# Patient Record
Sex: FEMALE | Race: WHITE | NOT HISPANIC OR LATINO | Employment: OTHER | URBAN - METROPOLITAN AREA
[De-identification: names, ages, dates, MRNs, and addresses within clinical notes are randomized per-mention and may not be internally consistent; named-entity substitution may affect disease eponyms.]

---

## 2017-02-01 ENCOUNTER — COMMUNICATION - HEALTHEAST (OUTPATIENT)
Dept: INTERNAL MEDICINE | Facility: CLINIC | Age: 82
End: 2017-02-01

## 2017-02-01 DIAGNOSIS — E78.5 DYSLIPIDEMIA: ICD-10-CM

## 2017-02-08 ENCOUNTER — COMMUNICATION - HEALTHEAST (OUTPATIENT)
Dept: INTERNAL MEDICINE | Facility: CLINIC | Age: 82
End: 2017-02-08

## 2017-02-08 DIAGNOSIS — N39.0 RECURRENT UTI: ICD-10-CM

## 2017-03-09 ENCOUNTER — COMMUNICATION - HEALTHEAST (OUTPATIENT)
Dept: INTERNAL MEDICINE | Facility: CLINIC | Age: 82
End: 2017-03-09

## 2017-03-09 DIAGNOSIS — I25.10 CAD (CORONARY ARTERY DISEASE): ICD-10-CM

## 2017-04-10 ENCOUNTER — COMMUNICATION - HEALTHEAST (OUTPATIENT)
Dept: INTERNAL MEDICINE | Facility: CLINIC | Age: 82
End: 2017-04-10

## 2017-04-10 DIAGNOSIS — G47.00 INSOMNIA: ICD-10-CM

## 2017-04-13 ENCOUNTER — RECORDS - HEALTHEAST (OUTPATIENT)
Dept: ADMINISTRATIVE | Facility: OTHER | Age: 82
End: 2017-04-13

## 2017-04-20 ENCOUNTER — RECORDS - HEALTHEAST (OUTPATIENT)
Dept: ADMINISTRATIVE | Facility: OTHER | Age: 82
End: 2017-04-20

## 2017-04-26 ENCOUNTER — RECORDS - HEALTHEAST (OUTPATIENT)
Dept: ADMINISTRATIVE | Facility: OTHER | Age: 82
End: 2017-04-26

## 2017-05-05 ENCOUNTER — OFFICE VISIT - HEALTHEAST (OUTPATIENT)
Dept: PULMONOLOGY | Facility: OTHER | Age: 82
End: 2017-05-05

## 2017-05-05 DIAGNOSIS — J45.909 ASTHMA: ICD-10-CM

## 2017-05-17 ENCOUNTER — RECORDS - HEALTHEAST (OUTPATIENT)
Dept: ADMINISTRATIVE | Facility: OTHER | Age: 82
End: 2017-05-17

## 2017-06-14 ENCOUNTER — RECORDS - HEALTHEAST (OUTPATIENT)
Dept: ADMINISTRATIVE | Facility: OTHER | Age: 82
End: 2017-06-14

## 2017-06-16 ENCOUNTER — OFFICE VISIT - HEALTHEAST (OUTPATIENT)
Dept: INTERNAL MEDICINE | Facility: CLINIC | Age: 82
End: 2017-06-16

## 2017-06-16 DIAGNOSIS — E78.00 PURE HYPERCHOLESTEROLEMIA: ICD-10-CM

## 2017-06-16 DIAGNOSIS — M81.0 SENILE OSTEOPOROSIS: ICD-10-CM

## 2017-06-16 DIAGNOSIS — S62.109A WRIST FRACTURE: ICD-10-CM

## 2017-06-16 DIAGNOSIS — I10 ESSENTIAL HYPERTENSION WITH GOAL BLOOD PRESSURE LESS THAN 140/90: ICD-10-CM

## 2017-06-16 DIAGNOSIS — J45.40 MODERATE PERSISTENT ASTHMA WITHOUT COMPLICATION: ICD-10-CM

## 2017-06-16 DIAGNOSIS — I25.10 CAD (CORONARY ARTERY DISEASE): ICD-10-CM

## 2017-06-16 LAB
CHOLEST SERPL-MCNC: 205 MG/DL
FASTING STATUS PATIENT QL REPORTED: YES
HDLC SERPL-MCNC: 73 MG/DL
LDLC SERPL CALC-MCNC: 109 MG/DL
TRIGL SERPL-MCNC: 114 MG/DL

## 2017-06-17 ENCOUNTER — COMMUNICATION - HEALTHEAST (OUTPATIENT)
Dept: INTERNAL MEDICINE | Facility: CLINIC | Age: 82
End: 2017-06-17

## 2017-06-17 DIAGNOSIS — I10 ESSENTIAL HYPERTENSION WITH GOAL BLOOD PRESSURE LESS THAN 140/90: ICD-10-CM

## 2017-06-26 ENCOUNTER — OFFICE VISIT - HEALTHEAST (OUTPATIENT)
Dept: INTERNAL MEDICINE | Facility: CLINIC | Age: 82
End: 2017-06-26

## 2017-06-26 DIAGNOSIS — E78.5 DYSLIPIDEMIA: ICD-10-CM

## 2017-06-26 DIAGNOSIS — N63.0 BREAST LUMP: ICD-10-CM

## 2017-06-26 DIAGNOSIS — Z48.02 VISIT FOR SUTURE REMOVAL: ICD-10-CM

## 2017-06-27 ENCOUNTER — COMMUNICATION - HEALTHEAST (OUTPATIENT)
Dept: INTERNAL MEDICINE | Facility: CLINIC | Age: 82
End: 2017-06-27

## 2017-06-28 ENCOUNTER — RECORDS - HEALTHEAST (OUTPATIENT)
Dept: ADMINISTRATIVE | Facility: OTHER | Age: 82
End: 2017-06-28

## 2017-07-11 ENCOUNTER — HOSPITAL ENCOUNTER (OUTPATIENT)
Dept: MAMMOGRAPHY | Facility: HOSPITAL | Age: 82
Discharge: HOME | End: 2017-07-11
Attending: INTERNAL MEDICINE

## 2017-07-11 ENCOUNTER — COMMUNICATION - HEALTHEAST (OUTPATIENT)
Dept: INTERNAL MEDICINE | Facility: CLINIC | Age: 82
End: 2017-07-11

## 2017-07-11 DIAGNOSIS — N63.0 BREAST LUMP: ICD-10-CM

## 2017-07-13 ENCOUNTER — HOSPITAL ENCOUNTER (OUTPATIENT)
Dept: MAMMOGRAPHY | Facility: HOSPITAL | Age: 82
Discharge: HOME | End: 2017-07-13
Attending: INTERNAL MEDICINE

## 2017-07-13 DIAGNOSIS — N63.0 BREAST LUMP: ICD-10-CM

## 2017-07-14 ENCOUNTER — COMMUNICATION - HEALTHEAST (OUTPATIENT)
Dept: MAMMOGRAPHY | Facility: HOSPITAL | Age: 82
End: 2017-07-14

## 2017-07-14 LAB
LAB AP CHARGES (HE HISTORICAL CONVERSION): ABNORMAL
PATH REPORT.COMMENTS IMP SPEC: ABNORMAL
PATH REPORT.COMMENTS IMP SPEC: ABNORMAL
PATH REPORT.FINAL DX SPEC: ABNORMAL
PATH REPORT.GROSS SPEC: ABNORMAL
PATH REPORT.MICROSCOPIC SPEC OTHER STN: ABNORMAL
PATH REPORT.MICROSCOPIC SPEC OTHER STN: ABNORMAL
PATH REPORT.RELEVANT HX SPEC: ABNORMAL
RESULT FLAG (HE HISTORICAL CONVERSION): ABNORMAL

## 2017-07-25 ENCOUNTER — COMMUNICATION - HEALTHEAST (OUTPATIENT)
Dept: INTERNAL MEDICINE | Facility: CLINIC | Age: 82
End: 2017-07-25

## 2017-07-25 ENCOUNTER — OFFICE VISIT - HEALTHEAST (OUTPATIENT)
Dept: SURGERY | Facility: CLINIC | Age: 82
End: 2017-07-25

## 2017-07-25 DIAGNOSIS — C50.512 MALIGNANT NEOPLASM OF LOWER-OUTER QUADRANT OF LEFT FEMALE BREAST (H): ICD-10-CM

## 2017-07-26 ENCOUNTER — RECORDS - HEALTHEAST (OUTPATIENT)
Dept: ADMINISTRATIVE | Facility: OTHER | Age: 82
End: 2017-07-26

## 2017-07-27 ENCOUNTER — OFFICE VISIT - HEALTHEAST (OUTPATIENT)
Dept: FAMILY MEDICINE | Facility: CLINIC | Age: 82
End: 2017-07-27

## 2017-07-27 ENCOUNTER — AMBULATORY - HEALTHEAST (OUTPATIENT)
Dept: SURGERY | Facility: CLINIC | Age: 82
End: 2017-07-27

## 2017-07-27 DIAGNOSIS — C50.912 BREAST CANCER, LEFT (H): ICD-10-CM

## 2017-07-27 DIAGNOSIS — Z01.818 PRE-OP EXAM: ICD-10-CM

## 2017-07-27 DIAGNOSIS — R94.31 EKG ABNORMALITY: ICD-10-CM

## 2017-07-28 ENCOUNTER — RECORDS - HEALTHEAST (OUTPATIENT)
Dept: ADMINISTRATIVE | Facility: OTHER | Age: 82
End: 2017-07-28

## 2017-07-28 ENCOUNTER — HOSPITAL ENCOUNTER (OUTPATIENT)
Dept: CARDIOLOGY | Facility: CLINIC | Age: 82
Discharge: HOME | End: 2017-07-28

## 2017-07-28 DIAGNOSIS — R94.31 EKG ABNORMALITY: ICD-10-CM

## 2017-07-28 LAB
AORTIC ROOT: 4.1 CM
AORTIC VALVE MEAN VELOCITY: 78.2 CM/S
AR DECEL SLOPE: 1800 MM/S2
AR PEAK VELOCITY: 342 CM/S
ASCENDING AORTA: 4.1 CM
ATRIAL RATE - MUSE: 61 BPM
AV DIMENSIONLESS INDEX VTI: 0.7
AV MEAN GRADIENT: 3 MMHG
AV PEAK GRADIENT: 5.8 MMHG
AV REGURGITANT PEAK GRADIENT: 46.8 MMHG
AV REGURGITATION PRESSURE HALF TIME: 568 MS
AV VALVE AREA: 2.2 CM2
AV VELOCITY RATIO: 0.7
BSA FOR ECHO PROCEDURE: 1.76 M2
DIASTOLIC BLOOD PRESSURE - MUSE: NORMAL MMHG
DOP CALC AO PEAK VEL: 120 CM/S
DOP CALC AO VTI: 30.7 CM
DOP CALC LVOT AREA: 3.14 CM2
DOP CALC LVOT DIAMETER: 2 CM
DOP CALC LVOT PEAK VEL: 78.2 CM/S
DOP CALC LVOT STROKE VOLUME: 66.3 CM3
DOP CALCLVOT PEAK VEL VTI: 21.1 CM
EJECTION FRACTION: 53 % (ref 55–75)
FRACTIONAL SHORTENING: 32 % (ref 28–44)
INTERPRETATION ECG - MUSE: NORMAL
INTERVENTRICULAR SEPTUM IN END DIASTOLE: 1.2 CM (ref 0.6–0.9)
IVS/PW RATIO: 1
LA AREA 1: 18.4 CM2
LA AREA 2: 18.3 CM2
LEFT ATRIUM LENGTH: 5.25 CM
LEFT ATRIUM SIZE: 3.4 CM
LEFT ATRIUM VOLUME INDEX: 31 ML/M2
LEFT ATRIUM VOLUME: 54.5 CM3
LEFT VENTRICLE DIASTOLIC VOLUME INDEX: 37.5 CM3/M2 (ref 34–74)
LEFT VENTRICLE DIASTOLIC VOLUME: 66 CM3 (ref 46–106)
LEFT VENTRICLE MASS INDEX: 132.8 G/M2
LEFT VENTRICLE SYSTOLIC VOLUME INDEX: 17.6 CM3/M2 (ref 11–31)
LEFT VENTRICLE SYSTOLIC VOLUME: 31 CM3 (ref 14–42)
LEFT VENTRICULAR INTERNAL DIMENSION IN DIASTOLE: 5 CM (ref 3.8–5.2)
LEFT VENTRICULAR INTERNAL DIMENSION IN SYSTOLE: 3.4 CM (ref 2.2–3.5)
LEFT VENTRICULAR MASS: 233.7 G
LEFT VENTRICULAR OUTFLOW TRACT MEAN GRADIENT: 1 MMHG
LEFT VENTRICULAR OUTFLOW TRACT MEAN VELOCITY: 54.6 CM/S
LEFT VENTRICULAR OUTFLOW TRACT PEAK GRADIENT: 2 MMHG
LEFT VENTRICULAR POSTERIOR WALL IN END DIASTOLE: 1.2 CM (ref 0.6–0.9)
LV STROKE VOLUME INDEX: 37.6 ML/M2
MITRAL VALVE E/A RATIO: 0.5
MV AVERAGE E/E' RATIO: 12.3 CM/S
MV DECELERATION TIME: 398 MS
MV E'TISSUE VEL-LAT: 4.48 CM/S
MV E'TISSUE VEL-MED: 4.58 CM/S
MV LATERAL E/E' RATIO: 12.5
MV MEDIAL E/E' RATIO: 12.2
MV PEAK A VELOCITY: 102 CM/S
MV PEAK E VELOCITY: 55.8 CM/S
P AXIS - MUSE: NORMAL DEGREES
PR INTERVAL - MUSE: NORMAL MS
QRS DURATION - MUSE: 84 MS
QT - MUSE: 460 MS
QTC - MUSE: 463 MS
R AXIS - MUSE: -9 DEGREES
SYSTOLIC BLOOD PRESSURE - MUSE: NORMAL MMHG
T AXIS - MUSE: 21 DEGREES
TRICUSPID REGURGITATION PEAK PRESSURE GRADIENT: 33.2 MMHG
TRICUSPID VALVE ANULAR PLANE SYSTOLIC EXCURSION: 2.1 CM
TRICUSPID VALVE PEAK REGURGITANT VELOCITY: 288 CM/S
VENTRICULAR RATE- MUSE: 61 BPM

## 2017-07-31 ENCOUNTER — COMMUNICATION - HEALTHEAST (OUTPATIENT)
Dept: SCHEDULING | Facility: CLINIC | Age: 82
End: 2017-07-31

## 2017-08-01 ENCOUNTER — COMMUNICATION - HEALTHEAST (OUTPATIENT)
Dept: INTERNAL MEDICINE | Facility: CLINIC | Age: 82
End: 2017-08-01

## 2017-08-01 DIAGNOSIS — J45.40 MODERATE PERSISTENT ASTHMA, UNCOMPLICATED: ICD-10-CM

## 2017-08-02 ENCOUNTER — HOSPITAL ENCOUNTER (OUTPATIENT)
Dept: NUCLEAR MEDICINE | Facility: HOSPITAL | Age: 82
Discharge: HOME | End: 2017-08-02
Attending: SPECIALIST

## 2017-08-02 ENCOUNTER — RECORDS - HEALTHEAST (OUTPATIENT)
Dept: ADMINISTRATIVE | Facility: OTHER | Age: 82
End: 2017-08-02

## 2017-08-02 ENCOUNTER — HOSPITAL ENCOUNTER (OUTPATIENT)
Dept: MAMMOGRAPHY | Facility: HOSPITAL | Age: 82
Discharge: HOME | End: 2017-08-02
Attending: SPECIALIST

## 2017-08-02 DIAGNOSIS — C50.512 MALIGNANT NEOPLASM OF LOWER-OUTER QUADRANT OF LEFT FEMALE BREAST (H): ICD-10-CM

## 2017-08-10 ENCOUNTER — OFFICE VISIT - HEALTHEAST (OUTPATIENT)
Dept: SURGERY | Facility: CLINIC | Age: 82
End: 2017-08-10

## 2017-08-10 DIAGNOSIS — C50.512 MALIGNANT NEOPLASM OF LOWER-OUTER QUADRANT OF LEFT FEMALE BREAST (H): ICD-10-CM

## 2017-08-11 ENCOUNTER — COMMUNICATION - HEALTHEAST (OUTPATIENT)
Dept: ONCOLOGY | Facility: HOSPITAL | Age: 82
End: 2017-08-11

## 2017-08-18 ENCOUNTER — OFFICE VISIT - HEALTHEAST (OUTPATIENT)
Dept: ONCOLOGY | Facility: HOSPITAL | Age: 82
End: 2017-08-18

## 2017-08-18 DIAGNOSIS — C50.412 MALIGNANT NEOPLASM OF UPPER-OUTER QUADRANT OF LEFT FEMALE BREAST (H): ICD-10-CM

## 2017-08-18 ASSESSMENT — MIFFLIN-ST. JEOR: SCORE: 1119.19

## 2017-08-22 ENCOUNTER — COMMUNICATION - HEALTHEAST (OUTPATIENT)
Dept: ONCOLOGY | Facility: HOSPITAL | Age: 82
End: 2017-08-22

## 2017-08-24 ENCOUNTER — OFFICE VISIT - HEALTHEAST (OUTPATIENT)
Dept: PULMONOLOGY | Facility: OTHER | Age: 82
End: 2017-08-24

## 2017-08-24 DIAGNOSIS — J45.909 ASTHMA: ICD-10-CM

## 2017-08-29 ENCOUNTER — OFFICE VISIT - HEALTHEAST (OUTPATIENT)
Dept: CARDIOLOGY | Facility: CLINIC | Age: 82
End: 2017-08-29

## 2017-08-29 DIAGNOSIS — I10 ESSENTIAL HYPERTENSION: ICD-10-CM

## 2017-08-29 DIAGNOSIS — I25.10 CORONARY ARTERY DISEASE INVOLVING NATIVE CORONARY ARTERY OF NATIVE HEART WITHOUT ANGINA PECTORIS: ICD-10-CM

## 2017-08-29 DIAGNOSIS — E78.5 DYSLIPIDEMIA: ICD-10-CM

## 2017-08-29 LAB
ATRIAL RATE - MUSE: 63 BPM
DIASTOLIC BLOOD PRESSURE - MUSE: NORMAL MMHG
INTERPRETATION ECG - MUSE: NORMAL
P AXIS - MUSE: 63 DEGREES
PR INTERVAL - MUSE: 188 MS
QRS DURATION - MUSE: 86 MS
QT - MUSE: 432 MS
QTC - MUSE: 442 MS
R AXIS - MUSE: -20 DEGREES
SYSTOLIC BLOOD PRESSURE - MUSE: NORMAL MMHG
T AXIS - MUSE: -2 DEGREES
VENTRICULAR RATE- MUSE: 63 BPM

## 2017-08-29 ASSESSMENT — MIFFLIN-ST. JEOR: SCORE: 1116.02

## 2017-08-31 ENCOUNTER — OFFICE VISIT - HEALTHEAST (OUTPATIENT)
Dept: RADIATION ONCOLOGY | Facility: CLINIC | Age: 82
End: 2017-08-31

## 2017-08-31 DIAGNOSIS — C50.412 MALIGNANT NEOPLASM OF UPPER-OUTER QUADRANT OF LEFT FEMALE BREAST (H): ICD-10-CM

## 2017-08-31 ASSESSMENT — MIFFLIN-ST. JEOR: SCORE: 1117.83

## 2017-09-06 ENCOUNTER — COMMUNICATION - HEALTHEAST (OUTPATIENT)
Dept: PULMONOLOGY | Facility: OTHER | Age: 82
End: 2017-09-06

## 2017-09-06 DIAGNOSIS — J45.909 ASTHMA: ICD-10-CM

## 2017-09-07 ENCOUNTER — AMBULATORY - HEALTHEAST (OUTPATIENT)
Dept: RADIATION ONCOLOGY | Facility: HOSPITAL | Age: 82
End: 2017-09-07

## 2017-09-11 ENCOUNTER — COMMUNICATION - HEALTHEAST (OUTPATIENT)
Dept: INTERNAL MEDICINE | Facility: CLINIC | Age: 82
End: 2017-09-11

## 2017-09-13 ENCOUNTER — OFFICE VISIT - HEALTHEAST (OUTPATIENT)
Dept: RADIATION ONCOLOGY | Facility: CLINIC | Age: 82
End: 2017-09-13

## 2017-09-13 DIAGNOSIS — C50.412 MALIGNANT NEOPLASM OF UPPER-OUTER QUADRANT OF LEFT FEMALE BREAST (H): ICD-10-CM

## 2017-09-15 ENCOUNTER — AMBULATORY - HEALTHEAST (OUTPATIENT)
Dept: PULMONOLOGY | Facility: OTHER | Age: 82
End: 2017-09-15

## 2017-09-15 DIAGNOSIS — J45.909 ASTHMA: ICD-10-CM

## 2017-09-20 ENCOUNTER — COMMUNICATION - HEALTHEAST (OUTPATIENT)
Dept: ONCOLOGY | Facility: HOSPITAL | Age: 82
End: 2017-09-20

## 2017-09-20 ENCOUNTER — OFFICE VISIT - HEALTHEAST (OUTPATIENT)
Dept: RADIATION ONCOLOGY | Facility: CLINIC | Age: 82
End: 2017-09-20

## 2017-09-20 DIAGNOSIS — C50.412 MALIGNANT NEOPLASM OF UPPER-OUTER QUADRANT OF LEFT FEMALE BREAST (H): ICD-10-CM

## 2017-09-21 ENCOUNTER — RECORDS - HEALTHEAST (OUTPATIENT)
Dept: ADMINISTRATIVE | Facility: OTHER | Age: 82
End: 2017-09-21

## 2017-09-27 ENCOUNTER — COMMUNICATION - HEALTHEAST (OUTPATIENT)
Dept: INTERNAL MEDICINE | Facility: CLINIC | Age: 82
End: 2017-09-27

## 2017-09-27 ENCOUNTER — OFFICE VISIT - HEALTHEAST (OUTPATIENT)
Dept: RADIATION ONCOLOGY | Facility: CLINIC | Age: 82
End: 2017-09-27

## 2017-09-27 ENCOUNTER — RECORDS - HEALTHEAST (OUTPATIENT)
Dept: ADMINISTRATIVE | Facility: OTHER | Age: 82
End: 2017-09-27

## 2017-09-27 ENCOUNTER — RECORDS - HEALTHEAST (OUTPATIENT)
Dept: BONE DENSITY | Facility: CLINIC | Age: 82
End: 2017-09-27

## 2017-09-27 DIAGNOSIS — G47.00 INSOMNIA: ICD-10-CM

## 2017-09-27 DIAGNOSIS — M81.0 AGE-RELATED OSTEOPOROSIS WITHOUT CURRENT PATHOLOGICAL FRACTURE: ICD-10-CM

## 2017-09-27 DIAGNOSIS — C50.412 MALIGNANT NEOPLASM OF UPPER-OUTER QUADRANT OF LEFT FEMALE BREAST (H): ICD-10-CM

## 2017-10-04 ENCOUNTER — OFFICE VISIT - HEALTHEAST (OUTPATIENT)
Dept: RADIATION ONCOLOGY | Facility: CLINIC | Age: 82
End: 2017-10-04

## 2017-10-04 ENCOUNTER — AMBULATORY - HEALTHEAST (OUTPATIENT)
Dept: RADIATION ONCOLOGY | Facility: CLINIC | Age: 82
End: 2017-10-04

## 2017-10-04 DIAGNOSIS — C50.412 MALIGNANT NEOPLASM OF UPPER-OUTER QUADRANT OF LEFT FEMALE BREAST (H): ICD-10-CM

## 2017-10-06 ENCOUNTER — AMBULATORY - HEALTHEAST (OUTPATIENT)
Dept: NURSING | Facility: CLINIC | Age: 82
End: 2017-10-06

## 2017-10-11 ENCOUNTER — COMMUNICATION - HEALTHEAST (OUTPATIENT)
Dept: RADIATION ONCOLOGY | Facility: CLINIC | Age: 82
End: 2017-10-11

## 2017-10-19 ENCOUNTER — OFFICE VISIT - HEALTHEAST (OUTPATIENT)
Dept: ONCOLOGY | Facility: HOSPITAL | Age: 82
End: 2017-10-19

## 2017-10-19 DIAGNOSIS — C50.412 MALIGNANT NEOPLASM OF UPPER-OUTER QUADRANT OF LEFT FEMALE BREAST (H): ICD-10-CM

## 2017-10-30 ENCOUNTER — COMMUNICATION - HEALTHEAST (OUTPATIENT)
Dept: ONCOLOGY | Facility: HOSPITAL | Age: 82
End: 2017-10-30

## 2017-11-03 ENCOUNTER — OFFICE VISIT - HEALTHEAST (OUTPATIENT)
Dept: RADIATION ONCOLOGY | Facility: HOSPITAL | Age: 82
End: 2017-11-03

## 2017-11-03 DIAGNOSIS — C50.412 MALIGNANT NEOPLASM OF UPPER-OUTER QUADRANT OF LEFT FEMALE BREAST (H): ICD-10-CM

## 2017-11-10 ENCOUNTER — OFFICE VISIT - HEALTHEAST (OUTPATIENT)
Dept: PULMONOLOGY | Facility: OTHER | Age: 82
End: 2017-11-10

## 2017-11-10 DIAGNOSIS — J45.909 ASTHMA: ICD-10-CM

## 2017-11-14 ENCOUNTER — COMMUNICATION - HEALTHEAST (OUTPATIENT)
Dept: INTERNAL MEDICINE | Facility: CLINIC | Age: 82
End: 2017-11-14

## 2017-11-20 ENCOUNTER — COMMUNICATION - HEALTHEAST (OUTPATIENT)
Dept: ONCOLOGY | Facility: CLINIC | Age: 82
End: 2017-11-20

## 2017-12-20 ENCOUNTER — OFFICE VISIT - HEALTHEAST (OUTPATIENT)
Dept: INTERNAL MEDICINE | Facility: CLINIC | Age: 82
End: 2017-12-20

## 2017-12-20 DIAGNOSIS — I10 ESSENTIAL HYPERTENSION: ICD-10-CM

## 2017-12-20 DIAGNOSIS — Z79.811 AROMATASE INHIBITOR USE: ICD-10-CM

## 2017-12-20 DIAGNOSIS — M81.0 SENILE OSTEOPOROSIS: ICD-10-CM

## 2017-12-20 DIAGNOSIS — I25.10 CAD (CORONARY ARTERY DISEASE): ICD-10-CM

## 2017-12-20 DIAGNOSIS — R29.6 FREQUENT FALLS: ICD-10-CM

## 2017-12-20 DIAGNOSIS — E78.5 DYSLIPIDEMIA: ICD-10-CM

## 2017-12-20 DIAGNOSIS — C50.412 MALIGNANT NEOPLASM OF UPPER-OUTER QUADRANT OF LEFT FEMALE BREAST (H): ICD-10-CM

## 2017-12-20 DIAGNOSIS — J45.40 MODERATE PERSISTENT ASTHMA WITHOUT COMPLICATION: ICD-10-CM

## 2017-12-20 LAB
CHOLEST SERPL-MCNC: 202 MG/DL
FASTING STATUS PATIENT QL REPORTED: YES
HDLC SERPL-MCNC: 71 MG/DL
LDLC SERPL CALC-MCNC: 102 MG/DL
TRIGL SERPL-MCNC: 146 MG/DL

## 2017-12-22 ENCOUNTER — COMMUNICATION - HEALTHEAST (OUTPATIENT)
Dept: INTERNAL MEDICINE | Facility: CLINIC | Age: 82
End: 2017-12-22

## 2017-12-27 ENCOUNTER — RECORDS - HEALTHEAST (OUTPATIENT)
Dept: ADMINISTRATIVE | Facility: OTHER | Age: 82
End: 2017-12-27

## 2017-12-30 ENCOUNTER — COMMUNICATION - HEALTHEAST (OUTPATIENT)
Dept: INTERNAL MEDICINE | Facility: CLINIC | Age: 82
End: 2017-12-30

## 2017-12-30 DIAGNOSIS — G47.00 INSOMNIA: ICD-10-CM

## 2018-01-11 ENCOUNTER — OFFICE VISIT - HEALTHEAST (OUTPATIENT)
Dept: PHYSICAL THERAPY | Facility: REHABILITATION | Age: 83
End: 2018-01-11

## 2018-01-11 DIAGNOSIS — R29.6 FREQUENT FALLS: ICD-10-CM

## 2018-01-11 DIAGNOSIS — Z74.09 IMPAIRED FUNCTIONAL MOBILITY, BALANCE, GAIT, AND ENDURANCE: ICD-10-CM

## 2018-01-17 ENCOUNTER — OFFICE VISIT - HEALTHEAST (OUTPATIENT)
Dept: PHYSICAL THERAPY | Facility: REHABILITATION | Age: 83
End: 2018-01-17

## 2018-01-17 DIAGNOSIS — Z74.09 IMPAIRED FUNCTIONAL MOBILITY, BALANCE, GAIT, AND ENDURANCE: ICD-10-CM

## 2018-01-17 DIAGNOSIS — R29.6 FREQUENT FALLS: ICD-10-CM

## 2018-01-18 ENCOUNTER — OFFICE VISIT - HEALTHEAST (OUTPATIENT)
Dept: ONCOLOGY | Facility: HOSPITAL | Age: 83
End: 2018-01-18

## 2018-01-18 DIAGNOSIS — C50.412 MALIGNANT NEOPLASM OF UPPER-OUTER QUADRANT OF LEFT FEMALE BREAST (H): ICD-10-CM

## 2018-01-19 ENCOUNTER — OFFICE VISIT - HEALTHEAST (OUTPATIENT)
Dept: PHYSICAL THERAPY | Facility: REHABILITATION | Age: 83
End: 2018-01-19

## 2018-01-19 DIAGNOSIS — Z74.09 IMPAIRED FUNCTIONAL MOBILITY, BALANCE, GAIT, AND ENDURANCE: ICD-10-CM

## 2018-01-19 DIAGNOSIS — R29.6 FREQUENT FALLS: ICD-10-CM

## 2018-01-24 ENCOUNTER — OFFICE VISIT - HEALTHEAST (OUTPATIENT)
Dept: PHYSICAL THERAPY | Facility: REHABILITATION | Age: 83
End: 2018-01-24

## 2018-01-24 DIAGNOSIS — Z74.09 IMPAIRED FUNCTIONAL MOBILITY, BALANCE, GAIT, AND ENDURANCE: ICD-10-CM

## 2018-01-24 DIAGNOSIS — R29.6 FREQUENT FALLS: ICD-10-CM

## 2018-01-26 ENCOUNTER — OFFICE VISIT - HEALTHEAST (OUTPATIENT)
Dept: PHYSICAL THERAPY | Facility: REHABILITATION | Age: 83
End: 2018-01-26

## 2018-01-26 DIAGNOSIS — R29.6 FREQUENT FALLS: ICD-10-CM

## 2018-01-26 DIAGNOSIS — Z74.09 IMPAIRED FUNCTIONAL MOBILITY, BALANCE, GAIT, AND ENDURANCE: ICD-10-CM

## 2018-01-31 ENCOUNTER — OFFICE VISIT - HEALTHEAST (OUTPATIENT)
Dept: PHYSICAL THERAPY | Facility: REHABILITATION | Age: 83
End: 2018-01-31

## 2018-01-31 DIAGNOSIS — Z74.09 IMPAIRED FUNCTIONAL MOBILITY, BALANCE, GAIT, AND ENDURANCE: ICD-10-CM

## 2018-01-31 DIAGNOSIS — R29.6 FREQUENT FALLS: ICD-10-CM

## 2018-02-14 ENCOUNTER — OFFICE VISIT - HEALTHEAST (OUTPATIENT)
Dept: PHYSICAL THERAPY | Facility: REHABILITATION | Age: 83
End: 2018-02-14

## 2018-02-14 DIAGNOSIS — Z74.09 IMPAIRED FUNCTIONAL MOBILITY, BALANCE, GAIT, AND ENDURANCE: ICD-10-CM

## 2018-02-14 DIAGNOSIS — R29.6 FREQUENT FALLS: ICD-10-CM

## 2018-03-16 ENCOUNTER — OFFICE VISIT - HEALTHEAST (OUTPATIENT)
Dept: PULMONOLOGY | Facility: OTHER | Age: 83
End: 2018-03-16

## 2018-03-16 DIAGNOSIS — J45.909 ASTHMA: ICD-10-CM

## 2018-04-19 ENCOUNTER — OFFICE VISIT - HEALTHEAST (OUTPATIENT)
Dept: ONCOLOGY | Facility: HOSPITAL | Age: 83
End: 2018-04-19

## 2018-04-19 DIAGNOSIS — Z17.0 MALIGNANT NEOPLASM OF UPPER-OUTER QUADRANT OF LEFT BREAST IN FEMALE, ESTROGEN RECEPTOR POSITIVE (H): ICD-10-CM

## 2018-04-19 DIAGNOSIS — C50.412 MALIGNANT NEOPLASM OF UPPER-OUTER QUADRANT OF LEFT BREAST IN FEMALE, ESTROGEN RECEPTOR POSITIVE (H): ICD-10-CM

## 2018-05-09 ENCOUNTER — COMMUNICATION - HEALTHEAST (OUTPATIENT)
Dept: ONCOLOGY | Facility: HOSPITAL | Age: 83
End: 2018-05-09

## 2018-05-25 ENCOUNTER — COMMUNICATION - HEALTHEAST (OUTPATIENT)
Dept: INTERNAL MEDICINE | Facility: CLINIC | Age: 83
End: 2018-05-25

## 2018-05-25 DIAGNOSIS — N39.0 RECURRENT UTI: ICD-10-CM

## 2018-05-27 ENCOUNTER — OFFICE VISIT - HEALTHEAST (OUTPATIENT)
Dept: FAMILY MEDICINE | Facility: CLINIC | Age: 83
End: 2018-05-27

## 2018-05-27 DIAGNOSIS — J22 LOWER RESPIRATORY INFECTION: ICD-10-CM

## 2018-05-27 DIAGNOSIS — J45.901 ASTHMA EXACERBATION: ICD-10-CM

## 2018-05-29 ENCOUNTER — COMMUNICATION - HEALTHEAST (OUTPATIENT)
Dept: INTERNAL MEDICINE | Facility: CLINIC | Age: 83
End: 2018-05-29

## 2018-05-29 DIAGNOSIS — N39.0 RECURRENT UTI: ICD-10-CM

## 2018-06-14 ENCOUNTER — OFFICE VISIT - HEALTHEAST (OUTPATIENT)
Dept: INTERNAL MEDICINE | Facility: CLINIC | Age: 83
End: 2018-06-14

## 2018-06-14 DIAGNOSIS — R06.09 OTHER FORMS OF DYSPNEA: ICD-10-CM

## 2018-06-14 DIAGNOSIS — I10 ESSENTIAL HYPERTENSION: ICD-10-CM

## 2018-06-14 DIAGNOSIS — E78.00 PURE HYPERCHOLESTEROLEMIA: ICD-10-CM

## 2018-06-14 DIAGNOSIS — I10 ESSENTIAL HYPERTENSION WITH GOAL BLOOD PRESSURE LESS THAN 140/90: ICD-10-CM

## 2018-06-14 DIAGNOSIS — R06.09 DYSPNEA ON EXERTION: ICD-10-CM

## 2018-06-14 DIAGNOSIS — C50.412 MALIGNANT NEOPLASM OF UPPER-OUTER QUADRANT OF LEFT BREAST IN FEMALE, ESTROGEN RECEPTOR POSITIVE (H): ICD-10-CM

## 2018-06-14 DIAGNOSIS — M81.0 SENILE OSTEOPOROSIS: ICD-10-CM

## 2018-06-14 DIAGNOSIS — I25.10 CAD (CORONARY ARTERY DISEASE): ICD-10-CM

## 2018-06-14 DIAGNOSIS — J45.40 MODERATE PERSISTENT ASTHMA WITHOUT COMPLICATION: ICD-10-CM

## 2018-06-14 DIAGNOSIS — R60.0 LEG EDEMA: ICD-10-CM

## 2018-06-14 DIAGNOSIS — Z17.0 MALIGNANT NEOPLASM OF UPPER-OUTER QUADRANT OF LEFT BREAST IN FEMALE, ESTROGEN RECEPTOR POSITIVE (H): ICD-10-CM

## 2018-06-15 ENCOUNTER — COMMUNICATION - HEALTHEAST (OUTPATIENT)
Dept: INTERNAL MEDICINE | Facility: CLINIC | Age: 83
End: 2018-06-15

## 2018-06-18 ENCOUNTER — AMBULATORY - HEALTHEAST (OUTPATIENT)
Dept: LAB | Facility: CLINIC | Age: 83
End: 2018-06-18

## 2018-06-18 DIAGNOSIS — I10 ESSENTIAL HYPERTENSION: ICD-10-CM

## 2018-06-18 DIAGNOSIS — C50.412 MALIGNANT NEOPLASM OF UPPER-OUTER QUADRANT OF LEFT BREAST IN FEMALE, ESTROGEN RECEPTOR POSITIVE (H): ICD-10-CM

## 2018-06-18 DIAGNOSIS — Z17.0 MALIGNANT NEOPLASM OF UPPER-OUTER QUADRANT OF LEFT BREAST IN FEMALE, ESTROGEN RECEPTOR POSITIVE (H): ICD-10-CM

## 2018-06-18 DIAGNOSIS — M81.0 SENILE OSTEOPOROSIS: ICD-10-CM

## 2018-06-18 DIAGNOSIS — R06.09 DYSPNEA ON EXERTION: ICD-10-CM

## 2018-06-18 DIAGNOSIS — J45.40 MODERATE PERSISTENT ASTHMA WITHOUT COMPLICATION: ICD-10-CM

## 2018-06-18 DIAGNOSIS — I10 ESSENTIAL HYPERTENSION WITH GOAL BLOOD PRESSURE LESS THAN 140/90: ICD-10-CM

## 2018-06-18 DIAGNOSIS — E78.00 PURE HYPERCHOLESTEROLEMIA: ICD-10-CM

## 2018-06-18 LAB
ALBUMIN SERPL-MCNC: 3.4 G/DL (ref 3.5–5)
ALP SERPL-CCNC: 81 U/L (ref 45–120)
ALT SERPL W P-5'-P-CCNC: 14 U/L (ref 0–45)
ANION GAP SERPL CALCULATED.3IONS-SCNC: 10 MMOL/L (ref 5–18)
AST SERPL W P-5'-P-CCNC: 22 U/L (ref 0–40)
BILIRUB SERPL-MCNC: 0.6 MG/DL (ref 0–1)
BUN SERPL-MCNC: 13 MG/DL (ref 8–28)
CALCIUM SERPL-MCNC: 9.1 MG/DL (ref 8.5–10.5)
CHLORIDE BLD-SCNC: 107 MMOL/L (ref 98–107)
CHOLEST SERPL-MCNC: 191 MG/DL
CO2 SERPL-SCNC: 25 MMOL/L (ref 22–31)
CREAT SERPL-MCNC: 0.78 MG/DL (ref 0.6–1.1)
ERYTHROCYTE [DISTWIDTH] IN BLOOD BY AUTOMATED COUNT: 11.6 % (ref 11–14.5)
FASTING STATUS PATIENT QL REPORTED: YES
GFR SERPL CREATININE-BSD FRML MDRD: >60 ML/MIN/1.73M2
GLUCOSE BLD-MCNC: 89 MG/DL (ref 70–125)
HCT VFR BLD AUTO: 37.9 % (ref 35–47)
HDLC SERPL-MCNC: 60 MG/DL
HGB BLD-MCNC: 12.6 G/DL (ref 12–16)
LDLC SERPL CALC-MCNC: 94 MG/DL
MCH RBC QN AUTO: 31 PG (ref 27–34)
MCHC RBC AUTO-ENTMCNC: 33.2 G/DL (ref 32–36)
MCV RBC AUTO: 93 FL (ref 80–100)
PLATELET # BLD AUTO: 246 THOU/UL (ref 140–440)
PMV BLD AUTO: 7 FL (ref 7–10)
POTASSIUM BLD-SCNC: 4 MMOL/L (ref 3.5–5)
PROT SERPL-MCNC: 6.7 G/DL (ref 6–8)
RBC # BLD AUTO: 4.06 MILL/UL (ref 3.8–5.4)
SODIUM SERPL-SCNC: 142 MMOL/L (ref 136–145)
TRIGL SERPL-MCNC: 183 MG/DL
WBC: 7.5 THOU/UL (ref 4–11)

## 2018-06-25 ENCOUNTER — OFFICE VISIT - HEALTHEAST (OUTPATIENT)
Dept: FAMILY MEDICINE | Facility: CLINIC | Age: 83
End: 2018-06-25

## 2018-06-25 DIAGNOSIS — R30.0 DYSURIA: ICD-10-CM

## 2018-06-25 LAB
ALBUMIN UR-MCNC: NEGATIVE MG/DL
APPEARANCE UR: CLEAR
BACTERIA #/AREA URNS HPF: ABNORMAL HPF
BILIRUB UR QL STRIP: NEGATIVE
COLOR UR AUTO: YELLOW
GLUCOSE UR STRIP-MCNC: NEGATIVE MG/DL
HGB UR QL STRIP: NEGATIVE
KETONES UR STRIP-MCNC: NEGATIVE MG/DL
LEUKOCYTE ESTERASE UR QL STRIP: ABNORMAL
NITRATE UR QL: NEGATIVE
PH UR STRIP: 7 [PH] (ref 5–8)
RBC #/AREA URNS AUTO: ABNORMAL HPF
SP GR UR STRIP: 1.01 (ref 1–1.03)
SQUAMOUS #/AREA URNS AUTO: ABNORMAL LPF
TRANS CELLS #/AREA URNS HPF: ABNORMAL LPF
UROBILINOGEN UR STRIP-ACNC: ABNORMAL
WBC #/AREA URNS AUTO: ABNORMAL HPF

## 2018-06-28 LAB — BACTERIA SPEC CULT: ABNORMAL

## 2018-06-29 ENCOUNTER — OFFICE VISIT - HEALTHEAST (OUTPATIENT)
Dept: INTERNAL MEDICINE | Facility: CLINIC | Age: 83
End: 2018-06-29

## 2018-06-29 DIAGNOSIS — N39.0 URINARY TRACT INFECTION: ICD-10-CM

## 2018-06-29 DIAGNOSIS — J45.40 MODERATE PERSISTENT ASTHMA WITHOUT COMPLICATION: ICD-10-CM

## 2018-07-02 ENCOUNTER — HOSPITAL ENCOUNTER (OUTPATIENT)
Dept: NUCLEAR MEDICINE | Facility: HOSPITAL | Age: 83
Discharge: HOME | End: 2018-07-02
Attending: INTERNAL MEDICINE

## 2018-07-02 ENCOUNTER — HOSPITAL ENCOUNTER (OUTPATIENT)
Dept: CARDIOLOGY | Facility: HOSPITAL | Age: 83
Discharge: HOME | End: 2018-07-02
Attending: INTERNAL MEDICINE

## 2018-07-02 DIAGNOSIS — R06.09 OTHER FORMS OF DYSPNEA: ICD-10-CM

## 2018-07-02 DIAGNOSIS — R06.09 DYSPNEA ON EXERTION: ICD-10-CM

## 2018-07-02 LAB
AORTIC ROOT: 4.4 CM
AORTIC VALVE MEAN VELOCITY: 125 CM/S
AV DIMENSIONLESS INDEX VTI: 0.6
AV MEAN GRADIENT: 7 MMHG
AV PEAK GRADIENT: 9.4 MMHG
AV VALVE AREA: 2.9 CM2
AV VELOCITY RATIO: 0.6
BSA FOR ECHO PROCEDURE: 1.82 M2
CV BLOOD PRESSURE: NORMAL MMHG
CV ECHO HEIGHT: 60 IN
CV ECHO WEIGHT: 172 LBS
CV STRESS MAX HR HE: 91
DOP CALC AO PEAK VEL: 153 CM/S
DOP CALC AO VTI: 31.1 CM
DOP CALC LVOT AREA: 4.91 CM2
DOP CALC LVOT DIAMETER: 2.5 CM
DOP CALC LVOT PEAK VEL: 93.8 CM/S
DOP CALC LVOT STROKE VOLUME: 90.8 CM3
DOP CALCLVOT PEAK VEL VTI: 18.5 CM
ECHO EJECTION FRACTION ESTIMATED: 55 %
EJECTION FRACTION: 53 % (ref 55–75)
FRACTIONAL SHORTENING: 38.1 % (ref 28–44)
INTERVENTRICULAR SEPTUM IN END DIASTOLE: 1.04 CM (ref 0.6–0.9)
IVS/PW RATIO: 0.9
LA AREA 1: 17.8 CM2
LA AREA 2: 13.8 CM2
LEFT ATRIUM LENGTH: 4.4 CM
LEFT ATRIUM SIZE: 4.4 CM
LEFT ATRIUM VOLUME INDEX: 26.1 ML/M2
LEFT ATRIUM VOLUME: 47.5 ML
LEFT VENTRICLE CARDIAC INDEX: 3.5 L/MIN/M2
LEFT VENTRICLE CARDIAC OUTPUT: 6.4 L/MIN
LEFT VENTRICLE DIASTOLIC VOLUME INDEX: 47.4 CM3/M2 (ref 34–74)
LEFT VENTRICLE DIASTOLIC VOLUME: 86.2 CM3 (ref 46–106)
LEFT VENTRICLE HEART RATE: 70 BPM
LEFT VENTRICLE MASS INDEX: 115 G/M2
LEFT VENTRICLE SYSTOLIC VOLUME INDEX: 22.1 CM3/M2 (ref 11–31)
LEFT VENTRICLE SYSTOLIC VOLUME: 40.2 CM3 (ref 14–42)
LEFT VENTRICULAR INTERNAL DIMENSION IN DIASTOLE: 5.15 CM (ref 3.8–5.2)
LEFT VENTRICULAR INTERNAL DIMENSION IN SYSTOLE: 3.19 CM (ref 2.2–3.5)
LEFT VENTRICULAR MASS: 209.3 G
LEFT VENTRICULAR OUTFLOW TRACT MEAN GRADIENT: 3 MMHG
LEFT VENTRICULAR OUTFLOW TRACT MEAN VELOCITY: 77.8 CM/S
LEFT VENTRICULAR OUTFLOW TRACT PEAK GRADIENT: 4 MMHG
LEFT VENTRICULAR POSTERIOR WALL IN END DIASTOLE: 1.1 CM (ref 0.6–0.9)
LV STROKE VOLUME INDEX: 49.9 ML/M2
MITRAL REGURGITANT VELOCITY TIME INTEGRAL: 145 CM
MITRAL VALVE E/A RATIO: 0.6
MR MEAN GRADIENT: 77 MMHG
MR MEAN GRADIENT: 77 MMHG
MR MEAN VELOCITY: 433 CM/S
MR MEAN VELOCITY: 433 CM/S
MR PEAK GRADIENT: 94.1 MMHG
MV AVERAGE E/E' RATIO: 17 CM/S
MV DECELERATION TIME: 292 MS
MV E'TISSUE VEL-LAT: 3.87 CM/S
MV E'TISSUE VEL-MED: 3.58 CM/S
MV LATERAL E/E' RATIO: 16.4
MV MEDIAL E/E' RATIO: 17.7
MV PEAK A VELOCITY: 114 CM/S
MV PEAK E VELOCITY: 63.5 CM/S
NUC REST DIASTOLIC VOLUME INDEX: 2752 LBS
NUC REST SYSTOLIC VOLUME INDEX: 60 IN
NUC STRESS EJECTION FRACTION: 72 %
PISA MR PEAK VEL: 485 CM/S
STRESS ECHO BASELINE BP: NORMAL MM OF HG
STRESS ECHO BASELINE HR: 76 BPM
STRESS ECHO CALCULATED PERCENT HR: 68 %
STRESS ECHO LAST STRESS BP: NORMAL MM OF HG
TRICUSPID VALVE ANULAR PLANE SYSTOLIC EXCURSION: 1.8 CM

## 2018-07-02 ASSESSMENT — MIFFLIN-ST. JEOR: SCORE: 1121.69

## 2018-07-06 ENCOUNTER — COMMUNICATION - HEALTHEAST (OUTPATIENT)
Dept: INTERNAL MEDICINE | Facility: CLINIC | Age: 83
End: 2018-07-06

## 2018-07-13 ENCOUNTER — COMMUNICATION - HEALTHEAST (OUTPATIENT)
Dept: PULMONOLOGY | Facility: OTHER | Age: 83
End: 2018-07-13

## 2018-07-13 DIAGNOSIS — J45.909 ASTHMA: ICD-10-CM

## 2018-07-16 ENCOUNTER — HOSPITAL ENCOUNTER (OUTPATIENT)
Dept: MAMMOGRAPHY | Facility: CLINIC | Age: 83
Discharge: HOME | End: 2018-07-16
Attending: SPECIALIST

## 2018-07-16 DIAGNOSIS — C50.512 MALIGNANT NEOPLASM OF LOWER-OUTER QUADRANT OF LEFT FEMALE BREAST (H): ICD-10-CM

## 2018-07-27 ENCOUNTER — COMMUNICATION - HEALTHEAST (OUTPATIENT)
Dept: INTERNAL MEDICINE | Facility: CLINIC | Age: 83
End: 2018-07-27

## 2018-07-30 ENCOUNTER — COMMUNICATION - HEALTHEAST (OUTPATIENT)
Dept: INTERNAL MEDICINE | Facility: CLINIC | Age: 83
End: 2018-07-30

## 2018-07-30 DIAGNOSIS — I10 ESSENTIAL HYPERTENSION WITH GOAL BLOOD PRESSURE LESS THAN 140/90: ICD-10-CM

## 2018-07-31 ENCOUNTER — COMMUNICATION - HEALTHEAST (OUTPATIENT)
Dept: INTERNAL MEDICINE | Facility: CLINIC | Age: 83
End: 2018-07-31

## 2018-07-31 DIAGNOSIS — I25.10 CAD (CORONARY ARTERY DISEASE): ICD-10-CM

## 2018-08-01 ENCOUNTER — OFFICE VISIT - HEALTHEAST (OUTPATIENT)
Dept: PULMONOLOGY | Facility: OTHER | Age: 83
End: 2018-08-01

## 2018-08-01 DIAGNOSIS — I10 ESSENTIAL HYPERTENSION WITH GOAL BLOOD PRESSURE LESS THAN 140/90: ICD-10-CM

## 2018-08-01 DIAGNOSIS — J84.112 IPF (IDIOPATHIC PULMONARY FIBROSIS) (H): ICD-10-CM

## 2018-08-07 ENCOUNTER — COMMUNICATION - HEALTHEAST (OUTPATIENT)
Dept: INTERNAL MEDICINE | Facility: CLINIC | Age: 83
End: 2018-08-07

## 2018-08-07 ENCOUNTER — HOSPITAL ENCOUNTER (OUTPATIENT)
Dept: CT IMAGING | Facility: HOSPITAL | Age: 83
Discharge: HOME | End: 2018-08-07
Attending: INTERNAL MEDICINE

## 2018-08-07 DIAGNOSIS — E78.5 DYSLIPIDEMIA: ICD-10-CM

## 2018-08-07 DIAGNOSIS — J84.112 IPF (IDIOPATHIC PULMONARY FIBROSIS) (H): ICD-10-CM

## 2018-08-09 ENCOUNTER — COMMUNICATION - HEALTHEAST (OUTPATIENT)
Dept: INTERNAL MEDICINE | Facility: CLINIC | Age: 83
End: 2018-08-09

## 2018-08-09 DIAGNOSIS — E78.5 DYSLIPIDEMIA: ICD-10-CM

## 2018-08-09 DIAGNOSIS — E78.00 HYPERCHOLESTEROLEMIA: ICD-10-CM

## 2018-08-20 ENCOUNTER — OFFICE VISIT - HEALTHEAST (OUTPATIENT)
Dept: ONCOLOGY | Facility: HOSPITAL | Age: 83
End: 2018-08-20

## 2018-08-20 DIAGNOSIS — C50.412 MALIGNANT NEOPLASM OF UPPER-OUTER QUADRANT OF LEFT BREAST IN FEMALE, ESTROGEN RECEPTOR POSITIVE (H): ICD-10-CM

## 2018-08-20 DIAGNOSIS — Z17.0 MALIGNANT NEOPLASM OF UPPER-OUTER QUADRANT OF LEFT BREAST IN FEMALE, ESTROGEN RECEPTOR POSITIVE (H): ICD-10-CM

## 2018-09-20 ENCOUNTER — COMMUNICATION - HEALTHEAST (OUTPATIENT)
Dept: ADMINISTRATIVE | Facility: CLINIC | Age: 83
End: 2018-09-20

## 2018-10-22 ENCOUNTER — COMMUNICATION - HEALTHEAST (OUTPATIENT)
Dept: ONCOLOGY | Facility: HOSPITAL | Age: 83
End: 2018-10-22

## 2018-10-23 ENCOUNTER — AMBULATORY - HEALTHEAST (OUTPATIENT)
Dept: NURSING | Facility: CLINIC | Age: 83
End: 2018-10-23

## 2018-10-23 DIAGNOSIS — Z00.00 ROUTINE GENERAL MEDICAL EXAMINATION AT A HEALTH CARE FACILITY: ICD-10-CM

## 2018-11-30 ENCOUNTER — COMMUNICATION - HEALTHEAST (OUTPATIENT)
Dept: INTERNAL MEDICINE | Facility: CLINIC | Age: 83
End: 2018-11-30

## 2018-11-30 DIAGNOSIS — N39.0 RECURRENT UTI: ICD-10-CM

## 2018-12-03 ENCOUNTER — COMMUNICATION - HEALTHEAST (OUTPATIENT)
Dept: PULMONOLOGY | Facility: OTHER | Age: 83
End: 2018-12-03

## 2018-12-03 DIAGNOSIS — J45.909 ASTHMA: ICD-10-CM

## 2018-12-04 ENCOUNTER — OFFICE VISIT - HEALTHEAST (OUTPATIENT)
Dept: CARDIOLOGY | Facility: CLINIC | Age: 83
End: 2018-12-04

## 2018-12-04 DIAGNOSIS — E78.5 DYSLIPIDEMIA: ICD-10-CM

## 2018-12-04 DIAGNOSIS — I25.10 CORONARY ARTERY DISEASE INVOLVING NATIVE CORONARY ARTERY OF NATIVE HEART WITHOUT ANGINA PECTORIS: ICD-10-CM

## 2018-12-04 DIAGNOSIS — I10 ESSENTIAL HYPERTENSION: ICD-10-CM

## 2018-12-04 DIAGNOSIS — I06.0 RHEUMATIC AORTIC STENOSIS: ICD-10-CM

## 2018-12-04 ASSESSMENT — MIFFLIN-ST. JEOR: SCORE: 1035.5

## 2018-12-14 ENCOUNTER — OFFICE VISIT - HEALTHEAST (OUTPATIENT)
Dept: INTERNAL MEDICINE | Facility: CLINIC | Age: 83
End: 2018-12-14

## 2018-12-14 DIAGNOSIS — Z17.0 MALIGNANT NEOPLASM OF UPPER-OUTER QUADRANT OF LEFT BREAST IN FEMALE, ESTROGEN RECEPTOR POSITIVE (H): ICD-10-CM

## 2018-12-14 DIAGNOSIS — E78.00 PURE HYPERCHOLESTEROLEMIA: ICD-10-CM

## 2018-12-14 DIAGNOSIS — M19.90 OSTEOARTHRITIS: ICD-10-CM

## 2018-12-14 DIAGNOSIS — I10 ESSENTIAL HYPERTENSION: ICD-10-CM

## 2018-12-14 DIAGNOSIS — G47.00 INSOMNIA: ICD-10-CM

## 2018-12-14 DIAGNOSIS — J45.40 MODERATE PERSISTENT ASTHMA WITHOUT COMPLICATION: ICD-10-CM

## 2018-12-14 DIAGNOSIS — Z00.00 ROUTINE GENERAL MEDICAL EXAMINATION AT A HEALTH CARE FACILITY: ICD-10-CM

## 2018-12-14 DIAGNOSIS — M81.0 SENILE OSTEOPOROSIS: ICD-10-CM

## 2018-12-14 DIAGNOSIS — C50.412 MALIGNANT NEOPLASM OF UPPER-OUTER QUADRANT OF LEFT BREAST IN FEMALE, ESTROGEN RECEPTOR POSITIVE (H): ICD-10-CM

## 2018-12-14 DIAGNOSIS — Z00.00 ROUTINE HEALTH MAINTENANCE: ICD-10-CM

## 2018-12-14 DIAGNOSIS — Z79.811 AROMATASE INHIBITOR USE: ICD-10-CM

## 2018-12-14 DIAGNOSIS — I25.10 CAD (CORONARY ARTERY DISEASE): ICD-10-CM

## 2018-12-14 LAB
ALBUMIN SERPL-MCNC: 4 G/DL (ref 3.5–5)
ALBUMIN UR-MCNC: NEGATIVE MG/DL
ALP SERPL-CCNC: 66 U/L (ref 45–120)
ALT SERPL W P-5'-P-CCNC: 12 U/L (ref 0–45)
ANION GAP SERPL CALCULATED.3IONS-SCNC: 14 MMOL/L (ref 5–18)
APPEARANCE UR: CLEAR
AST SERPL W P-5'-P-CCNC: 24 U/L (ref 0–40)
BILIRUB SERPL-MCNC: 0.7 MG/DL (ref 0–1)
BILIRUB UR QL STRIP: NEGATIVE
BUN SERPL-MCNC: 9 MG/DL (ref 8–28)
CALCIUM SERPL-MCNC: 9.7 MG/DL (ref 8.5–10.5)
CHLORIDE BLD-SCNC: 105 MMOL/L (ref 98–107)
CO2 SERPL-SCNC: 23 MMOL/L (ref 22–31)
COLOR UR AUTO: YELLOW
CREAT SERPL-MCNC: 0.79 MG/DL (ref 0.6–1.1)
ERYTHROCYTE [DISTWIDTH] IN BLOOD BY AUTOMATED COUNT: 12.5 % (ref 11–14.5)
GFR SERPL CREATININE-BSD FRML MDRD: >60 ML/MIN/1.73M2
GLUCOSE BLD-MCNC: 96 MG/DL (ref 70–125)
GLUCOSE UR STRIP-MCNC: NEGATIVE MG/DL
HCT VFR BLD AUTO: 41.3 % (ref 35–47)
HGB BLD-MCNC: 13.7 G/DL (ref 12–16)
HGB UR QL STRIP: NEGATIVE
KETONES UR STRIP-MCNC: NEGATIVE MG/DL
LEUKOCYTE ESTERASE UR QL STRIP: NEGATIVE
MCH RBC QN AUTO: 30.3 PG (ref 27–34)
MCHC RBC AUTO-ENTMCNC: 33.3 G/DL (ref 32–36)
MCV RBC AUTO: 91 FL (ref 80–100)
NITRATE UR QL: NEGATIVE
PH UR STRIP: 7 [PH] (ref 5–8)
PLATELET # BLD AUTO: 265 THOU/UL (ref 140–440)
PMV BLD AUTO: 7.3 FL (ref 7–10)
POTASSIUM BLD-SCNC: 3.8 MMOL/L (ref 3.5–5)
PROT SERPL-MCNC: 7.5 G/DL (ref 6–8)
RBC # BLD AUTO: 4.53 MILL/UL (ref 3.8–5.4)
SODIUM SERPL-SCNC: 142 MMOL/L (ref 136–145)
SP GR UR STRIP: 1.01 (ref 1–1.03)
UROBILINOGEN UR STRIP-ACNC: NORMAL
WBC: 8.4 THOU/UL (ref 4–11)

## 2018-12-14 ASSESSMENT — MIFFLIN-ST. JEOR: SCORE: 1035.05

## 2018-12-17 LAB
25(OH)D3 SERPL-MCNC: 61.4 NG/ML (ref 30–80)
25(OH)D3 SERPL-MCNC: 61.4 NG/ML (ref 30–80)

## 2018-12-27 ENCOUNTER — COMMUNICATION - HEALTHEAST (OUTPATIENT)
Dept: SCHEDULING | Facility: CLINIC | Age: 83
End: 2018-12-27

## 2018-12-31 ENCOUNTER — OFFICE VISIT - HEALTHEAST (OUTPATIENT)
Dept: INTERNAL MEDICINE | Facility: CLINIC | Age: 83
End: 2018-12-31

## 2018-12-31 DIAGNOSIS — I10 ESSENTIAL HYPERTENSION: ICD-10-CM

## 2018-12-31 LAB
ANION GAP SERPL CALCULATED.3IONS-SCNC: 13 MMOL/L (ref 5–18)
BUN SERPL-MCNC: 12 MG/DL (ref 8–28)
CALCIUM SERPL-MCNC: 9.5 MG/DL (ref 8.5–10.5)
CHLORIDE BLD-SCNC: 107 MMOL/L (ref 98–107)
CO2 SERPL-SCNC: 20 MMOL/L (ref 22–31)
CREAT SERPL-MCNC: 0.76 MG/DL (ref 0.6–1.1)
GFR SERPL CREATININE-BSD FRML MDRD: >60 ML/MIN/1.73M2
GLUCOSE BLD-MCNC: 92 MG/DL (ref 70–125)
POTASSIUM BLD-SCNC: 3.8 MMOL/L (ref 3.5–5)
SODIUM SERPL-SCNC: 140 MMOL/L (ref 136–145)

## 2019-01-04 ENCOUNTER — COMMUNICATION - HEALTHEAST (OUTPATIENT)
Dept: ADMINISTRATIVE | Facility: HOSPITAL | Age: 84
End: 2019-01-04

## 2019-01-14 ENCOUNTER — OFFICE VISIT - HEALTHEAST (OUTPATIENT)
Dept: INTERNAL MEDICINE | Facility: CLINIC | Age: 84
End: 2019-01-14

## 2019-01-14 DIAGNOSIS — I10 ESSENTIAL HYPERTENSION: ICD-10-CM

## 2019-02-06 ENCOUNTER — COMMUNICATION - HEALTHEAST (OUTPATIENT)
Dept: INTERNAL MEDICINE | Facility: CLINIC | Age: 84
End: 2019-02-06

## 2019-02-06 DIAGNOSIS — E78.5 DYSLIPIDEMIA: ICD-10-CM

## 2019-02-18 ENCOUNTER — OFFICE VISIT - HEALTHEAST (OUTPATIENT)
Dept: INTERNAL MEDICINE | Facility: CLINIC | Age: 84
End: 2019-02-18

## 2019-02-18 DIAGNOSIS — I10 ESSENTIAL HYPERTENSION WITH GOAL BLOOD PRESSURE LESS THAN 140/90: ICD-10-CM

## 2019-02-25 ENCOUNTER — OFFICE VISIT - HEALTHEAST (OUTPATIENT)
Dept: ONCOLOGY | Facility: HOSPITAL | Age: 84
End: 2019-02-25

## 2019-02-25 DIAGNOSIS — Z17.0 MALIGNANT NEOPLASM OF UPPER-OUTER QUADRANT OF LEFT BREAST IN FEMALE, ESTROGEN RECEPTOR POSITIVE (H): ICD-10-CM

## 2019-02-25 DIAGNOSIS — C50.412 MALIGNANT NEOPLASM OF UPPER-OUTER QUADRANT OF LEFT BREAST IN FEMALE, ESTROGEN RECEPTOR POSITIVE (H): ICD-10-CM

## 2019-02-25 DIAGNOSIS — Z12.31 ENCOUNTER FOR SCREENING MAMMOGRAM FOR MALIGNANT NEOPLASM OF BREAST: ICD-10-CM

## 2019-02-26 ENCOUNTER — OFFICE VISIT - HEALTHEAST (OUTPATIENT)
Dept: PULMONOLOGY | Facility: OTHER | Age: 84
End: 2019-02-26

## 2019-02-26 DIAGNOSIS — J45.20 INTERMITTENT ASTHMA WITHOUT COMPLICATION, UNSPECIFIED ASTHMA SEVERITY: ICD-10-CM

## 2019-02-26 DIAGNOSIS — I10 ESSENTIAL HYPERTENSION WITH GOAL BLOOD PRESSURE LESS THAN 140/90: ICD-10-CM

## 2019-02-26 ASSESSMENT — MIFFLIN-ST. JEOR: SCORE: 1030.97

## 2019-03-18 ENCOUNTER — OFFICE VISIT - HEALTHEAST (OUTPATIENT)
Dept: INTERNAL MEDICINE | Facility: CLINIC | Age: 84
End: 2019-03-18

## 2019-03-18 DIAGNOSIS — I10 ESSENTIAL HYPERTENSION: ICD-10-CM

## 2019-03-18 LAB
ANION GAP SERPL CALCULATED.3IONS-SCNC: 10 MMOL/L (ref 5–18)
BUN SERPL-MCNC: 13 MG/DL (ref 8–28)
CALCIUM SERPL-MCNC: 9.3 MG/DL (ref 8.5–10.5)
CHLORIDE BLD-SCNC: 105 MMOL/L (ref 98–107)
CO2 SERPL-SCNC: 23 MMOL/L (ref 22–31)
CREAT SERPL-MCNC: 0.85 MG/DL (ref 0.6–1.1)
GFR SERPL CREATININE-BSD FRML MDRD: >60 ML/MIN/1.73M2
GLUCOSE BLD-MCNC: 80 MG/DL (ref 70–125)
POTASSIUM BLD-SCNC: 4.3 MMOL/L (ref 3.5–5)
SODIUM SERPL-SCNC: 138 MMOL/L (ref 136–145)

## 2019-03-19 ENCOUNTER — COMMUNICATION - HEALTHEAST (OUTPATIENT)
Dept: INTERNAL MEDICINE | Facility: CLINIC | Age: 84
End: 2019-03-19

## 2019-04-28 ENCOUNTER — COMMUNICATION - HEALTHEAST (OUTPATIENT)
Dept: PULMONOLOGY | Facility: OTHER | Age: 84
End: 2019-04-28

## 2019-04-28 DIAGNOSIS — J45.909 ASTHMA: ICD-10-CM

## 2019-05-01 ENCOUNTER — COMMUNICATION - HEALTHEAST (OUTPATIENT)
Dept: INTERNAL MEDICINE | Facility: CLINIC | Age: 84
End: 2019-05-01

## 2019-05-01 DIAGNOSIS — I10 BENIGN ESSENTIAL HYPERTENSION: ICD-10-CM

## 2019-05-23 ENCOUNTER — COMMUNICATION - HEALTHEAST (OUTPATIENT)
Dept: INTERNAL MEDICINE | Facility: CLINIC | Age: 84
End: 2019-05-23

## 2019-05-23 DIAGNOSIS — I25.10 CAD (CORONARY ARTERY DISEASE): ICD-10-CM

## 2019-06-12 ENCOUNTER — COMMUNICATION - HEALTHEAST (OUTPATIENT)
Dept: INTERNAL MEDICINE | Facility: CLINIC | Age: 84
End: 2019-06-12

## 2019-06-14 ENCOUNTER — AMBULATORY - HEALTHEAST (OUTPATIENT)
Dept: NURSING | Facility: CLINIC | Age: 84
End: 2019-06-14

## 2019-06-17 ENCOUNTER — OFFICE VISIT - HEALTHEAST (OUTPATIENT)
Dept: INTERNAL MEDICINE | Facility: CLINIC | Age: 84
End: 2019-06-17

## 2019-06-17 DIAGNOSIS — R30.0 DYSURIA: ICD-10-CM

## 2019-06-17 DIAGNOSIS — G47.00 INSOMNIA, UNSPECIFIED TYPE: ICD-10-CM

## 2019-06-17 LAB
ALBUMIN UR-MCNC: NEGATIVE MG/DL
APPEARANCE UR: CLEAR
BACTERIA #/AREA URNS HPF: ABNORMAL HPF
BILIRUB UR QL STRIP: NEGATIVE
COLOR UR AUTO: YELLOW
GLUCOSE UR STRIP-MCNC: NEGATIVE MG/DL
HGB UR QL STRIP: NEGATIVE
KETONES UR STRIP-MCNC: NEGATIVE MG/DL
LEUKOCYTE ESTERASE UR QL STRIP: ABNORMAL
NITRATE UR QL: NEGATIVE
PH UR STRIP: 6 [PH] (ref 5–8)
RBC #/AREA URNS AUTO: ABNORMAL HPF
SP GR UR STRIP: 1.01 (ref 1–1.03)
SQUAMOUS #/AREA URNS AUTO: ABNORMAL LPF
UROBILINOGEN UR STRIP-ACNC: ABNORMAL
WBC #/AREA URNS AUTO: ABNORMAL HPF
WBC CLUMPS #/AREA URNS HPF: PRESENT /[HPF]

## 2019-06-19 ENCOUNTER — COMMUNICATION - HEALTHEAST (OUTPATIENT)
Dept: SCHEDULING | Facility: CLINIC | Age: 84
End: 2019-06-19

## 2019-06-19 LAB — BACTERIA SPEC CULT: NORMAL

## 2019-06-25 ENCOUNTER — COMMUNICATION - HEALTHEAST (OUTPATIENT)
Dept: INTERNAL MEDICINE | Facility: CLINIC | Age: 84
End: 2019-06-25

## 2019-06-25 DIAGNOSIS — N39.0 RECURRENT UTI: ICD-10-CM

## 2019-07-02 ENCOUNTER — COMMUNICATION - HEALTHEAST (OUTPATIENT)
Dept: INTERNAL MEDICINE | Facility: CLINIC | Age: 84
End: 2019-07-02

## 2019-07-02 DIAGNOSIS — M81.0 SENILE OSTEOPOROSIS: ICD-10-CM

## 2019-07-03 ENCOUNTER — OFFICE VISIT - HEALTHEAST (OUTPATIENT)
Dept: PULMONOLOGY | Facility: OTHER | Age: 84
End: 2019-07-03

## 2019-07-03 DIAGNOSIS — J45.20 INTERMITTENT ASTHMA WITHOUT COMPLICATION, UNSPECIFIED ASTHMA SEVERITY: ICD-10-CM

## 2019-07-03 DIAGNOSIS — I10 ESSENTIAL HYPERTENSION WITH GOAL BLOOD PRESSURE LESS THAN 140/90: ICD-10-CM

## 2019-07-03 DIAGNOSIS — J84.112 IPF (IDIOPATHIC PULMONARY FIBROSIS) (H): ICD-10-CM

## 2019-07-11 ENCOUNTER — COMMUNICATION - HEALTHEAST (OUTPATIENT)
Dept: INTERNAL MEDICINE | Facility: CLINIC | Age: 84
End: 2019-07-11

## 2019-07-11 DIAGNOSIS — I10 ESSENTIAL HYPERTENSION: ICD-10-CM

## 2019-07-18 ENCOUNTER — HOSPITAL ENCOUNTER (OUTPATIENT)
Dept: MAMMOGRAPHY | Facility: CLINIC | Age: 84
Discharge: HOME | End: 2019-07-18
Attending: INTERNAL MEDICINE

## 2019-07-18 DIAGNOSIS — C50.412 MALIGNANT NEOPLASM OF UPPER-OUTER QUADRANT OF LEFT BREAST IN FEMALE, ESTROGEN RECEPTOR POSITIVE (H): ICD-10-CM

## 2019-07-18 DIAGNOSIS — Z17.0 MALIGNANT NEOPLASM OF UPPER-OUTER QUADRANT OF LEFT BREAST IN FEMALE, ESTROGEN RECEPTOR POSITIVE (H): ICD-10-CM

## 2019-07-18 DIAGNOSIS — Z12.31 ENCOUNTER FOR SCREENING MAMMOGRAM FOR MALIGNANT NEOPLASM OF BREAST: ICD-10-CM

## 2019-07-21 ENCOUNTER — COMMUNICATION - HEALTHEAST (OUTPATIENT)
Dept: INTERNAL MEDICINE | Facility: CLINIC | Age: 84
End: 2019-07-21

## 2019-07-21 DIAGNOSIS — I10 ESSENTIAL HYPERTENSION WITH GOAL BLOOD PRESSURE LESS THAN 140/90: ICD-10-CM

## 2019-07-22 ENCOUNTER — COMMUNICATION - HEALTHEAST (OUTPATIENT)
Dept: PULMONOLOGY | Facility: OTHER | Age: 84
End: 2019-07-22

## 2019-07-22 DIAGNOSIS — J45.909 ASTHMA: ICD-10-CM

## 2019-07-31 ENCOUNTER — OFFICE VISIT - HEALTHEAST (OUTPATIENT)
Dept: INTERNAL MEDICINE | Facility: CLINIC | Age: 84
End: 2019-07-31

## 2019-07-31 DIAGNOSIS — M54.2 NECK PAIN: ICD-10-CM

## 2019-07-31 DIAGNOSIS — I10 ESSENTIAL HYPERTENSION: ICD-10-CM

## 2019-08-12 ENCOUNTER — OFFICE VISIT - HEALTHEAST (OUTPATIENT)
Dept: ONCOLOGY | Facility: HOSPITAL | Age: 84
End: 2019-08-12

## 2019-08-12 DIAGNOSIS — Z17.0 MALIGNANT NEOPLASM OF UPPER-OUTER QUADRANT OF LEFT BREAST IN FEMALE, ESTROGEN RECEPTOR POSITIVE (H): ICD-10-CM

## 2019-08-12 DIAGNOSIS — C50.412 MALIGNANT NEOPLASM OF UPPER-OUTER QUADRANT OF LEFT BREAST IN FEMALE, ESTROGEN RECEPTOR POSITIVE (H): ICD-10-CM

## 2019-08-20 ENCOUNTER — OFFICE VISIT - HEALTHEAST (OUTPATIENT)
Dept: PULMONOLOGY | Facility: OTHER | Age: 84
End: 2019-08-20

## 2019-08-20 DIAGNOSIS — J45.40 MODERATE PERSISTENT ASTHMA, UNCOMPLICATED: ICD-10-CM

## 2019-08-20 DIAGNOSIS — R91.8 PULMONARY NODULES: ICD-10-CM

## 2019-08-21 ENCOUNTER — OFFICE VISIT - HEALTHEAST (OUTPATIENT)
Dept: INTERNAL MEDICINE | Facility: CLINIC | Age: 84
End: 2019-08-21

## 2019-08-21 ENCOUNTER — COMMUNICATION - HEALTHEAST (OUTPATIENT)
Dept: INTERNAL MEDICINE | Facility: CLINIC | Age: 84
End: 2019-08-21

## 2019-08-21 DIAGNOSIS — M54.2 NECK PAIN: ICD-10-CM

## 2019-08-21 DIAGNOSIS — I10 ESSENTIAL HYPERTENSION: ICD-10-CM

## 2019-08-21 LAB
ANION GAP SERPL CALCULATED.3IONS-SCNC: 11 MMOL/L (ref 5–18)
BUN SERPL-MCNC: 10 MG/DL (ref 8–28)
CALCIUM SERPL-MCNC: 9 MG/DL (ref 8.5–10.5)
CHLORIDE BLD-SCNC: 106 MMOL/L (ref 98–107)
CO2 SERPL-SCNC: 21 MMOL/L (ref 22–31)
CREAT SERPL-MCNC: 1.08 MG/DL (ref 0.6–1.1)
GFR SERPL CREATININE-BSD FRML MDRD: 48 ML/MIN/1.73M2
GLUCOSE BLD-MCNC: 91 MG/DL (ref 70–125)
POTASSIUM BLD-SCNC: 4.1 MMOL/L (ref 3.5–5)
SODIUM SERPL-SCNC: 138 MMOL/L (ref 136–145)

## 2019-09-04 ENCOUNTER — HOSPITAL ENCOUNTER (OUTPATIENT)
Dept: CT IMAGING | Facility: HOSPITAL | Age: 84
Discharge: HOME | End: 2019-09-04
Attending: INTERNAL MEDICINE

## 2019-09-04 DIAGNOSIS — R91.8 PULMONARY NODULES: ICD-10-CM

## 2019-09-11 ENCOUNTER — COMMUNICATION - HEALTHEAST (OUTPATIENT)
Dept: PULMONOLOGY | Facility: OTHER | Age: 84
End: 2019-09-11

## 2019-09-16 ENCOUNTER — OFFICE VISIT - HEALTHEAST (OUTPATIENT)
Dept: PULMONOLOGY | Facility: OTHER | Age: 84
End: 2019-09-16

## 2019-09-16 DIAGNOSIS — J45.41 MODERATE PERSISTENT ASTHMA WITH EXACERBATION: ICD-10-CM

## 2019-10-08 ENCOUNTER — RECORDS - HEALTHEAST (OUTPATIENT)
Dept: ADMINISTRATIVE | Facility: OTHER | Age: 84
End: 2019-10-08

## 2019-10-08 ENCOUNTER — RECORDS - HEALTHEAST (OUTPATIENT)
Dept: BONE DENSITY | Facility: CLINIC | Age: 84
End: 2019-10-08

## 2019-10-08 DIAGNOSIS — M81.0 AGE-RELATED OSTEOPOROSIS WITHOUT CURRENT PATHOLOGICAL FRACTURE: ICD-10-CM

## 2019-10-13 ENCOUNTER — COMMUNICATION - HEALTHEAST (OUTPATIENT)
Dept: ONCOLOGY | Facility: HOSPITAL | Age: 84
End: 2019-10-13

## 2019-10-13 DIAGNOSIS — C50.412 MALIGNANT NEOPLASM OF UPPER-OUTER QUADRANT OF LEFT BREAST IN FEMALE, ESTROGEN RECEPTOR POSITIVE (H): ICD-10-CM

## 2019-10-13 DIAGNOSIS — Z17.0 MALIGNANT NEOPLASM OF UPPER-OUTER QUADRANT OF LEFT BREAST IN FEMALE, ESTROGEN RECEPTOR POSITIVE (H): ICD-10-CM

## 2019-10-21 ENCOUNTER — COMMUNICATION - HEALTHEAST (OUTPATIENT)
Dept: INTERNAL MEDICINE | Facility: CLINIC | Age: 84
End: 2019-10-21

## 2019-10-21 DIAGNOSIS — G47.00 INSOMNIA, UNSPECIFIED TYPE: ICD-10-CM

## 2019-10-26 ENCOUNTER — OFFICE VISIT - HEALTHEAST (OUTPATIENT)
Dept: FAMILY MEDICINE | Facility: CLINIC | Age: 84
End: 2019-10-26

## 2019-10-26 DIAGNOSIS — H61.21 IMPACTED CERUMEN OF RIGHT EAR: ICD-10-CM

## 2019-10-26 DIAGNOSIS — R05.9 COUGH: ICD-10-CM

## 2019-12-11 ENCOUNTER — AMBULATORY - HEALTHEAST (OUTPATIENT)
Dept: INTERNAL MEDICINE | Facility: CLINIC | Age: 84
End: 2019-12-11

## 2019-12-11 DIAGNOSIS — C50.412 MALIGNANT NEOPLASM OF UPPER-OUTER QUADRANT OF LEFT BREAST IN FEMALE, ESTROGEN RECEPTOR POSITIVE (H): ICD-10-CM

## 2019-12-11 DIAGNOSIS — M81.0 SENILE OSTEOPOROSIS: ICD-10-CM

## 2019-12-11 DIAGNOSIS — Z92.29 PERSONAL HISTORY OF OTHER DRUG THERAPY: ICD-10-CM

## 2019-12-11 DIAGNOSIS — Z79.811 AROMATASE INHIBITOR USE: ICD-10-CM

## 2019-12-11 DIAGNOSIS — Z17.0 MALIGNANT NEOPLASM OF UPPER-OUTER QUADRANT OF LEFT BREAST IN FEMALE, ESTROGEN RECEPTOR POSITIVE (H): ICD-10-CM

## 2019-12-17 ENCOUNTER — OFFICE VISIT - HEALTHEAST (OUTPATIENT)
Dept: PULMONOLOGY | Facility: OTHER | Age: 84
End: 2019-12-17

## 2019-12-17 DIAGNOSIS — J45.30 MILD PERSISTENT ASTHMA WITHOUT COMPLICATION: ICD-10-CM

## 2019-12-17 DIAGNOSIS — R94.2 DIFFUSION CAPACITY OF LUNG (DL), DECREASED: ICD-10-CM

## 2019-12-17 ASSESSMENT — MIFFLIN-ST. JEOR: SCORE: 1026.43

## 2019-12-18 ENCOUNTER — OFFICE VISIT - HEALTHEAST (OUTPATIENT)
Dept: INTERNAL MEDICINE | Facility: CLINIC | Age: 84
End: 2019-12-18

## 2019-12-18 DIAGNOSIS — I10 ESSENTIAL HYPERTENSION: ICD-10-CM

## 2019-12-18 DIAGNOSIS — M81.0 SENILE OSTEOPOROSIS: ICD-10-CM

## 2019-12-18 DIAGNOSIS — C50.412 MALIGNANT NEOPLASM OF UPPER-OUTER QUADRANT OF LEFT BREAST IN FEMALE, ESTROGEN RECEPTOR POSITIVE (H): ICD-10-CM

## 2019-12-18 DIAGNOSIS — J45.40 MODERATE PERSISTENT ASTHMA WITHOUT COMPLICATION: ICD-10-CM

## 2019-12-18 DIAGNOSIS — Z17.0 MALIGNANT NEOPLASM OF UPPER-OUTER QUADRANT OF LEFT BREAST IN FEMALE, ESTROGEN RECEPTOR POSITIVE (H): ICD-10-CM

## 2019-12-18 DIAGNOSIS — M54.12 CERVICAL RADICULOPATHY: ICD-10-CM

## 2019-12-18 DIAGNOSIS — I25.10 CORONARY ARTERY DISEASE INVOLVING NATIVE CORONARY ARTERY OF NATIVE HEART WITHOUT ANGINA PECTORIS: ICD-10-CM

## 2019-12-18 DIAGNOSIS — G47.00 INSOMNIA, UNSPECIFIED TYPE: ICD-10-CM

## 2019-12-18 LAB
ALBUMIN SERPL-MCNC: 3.7 G/DL (ref 3.5–5)
ALP SERPL-CCNC: 72 U/L (ref 45–120)
ALT SERPL W P-5'-P-CCNC: <9 U/L (ref 0–45)
ANION GAP SERPL CALCULATED.3IONS-SCNC: 10 MMOL/L (ref 5–18)
AST SERPL W P-5'-P-CCNC: 14 U/L (ref 0–40)
BILIRUB SERPL-MCNC: 0.8 MG/DL (ref 0–1)
BUN SERPL-MCNC: 12 MG/DL (ref 8–28)
CALCIUM SERPL-MCNC: 9.7 MG/DL (ref 8.5–10.5)
CHLORIDE BLD-SCNC: 103 MMOL/L (ref 98–107)
CO2 SERPL-SCNC: 23 MMOL/L (ref 22–31)
CREAT SERPL-MCNC: 0.82 MG/DL (ref 0.6–1.1)
GFR SERPL CREATININE-BSD FRML MDRD: >60 ML/MIN/1.73M2
GLUCOSE BLD-MCNC: 88 MG/DL (ref 70–125)
POTASSIUM BLD-SCNC: 4.2 MMOL/L (ref 3.5–5)
PROT SERPL-MCNC: 7.2 G/DL (ref 6–8)
SODIUM SERPL-SCNC: 136 MMOL/L (ref 136–145)

## 2020-01-06 ENCOUNTER — HOSPITAL ENCOUNTER (OUTPATIENT)
Dept: PHYSICAL MEDICINE AND REHAB | Facility: CLINIC | Age: 85
Discharge: HOME | End: 2020-01-06
Attending: INTERNAL MEDICINE

## 2020-01-06 DIAGNOSIS — M48.02 FORAMINAL STENOSIS OF CERVICAL REGION: ICD-10-CM

## 2020-01-06 DIAGNOSIS — M54.16 LEFT LUMBAR RADICULITIS: ICD-10-CM

## 2020-01-06 DIAGNOSIS — M54.42 CHRONIC BILATERAL LOW BACK PAIN WITH LEFT-SIDED SCIATICA: ICD-10-CM

## 2020-01-06 DIAGNOSIS — M54.12 RADICULITIS OF LEFT CERVICAL REGION: ICD-10-CM

## 2020-01-06 DIAGNOSIS — M79.18 MYOFASCIAL PAIN: ICD-10-CM

## 2020-01-06 DIAGNOSIS — M48.061 LUMBAR FORAMINAL STENOSIS: ICD-10-CM

## 2020-01-06 DIAGNOSIS — M43.16 SPONDYLOLISTHESIS OF LUMBAR REGION: ICD-10-CM

## 2020-01-06 DIAGNOSIS — G89.29 CHRONIC BILATERAL LOW BACK PAIN WITH LEFT-SIDED SCIATICA: ICD-10-CM

## 2020-01-06 DIAGNOSIS — M50.30 DDD (DEGENERATIVE DISC DISEASE), CERVICAL: ICD-10-CM

## 2020-01-06 RX ORDER — SENNOSIDES 8.6 MG
650 CAPSULE ORAL EVERY 8 HOURS PRN
Status: SHIPPED | COMMUNITY
Start: 2020-01-06

## 2020-01-06 ASSESSMENT — MIFFLIN-ST. JEOR: SCORE: 1032.78

## 2020-01-07 ENCOUNTER — COMMUNICATION - HEALTHEAST (OUTPATIENT)
Dept: INTERNAL MEDICINE | Facility: CLINIC | Age: 85
End: 2020-01-07

## 2020-01-07 DIAGNOSIS — N39.0 RECURRENT UTI: ICD-10-CM

## 2020-01-20 ENCOUNTER — HOSPITAL ENCOUNTER (OUTPATIENT)
Dept: MRI IMAGING | Facility: HOSPITAL | Age: 85
Discharge: HOME | End: 2020-01-20

## 2020-01-20 DIAGNOSIS — G89.29 CHRONIC BILATERAL LOW BACK PAIN WITH LEFT-SIDED SCIATICA: ICD-10-CM

## 2020-01-20 DIAGNOSIS — M54.42 CHRONIC BILATERAL LOW BACK PAIN WITH LEFT-SIDED SCIATICA: ICD-10-CM

## 2020-01-20 DIAGNOSIS — M48.061 LUMBAR FORAMINAL STENOSIS: ICD-10-CM

## 2020-01-20 DIAGNOSIS — M50.30 DDD (DEGENERATIVE DISC DISEASE), CERVICAL: ICD-10-CM

## 2020-01-20 DIAGNOSIS — M54.12 RADICULITIS OF LEFT CERVICAL REGION: ICD-10-CM

## 2020-01-20 DIAGNOSIS — M48.02 FORAMINAL STENOSIS OF CERVICAL REGION: ICD-10-CM

## 2020-01-20 DIAGNOSIS — M54.16 LEFT LUMBAR RADICULITIS: ICD-10-CM

## 2020-01-20 DIAGNOSIS — M43.16 SPONDYLOLISTHESIS OF LUMBAR REGION: ICD-10-CM

## 2020-01-22 ENCOUNTER — AMBULATORY - HEALTHEAST (OUTPATIENT)
Dept: PHYSICAL MEDICINE AND REHAB | Facility: CLINIC | Age: 85
End: 2020-01-22

## 2020-01-22 DIAGNOSIS — G89.29 CHRONIC BILATERAL LOW BACK PAIN WITH LEFT-SIDED SCIATICA: ICD-10-CM

## 2020-01-22 DIAGNOSIS — M54.42 CHRONIC BILATERAL LOW BACK PAIN WITH LEFT-SIDED SCIATICA: ICD-10-CM

## 2020-01-22 DIAGNOSIS — M54.16 LEFT LUMBAR RADICULITIS: ICD-10-CM

## 2020-01-22 DIAGNOSIS — M48.061 LUMBAR FORAMINAL STENOSIS: ICD-10-CM

## 2020-01-22 DIAGNOSIS — M43.16 SPONDYLOLISTHESIS OF LUMBAR REGION: ICD-10-CM

## 2020-01-23 ENCOUNTER — COMMUNICATION - HEALTHEAST (OUTPATIENT)
Dept: PHYSICAL MEDICINE AND REHAB | Facility: CLINIC | Age: 85
End: 2020-01-23

## 2020-02-04 ENCOUNTER — COMMUNICATION - HEALTHEAST (OUTPATIENT)
Dept: INTERNAL MEDICINE | Facility: CLINIC | Age: 85
End: 2020-02-04

## 2020-02-04 ENCOUNTER — HOSPITAL ENCOUNTER (OUTPATIENT)
Dept: PHYSICAL MEDICINE AND REHAB | Facility: CLINIC | Age: 85
Discharge: HOME | End: 2020-02-04
Attending: PAIN MEDICINE

## 2020-02-04 DIAGNOSIS — M43.16 SPONDYLOLISTHESIS OF LUMBAR REGION: ICD-10-CM

## 2020-02-04 DIAGNOSIS — M48.061 LUMBAR FORAMINAL STENOSIS: ICD-10-CM

## 2020-02-04 DIAGNOSIS — M54.42 CHRONIC BILATERAL LOW BACK PAIN WITH LEFT-SIDED SCIATICA: ICD-10-CM

## 2020-02-04 DIAGNOSIS — G89.29 CHRONIC BILATERAL LOW BACK PAIN WITH LEFT-SIDED SCIATICA: ICD-10-CM

## 2020-02-04 DIAGNOSIS — M54.16 LEFT LUMBAR RADICULITIS: ICD-10-CM

## 2020-02-04 DIAGNOSIS — E78.5 DYSLIPIDEMIA: ICD-10-CM

## 2020-02-05 ENCOUNTER — COMMUNICATION - HEALTHEAST (OUTPATIENT)
Dept: INTERNAL MEDICINE | Facility: CLINIC | Age: 85
End: 2020-02-05

## 2020-02-05 ENCOUNTER — OFFICE VISIT - HEALTHEAST (OUTPATIENT)
Dept: CARDIOLOGY | Facility: CLINIC | Age: 85
End: 2020-02-05

## 2020-02-05 DIAGNOSIS — E78.5 DYSLIPIDEMIA: ICD-10-CM

## 2020-02-05 DIAGNOSIS — I10 ESSENTIAL HYPERTENSION: ICD-10-CM

## 2020-02-05 DIAGNOSIS — E78.00 HYPERCHOLESTEROLEMIA: ICD-10-CM

## 2020-02-05 DIAGNOSIS — I35.0 NONRHEUMATIC AORTIC VALVE STENOSIS: ICD-10-CM

## 2020-02-05 DIAGNOSIS — I25.10 CORONARY ARTERY DISEASE INVOLVING NATIVE CORONARY ARTERY OF NATIVE HEART WITHOUT ANGINA PECTORIS: ICD-10-CM

## 2020-02-05 ASSESSMENT — MIFFLIN-ST. JEOR: SCORE: 1035.5

## 2020-02-11 ENCOUNTER — COMMUNICATION - HEALTHEAST (OUTPATIENT)
Dept: INTERNAL MEDICINE | Facility: CLINIC | Age: 85
End: 2020-02-11

## 2020-02-11 DIAGNOSIS — I25.10 CAD (CORONARY ARTERY DISEASE): ICD-10-CM

## 2020-02-12 ENCOUNTER — COMMUNICATION - HEALTHEAST (OUTPATIENT)
Dept: SCHEDULING | Facility: CLINIC | Age: 85
End: 2020-02-12

## 2020-02-17 ENCOUNTER — COMMUNICATION - HEALTHEAST (OUTPATIENT)
Dept: INTERNAL MEDICINE | Facility: CLINIC | Age: 85
End: 2020-02-17

## 2020-02-17 DIAGNOSIS — I25.10 CAD (CORONARY ARTERY DISEASE): ICD-10-CM

## 2020-02-18 ENCOUNTER — OFFICE VISIT - HEALTHEAST (OUTPATIENT)
Dept: ONCOLOGY | Facility: HOSPITAL | Age: 85
End: 2020-02-18

## 2020-02-18 DIAGNOSIS — C50.412 MALIGNANT NEOPLASM OF UPPER-OUTER QUADRANT OF LEFT BREAST IN FEMALE, ESTROGEN RECEPTOR POSITIVE (H): ICD-10-CM

## 2020-02-18 DIAGNOSIS — Z17.0 MALIGNANT NEOPLASM OF UPPER-OUTER QUADRANT OF LEFT BREAST IN FEMALE, ESTROGEN RECEPTOR POSITIVE (H): ICD-10-CM

## 2020-02-18 DIAGNOSIS — Z12.31 BREAST CANCER SCREENING BY MAMMOGRAM: ICD-10-CM

## 2020-02-24 ENCOUNTER — HOSPITAL ENCOUNTER (OUTPATIENT)
Dept: PHYSICAL MEDICINE AND REHAB | Facility: CLINIC | Age: 85
Discharge: HOME | End: 2020-02-24
Attending: NURSE PRACTITIONER

## 2020-02-24 DIAGNOSIS — M43.16 SPONDYLOLISTHESIS OF LUMBAR REGION: ICD-10-CM

## 2020-02-24 DIAGNOSIS — M48.02 FORAMINAL STENOSIS OF CERVICAL REGION: ICD-10-CM

## 2020-02-24 DIAGNOSIS — G89.29 CHRONIC BILATERAL LOW BACK PAIN WITH LEFT-SIDED SCIATICA: ICD-10-CM

## 2020-02-24 DIAGNOSIS — M54.42 CHRONIC BILATERAL LOW BACK PAIN WITH LEFT-SIDED SCIATICA: ICD-10-CM

## 2020-02-24 DIAGNOSIS — M79.18 MYOFASCIAL PAIN: ICD-10-CM

## 2020-02-24 DIAGNOSIS — M54.12 RADICULITIS OF LEFT CERVICAL REGION: ICD-10-CM

## 2020-02-24 DIAGNOSIS — M50.30 DDD (DEGENERATIVE DISC DISEASE), CERVICAL: ICD-10-CM

## 2020-02-24 DIAGNOSIS — M54.16 LEFT LUMBAR RADICULITIS: ICD-10-CM

## 2020-02-24 DIAGNOSIS — M48.061 LUMBAR FORAMINAL STENOSIS: ICD-10-CM

## 2020-02-24 ASSESSMENT — MIFFLIN-ST. JEOR: SCORE: 1044.58

## 2020-02-25 ENCOUNTER — COMMUNICATION - HEALTHEAST (OUTPATIENT)
Dept: INTERNAL MEDICINE | Facility: CLINIC | Age: 85
End: 2020-02-25

## 2020-02-25 DIAGNOSIS — G47.00 INSOMNIA, UNSPECIFIED TYPE: ICD-10-CM

## 2020-03-30 ENCOUNTER — AMBULATORY - HEALTHEAST (OUTPATIENT)
Dept: PULMONOLOGY | Facility: OTHER | Age: 85
End: 2020-03-30

## 2020-03-30 ENCOUNTER — COMMUNICATION - HEALTHEAST (OUTPATIENT)
Dept: PULMONOLOGY | Facility: OTHER | Age: 85
End: 2020-03-30

## 2020-03-30 DIAGNOSIS — J45.909 ASTHMA: ICD-10-CM

## 2020-04-22 ENCOUNTER — OFFICE VISIT - HEALTHEAST (OUTPATIENT)
Dept: PULMONOLOGY | Facility: OTHER | Age: 85
End: 2020-04-22

## 2020-04-22 DIAGNOSIS — J45.30 MILD PERSISTENT ASTHMA WITHOUT COMPLICATION: ICD-10-CM

## 2020-06-18 ENCOUNTER — OFFICE VISIT - HEALTHEAST (OUTPATIENT)
Dept: INTERNAL MEDICINE | Facility: CLINIC | Age: 85
End: 2020-06-18

## 2020-06-18 DIAGNOSIS — Z17.0 MALIGNANT NEOPLASM OF UPPER-OUTER QUADRANT OF LEFT BREAST IN FEMALE, ESTROGEN RECEPTOR POSITIVE (H): ICD-10-CM

## 2020-06-18 DIAGNOSIS — Z00.00 ROUTINE GENERAL MEDICAL EXAMINATION AT A HEALTH CARE FACILITY: ICD-10-CM

## 2020-06-18 DIAGNOSIS — C50.412 MALIGNANT NEOPLASM OF UPPER-OUTER QUADRANT OF LEFT BREAST IN FEMALE, ESTROGEN RECEPTOR POSITIVE (H): ICD-10-CM

## 2020-06-18 DIAGNOSIS — I25.10 CORONARY ARTERY DISEASE INVOLVING NATIVE CORONARY ARTERY OF NATIVE HEART WITHOUT ANGINA PECTORIS: ICD-10-CM

## 2020-06-18 DIAGNOSIS — M81.0 SENILE OSTEOPOROSIS: ICD-10-CM

## 2020-06-18 DIAGNOSIS — J45.40 MODERATE PERSISTENT ASTHMA WITHOUT COMPLICATION: ICD-10-CM

## 2020-06-18 DIAGNOSIS — I10 ESSENTIAL HYPERTENSION: ICD-10-CM

## 2020-06-18 DIAGNOSIS — E78.00 PURE HYPERCHOLESTEROLEMIA: ICD-10-CM

## 2020-06-18 LAB
ALBUMIN SERPL-MCNC: 3.8 G/DL (ref 3.5–5)
ALBUMIN UR-MCNC: NEGATIVE MG/DL
ALP SERPL-CCNC: 79 U/L (ref 45–120)
ALT SERPL W P-5'-P-CCNC: 9 U/L (ref 0–45)
ANION GAP SERPL CALCULATED.3IONS-SCNC: 10 MMOL/L (ref 5–18)
APPEARANCE UR: CLEAR
AST SERPL W P-5'-P-CCNC: 17 U/L (ref 0–40)
BACTERIA #/AREA URNS HPF: ABNORMAL HPF
BILIRUB SERPL-MCNC: 0.6 MG/DL (ref 0–1)
BILIRUB UR QL STRIP: NEGATIVE
BUN SERPL-MCNC: 11 MG/DL (ref 8–28)
CALCIUM SERPL-MCNC: 9.4 MG/DL (ref 8.5–10.5)
CHLORIDE BLD-SCNC: 105 MMOL/L (ref 98–107)
CHOLEST SERPL-MCNC: 171 MG/DL
CO2 SERPL-SCNC: 24 MMOL/L (ref 22–31)
COLOR UR AUTO: YELLOW
CREAT SERPL-MCNC: 0.84 MG/DL (ref 0.6–1.1)
ERYTHROCYTE [DISTWIDTH] IN BLOOD BY AUTOMATED COUNT: 12.9 % (ref 11–14.5)
FASTING STATUS PATIENT QL REPORTED: YES
GFR SERPL CREATININE-BSD FRML MDRD: >60 ML/MIN/1.73M2
GLUCOSE BLD-MCNC: 94 MG/DL (ref 70–125)
GLUCOSE UR STRIP-MCNC: NEGATIVE MG/DL
HCT VFR BLD AUTO: 39.3 % (ref 35–47)
HDLC SERPL-MCNC: 62 MG/DL
HGB BLD-MCNC: 13.1 G/DL (ref 12–16)
HGB UR QL STRIP: NEGATIVE
KETONES UR STRIP-MCNC: NEGATIVE MG/DL
LDLC SERPL CALC-MCNC: 83 MG/DL
LEUKOCYTE ESTERASE UR QL STRIP: ABNORMAL
MCH RBC QN AUTO: 30.3 PG (ref 27–34)
MCHC RBC AUTO-ENTMCNC: 33.3 G/DL (ref 32–36)
MCV RBC AUTO: 91 FL (ref 80–100)
NITRATE UR QL: NEGATIVE
PH UR STRIP: 7 [PH] (ref 5–8)
PLATELET # BLD AUTO: 321 THOU/UL (ref 140–440)
PMV BLD AUTO: 7 FL (ref 7–10)
POTASSIUM BLD-SCNC: 4.3 MMOL/L (ref 3.5–5)
PROT SERPL-MCNC: 7.1 G/DL (ref 6–8)
RBC # BLD AUTO: 4.32 MILL/UL (ref 3.8–5.4)
RBC #/AREA URNS AUTO: ABNORMAL HPF
SODIUM SERPL-SCNC: 139 MMOL/L (ref 136–145)
SP GR UR STRIP: 1.02 (ref 1–1.03)
SQUAMOUS #/AREA URNS AUTO: ABNORMAL LPF
TRIGL SERPL-MCNC: 131 MG/DL
UROBILINOGEN UR STRIP-ACNC: ABNORMAL
WBC #/AREA URNS AUTO: ABNORMAL HPF
WBC: 9.5 THOU/UL (ref 4–11)

## 2020-06-18 ASSESSMENT — MIFFLIN-ST. JEOR: SCORE: 1065.44

## 2020-06-19 LAB
25(OH)D3 SERPL-MCNC: 52 NG/ML (ref 30–80)
25(OH)D3 SERPL-MCNC: 52 NG/ML (ref 30–80)
BACTERIA SPEC CULT: NO GROWTH

## 2020-06-29 ENCOUNTER — COMMUNICATION - HEALTHEAST (OUTPATIENT)
Dept: INTERNAL MEDICINE | Facility: CLINIC | Age: 85
End: 2020-06-29

## 2020-06-29 DIAGNOSIS — N39.0 RECURRENT UTI: ICD-10-CM

## 2020-07-02 ENCOUNTER — OFFICE VISIT - HEALTHEAST (OUTPATIENT)
Dept: INTERNAL MEDICINE | Facility: CLINIC | Age: 85
End: 2020-07-02

## 2020-07-02 ENCOUNTER — COMMUNICATION - HEALTHEAST (OUTPATIENT)
Dept: INTERNAL MEDICINE | Facility: CLINIC | Age: 85
End: 2020-07-02

## 2020-07-02 DIAGNOSIS — I10 ESSENTIAL HYPERTENSION: ICD-10-CM

## 2020-07-08 ENCOUNTER — RECORDS - HEALTHEAST (OUTPATIENT)
Dept: ADMINISTRATIVE | Facility: OTHER | Age: 85
End: 2020-07-08

## 2020-07-14 ENCOUNTER — COMMUNICATION - HEALTHEAST (OUTPATIENT)
Dept: INTERNAL MEDICINE | Facility: CLINIC | Age: 85
End: 2020-07-14

## 2020-07-14 DIAGNOSIS — I10 ESSENTIAL HYPERTENSION WITH GOAL BLOOD PRESSURE LESS THAN 140/90: ICD-10-CM

## 2020-07-20 ENCOUNTER — HOSPITAL ENCOUNTER (OUTPATIENT)
Dept: MAMMOGRAPHY | Facility: CLINIC | Age: 85
Discharge: HOME | End: 2020-07-20
Attending: INTERNAL MEDICINE

## 2020-07-20 DIAGNOSIS — Z17.0 MALIGNANT NEOPLASM OF UPPER-OUTER QUADRANT OF LEFT BREAST IN FEMALE, ESTROGEN RECEPTOR POSITIVE (H): ICD-10-CM

## 2020-07-20 DIAGNOSIS — C50.412 MALIGNANT NEOPLASM OF UPPER-OUTER QUADRANT OF LEFT BREAST IN FEMALE, ESTROGEN RECEPTOR POSITIVE (H): ICD-10-CM

## 2020-07-20 DIAGNOSIS — Z12.31 BREAST CANCER SCREENING BY MAMMOGRAM: ICD-10-CM

## 2020-07-23 ENCOUNTER — OFFICE VISIT - HEALTHEAST (OUTPATIENT)
Dept: INTERNAL MEDICINE | Facility: CLINIC | Age: 85
End: 2020-07-23

## 2020-07-23 DIAGNOSIS — M54.50 CHRONIC RIGHT-SIDED LOW BACK PAIN WITHOUT SCIATICA: ICD-10-CM

## 2020-07-23 DIAGNOSIS — G89.29 CHRONIC RIGHT-SIDED LOW BACK PAIN WITHOUT SCIATICA: ICD-10-CM

## 2020-07-23 DIAGNOSIS — I10 ESSENTIAL HYPERTENSION: ICD-10-CM

## 2020-08-14 ENCOUNTER — COMMUNICATION - HEALTHEAST (OUTPATIENT)
Dept: SCHEDULING | Facility: CLINIC | Age: 85
End: 2020-08-14

## 2020-08-18 ENCOUNTER — OFFICE VISIT - HEALTHEAST (OUTPATIENT)
Dept: ONCOLOGY | Facility: HOSPITAL | Age: 85
End: 2020-08-18

## 2020-08-18 DIAGNOSIS — C50.412 MALIGNANT NEOPLASM OF UPPER-OUTER QUADRANT OF LEFT BREAST IN FEMALE, ESTROGEN RECEPTOR POSITIVE (H): ICD-10-CM

## 2020-08-18 DIAGNOSIS — Z17.0 MALIGNANT NEOPLASM OF UPPER-OUTER QUADRANT OF LEFT BREAST IN FEMALE, ESTROGEN RECEPTOR POSITIVE (H): ICD-10-CM

## 2020-09-03 ENCOUNTER — OFFICE VISIT - HEALTHEAST (OUTPATIENT)
Dept: PULMONOLOGY | Facility: OTHER | Age: 85
End: 2020-09-03

## 2020-09-03 DIAGNOSIS — J20.9 ACUTE BRONCHITIS, UNSPECIFIED ORGANISM: ICD-10-CM

## 2020-09-03 ASSESSMENT — MIFFLIN-ST. JEOR: SCORE: 1049.11

## 2020-09-18 ENCOUNTER — RECORDS - HEALTHEAST (OUTPATIENT)
Dept: ADMINISTRATIVE | Facility: OTHER | Age: 85
End: 2020-09-18

## 2020-10-08 ENCOUNTER — COMMUNICATION - HEALTHEAST (OUTPATIENT)
Dept: ONCOLOGY | Facility: HOSPITAL | Age: 85
End: 2020-10-08

## 2020-10-08 DIAGNOSIS — Z17.0 MALIGNANT NEOPLASM OF UPPER-OUTER QUADRANT OF LEFT BREAST IN FEMALE, ESTROGEN RECEPTOR POSITIVE (H): ICD-10-CM

## 2020-10-08 DIAGNOSIS — C50.412 MALIGNANT NEOPLASM OF UPPER-OUTER QUADRANT OF LEFT BREAST IN FEMALE, ESTROGEN RECEPTOR POSITIVE (H): ICD-10-CM

## 2020-10-15 ENCOUNTER — OFFICE VISIT - HEALTHEAST (OUTPATIENT)
Dept: INTERNAL MEDICINE | Facility: CLINIC | Age: 85
End: 2020-10-15

## 2020-10-15 DIAGNOSIS — I10 ESSENTIAL HYPERTENSION WITH GOAL BLOOD PRESSURE LESS THAN 140/90: ICD-10-CM

## 2020-10-15 DIAGNOSIS — J45.40 MODERATE PERSISTENT ASTHMA WITHOUT COMPLICATION: ICD-10-CM

## 2020-10-15 DIAGNOSIS — R06.09 DOE (DYSPNEA ON EXERTION): ICD-10-CM

## 2020-10-15 DIAGNOSIS — I25.10 CORONARY ARTERY DISEASE INVOLVING NATIVE CORONARY ARTERY OF NATIVE HEART WITHOUT ANGINA PECTORIS: ICD-10-CM

## 2020-10-15 DIAGNOSIS — N39.0 RECURRENT UTI: ICD-10-CM

## 2020-10-15 DIAGNOSIS — I10 ESSENTIAL HYPERTENSION: ICD-10-CM

## 2020-10-15 RX ORDER — METOPROLOL TARTRATE 100 MG
100 TABLET ORAL 2 TIMES DAILY
Qty: 180 TABLET | Refills: 3 | Status: SHIPPED | OUTPATIENT
Start: 2020-10-15

## 2020-11-09 ENCOUNTER — COMMUNICATION - HEALTHEAST (OUTPATIENT)
Dept: INTERNAL MEDICINE | Facility: CLINIC | Age: 85
End: 2020-11-09

## 2020-11-16 ENCOUNTER — OFFICE VISIT - HEALTHEAST (OUTPATIENT)
Dept: PULMONOLOGY | Facility: OTHER | Age: 85
End: 2020-11-16

## 2020-11-16 DIAGNOSIS — J45.41 MODERATE PERSISTENT ASTHMA WITH EXACERBATION: ICD-10-CM

## 2020-11-16 ASSESSMENT — MIFFLIN-ST. JEOR: SCORE: 1080.86

## 2020-11-23 ENCOUNTER — HOSPITAL ENCOUNTER (OUTPATIENT)
Dept: CARDIOLOGY | Facility: HOSPITAL | Age: 85
Discharge: HOME | End: 2020-11-23
Attending: INTERNAL MEDICINE

## 2020-11-23 ENCOUNTER — HOSPITAL ENCOUNTER (OUTPATIENT)
Dept: NUCLEAR MEDICINE | Facility: HOSPITAL | Age: 85
Discharge: HOME | End: 2020-11-23
Attending: INTERNAL MEDICINE

## 2020-11-23 DIAGNOSIS — I25.10 CORONARY ARTERY DISEASE INVOLVING NATIVE CORONARY ARTERY OF NATIVE HEART WITHOUT ANGINA PECTORIS: ICD-10-CM

## 2020-11-23 DIAGNOSIS — R06.09 DOE (DYSPNEA ON EXERTION): ICD-10-CM

## 2020-11-23 LAB
AORTIC ROOT: 3.3 CM
AORTIC VALVE MEAN VELOCITY: 117 CM/S
AR DECEL SLOPE: 1790 MM/S2
AR PEAK VELOCITY: 389 CM/S
ASCENDING AORTA: 3.9 CM
AV DIMENSIONLESS INDEX VTI: 0.5
AV MEAN GRADIENT: 6 MMHG
AV PEAK GRADIENT: 14.6 MMHG
AV REGURGITANT PEAK GRADIENT: 60.5 MMHG
AV REGURGITATION PRESSURE HALF TIME: 636 MS
AV VALVE AREA: 1.7 CM2
BSA FOR ECHO PROCEDURE: 1.77 M2
CV ECHO HEIGHT: 60 IN
CV ECHO WEIGHT: 163 LBS
CV STRESS CURRENT BP HE: NORMAL
CV STRESS CURRENT HR HE: 52
CV STRESS CURRENT HR HE: 53
CV STRESS CURRENT HR HE: 55
CV STRESS CURRENT HR HE: 60
CV STRESS CURRENT HR HE: 61
CV STRESS CURRENT HR HE: 62
CV STRESS CURRENT HR HE: 62
CV STRESS CURRENT HR HE: 63
CV STRESS CURRENT HR HE: 69
CV STRESS CURRENT HR HE: 70
CV STRESS CURRENT HR HE: 75
CV STRESS CURRENT HR HE: 75
CV STRESS CURRENT HR HE: 76
CV STRESS CURRENT HR HE: 76
CV STRESS CURRENT HR HE: 77
CV STRESS CURRENT HR HE: 78
CV STRESS CURRENT HR HE: 79
CV STRESS CURRENT HR HE: 80
CV STRESS DEVIATION TIME HE: NORMAL
CV STRESS ECHO PERCENT HR HE: NORMAL
CV STRESS EXERCISE STAGE HE: NORMAL
CV STRESS FINAL RESTING BP HE: NORMAL
CV STRESS FINAL RESTING HR HE: 60
CV STRESS MAX HR HE: 80
CV STRESS MAX TREADMILL GRADE HE: 0
CV STRESS MAX TREADMILL SPEED HE: 0
CV STRESS PEAK DIA BP HE: NORMAL
CV STRESS PEAK SYS BP HE: NORMAL
CV STRESS PHASE HE: NORMAL
CV STRESS PROTOCOL HE: NORMAL
CV STRESS RESTING PT POSITION HE: NORMAL
CV STRESS RESTING PT POSITION HE: NORMAL
CV STRESS ST DEVIATION AMOUNT HE: NORMAL
CV STRESS ST DEVIATION ELEVATION HE: NORMAL
CV STRESS ST EVELATION AMOUNT HE: NORMAL
CV STRESS TEST TYPE HE: NORMAL
CV STRESS TOTAL STAGE TIME MIN 1 HE: NORMAL
DOP CALC AO PEAK VEL: 191 CM/S
DOP CALC AO VTI: 42.2 CM
DOP CALC LVOT AREA: 3.14 CM2
DOP CALC LVOT DIAMETER: 2 CM
DOP CALC LVOT STROKE VOLUME: 71.9 CM3
DOP CALCLVOT PEAK VEL VTI: 22.9 CM
EJECTION FRACTION: 66 % (ref 55–75)
FRACTIONAL SHORTENING: 24 % (ref 28–44)
INTERVENTRICULAR SEPTUM IN END DIASTOLE: 1.2 CM (ref 0.6–0.9)
IVS/PW RATIO: 1
LA AREA 1: 20.3 CM2
LA AREA 2: 17.3 CM2
LEFT ATRIUM LENGTH: 5.85 CM
LEFT ATRIUM SIZE: 3.8 CM
LEFT ATRIUM TO AORTIC ROOT RATIO: 1.15 NO UNITS
LEFT ATRIUM VOLUME INDEX: 28.8 ML/M2
LEFT ATRIUM VOLUME: 51 ML
LEFT VENTRICLE DIASTOLIC VOLUME INDEX: 57.6 CM3/M2 (ref 29–61)
LEFT VENTRICLE DIASTOLIC VOLUME: 102 CM3 (ref 46–106)
LEFT VENTRICLE MASS INDEX: 132.1 G/M2
LEFT VENTRICLE SYSTOLIC VOLUME INDEX: 19.8 CM3/M2 (ref 8–24)
LEFT VENTRICLE SYSTOLIC VOLUME: 35 CM3 (ref 14–42)
LEFT VENTRICULAR INTERNAL DIMENSION IN DIASTOLE: 5 CM (ref 3.8–5.2)
LEFT VENTRICULAR INTERNAL DIMENSION IN SYSTOLE: 3.8 CM (ref 2.2–3.5)
LEFT VENTRICULAR MASS: 233.7 G
LEFT VENTRICULAR OUTFLOW TRACT MEAN GRADIENT: 2 MMHG
LEFT VENTRICULAR OUTFLOW TRACT MEAN VELOCITY: 57.6 CM/S
LEFT VENTRICULAR POSTERIOR WALL IN END DIASTOLE: 1.2 CM (ref 0.6–0.9)
LV STROKE VOLUME INDEX: 40.6 ML/M2
MITRAL VALVE E/A RATIO: 1.3
MV AVERAGE E/E' RATIO: 18.3 CM/S
MV DECELERATION TIME: 211 MS
MV E'TISSUE VEL-LAT: 6.62 CM/S
MV E'TISSUE VEL-MED: 4.51 CM/S
MV LATERAL E/E' RATIO: 15.4
MV MEDIAL E/E' RATIO: 22.6
MV PEAK A VELOCITY: 79 CM/S
MV PEAK E VELOCITY: 102 CM/S
NUC REST DIASTOLIC VOLUME INDEX: 2608 LBS
NUC REST SYSTOLIC VOLUME INDEX: 60 IN
NUC STRESS EJECTION FRACTION: 64 %
PR MAX PG: 8 MMHG
PR PEAK VELOCITY: 144 CM/S
RATE PRESSURE PRODUCT: NORMAL
STRESS ECHO BASELINE DIASTOLIC HE: 79
STRESS ECHO BASELINE HR: 56
STRESS ECHO BASELINE SYSTOLIC BP: 179
STRESS ECHO CALCULATED PERCENT HR: 61 %
STRESS ECHO LAST STRESS DIASTOLIC BP: 83
STRESS ECHO LAST STRESS HR: 76
STRESS ECHO LAST STRESS SYSTOLIC BP: 170
STRESS ECHO TARGET HR: 131
TRICUSPID REGURGITATION PEAK PRESSURE GRADIENT: 27.2 MMHG
TRICUSPID VALVE PEAK REGURGITANT VELOCITY: 261 CM/S

## 2020-11-23 ASSESSMENT — MIFFLIN-ST. JEOR
SCORE: 1080.86
SCORE: 1053.65

## 2020-12-01 ENCOUNTER — COMMUNICATION - HEALTHEAST (OUTPATIENT)
Dept: INTERNAL MEDICINE | Facility: CLINIC | Age: 85
End: 2020-12-01

## 2020-12-09 ENCOUNTER — COMMUNICATION - HEALTHEAST (OUTPATIENT)
Dept: PHYSICAL MEDICINE AND REHAB | Facility: CLINIC | Age: 85
End: 2020-12-09

## 2020-12-09 DIAGNOSIS — M54.16 LEFT LUMBAR RADICULITIS: ICD-10-CM

## 2020-12-09 DIAGNOSIS — M54.12 RADICULITIS OF LEFT CERVICAL REGION: ICD-10-CM

## 2020-12-15 ENCOUNTER — OFFICE VISIT - HEALTHEAST (OUTPATIENT)
Dept: CARDIOLOGY | Facility: CLINIC | Age: 85
End: 2020-12-15

## 2020-12-15 DIAGNOSIS — I25.10 CORONARY ARTERY DISEASE INVOLVING NATIVE CORONARY ARTERY OF NATIVE HEART WITHOUT ANGINA PECTORIS: ICD-10-CM

## 2020-12-15 DIAGNOSIS — I10 HTN (HYPERTENSION): ICD-10-CM

## 2020-12-15 DIAGNOSIS — I35.0 NONRHEUMATIC AORTIC VALVE STENOSIS: ICD-10-CM

## 2020-12-15 DIAGNOSIS — I10 ESSENTIAL HYPERTENSION: ICD-10-CM

## 2020-12-15 DIAGNOSIS — E78.5 DYSLIPIDEMIA: ICD-10-CM

## 2020-12-15 DIAGNOSIS — I51.89 DIASTOLIC DYSFUNCTION: ICD-10-CM

## 2020-12-15 ASSESSMENT — MIFFLIN-ST. JEOR: SCORE: 1080.86

## 2020-12-17 ENCOUNTER — AMBULATORY - HEALTHEAST (OUTPATIENT)
Dept: INTERNAL MEDICINE | Facility: CLINIC | Age: 85
End: 2020-12-17

## 2020-12-17 DIAGNOSIS — M81.0 SENILE OSTEOPOROSIS: ICD-10-CM

## 2020-12-17 DIAGNOSIS — Z92.29 PERSONAL HISTORY OF OTHER DRUG THERAPY: ICD-10-CM

## 2020-12-22 ENCOUNTER — OFFICE VISIT - HEALTHEAST (OUTPATIENT)
Dept: INTERNAL MEDICINE | Facility: CLINIC | Age: 85
End: 2020-12-22

## 2020-12-22 DIAGNOSIS — Z17.0 MALIGNANT NEOPLASM OF UPPER-OUTER QUADRANT OF LEFT BREAST IN FEMALE, ESTROGEN RECEPTOR POSITIVE (H): ICD-10-CM

## 2020-12-22 DIAGNOSIS — M81.0 SENILE OSTEOPOROSIS: ICD-10-CM

## 2020-12-22 DIAGNOSIS — C50.412 MALIGNANT NEOPLASM OF UPPER-OUTER QUADRANT OF LEFT BREAST IN FEMALE, ESTROGEN RECEPTOR POSITIVE (H): ICD-10-CM

## 2020-12-22 DIAGNOSIS — I25.10 CORONARY ARTERY DISEASE INVOLVING NATIVE CORONARY ARTERY OF NATIVE HEART WITHOUT ANGINA PECTORIS: ICD-10-CM

## 2020-12-22 DIAGNOSIS — I10 ESSENTIAL HYPERTENSION: ICD-10-CM

## 2020-12-22 DIAGNOSIS — J45.40 MODERATE PERSISTENT ASTHMA WITHOUT COMPLICATION: ICD-10-CM

## 2021-01-01 ENCOUNTER — PATIENT OUTREACH (OUTPATIENT)
Dept: CARE COORDINATION | Facility: CLINIC | Age: 86
End: 2021-01-01
Payer: COMMERCIAL

## 2021-01-01 ENCOUNTER — ANESTHESIA EVENT (OUTPATIENT)
Dept: SURGERY | Facility: HOSPITAL | Age: 86
DRG: 640 | End: 2021-01-01
Payer: COMMERCIAL

## 2021-01-01 ENCOUNTER — HEALTH MAINTENANCE LETTER (OUTPATIENT)
Age: 86
End: 2021-01-01

## 2021-01-01 ENCOUNTER — RECORDS - HEALTHEAST (OUTPATIENT)
Dept: ADMINISTRATIVE | Facility: CLINIC | Age: 86
End: 2021-01-01

## 2021-01-01 ENCOUNTER — OFFICE VISIT - HEALTHEAST (OUTPATIENT)
Dept: FAMILY MEDICINE | Facility: CLINIC | Age: 86
End: 2021-01-01

## 2021-01-01 ENCOUNTER — OFFICE VISIT (OUTPATIENT)
Dept: INTERNAL MEDICINE | Facility: CLINIC | Age: 86
End: 2021-01-01
Payer: COMMERCIAL

## 2021-01-01 ENCOUNTER — OFFICE VISIT - HEALTHEAST (OUTPATIENT)
Dept: PULMONOLOGY | Facility: OTHER | Age: 86
End: 2021-01-01

## 2021-01-01 ENCOUNTER — TRANSFERRED RECORDS (OUTPATIENT)
Dept: HEALTH INFORMATION MANAGEMENT | Facility: CLINIC | Age: 86
End: 2021-01-01

## 2021-01-01 ENCOUNTER — HOME INFUSION (PRE-WILLOW HOME INFUSION) (OUTPATIENT)
Dept: PHARMACY | Facility: CLINIC | Age: 86
End: 2021-01-01

## 2021-01-01 ENCOUNTER — RECORDS - HEALTHEAST (OUTPATIENT)
Dept: CARDIOLOGY | Facility: CLINIC | Age: 86
End: 2021-01-01

## 2021-01-01 ENCOUNTER — RECORDS - HEALTHEAST (OUTPATIENT)
Dept: ADMINISTRATIVE | Facility: OTHER | Age: 86
End: 2021-01-01

## 2021-01-01 ENCOUNTER — HOSPITAL ENCOUNTER (OUTPATIENT)
Dept: CT IMAGING | Facility: CLINIC | Age: 86
Discharge: HOME | End: 2021-05-12
Attending: INTERNAL MEDICINE

## 2021-01-01 ENCOUNTER — TELEPHONE (OUTPATIENT)
Dept: INTERNAL MEDICINE | Facility: CLINIC | Age: 86
End: 2021-01-01
Payer: COMMERCIAL

## 2021-01-01 ENCOUNTER — COMMUNICATION - HEALTHEAST (OUTPATIENT)
Dept: SCHEDULING | Facility: CLINIC | Age: 86
End: 2021-01-01

## 2021-01-01 ENCOUNTER — RECORDS - HEALTHEAST (OUTPATIENT)
Dept: SCHEDULING | Facility: CLINIC | Age: 86
End: 2021-01-01

## 2021-01-01 ENCOUNTER — RECORDS - HEALTHEAST (OUTPATIENT)
Dept: LAB | Facility: CLINIC | Age: 86
End: 2021-01-01

## 2021-01-01 ENCOUNTER — HOSPITAL ENCOUNTER (EMERGENCY)
Facility: HOSPITAL | Age: 86
Discharge: ANOTHER HOSPITAL | End: 2021-08-01
Attending: EMERGENCY MEDICINE | Admitting: EMERGENCY MEDICINE
Payer: COMMERCIAL

## 2021-01-01 ENCOUNTER — COMMUNICATION - HEALTHEAST (OUTPATIENT)
Dept: CARDIOLOGY | Facility: CLINIC | Age: 86
End: 2021-01-01

## 2021-01-01 ENCOUNTER — HOSPITAL ENCOUNTER (INPATIENT)
Facility: HOSPITAL | Age: 86
LOS: 2 days | Discharge: HOME | DRG: 640 | End: 2021-12-30
Attending: EMERGENCY MEDICINE | Admitting: INTERNAL MEDICINE
Payer: COMMERCIAL

## 2021-01-01 ENCOUNTER — AMBULATORY - HEALTHEAST (OUTPATIENT)
Dept: PULMONOLOGY | Facility: OTHER | Age: 86
End: 2021-01-01

## 2021-01-01 ENCOUNTER — TELEPHONE (OUTPATIENT)
Dept: ONCOLOGY | Facility: HOSPITAL | Age: 86
End: 2021-01-01

## 2021-01-01 ENCOUNTER — HOSPITAL ENCOUNTER (OUTPATIENT)
Dept: CT IMAGING | Facility: CLINIC | Age: 86
Discharge: HOME | End: 2021-12-19
Attending: INTERNAL MEDICINE | Admitting: INTERNAL MEDICINE
Payer: COMMERCIAL

## 2021-01-01 ENCOUNTER — TELEPHONE (OUTPATIENT)
Dept: PULMONOLOGY | Facility: OTHER | Age: 86
End: 2021-01-01

## 2021-01-01 ENCOUNTER — MEDICAL CORRESPONDENCE (OUTPATIENT)
Dept: HEALTH INFORMATION MANAGEMENT | Facility: CLINIC | Age: 86
End: 2021-01-01
Payer: COMMERCIAL

## 2021-01-01 ENCOUNTER — COMMUNICATION - HEALTHEAST (OUTPATIENT)
Dept: FAMILY MEDICINE | Facility: CLINIC | Age: 86
End: 2021-01-01

## 2021-01-01 ENCOUNTER — COMMUNICATION - HEALTHEAST (OUTPATIENT)
Dept: INTERNAL MEDICINE | Facility: CLINIC | Age: 86
End: 2021-01-01

## 2021-01-01 ENCOUNTER — OFFICE VISIT - HEALTHEAST (OUTPATIENT)
Dept: CARDIOLOGY | Facility: CLINIC | Age: 86
End: 2021-01-01

## 2021-01-01 ENCOUNTER — RECORDS - HEALTHEAST (OUTPATIENT)
Dept: GENERAL RADIOLOGY | Facility: CLINIC | Age: 86
End: 2021-01-01

## 2021-01-01 ENCOUNTER — COMMUNICATION - HEALTHEAST (OUTPATIENT)
Dept: ONCOLOGY | Facility: HOSPITAL | Age: 86
End: 2021-01-01

## 2021-01-01 ENCOUNTER — AMBULATORY - HEALTHEAST (OUTPATIENT)
Dept: CARDIOLOGY | Facility: CLINIC | Age: 86
End: 2021-01-01

## 2021-01-01 ENCOUNTER — HOSPITAL ENCOUNTER (OUTPATIENT)
Dept: CT IMAGING | Facility: HOSPITAL | Age: 86
Discharge: HOME | End: 2021-06-21
Attending: INTERNAL MEDICINE
Payer: MEDICARE

## 2021-01-01 ENCOUNTER — OFFICE VISIT (OUTPATIENT)
Dept: PULMONOLOGY | Facility: OTHER | Age: 86
End: 2021-01-01
Payer: COMMERCIAL

## 2021-01-01 ENCOUNTER — OFFICE VISIT (OUTPATIENT)
Dept: NEUROSURGERY | Facility: CLINIC | Age: 86
End: 2021-01-01
Payer: COMMERCIAL

## 2021-01-01 ENCOUNTER — HOSPITAL ENCOUNTER (OUTPATIENT)
Dept: CT IMAGING | Facility: CLINIC | Age: 86
Discharge: HOME | End: 2021-12-04
Attending: INTERNAL MEDICINE | Admitting: INTERNAL MEDICINE
Payer: COMMERCIAL

## 2021-01-01 ENCOUNTER — OFFICE VISIT - HEALTHEAST (OUTPATIENT)
Dept: ONCOLOGY | Facility: HOSPITAL | Age: 86
End: 2021-01-01

## 2021-01-01 ENCOUNTER — TELEPHONE (OUTPATIENT)
Dept: INTERNAL MEDICINE | Facility: CLINIC | Age: 86
End: 2021-01-01

## 2021-01-01 ENCOUNTER — MEDICAL CORRESPONDENCE (OUTPATIENT)
Dept: HEALTH INFORMATION MANAGEMENT | Facility: CLINIC | Age: 86
End: 2021-01-01

## 2021-01-01 ENCOUNTER — TELEPHONE (OUTPATIENT)
Dept: NEUROSURGERY | Facility: CLINIC | Age: 86
End: 2021-01-01

## 2021-01-01 ENCOUNTER — HOSPITAL ENCOUNTER (OUTPATIENT)
Dept: RADIOLOGY | Facility: HOSPITAL | Age: 86
Discharge: HOME | End: 2021-09-07
Attending: SURGERY | Admitting: SURGERY
Payer: COMMERCIAL

## 2021-01-01 ENCOUNTER — AMBULATORY - HEALTHEAST (OUTPATIENT)
Dept: MULTI SPECIALTY CLINIC | Facility: CLINIC | Age: 86
End: 2021-01-01

## 2021-01-01 ENCOUNTER — HOSPITAL ENCOUNTER (OUTPATIENT)
Facility: CLINIC | Age: 86
End: 2021-01-01
Attending: INTERNAL MEDICINE | Admitting: INTERNAL MEDICINE
Payer: COMMERCIAL

## 2021-01-01 ENCOUNTER — APPOINTMENT (OUTPATIENT)
Dept: CT IMAGING | Facility: HOSPITAL | Age: 86
End: 2021-01-01
Attending: EMERGENCY MEDICINE
Payer: COMMERCIAL

## 2021-01-01 ENCOUNTER — APPOINTMENT (OUTPATIENT)
Dept: MRI IMAGING | Facility: HOSPITAL | Age: 86
End: 2021-01-01
Attending: EMERGENCY MEDICINE
Payer: COMMERCIAL

## 2021-01-01 ENCOUNTER — HOSPITAL ENCOUNTER (OUTPATIENT)
Dept: RESPIRATORY THERAPY | Facility: HOSPITAL | Age: 86
Discharge: HOME | End: 2021-08-05
Attending: INTERNAL MEDICINE | Admitting: INTERNAL MEDICINE
Payer: COMMERCIAL

## 2021-01-01 ENCOUNTER — ANESTHESIA (OUTPATIENT)
Dept: SURGERY | Facility: HOSPITAL | Age: 86
DRG: 640 | End: 2021-01-01
Payer: COMMERCIAL

## 2021-01-01 ENCOUNTER — OFFICE VISIT - HEALTHEAST (OUTPATIENT)
Dept: INTERNAL MEDICINE | Facility: CLINIC | Age: 86
End: 2021-01-01

## 2021-01-01 ENCOUNTER — ANCILLARY PROCEDURE (OUTPATIENT)
Dept: PHYSICAL MEDICINE AND REHAB | Facility: CLINIC | Age: 86
End: 2021-01-01
Attending: NURSE PRACTITIONER
Payer: COMMERCIAL

## 2021-01-01 ENCOUNTER — COMMUNICATION - HEALTHEAST (OUTPATIENT)
Dept: PHYSICAL MEDICINE AND REHAB | Facility: CLINIC | Age: 86
End: 2021-01-01

## 2021-01-01 ENCOUNTER — COMMUNICATION - HEALTHEAST (OUTPATIENT)
Dept: ADMINISTRATIVE | Facility: CLINIC | Age: 86
End: 2021-01-01

## 2021-01-01 ENCOUNTER — AMBULATORY - HEALTHEAST (OUTPATIENT)
Dept: LAB | Facility: CLINIC | Age: 86
End: 2021-01-01

## 2021-01-01 VITALS
WEIGHT: 153 LBS | HEART RATE: 89 BPM | SYSTOLIC BLOOD PRESSURE: 161 MMHG | OXYGEN SATURATION: 94 % | HEIGHT: 60 IN | DIASTOLIC BLOOD PRESSURE: 83 MMHG | BODY MASS INDEX: 30.04 KG/M2

## 2021-01-01 VITALS
WEIGHT: 160.5 LBS | TEMPERATURE: 98.5 F | OXYGEN SATURATION: 96 % | HEART RATE: 54 BPM | SYSTOLIC BLOOD PRESSURE: 175 MMHG | DIASTOLIC BLOOD PRESSURE: 78 MMHG | BODY MASS INDEX: 31.35 KG/M2

## 2021-01-01 VITALS — BODY MASS INDEX: 32.65 KG/M2 | WEIGHT: 170 LBS

## 2021-01-01 VITALS
SYSTOLIC BLOOD PRESSURE: 152 MMHG | BODY MASS INDEX: 31.44 KG/M2 | OXYGEN SATURATION: 95 % | HEART RATE: 86 BPM | DIASTOLIC BLOOD PRESSURE: 78 MMHG | WEIGHT: 161 LBS

## 2021-01-01 VITALS
RESPIRATION RATE: 12 BRPM | HEART RATE: 66 BPM | HEIGHT: 60 IN | BODY MASS INDEX: 32 KG/M2 | OXYGEN SATURATION: 96 % | SYSTOLIC BLOOD PRESSURE: 160 MMHG | WEIGHT: 163 LBS | DIASTOLIC BLOOD PRESSURE: 80 MMHG

## 2021-01-01 VITALS
TEMPERATURE: 98.3 F | HEIGHT: 60 IN | RESPIRATION RATE: 23 BRPM | WEIGHT: 159 LBS | OXYGEN SATURATION: 95 % | SYSTOLIC BLOOD PRESSURE: 145 MMHG | BODY MASS INDEX: 31.22 KG/M2 | DIASTOLIC BLOOD PRESSURE: 80 MMHG | HEART RATE: 148 BPM

## 2021-01-01 VITALS
BODY MASS INDEX: 32.15 KG/M2 | SYSTOLIC BLOOD PRESSURE: 139 MMHG | HEART RATE: 60 BPM | WEIGHT: 164.6 LBS | OXYGEN SATURATION: 96 % | DIASTOLIC BLOOD PRESSURE: 72 MMHG

## 2021-01-01 VITALS
RESPIRATION RATE: 12 BRPM | WEIGHT: 151 LBS | DIASTOLIC BLOOD PRESSURE: 62 MMHG | BODY MASS INDEX: 29.64 KG/M2 | HEIGHT: 60 IN | SYSTOLIC BLOOD PRESSURE: 118 MMHG | HEART RATE: 55 BPM | OXYGEN SATURATION: 95 %

## 2021-01-01 VITALS — WEIGHT: 152 LBS | BODY MASS INDEX: 29.69 KG/M2

## 2021-01-01 VITALS — WEIGHT: 148.6 LBS | BODY MASS INDEX: 29.02 KG/M2

## 2021-01-01 VITALS — WEIGHT: 168 LBS | BODY MASS INDEX: 32.27 KG/M2

## 2021-01-01 VITALS — WEIGHT: 170 LBS | BODY MASS INDEX: 32.65 KG/M2

## 2021-01-01 VITALS
BODY MASS INDEX: 29.72 KG/M2 | HEART RATE: 52 BPM | WEIGHT: 152.19 LBS | SYSTOLIC BLOOD PRESSURE: 168 MMHG | RESPIRATION RATE: 20 BRPM | OXYGEN SATURATION: 96 % | DIASTOLIC BLOOD PRESSURE: 68 MMHG | TEMPERATURE: 98.2 F

## 2021-01-01 VITALS — BODY MASS INDEX: 29.88 KG/M2 | WEIGHT: 153 LBS

## 2021-01-01 VITALS — DIASTOLIC BLOOD PRESSURE: 62 MMHG | SYSTOLIC BLOOD PRESSURE: 131 MMHG | HEART RATE: 78 BPM | OXYGEN SATURATION: 93 %

## 2021-01-01 VITALS
WEIGHT: 142 LBS | BODY MASS INDEX: 27.73 KG/M2 | OXYGEN SATURATION: 94 % | SYSTOLIC BLOOD PRESSURE: 120 MMHG | DIASTOLIC BLOOD PRESSURE: 68 MMHG | HEART RATE: 80 BPM

## 2021-01-01 VITALS — WEIGHT: 145 LBS | BODY MASS INDEX: 27.85 KG/M2

## 2021-01-01 VITALS — WEIGHT: 170.2 LBS | BODY MASS INDEX: 32.69 KG/M2

## 2021-01-01 VITALS
SYSTOLIC BLOOD PRESSURE: 166 MMHG | DIASTOLIC BLOOD PRESSURE: 60 MMHG | HEART RATE: 72 BPM | WEIGHT: 153 LBS | HEIGHT: 60 IN | BODY MASS INDEX: 30.04 KG/M2 | RESPIRATION RATE: 18 BRPM

## 2021-01-01 VITALS
DIASTOLIC BLOOD PRESSURE: 86 MMHG | SYSTOLIC BLOOD PRESSURE: 164 MMHG | BODY MASS INDEX: 30.63 KG/M2 | OXYGEN SATURATION: 96 % | HEIGHT: 60 IN | WEIGHT: 156 LBS | HEART RATE: 57 BPM

## 2021-01-01 VITALS
SYSTOLIC BLOOD PRESSURE: 186 MMHG | HEART RATE: 66 BPM | BODY MASS INDEX: 32.15 KG/M2 | TEMPERATURE: 98.3 F | OXYGEN SATURATION: 97 % | WEIGHT: 164.6 LBS | DIASTOLIC BLOOD PRESSURE: 79 MMHG

## 2021-01-01 VITALS — BODY MASS INDEX: 33.23 KG/M2 | WEIGHT: 173 LBS

## 2021-01-01 VITALS — BODY MASS INDEX: 29.92 KG/M2 | HEIGHT: 60 IN | WEIGHT: 152.4 LBS

## 2021-01-01 VITALS
DIASTOLIC BLOOD PRESSURE: 60 MMHG | WEIGHT: 165 LBS | RESPIRATION RATE: 20 BRPM | SYSTOLIC BLOOD PRESSURE: 120 MMHG | HEIGHT: 60 IN | HEART RATE: 66 BPM | BODY MASS INDEX: 32.39 KG/M2

## 2021-01-01 VITALS
OXYGEN SATURATION: 97 % | BODY MASS INDEX: 31.83 KG/M2 | HEART RATE: 78 BPM | WEIGHT: 163 LBS | RESPIRATION RATE: 17 BRPM | DIASTOLIC BLOOD PRESSURE: 70 MMHG | SYSTOLIC BLOOD PRESSURE: 138 MMHG

## 2021-01-01 VITALS — WEIGHT: 149.7 LBS | BODY MASS INDEX: 29.24 KG/M2

## 2021-01-01 VITALS
OXYGEN SATURATION: 93 % | WEIGHT: 153 LBS | BODY MASS INDEX: 29.88 KG/M2 | DIASTOLIC BLOOD PRESSURE: 60 MMHG | SYSTOLIC BLOOD PRESSURE: 110 MMHG | HEART RATE: 62 BPM

## 2021-01-01 VITALS — WEIGHT: 176.4 LBS | BODY MASS INDEX: 33.88 KG/M2

## 2021-01-01 VITALS
HEART RATE: 80 BPM | SYSTOLIC BLOOD PRESSURE: 111 MMHG | WEIGHT: 138 LBS | DIASTOLIC BLOOD PRESSURE: 68 MMHG | BODY MASS INDEX: 26.95 KG/M2 | OXYGEN SATURATION: 94 %

## 2021-01-01 VITALS — BODY MASS INDEX: 29.69 KG/M2 | WEIGHT: 152 LBS

## 2021-01-01 VITALS — WEIGHT: 169.7 LBS | BODY MASS INDEX: 32.6 KG/M2

## 2021-01-01 VITALS
DIASTOLIC BLOOD PRESSURE: 70 MMHG | WEIGHT: 159.6 LBS | HEIGHT: 60 IN | HEART RATE: 66 BPM | BODY MASS INDEX: 31.33 KG/M2 | OXYGEN SATURATION: 90 % | SYSTOLIC BLOOD PRESSURE: 173 MMHG

## 2021-01-01 VITALS
HEIGHT: 60 IN | WEIGHT: 163 LBS | RESPIRATION RATE: 16 BRPM | SYSTOLIC BLOOD PRESSURE: 140 MMHG | BODY MASS INDEX: 32 KG/M2 | HEART RATE: 64 BPM | DIASTOLIC BLOOD PRESSURE: 80 MMHG

## 2021-01-01 VITALS — BODY MASS INDEX: 32.98 KG/M2 | WEIGHT: 171.7 LBS

## 2021-01-01 VITALS — HEIGHT: 60 IN | WEIGHT: 152.9 LBS | BODY MASS INDEX: 30.02 KG/M2

## 2021-01-01 VITALS
WEIGHT: 151 LBS | BODY MASS INDEX: 29.49 KG/M2 | DIASTOLIC BLOOD PRESSURE: 63 MMHG | OXYGEN SATURATION: 95 % | SYSTOLIC BLOOD PRESSURE: 127 MMHG | HEART RATE: 58 BPM

## 2021-01-01 VITALS — HEIGHT: 60 IN | WEIGHT: 157 LBS | BODY MASS INDEX: 30.82 KG/M2

## 2021-01-01 VITALS
RESPIRATION RATE: 20 BRPM | HEART RATE: 56 BPM | OXYGEN SATURATION: 96 % | SYSTOLIC BLOOD PRESSURE: 136 MMHG | WEIGHT: 148.9 LBS | DIASTOLIC BLOOD PRESSURE: 62 MMHG | BODY MASS INDEX: 29.08 KG/M2

## 2021-01-01 VITALS — HEIGHT: 60 IN | WEIGHT: 152 LBS | BODY MASS INDEX: 29.84 KG/M2

## 2021-01-01 VITALS — BODY MASS INDEX: 32.36 KG/M2 | WEIGHT: 165.7 LBS

## 2021-01-01 VITALS — WEIGHT: 153 LBS | BODY MASS INDEX: 29.88 KG/M2

## 2021-01-01 VITALS
BODY MASS INDEX: 32 KG/M2 | WEIGHT: 163 LBS | HEART RATE: 72 BPM | HEIGHT: 60 IN | OXYGEN SATURATION: 97 % | DIASTOLIC BLOOD PRESSURE: 80 MMHG | SYSTOLIC BLOOD PRESSURE: 130 MMHG

## 2021-01-01 VITALS
BODY MASS INDEX: 31.74 KG/M2 | HEART RATE: 65 BPM | DIASTOLIC BLOOD PRESSURE: 74 MMHG | TEMPERATURE: 98 F | WEIGHT: 162.5 LBS | SYSTOLIC BLOOD PRESSURE: 168 MMHG | OXYGEN SATURATION: 94 %

## 2021-01-01 VITALS
OXYGEN SATURATION: 90 % | RESPIRATION RATE: 20 BRPM | SYSTOLIC BLOOD PRESSURE: 129 MMHG | HEIGHT: 60 IN | HEART RATE: 84 BPM | DIASTOLIC BLOOD PRESSURE: 69 MMHG | WEIGHT: 159 LBS | BODY MASS INDEX: 31.22 KG/M2

## 2021-01-01 VITALS — SYSTOLIC BLOOD PRESSURE: 176 MMHG | DIASTOLIC BLOOD PRESSURE: 79 MMHG | HEART RATE: 73 BPM | OXYGEN SATURATION: 99 %

## 2021-01-01 VITALS — BODY MASS INDEX: 30.13 KG/M2 | WEIGHT: 154.3 LBS

## 2021-01-01 VITALS — BODY MASS INDEX: 31.98 KG/M2 | WEIGHT: 169.4 LBS | HEIGHT: 61 IN

## 2021-01-01 VITALS — WEIGHT: 170.5 LBS | BODY MASS INDEX: 32.75 KG/M2

## 2021-01-01 VITALS
BODY MASS INDEX: 32.32 KG/M2 | HEART RATE: 66 BPM | SYSTOLIC BLOOD PRESSURE: 159 MMHG | WEIGHT: 165.5 LBS | DIASTOLIC BLOOD PRESSURE: 78 MMHG | OXYGEN SATURATION: 95 %

## 2021-01-01 VITALS
WEIGHT: 129 LBS | BODY MASS INDEX: 25.19 KG/M2 | OXYGEN SATURATION: 93 % | DIASTOLIC BLOOD PRESSURE: 86 MMHG | TEMPERATURE: 97.1 F | SYSTOLIC BLOOD PRESSURE: 167 MMHG | RESPIRATION RATE: 18 BRPM | HEART RATE: 85 BPM

## 2021-01-01 VITALS — BODY MASS INDEX: 33.04 KG/M2 | WEIGHT: 172 LBS

## 2021-01-01 VITALS — WEIGHT: 170.1 LBS | BODY MASS INDEX: 32.67 KG/M2

## 2021-01-01 VITALS — WEIGHT: 148 LBS | WEIGHT: 170.1 LBS | BODY MASS INDEX: 28.9 KG/M2 | BODY MASS INDEX: 32.67 KG/M2

## 2021-01-01 VITALS
BODY MASS INDEX: 31.87 KG/M2 | HEART RATE: 60 BPM | DIASTOLIC BLOOD PRESSURE: 80 MMHG | OXYGEN SATURATION: 98 % | WEIGHT: 163.2 LBS | SYSTOLIC BLOOD PRESSURE: 179 MMHG

## 2021-01-01 VITALS
HEART RATE: 60 BPM | BODY MASS INDEX: 30.31 KG/M2 | WEIGHT: 155.2 LBS | OXYGEN SATURATION: 95 % | SYSTOLIC BLOOD PRESSURE: 177 MMHG | TEMPERATURE: 97.6 F | DIASTOLIC BLOOD PRESSURE: 78 MMHG

## 2021-01-01 VITALS
OXYGEN SATURATION: 92 % | SYSTOLIC BLOOD PRESSURE: 100 MMHG | DIASTOLIC BLOOD PRESSURE: 58 MMHG | HEART RATE: 90 BPM | TEMPERATURE: 98.9 F

## 2021-01-01 VITALS
HEART RATE: 63 BPM | OXYGEN SATURATION: 96 % | SYSTOLIC BLOOD PRESSURE: 162 MMHG | DIASTOLIC BLOOD PRESSURE: 76 MMHG | BODY MASS INDEX: 32.56 KG/M2 | WEIGHT: 166.7 LBS

## 2021-01-01 VITALS — WEIGHT: 169 LBS | BODY MASS INDEX: 31.91 KG/M2 | HEIGHT: 61 IN

## 2021-01-01 VITALS — OXYGEN SATURATION: 90 %

## 2021-01-01 VITALS — WEIGHT: 168 LBS | BODY MASS INDEX: 32.81 KG/M2

## 2021-01-01 VITALS — WEIGHT: 172 LBS | BODY MASS INDEX: 33.04 KG/M2

## 2021-01-01 VITALS — HEIGHT: 60 IN | BODY MASS INDEX: 32 KG/M2 | WEIGHT: 163 LBS

## 2021-01-01 VITALS — HEIGHT: 60 IN | BODY MASS INDEX: 33.77 KG/M2 | WEIGHT: 172 LBS

## 2021-01-01 VITALS — HEIGHT: 60 IN | BODY MASS INDEX: 30.43 KG/M2 | WEIGHT: 155 LBS

## 2021-01-01 VITALS
OXYGEN SATURATION: 96 % | WEIGHT: 129.2 LBS | HEIGHT: 60 IN | BODY MASS INDEX: 25.36 KG/M2 | SYSTOLIC BLOOD PRESSURE: 112 MMHG | DIASTOLIC BLOOD PRESSURE: 70 MMHG | HEART RATE: 80 BPM

## 2021-01-01 VITALS — WEIGHT: 148 LBS | BODY MASS INDEX: 28.9 KG/M2

## 2021-01-01 VITALS — HEART RATE: 55 BPM | OXYGEN SATURATION: 94 % | DIASTOLIC BLOOD PRESSURE: 59 MMHG | SYSTOLIC BLOOD PRESSURE: 112 MMHG

## 2021-01-01 VITALS — HEIGHT: 60 IN | BODY MASS INDEX: 30.04 KG/M2 | WEIGHT: 153 LBS

## 2021-01-01 VITALS — WEIGHT: 169.7 LBS | HEIGHT: 61 IN | BODY MASS INDEX: 32.04 KG/M2

## 2021-01-01 VITALS — HEIGHT: 60 IN | BODY MASS INDEX: 31.41 KG/M2 | WEIGHT: 160 LBS

## 2021-01-01 VITALS — WEIGHT: 169.8 LBS | BODY MASS INDEX: 32.62 KG/M2

## 2021-01-01 VITALS — BODY MASS INDEX: 32.46 KG/M2 | WEIGHT: 169 LBS

## 2021-01-01 DIAGNOSIS — I25.10 CAD (CORONARY ARTERY DISEASE): ICD-10-CM

## 2021-01-01 DIAGNOSIS — E78.5 DYSLIPIDEMIA: ICD-10-CM

## 2021-01-01 DIAGNOSIS — M62.81 GENERALIZED MUSCLE WEAKNESS: ICD-10-CM

## 2021-01-01 DIAGNOSIS — K63.89 COLONIC MASS: Primary | ICD-10-CM

## 2021-01-01 DIAGNOSIS — M79.602 LEFT ARM PAIN: ICD-10-CM

## 2021-01-01 DIAGNOSIS — J45.909 ASTHMA: ICD-10-CM

## 2021-01-01 DIAGNOSIS — J18.9 PNEUMONIA: Primary | ICD-10-CM

## 2021-01-01 DIAGNOSIS — R11.0 NAUSEA: Primary | ICD-10-CM

## 2021-01-01 DIAGNOSIS — R00.0 TACHYCARDIA: ICD-10-CM

## 2021-01-01 DIAGNOSIS — C50.412 MALIGNANT NEOPLASM OF UPPER-OUTER QUADRANT OF LEFT FEMALE BREAST, UNSPECIFIED ESTROGEN RECEPTOR STATUS (H): Chronic | ICD-10-CM

## 2021-01-01 DIAGNOSIS — Z17.0 MALIGNANT NEOPLASM OF UPPER-OUTER QUADRANT OF LEFT BREAST IN FEMALE, ESTROGEN RECEPTOR POSITIVE (H): ICD-10-CM

## 2021-01-01 DIAGNOSIS — R93.5 ABNORMAL CT OF THE ABDOMEN: ICD-10-CM

## 2021-01-01 DIAGNOSIS — M81.0 SENILE OSTEOPOROSIS: Primary | Chronic | ICD-10-CM

## 2021-01-01 DIAGNOSIS — I10 ESSENTIAL HYPERTENSION: ICD-10-CM

## 2021-01-01 DIAGNOSIS — N28.0 RENAL INFARCT (H): ICD-10-CM

## 2021-01-01 DIAGNOSIS — R13.10 DYSPHAGIA: ICD-10-CM

## 2021-01-01 DIAGNOSIS — I50.32 CHRONIC HEART FAILURE WITH PRESERVED EJECTION FRACTION (H): ICD-10-CM

## 2021-01-01 DIAGNOSIS — F41.9 ANXIETY: ICD-10-CM

## 2021-01-01 DIAGNOSIS — I51.89 DIASTOLIC DYSFUNCTION: ICD-10-CM

## 2021-01-01 DIAGNOSIS — I25.83 CORONARY ARTERY DISEASE DUE TO LIPID RICH PLAQUE: ICD-10-CM

## 2021-01-01 DIAGNOSIS — K59.01 SLOW TRANSIT CONSTIPATION: Primary | ICD-10-CM

## 2021-01-01 DIAGNOSIS — R07.9 CHEST PAIN: ICD-10-CM

## 2021-01-01 DIAGNOSIS — Z92.29 PERSONAL HISTORY OF OTHER DRUG THERAPY: ICD-10-CM

## 2021-01-01 DIAGNOSIS — I50.9 CHRONIC CONGESTIVE HEART FAILURE, UNSPECIFIED HEART FAILURE TYPE (H): ICD-10-CM

## 2021-01-01 DIAGNOSIS — K44.9 HIATAL HERNIA: ICD-10-CM

## 2021-01-01 DIAGNOSIS — M54.12 CERVICAL RADICULOPATHY: Primary | ICD-10-CM

## 2021-01-01 DIAGNOSIS — R11.0 NAUSEA: ICD-10-CM

## 2021-01-01 DIAGNOSIS — I48.21 PERMANENT ATRIAL FIBRILLATION (H): ICD-10-CM

## 2021-01-01 DIAGNOSIS — M79.602 LEFT ARM PAIN: Primary | ICD-10-CM

## 2021-01-01 DIAGNOSIS — R93.5 ABNORMAL CT OF THE ABDOMEN: Primary | ICD-10-CM

## 2021-01-01 DIAGNOSIS — J47.1 BRONCHIECTASIS WITH ACUTE EXACERBATION (H): Primary | ICD-10-CM

## 2021-01-01 DIAGNOSIS — N28.0 RENAL INFARCT (H): Primary | ICD-10-CM

## 2021-01-01 DIAGNOSIS — R05.9 COUGH: ICD-10-CM

## 2021-01-01 DIAGNOSIS — R06.02 SHORTNESS OF BREATH: ICD-10-CM

## 2021-01-01 DIAGNOSIS — J44.9 COPD (CHRONIC OBSTRUCTIVE PULMONARY DISEASE) (H): ICD-10-CM

## 2021-01-01 DIAGNOSIS — M48.50XA PATHOLOGICAL COMPRESSION FRACTURE OF VERTEBRA, INITIAL ENCOUNTER (H): ICD-10-CM

## 2021-01-01 DIAGNOSIS — C50.412 MALIGNANT NEOPLASM OF UPPER-OUTER QUADRANT OF LEFT BREAST IN FEMALE, ESTROGEN RECEPTOR POSITIVE (H): ICD-10-CM

## 2021-01-01 DIAGNOSIS — R06.00 DYSPNEA: ICD-10-CM

## 2021-01-01 DIAGNOSIS — I35.0 MODERATE AORTIC STENOSIS: ICD-10-CM

## 2021-01-01 DIAGNOSIS — R10.13 ABDOMINAL PAIN, EPIGASTRIC: ICD-10-CM

## 2021-01-01 DIAGNOSIS — J18.9 PNEUMONIA: ICD-10-CM

## 2021-01-01 DIAGNOSIS — N39.0 RECURRENT UTI: ICD-10-CM

## 2021-01-01 DIAGNOSIS — F51.01 PRIMARY INSOMNIA: ICD-10-CM

## 2021-01-01 DIAGNOSIS — J47.1 BRONCHIECTASIS WITH ACUTE EXACERBATION (H): ICD-10-CM

## 2021-01-01 DIAGNOSIS — M54.12 RADICULITIS OF LEFT CERVICAL REGION: ICD-10-CM

## 2021-01-01 DIAGNOSIS — K63.89 COLONIC MASS: ICD-10-CM

## 2021-01-01 DIAGNOSIS — Z87.19 HISTORY OF ESOPHAGEAL STRICTURE: ICD-10-CM

## 2021-01-01 DIAGNOSIS — M81.0 SENILE OSTEOPOROSIS: ICD-10-CM

## 2021-01-01 DIAGNOSIS — H61.23 BILATERAL IMPACTED CERUMEN: ICD-10-CM

## 2021-01-01 DIAGNOSIS — M81.0 SENILE OSTEOPOROSIS: Chronic | ICD-10-CM

## 2021-01-01 DIAGNOSIS — I35.0 AORTIC VALVE STENOSIS, ETIOLOGY OF CARDIAC VALVE DISEASE UNSPECIFIED: ICD-10-CM

## 2021-01-01 DIAGNOSIS — M54.16 LEFT LUMBAR RADICULITIS: ICD-10-CM

## 2021-01-01 DIAGNOSIS — E83.42 HYPOMAGNESEMIA: ICD-10-CM

## 2021-01-01 DIAGNOSIS — D64.9 ANEMIA, UNSPECIFIED TYPE: Chronic | ICD-10-CM

## 2021-01-01 DIAGNOSIS — I25.10 CORONARY ARTERY DISEASE DUE TO LIPID RICH PLAQUE: ICD-10-CM

## 2021-01-01 DIAGNOSIS — J69.0 ASPIRATION PNEUMONITIS (H): ICD-10-CM

## 2021-01-01 DIAGNOSIS — I49.1 PAC (PREMATURE ATRIAL CONTRACTION): ICD-10-CM

## 2021-01-01 DIAGNOSIS — Z09 HOSPITAL DISCHARGE FOLLOW-UP: ICD-10-CM

## 2021-01-01 DIAGNOSIS — S12.601A CLOSED NONDISPLACED FRACTURE OF SEVENTH CERVICAL VERTEBRA, UNSPECIFIED FRACTURE MORPHOLOGY, INITIAL ENCOUNTER (H): ICD-10-CM

## 2021-01-01 DIAGNOSIS — M54.12 CERVICAL RADICULOPATHY: ICD-10-CM

## 2021-01-01 DIAGNOSIS — R94.39 OTHER NONSPECIFIC ABNORMAL CARDIOVASCULAR SYSTEM FUNCTION STUDY: ICD-10-CM

## 2021-01-01 DIAGNOSIS — Z53.9 DIAGNOSIS NOT YET DEFINED: Primary | ICD-10-CM

## 2021-01-01 DIAGNOSIS — J45.40 MODERATE PERSISTENT ASTHMA, UNSPECIFIED WHETHER COMPLICATED: Chronic | ICD-10-CM

## 2021-01-01 DIAGNOSIS — J45.51 SEVERE PERSISTENT ASTHMA WITH EXACERBATION (H): ICD-10-CM

## 2021-01-01 DIAGNOSIS — Z01.818 PREOPERATIVE EXAMINATION: Primary | ICD-10-CM

## 2021-01-01 DIAGNOSIS — I25.10 CORONARY ATHEROSCLEROSIS OF NATIVE CORONARY ARTERY: ICD-10-CM

## 2021-01-01 DIAGNOSIS — M81.0 SENILE OSTEOPOROSIS: Primary | ICD-10-CM

## 2021-01-01 DIAGNOSIS — R63.4 WEIGHT LOSS: ICD-10-CM

## 2021-01-01 DIAGNOSIS — I25.10 CORONARY ARTERY DISEASE INVOLVING NATIVE CORONARY ARTERY OF NATIVE HEART WITHOUT ANGINA PECTORIS: ICD-10-CM

## 2021-01-01 DIAGNOSIS — J45.40 MODERATE PERSISTENT ASTHMA, UNCOMPLICATED: ICD-10-CM

## 2021-01-01 DIAGNOSIS — I49.9 CARDIAC ARRHYTHMIA, UNSPECIFIED CARDIAC ARRHYTHMIA TYPE: ICD-10-CM

## 2021-01-01 DIAGNOSIS — E87.6 HYPOKALEMIA: ICD-10-CM

## 2021-01-01 DIAGNOSIS — J15.1 PNEUMONIA OF RIGHT MIDDLE LOBE DUE TO PSEUDOMONAS SPECIES (H): Primary | ICD-10-CM

## 2021-01-01 DIAGNOSIS — Z71.89 OTHER SPECIFIED COUNSELING: ICD-10-CM

## 2021-01-01 DIAGNOSIS — K59.01 SLOW TRANSIT CONSTIPATION: ICD-10-CM

## 2021-01-01 DIAGNOSIS — K44.9 HIATAL HERNIA: Chronic | ICD-10-CM

## 2021-01-01 DIAGNOSIS — Z12.31 ENCOUNTER FOR SCREENING MAMMOGRAM FOR MALIGNANT NEOPLASM OF BREAST: ICD-10-CM

## 2021-01-01 DIAGNOSIS — J42 CHRONIC BRONCHITIS, UNSPECIFIED CHRONIC BRONCHITIS TYPE (H): ICD-10-CM

## 2021-01-01 DIAGNOSIS — J45.40 MODERATE PERSISTENT ASTHMA WITHOUT COMPLICATION: ICD-10-CM

## 2021-01-01 DIAGNOSIS — R53.83 FATIGUE, UNSPECIFIED TYPE: ICD-10-CM

## 2021-01-01 LAB
25(OH)D3 SERPL-MCNC: 38.7 NG/ML (ref 30–80)
25(OH)D3 SERPL-MCNC: 38.7 NG/ML (ref 30–80)
ALBUMIN SERPL-MCNC: 3 G/DL (ref 3.5–5)
ALBUMIN SERPL-MCNC: 3 G/DL (ref 3.5–5)
ALBUMIN SERPL-MCNC: 3.1 G/DL (ref 3.5–5)
ALBUMIN SERPL-MCNC: 3.2 G/DL (ref 3.5–5)
ALBUMIN SERPL-MCNC: 3.4 G/DL (ref 3.5–5)
ALBUMIN SERPL-MCNC: 3.6 G/DL (ref 3.5–5)
ALBUMIN SERPL-MCNC: 3.8 G/DL (ref 3.5–5)
ALBUMIN UR-MCNC: NEGATIVE MG/DL
ALP SERPL-CCNC: 53 U/L (ref 45–120)
ALP SERPL-CCNC: 62 U/L (ref 45–120)
ALP SERPL-CCNC: 69 U/L (ref 45–120)
ALP SERPL-CCNC: 74 U/L (ref 45–120)
ALP SERPL-CCNC: 78 U/L (ref 45–120)
ALT SERPL W P-5'-P-CCNC: <9 U/L (ref 0–45)
AMYLASE SERPL-CCNC: 53 U/L (ref 5–120)
ANION GAP SERPL CALCULATED.3IONS-SCNC: 11 MMOL/L (ref 5–18)
ANION GAP SERPL CALCULATED.3IONS-SCNC: 11 MMOL/L (ref 5–18)
ANION GAP SERPL CALCULATED.3IONS-SCNC: 12 MMOL/L (ref 5–18)
ANION GAP SERPL CALCULATED.3IONS-SCNC: 13 MMOL/L (ref 5–18)
ANION GAP SERPL CALCULATED.3IONS-SCNC: 16 MMOL/L (ref 5–18)
ANION GAP SERPL CALCULATED.3IONS-SCNC: 16 MMOL/L (ref 5–18)
ANION GAP SERPL CALCULATED.3IONS-SCNC: 17 MMOL/L (ref 5–18)
ANION GAP SERPL CALCULATED.3IONS-SCNC: 8 MMOL/L (ref 5–18)
ANION GAP SERPL CALCULATED.3IONS-SCNC: 9 MMOL/L (ref 5–18)
ANION GAP SERPL CALCULATED.3IONS-SCNC: 9 MMOL/L (ref 5–18)
APPEARANCE UR: CLEAR
AST SERPL W P-5'-P-CCNC: 13 U/L (ref 0–40)
AST SERPL W P-5'-P-CCNC: 15 U/L (ref 0–40)
AST SERPL W P-5'-P-CCNC: 16 U/L (ref 0–40)
ATRIAL RATE - MUSE: 136 BPM
ATRIAL RATE - MUSE: 141 BPM
ATRIAL RATE - MUSE: 73 BPM
ATRIAL RATE - MUSE: NORMAL BPM
BASOPHILS # BLD AUTO: 0.1 10E3/UL (ref 0–0.2)
BASOPHILS # BLD AUTO: 0.1 10E3/UL (ref 0–0.2)
BASOPHILS NFR BLD AUTO: 1 %
BASOPHILS NFR BLD AUTO: 1 %
BILIRUB SERPL-MCNC: 0.6 MG/DL (ref 0–1)
BILIRUB SERPL-MCNC: 0.6 MG/DL (ref 0–1)
BILIRUB SERPL-MCNC: 0.9 MG/DL (ref 0–1)
BILIRUB SERPL-MCNC: 1 MG/DL (ref 0–1)
BILIRUB SERPL-MCNC: 1 MG/DL (ref 0–1)
BILIRUB UR QL STRIP: NEGATIVE
BNP SERPL-MCNC: 197 PG/ML (ref 0–167)
BNP SERPL-MCNC: 264 PG/ML (ref 0–167)
BSA FOR ECHO PROCEDURE: 1.75 M2
BUN SERPL-MCNC: 11 MG/DL (ref 8–28)
BUN SERPL-MCNC: 11 MG/DL (ref 8–28)
BUN SERPL-MCNC: 6 MG/DL (ref 8–28)
BUN SERPL-MCNC: 7 MG/DL (ref 8–28)
BUN SERPL-MCNC: 8 MG/DL (ref 8–28)
BUN SERPL-MCNC: 8 MG/DL (ref 8–28)
BUN SERPL-MCNC: 9 MG/DL (ref 8–28)
BUN SERPL-MCNC: 9 MG/DL (ref 8–28)
CALCIUM SERPL-MCNC: 10.2 MG/DL (ref 8.5–10.5)
CALCIUM SERPL-MCNC: 10.4 MG/DL (ref 8.5–10.5)
CALCIUM SERPL-MCNC: 8.1 MG/DL (ref 8.5–10.5)
CALCIUM SERPL-MCNC: 8.4 MG/DL (ref 8.5–10.5)
CALCIUM SERPL-MCNC: 8.6 MG/DL (ref 8.5–10.5)
CALCIUM SERPL-MCNC: 8.6 MG/DL (ref 8.5–10.5)
CALCIUM SERPL-MCNC: 8.9 MG/DL (ref 8.5–10.5)
CALCIUM SERPL-MCNC: 9 MG/DL (ref 8.5–10.5)
CALCIUM SERPL-MCNC: 9.1 MG/DL (ref 8.5–10.5)
CALCIUM SERPL-MCNC: 9.4 MG/DL (ref 8.5–10.5)
CCTA ASCENDING AORTA: 4.1 CM
CHLORIDE BLD-SCNC: 101 MMOL/L (ref 98–107)
CHLORIDE BLD-SCNC: 101 MMOL/L (ref 98–107)
CHLORIDE BLD-SCNC: 102 MMOL/L (ref 98–107)
CHLORIDE BLD-SCNC: 103 MMOL/L (ref 98–107)
CHLORIDE BLD-SCNC: 104 MMOL/L (ref 98–107)
CHLORIDE BLD-SCNC: 104 MMOL/L (ref 98–107)
CHLORIDE BLD-SCNC: 106 MMOL/L (ref 98–107)
CHLORIDE BLD-SCNC: 107 MMOL/L (ref 98–107)
CHLORIDE BLD-SCNC: 95 MMOL/L (ref 98–107)
CHLORIDE BLD-SCNC: 96 MMOL/L (ref 98–107)
CO2 SERPL-SCNC: 22 MMOL/L (ref 22–31)
CO2 SERPL-SCNC: 23 MMOL/L (ref 22–31)
CO2 SERPL-SCNC: 24 MMOL/L (ref 22–31)
CO2 SERPL-SCNC: 25 MMOL/L (ref 22–31)
CO2 SERPL-SCNC: 25 MMOL/L (ref 22–31)
CO2 SERPL-SCNC: 30 MMOL/L (ref 22–31)
COLOR UR AUTO: ABNORMAL
CREAT BLD-MCNC: 0.8 MG/DL (ref 0.6–1.1)
CREAT BLD-MCNC: 0.8 MG/DL (ref 0.6–1.1)
CREAT BLD-MCNC: 0.9 MG/DL (ref 0.6–1.1)
CREAT SERPL-MCNC: 0.71 MG/DL (ref 0.6–1.1)
CREAT SERPL-MCNC: 0.73 MG/DL (ref 0.6–1.1)
CREAT SERPL-MCNC: 0.78 MG/DL (ref 0.6–1.1)
CREAT SERPL-MCNC: 0.82 MG/DL (ref 0.6–1.1)
CREAT SERPL-MCNC: 0.83 MG/DL (ref 0.6–1.1)
CREAT SERPL-MCNC: 0.91 MG/DL (ref 0.6–1.1)
CREAT SERPL-MCNC: 1.07 MG/DL (ref 0.6–1.1)
CREAT SERPL-MCNC: 1.09 MG/DL (ref 0.6–1.1)
DIASTOLIC BLOOD PRESSURE - MUSE: 83 MMHG
DIASTOLIC BLOOD PRESSURE - MUSE: NORMAL MMHG
EOSINOPHIL # BLD AUTO: 0 10E3/UL (ref 0–0.7)
EOSINOPHIL # BLD AUTO: 0 10E3/UL (ref 0–0.7)
EOSINOPHIL NFR BLD AUTO: 0 %
EOSINOPHIL NFR BLD AUTO: 0 %
ERYTHROCYTE [DISTWIDTH] IN BLOOD BY AUTOMATED COUNT: 12.9 % (ref 11–14.5)
ERYTHROCYTE [DISTWIDTH] IN BLOOD BY AUTOMATED COUNT: 14.2 % (ref 10–15)
ERYTHROCYTE [DISTWIDTH] IN BLOOD BY AUTOMATED COUNT: 14.5 % (ref 10–15)
ERYTHROCYTE [DISTWIDTH] IN BLOOD BY AUTOMATED COUNT: 15.9 % (ref 10–15)
ERYTHROCYTE [DISTWIDTH] IN BLOOD BY AUTOMATED COUNT: 16.1 % (ref 10–15)
ERYTHROCYTE [SEDIMENTATION RATE] IN BLOOD BY WESTERGREN METHOD: 39 MM/HR (ref 0–20)
GFR SERPL CREATININE-BSD FRML MDRD: 48 ML/MIN/1.73M2
GFR SERPL CREATININE-BSD FRML MDRD: 49 ML/MIN/1.73M2
GFR SERPL CREATININE-BSD FRML MDRD: 56 ML/MIN/1.73M2
GFR SERPL CREATININE-BSD FRML MDRD: 60 ML/MIN/1.73M2
GFR SERPL CREATININE-BSD FRML MDRD: 67 ML/MIN/1.73M2
GFR SERPL CREATININE-BSD FRML MDRD: 72 ML/MIN/1.73M2
GFR SERPL CREATININE-BSD FRML MDRD: >60 ML/MIN/1.73M2
GLUCOSE BLD-MCNC: 102 MG/DL (ref 70–125)
GLUCOSE BLD-MCNC: 108 MG/DL (ref 70–125)
GLUCOSE BLD-MCNC: 80 MG/DL (ref 70–125)
GLUCOSE BLD-MCNC: 82 MG/DL (ref 70–125)
GLUCOSE BLD-MCNC: 88 MG/DL (ref 70–125)
GLUCOSE BLD-MCNC: 90 MG/DL (ref 70–125)
GLUCOSE BLD-MCNC: 91 MG/DL (ref 70–125)
GLUCOSE BLD-MCNC: 94 MG/DL (ref 70–125)
GLUCOSE BLD-MCNC: 98 MG/DL (ref 70–125)
GLUCOSE BLD-MCNC: 98 MG/DL (ref 70–125)
GLUCOSE UR STRIP-MCNC: NEGATIVE MG/DL
HCT VFR BLD AUTO: 37.2 % (ref 35–47)
HCT VFR BLD AUTO: 37.8 % (ref 35–47)
HCT VFR BLD AUTO: 39.7 % (ref 35–47)
HCT VFR BLD AUTO: 41.8 % (ref 35–47)
HCT VFR BLD AUTO: 43 % (ref 35–47)
HGB BLD-MCNC: 11.9 G/DL (ref 12–16)
HGB BLD-MCNC: 12.4 G/DL (ref 11.7–15.7)
HGB BLD-MCNC: 12.6 G/DL (ref 11.7–15.7)
HGB BLD-MCNC: 13.8 G/DL (ref 11.7–15.7)
HGB BLD-MCNC: 14.2 G/DL (ref 11.7–15.7)
HGB UR QL STRIP: NEGATIVE
IMM GRANULOCYTES # BLD: 0 10E3/UL
IMM GRANULOCYTES # BLD: 0.1 10E3/UL
IMM GRANULOCYTES NFR BLD: 1 %
IMM GRANULOCYTES NFR BLD: 1 %
INTERPRETATION ECG - MUSE: NORMAL
KETONES UR STRIP-MCNC: NEGATIVE MG/DL
LACTATE SERPL-SCNC: 1.9 MMOL/L (ref 0.7–2)
LEFT VENTRICLE HEART RATE: 61 BPM
LEUKOCYTE ESTERASE UR QL STRIP: NEGATIVE
LIPASE SERPL-CCNC: 38 U/L (ref 0–52)
LIPASE SERPL-CCNC: 41 U/L (ref 0–52)
LYMPHOCYTES # BLD AUTO: 1.8 10E3/UL (ref 0.8–5.3)
LYMPHOCYTES # BLD AUTO: 2 10E3/UL (ref 0.8–5.3)
LYMPHOCYTES NFR BLD AUTO: 16 %
LYMPHOCYTES NFR BLD AUTO: 23 %
MAGNESIUM SERPL-MCNC: 1.6 MG/DL (ref 1.8–2.6)
MAGNESIUM SERPL-MCNC: 1.7 MG/DL (ref 1.8–2.6)
MAGNESIUM SERPL-MCNC: 2.4 MG/DL (ref 1.8–2.6)
MCH RBC QN AUTO: 28.1 PG (ref 26.5–33)
MCH RBC QN AUTO: 28.3 PG (ref 26.5–33)
MCH RBC QN AUTO: 28.8 PG (ref 26.5–33)
MCH RBC QN AUTO: 28.9 PG (ref 26.5–33)
MCH RBC QN AUTO: 29.3 PG (ref 27–34)
MCHC RBC AUTO-ENTMCNC: 31.7 G/DL (ref 31.5–36.5)
MCHC RBC AUTO-ENTMCNC: 32 G/DL (ref 32–36)
MCHC RBC AUTO-ENTMCNC: 32.8 G/DL (ref 31.5–36.5)
MCHC RBC AUTO-ENTMCNC: 33 G/DL (ref 31.5–36.5)
MCHC RBC AUTO-ENTMCNC: 33 G/DL (ref 31.5–36.5)
MCV RBC AUTO: 85 FL (ref 78–100)
MCV RBC AUTO: 86 FL (ref 78–100)
MCV RBC AUTO: 88 FL (ref 78–100)
MCV RBC AUTO: 91 FL (ref 78–100)
MCV RBC AUTO: 92 FL (ref 80–100)
MONOCYTES # BLD AUTO: 0.7 10E3/UL (ref 0–1.3)
MONOCYTES # BLD AUTO: 0.8 10E3/UL (ref 0–1.3)
MONOCYTES NFR BLD AUTO: 7 %
MONOCYTES NFR BLD AUTO: 8 %
MUCOUS THREADS #/AREA URNS LPF: PRESENT /LPF
NEUTROPHILS # BLD AUTO: 5.7 10E3/UL (ref 1.6–8.3)
NEUTROPHILS # BLD AUTO: 8.1 10E3/UL (ref 1.6–8.3)
NEUTROPHILS NFR BLD AUTO: 67 %
NEUTROPHILS NFR BLD AUTO: 75 %
NITRATE UR QL: NEGATIVE
NRBC # BLD AUTO: 0 10E3/UL
NRBC # BLD AUTO: 0 10E3/UL
NRBC BLD AUTO-RTO: 0 /100
NRBC BLD AUTO-RTO: 0 /100
P AXIS - MUSE: NORMAL DEGREES
PATH REPORT.COMMENTS IMP SPEC: NORMAL
PATH REPORT.COMMENTS IMP SPEC: NORMAL
PATH REPORT.FINAL DX SPEC: NORMAL
PATH REPORT.GROSS SPEC: NORMAL
PATH REPORT.MICROSCOPIC SPEC OTHER STN: NORMAL
PH UR STRIP: 7.5 [PH] (ref 5–7)
PHOSPHATE SERPL-MCNC: 3.5 MG/DL (ref 2.5–4.5)
PHOSPHATE SERPL-MCNC: 3.5 MG/DL (ref 2.5–4.5)
PHOTO IMAGE: NORMAL
PLATELET # BLD AUTO: 269 THOU/UL (ref 140–440)
PLATELET # BLD AUTO: 280 10E3/UL (ref 150–450)
PLATELET # BLD AUTO: 397 10E3/UL (ref 150–450)
PLATELET # BLD AUTO: 397 10E3/UL (ref 150–450)
PLATELET # BLD AUTO: 415 10E3/UL (ref 150–450)
PMV BLD AUTO: 9.1 FL (ref 7–10)
POTASSIUM BLD-SCNC: 2.6 MMOL/L (ref 3.5–5)
POTASSIUM BLD-SCNC: 2.6 MMOL/L (ref 3.5–5)
POTASSIUM BLD-SCNC: 2.8 MMOL/L (ref 3.5–5)
POTASSIUM BLD-SCNC: 3.1 MMOL/L (ref 3.5–5)
POTASSIUM BLD-SCNC: 3.1 MMOL/L (ref 3.5–5)
POTASSIUM BLD-SCNC: 3.3 MMOL/L (ref 3.5–5)
POTASSIUM BLD-SCNC: 3.3 MMOL/L (ref 3.5–5)
POTASSIUM BLD-SCNC: 3.7 MMOL/L (ref 3.5–5)
POTASSIUM BLD-SCNC: 3.8 MMOL/L (ref 3.5–5)
POTASSIUM BLD-SCNC: 4.1 MMOL/L (ref 3.5–5)
PR INTERVAL - MUSE: NORMAL MS
PROT SERPL-MCNC: 6.2 G/DL (ref 6–8)
PROT SERPL-MCNC: 6.6 G/DL (ref 6–8)
PROT SERPL-MCNC: 6.8 G/DL (ref 6–8)
PROT SERPL-MCNC: 7.1 G/DL (ref 6–8)
PROT SERPL-MCNC: 7.3 G/DL (ref 6–8)
QRS DURATION - MUSE: 70 MS
QRS DURATION - MUSE: 76 MS
QRS DURATION - MUSE: 84 MS
QRS DURATION - MUSE: 92 MS
QT - MUSE: 346 MS
QT - MUSE: 352 MS
QT - MUSE: 438 MS
QT - MUSE: 454 MS
QTC - MUSE: 426 MS
QTC - MUSE: 435 MS
QTC - MUSE: 444 MS
QTC - MUSE: 482 MS
R AXIS - MUSE: -31 DEGREES
R AXIS - MUSE: -34 DEGREES
R AXIS - MUSE: -41 DEGREES
R AXIS - MUSE: -46 DEGREES
RBC # BLD AUTO: 4.06 MILL/UL (ref 3.8–5.4)
RBC # BLD AUTO: 4.29 10E6/UL (ref 3.8–5.2)
RBC # BLD AUTO: 4.38 10E6/UL (ref 3.8–5.2)
RBC # BLD AUTO: 4.91 10E6/UL (ref 3.8–5.2)
RBC # BLD AUTO: 5.02 10E6/UL (ref 3.8–5.2)
RBC URINE: 1 /HPF
SARS-COV-2 RNA RESP QL NAA+PROBE: NEGATIVE
SARS-COV-2 RNA RESP QL NAA+PROBE: NEGATIVE
SODIUM SERPL-SCNC: 136 MMOL/L (ref 136–145)
SODIUM SERPL-SCNC: 137 MMOL/L (ref 136–145)
SODIUM SERPL-SCNC: 137 MMOL/L (ref 136–145)
SODIUM SERPL-SCNC: 138 MMOL/L (ref 136–145)
SODIUM SERPL-SCNC: 138 MMOL/L (ref 136–145)
SODIUM SERPL-SCNC: 139 MMOL/L (ref 136–145)
SODIUM SERPL-SCNC: 139 MMOL/L (ref 136–145)
SODIUM SERPL-SCNC: 140 MMOL/L (ref 136–145)
SP GR UR STRIP: 1.04 (ref 1–1.03)
SQUAMOUS EPITHELIAL: <1 /HPF
SYSTOLIC BLOOD PRESSURE - MUSE: 131 MMHG
SYSTOLIC BLOOD PRESSURE - MUSE: NORMAL MMHG
T AXIS - MUSE: -10 DEGREES
T AXIS - MUSE: -33 DEGREES
T AXIS - MUSE: 32 DEGREES
T AXIS - MUSE: 8 DEGREES
TROPONIN I SERPL-MCNC: 0.03 NG/ML (ref 0–0.29)
TSH SERPL DL<=0.005 MIU/L-ACNC: 0.76 UIU/ML (ref 0.3–5)
UPPER GI ENDOSCOPY: NORMAL
UROBILINOGEN UR STRIP-MCNC: <2 MG/DL
VENTRICULAR RATE- MUSE: 57 BPM
VENTRICULAR RATE- MUSE: 68 BPM
VENTRICULAR RATE- MUSE: 92 BPM
VENTRICULAR RATE- MUSE: 99 BPM
WBC # BLD AUTO: 10.6 10E3/UL (ref 4–11)
WBC # BLD AUTO: 10.8 10E3/UL (ref 4–11)
WBC # BLD AUTO: 11.9 10E3/UL (ref 4–11)
WBC # BLD AUTO: 8.4 10E3/UL (ref 4–11)
WBC URINE: 0 /HPF
WBC: 8.1 THOU/UL (ref 4–11)

## 2021-01-01 PROCEDURE — 87635 SARS-COV-2 COVID-19 AMP PRB: CPT | Performed by: EMERGENCY MEDICINE

## 2021-01-01 PROCEDURE — 250N000011 HC RX IP 250 OP 636: Performed by: INTERNAL MEDICINE

## 2021-01-01 PROCEDURE — 80053 COMPREHEN METABOLIC PANEL: CPT | Performed by: INTERNAL MEDICINE

## 2021-01-01 PROCEDURE — 80048 BASIC METABOLIC PNL TOTAL CA: CPT | Performed by: INTERNAL MEDICINE

## 2021-01-01 PROCEDURE — 82040 ASSAY OF SERUM ALBUMIN: CPT | Performed by: EMERGENCY MEDICINE

## 2021-01-01 PROCEDURE — 250N000013 HC RX MED GY IP 250 OP 250 PS 637: Performed by: INTERNAL MEDICINE

## 2021-01-01 PROCEDURE — 258N000003 HC RX IP 258 OP 636: Performed by: INTERNAL MEDICINE

## 2021-01-01 PROCEDURE — 36415 COLL VENOUS BLD VENIPUNCTURE: CPT | Performed by: INTERNAL MEDICINE

## 2021-01-01 PROCEDURE — C9803 HOPD COVID-19 SPEC COLLECT: HCPCS

## 2021-01-01 PROCEDURE — 250N000009 HC RX 250: Performed by: EMERGENCY MEDICINE

## 2021-01-01 PROCEDURE — 258N000003 HC RX IP 258 OP 636: Performed by: EMERGENCY MEDICINE

## 2021-01-01 PROCEDURE — 83735 ASSAY OF MAGNESIUM: CPT | Performed by: STUDENT IN AN ORGANIZED HEALTH CARE EDUCATION/TRAINING PROGRAM

## 2021-01-01 PROCEDURE — 96365 THER/PROPH/DIAG IV INF INIT: CPT

## 2021-01-01 PROCEDURE — 250N000011 HC RX IP 250 OP 636: Performed by: EMERGENCY MEDICINE

## 2021-01-01 PROCEDURE — 96372 THER/PROPH/DIAG INJ SC/IM: CPT | Performed by: INTERNAL MEDICINE

## 2021-01-01 PROCEDURE — 99214 OFFICE O/P EST MOD 30 MIN: CPT | Mod: 25 | Performed by: INTERNAL MEDICINE

## 2021-01-01 PROCEDURE — 85027 COMPLETE CBC AUTOMATED: CPT | Performed by: STUDENT IN AN ORGANIZED HEALTH CARE EDUCATION/TRAINING PROGRAM

## 2021-01-01 PROCEDURE — 99207 PR INPT ADMISSION FROM CLINIC: CPT | Performed by: INTERNAL MEDICINE

## 2021-01-01 PROCEDURE — 120N000001 HC R&B MED SURG/OB

## 2021-01-01 PROCEDURE — 83880 ASSAY OF NATRIURETIC PEPTIDE: CPT | Performed by: EMERGENCY MEDICINE

## 2021-01-01 PROCEDURE — 99222 1ST HOSP IP/OBS MODERATE 55: CPT | Performed by: INTERNAL MEDICINE

## 2021-01-01 PROCEDURE — 93005 ELECTROCARDIOGRAM TRACING: CPT | Performed by: INTERNAL MEDICINE

## 2021-01-01 PROCEDURE — 94640 AIRWAY INHALATION TREATMENT: CPT

## 2021-01-01 PROCEDURE — 99215 OFFICE O/P EST HI 40 MIN: CPT | Performed by: INTERNAL MEDICINE

## 2021-01-01 PROCEDURE — 71260 CT THORAX DX C+: CPT

## 2021-01-01 PROCEDURE — 74175 CTA ABDOMEN W/CONTRAST: CPT

## 2021-01-01 PROCEDURE — 82565 ASSAY OF CREATININE: CPT

## 2021-01-01 PROCEDURE — 250N000011 HC RX IP 250 OP 636: Performed by: NURSE ANESTHETIST, CERTIFIED REGISTERED

## 2021-01-01 PROCEDURE — 96361 HYDRATE IV INFUSION ADD-ON: CPT

## 2021-01-01 PROCEDURE — 72141 MRI NECK SPINE W/O DYE: CPT

## 2021-01-01 PROCEDURE — 99233 SBSQ HOSP IP/OBS HIGH 50: CPT | Performed by: INTERNAL MEDICINE

## 2021-01-01 PROCEDURE — 83690 ASSAY OF LIPASE: CPT | Performed by: EMERGENCY MEDICINE

## 2021-01-01 PROCEDURE — 370N000017 HC ANESTHESIA TECHNICAL FEE, PER MIN: Performed by: INTERNAL MEDICINE

## 2021-01-01 PROCEDURE — 999N000141 HC STATISTIC PRE-PROCEDURE NURSING ASSESSMENT: Performed by: INTERNAL MEDICINE

## 2021-01-01 PROCEDURE — 85025 COMPLETE CBC W/AUTO DIFF WBC: CPT | Performed by: INTERNAL MEDICINE

## 2021-01-01 PROCEDURE — 94640 AIRWAY INHALATION TREATMENT: CPT | Mod: 76

## 2021-01-01 PROCEDURE — 84484 ASSAY OF TROPONIN QUANT: CPT | Performed by: EMERGENCY MEDICINE

## 2021-01-01 PROCEDURE — 272N000001 HC OR GENERAL SUPPLY STERILE: Performed by: INTERNAL MEDICINE

## 2021-01-01 PROCEDURE — 360N000075 HC SURGERY LEVEL 2, PER MIN: Performed by: INTERNAL MEDICINE

## 2021-01-01 PROCEDURE — 250N000011 HC RX IP 250 OP 636: Performed by: STUDENT IN AN ORGANIZED HEALTH CARE EDUCATION/TRAINING PROGRAM

## 2021-01-01 PROCEDURE — 73090 X-RAY EXAM OF FOREARM: CPT | Mod: LT

## 2021-01-01 PROCEDURE — 250N000009 HC RX 250: Performed by: NURSE ANESTHETIST, CERTIFIED REGISTERED

## 2021-01-01 PROCEDURE — 999N000157 HC STATISTIC RCP TIME EA 10 MIN

## 2021-01-01 PROCEDURE — 99214 OFFICE O/P EST MOD 30 MIN: CPT | Performed by: INTERNAL MEDICINE

## 2021-01-01 PROCEDURE — 250N000009 HC RX 250: Performed by: INTERNAL MEDICINE

## 2021-01-01 PROCEDURE — 88305 TISSUE EXAM BY PATHOLOGIST: CPT | Mod: 26 | Performed by: PATHOLOGY

## 2021-01-01 PROCEDURE — 83735 ASSAY OF MAGNESIUM: CPT | Performed by: EMERGENCY MEDICINE

## 2021-01-01 PROCEDURE — 85027 COMPLETE CBC AUTOMATED: CPT | Performed by: INTERNAL MEDICINE

## 2021-01-01 PROCEDURE — 93005 ELECTROCARDIOGRAM TRACING: CPT | Performed by: STUDENT IN AN ORGANIZED HEALTH CARE EDUCATION/TRAINING PROGRAM

## 2021-01-01 PROCEDURE — 99285 EMERGENCY DEPT VISIT HI MDM: CPT | Mod: 25

## 2021-01-01 PROCEDURE — 88342 IMHCHEM/IMCYTCHM 1ST ANTB: CPT | Mod: 26 | Performed by: PATHOLOGY

## 2021-01-01 PROCEDURE — G0008 ADMIN INFLUENZA VIRUS VAC: HCPCS | Performed by: INTERNAL MEDICINE

## 2021-01-01 PROCEDURE — 99239 HOSP IP/OBS DSCHRG MGMT >30: CPT | Performed by: FAMILY MEDICINE

## 2021-01-01 PROCEDURE — 74177 CT ABD & PELVIS W/CONTRAST: CPT

## 2021-01-01 PROCEDURE — 99285 EMERGENCY DEPT VISIT HI MDM: CPT

## 2021-01-01 PROCEDURE — 96374 THER/PROPH/DIAG INJ IV PUSH: CPT | Mod: 59

## 2021-01-01 PROCEDURE — 72125 CT NECK SPINE W/O DYE: CPT

## 2021-01-01 PROCEDURE — G0180 MD CERTIFICATION HHA PATIENT: HCPCS | Performed by: INTERNAL MEDICINE

## 2021-01-01 PROCEDURE — 84443 ASSAY THYROID STIM HORMONE: CPT | Performed by: EMERGENCY MEDICINE

## 2021-01-01 PROCEDURE — 99213 OFFICE O/P EST LOW 20 MIN: CPT | Performed by: SURGERY

## 2021-01-01 PROCEDURE — 69209 REMOVE IMPACTED EAR WAX UNI: CPT | Mod: 50 | Performed by: INTERNAL MEDICINE

## 2021-01-01 PROCEDURE — 710N000012 HC RECOVERY PHASE 2, PER MINUTE: Performed by: INTERNAL MEDICINE

## 2021-01-01 PROCEDURE — 83735 ASSAY OF MAGNESIUM: CPT | Performed by: INTERNAL MEDICINE

## 2021-01-01 PROCEDURE — 36415 COLL VENOUS BLD VENIPUNCTURE: CPT | Mod: 59 | Performed by: EMERGENCY MEDICINE

## 2021-01-01 PROCEDURE — 250N000013 HC RX MED GY IP 250 OP 250 PS 637: Performed by: EMERGENCY MEDICINE

## 2021-01-01 PROCEDURE — 84155 ASSAY OF PROTEIN SERUM: CPT | Performed by: STUDENT IN AN ORGANIZED HEALTH CARE EDUCATION/TRAINING PROGRAM

## 2021-01-01 PROCEDURE — 93005 ELECTROCARDIOGRAM TRACING: CPT | Performed by: EMERGENCY MEDICINE

## 2021-01-01 PROCEDURE — 93010 ELECTROCARDIOGRAM REPORT: CPT | Performed by: INTERNAL MEDICINE

## 2021-01-01 PROCEDURE — 0DB68ZX EXCISION OF STOMACH, VIA NATURAL OR ARTIFICIAL OPENING ENDOSCOPIC, DIAGNOSTIC: ICD-10-PCS | Performed by: INTERNAL MEDICINE

## 2021-01-01 PROCEDURE — 90662 IIV NO PRSV INCREASED AG IM: CPT | Performed by: INTERNAL MEDICINE

## 2021-01-01 PROCEDURE — 99213 OFFICE O/P EST LOW 20 MIN: CPT | Performed by: NURSE PRACTITIONER

## 2021-01-01 PROCEDURE — 84132 ASSAY OF SERUM POTASSIUM: CPT | Performed by: INTERNAL MEDICINE

## 2021-01-01 PROCEDURE — 81001 URINALYSIS AUTO W/SCOPE: CPT | Performed by: EMERGENCY MEDICINE

## 2021-01-01 PROCEDURE — 83605 ASSAY OF LACTIC ACID: CPT | Performed by: EMERGENCY MEDICINE

## 2021-01-01 PROCEDURE — 85025 COMPLETE CBC W/AUTO DIFF WBC: CPT | Performed by: EMERGENCY MEDICINE

## 2021-01-01 PROCEDURE — 85652 RBC SED RATE AUTOMATED: CPT | Performed by: EMERGENCY MEDICINE

## 2021-01-01 PROCEDURE — 96367 TX/PROPH/DG ADDL SEQ IV INF: CPT

## 2021-01-01 PROCEDURE — 62321 NJX INTERLAMINAR CRV/THRC: CPT | Performed by: PHYSICAL MEDICINE & REHABILITATION

## 2021-01-01 PROCEDURE — 88305 TISSUE EXAM BY PATHOLOGIST: CPT | Mod: TC | Performed by: INTERNAL MEDICINE

## 2021-01-01 PROCEDURE — 36415 COLL VENOUS BLD VENIPUNCTURE: CPT | Performed by: STUDENT IN AN ORGANIZED HEALTH CARE EDUCATION/TRAINING PROGRAM

## 2021-01-01 RX ORDER — ALBUTEROL SULFATE 0.83 MG/ML
2.5 SOLUTION RESPIRATORY (INHALATION)
Status: COMPLETED | OUTPATIENT
Start: 2021-01-01 | End: 2021-01-01

## 2021-01-01 RX ORDER — HYDROCODONE BITARTRATE AND ACETAMINOPHEN 5; 325 MG/1; MG/1
1 TABLET ORAL ONCE
Status: COMPLETED | OUTPATIENT
Start: 2021-01-01 | End: 2021-01-01

## 2021-01-01 RX ORDER — NITROGLYCERIN 0.4 MG/1
0.4 TABLET SUBLINGUAL EVERY 5 MIN PRN
Qty: 2 BOTTLE | Refills: 1 | Status: SHIPPED | OUTPATIENT
Start: 2021-01-01

## 2021-01-01 RX ORDER — POTASSIUM CHLORIDE 7.45 MG/ML
10 INJECTION INTRAVENOUS ONCE
Status: COMPLETED | OUTPATIENT
Start: 2021-01-01 | End: 2021-01-01

## 2021-01-01 RX ORDER — IPRATROPIUM BROMIDE AND ALBUTEROL SULFATE 2.5; .5 MG/3ML; MG/3ML
3 SOLUTION RESPIRATORY (INHALATION) ONCE
Status: COMPLETED | OUTPATIENT
Start: 2021-01-01 | End: 2021-01-01

## 2021-01-01 RX ORDER — METOPROLOL TARTRATE 25 MG/1
100 TABLET, FILM COATED ORAL 2 TIMES DAILY
Status: DISCONTINUED | OUTPATIENT
Start: 2021-01-01 | End: 2021-01-01 | Stop reason: HOSPADM

## 2021-01-01 RX ORDER — AMOXICILLIN 250 MG
2 CAPSULE ORAL 2 TIMES DAILY PRN
Status: DISCONTINUED | OUTPATIENT
Start: 2021-01-01 | End: 2021-01-01 | Stop reason: HOSPADM

## 2021-01-01 RX ORDER — POTASSIUM CHLORIDE 7.45 MG/ML
10 INJECTION INTRAVENOUS
Status: DISPENSED | OUTPATIENT
Start: 2021-01-01 | End: 2021-01-01

## 2021-01-01 RX ORDER — ISOSORBIDE MONONITRATE 30 MG/1
15 TABLET, EXTENDED RELEASE ORAL DAILY
Qty: 45 TABLET | Refills: 3 | Status: SHIPPED | OUTPATIENT
Start: 2021-01-01 | End: 2021-01-01

## 2021-01-01 RX ORDER — HYDROCODONE BITARTRATE AND ACETAMINOPHEN 5; 325 MG/1; MG/1
1 TABLET ORAL EVERY 8 HOURS PRN
Qty: 30 TABLET | Refills: 0 | Status: SHIPPED | OUTPATIENT
Start: 2021-01-01 | End: 2021-01-01

## 2021-01-01 RX ORDER — IPRATROPIUM BROMIDE AND ALBUTEROL SULFATE 2.5; .5 MG/3ML; MG/3ML
3 SOLUTION RESPIRATORY (INHALATION) 2 TIMES DAILY PRN
Qty: 1,080 ML | Refills: 3 | Status: SHIPPED | OUTPATIENT
Start: 2021-01-01

## 2021-01-01 RX ORDER — ACETYLCYSTEINE 200 MG/ML
2 SOLUTION ORAL; RESPIRATORY (INHALATION) AT BEDTIME
COMMUNITY

## 2021-01-01 RX ORDER — ONDANSETRON 4 MG/1
4 TABLET, ORALLY DISINTEGRATING ORAL EVERY 30 MIN PRN
Status: DISCONTINUED | OUTPATIENT
Start: 2021-01-01 | End: 2021-01-01 | Stop reason: HOSPADM

## 2021-01-01 RX ORDER — PROCHLORPERAZINE MALEATE 5 MG
5 TABLET ORAL EVERY 12 HOURS PRN
Qty: 60 TABLET | Refills: 3 | Status: SHIPPED | OUTPATIENT
Start: 2021-01-01 | End: 2021-01-01

## 2021-01-01 RX ORDER — GABAPENTIN 100 MG/1
CAPSULE ORAL
Qty: 150 CAPSULE | Refills: 1 | Status: SHIPPED | OUTPATIENT
Start: 2021-01-01 | End: 2021-01-01

## 2021-01-01 RX ORDER — LORAZEPAM 2 MG/ML
0.5 INJECTION INTRAMUSCULAR ONCE
Status: COMPLETED | OUTPATIENT
Start: 2021-01-01 | End: 2021-01-01

## 2021-01-01 RX ORDER — LABETALOL HYDROCHLORIDE 5 MG/ML
5 INJECTION, SOLUTION INTRAVENOUS EVERY 10 MIN PRN
Status: CANCELLED | OUTPATIENT
Start: 2021-01-01

## 2021-01-01 RX ORDER — IOPAMIDOL 755 MG/ML
100 INJECTION, SOLUTION INTRAVASCULAR ONCE
Status: COMPLETED | OUTPATIENT
Start: 2021-01-01 | End: 2021-01-01

## 2021-01-01 RX ORDER — HYDROCODONE BITARTRATE AND ACETAMINOPHEN 5; 325 MG/1; MG/1
1 TABLET ORAL EVERY 4 HOURS PRN
Status: DISCONTINUED | OUTPATIENT
Start: 2021-01-01 | End: 2021-01-01 | Stop reason: HOSPADM

## 2021-01-01 RX ORDER — CIPROFLOXACIN 250 MG/1
TABLET, FILM COATED ORAL
Qty: 45 TABLET | Refills: 0 | Status: SHIPPED | OUTPATIENT
Start: 2021-01-01 | End: 2021-01-01

## 2021-01-01 RX ORDER — ONDANSETRON 8 MG/1
TABLET, FILM COATED ORAL
Qty: 30 TABLET | Refills: 0 | Status: SHIPPED | OUTPATIENT
Start: 2021-01-01 | End: 2021-01-01

## 2021-01-01 RX ORDER — METHYLPREDNISOLONE ACETATE 80 MG/ML
INJECTION, SUSPENSION INTRA-ARTICULAR; INTRALESIONAL; INTRAMUSCULAR; SOFT TISSUE
Status: COMPLETED | OUTPATIENT
Start: 2021-01-01 | End: 2021-01-01

## 2021-01-01 RX ORDER — PREDNISONE 10 MG/1
TABLET ORAL
Qty: 15 TABLET | Refills: 0 | Status: SHIPPED | OUTPATIENT
Start: 2021-01-01 | End: 2021-01-01

## 2021-01-01 RX ORDER — LORAZEPAM 0.5 MG/1
TABLET ORAL
Qty: 30 TABLET | Refills: 0 | Status: SHIPPED | OUTPATIENT
Start: 2021-01-01 | End: 2021-01-01

## 2021-01-01 RX ORDER — SODIUM CHLORIDE, SODIUM LACTATE, POTASSIUM CHLORIDE, CALCIUM CHLORIDE 600; 310; 30; 20 MG/100ML; MG/100ML; MG/100ML; MG/100ML
INJECTION, SOLUTION INTRAVENOUS CONTINUOUS
Status: DISCONTINUED | OUTPATIENT
Start: 2021-01-01 | End: 2021-01-01 | Stop reason: HOSPADM

## 2021-01-01 RX ORDER — ALBUTEROL SULFATE 90 UG/1
2 AEROSOL, METERED RESPIRATORY (INHALATION) EVERY 6 HOURS PRN
Status: DISCONTINUED | OUTPATIENT
Start: 2021-01-01 | End: 2021-01-01 | Stop reason: HOSPADM

## 2021-01-01 RX ORDER — LORAZEPAM 0.5 MG/1
0.5 TABLET ORAL EVERY 6 HOURS PRN
Qty: 30 TABLET | Refills: 0 | Status: CANCELLED | OUTPATIENT
Start: 2021-01-01

## 2021-01-01 RX ORDER — HALOPERIDOL 5 MG/ML
1 INJECTION INTRAMUSCULAR
Status: DISCONTINUED | OUTPATIENT
Start: 2021-01-01 | End: 2021-01-01 | Stop reason: HOSPADM

## 2021-01-01 RX ORDER — ROSUVASTATIN CALCIUM 5 MG/1
10 TABLET, COATED ORAL AT BEDTIME
COMMUNITY

## 2021-01-01 RX ORDER — ROSUVASTATIN CALCIUM 10 MG/1
10 TABLET, COATED ORAL AT BEDTIME
Status: DISCONTINUED | OUTPATIENT
Start: 2021-01-01 | End: 2021-01-01 | Stop reason: HOSPADM

## 2021-01-01 RX ORDER — AMOXICILLIN 250 MG
1 CAPSULE ORAL 2 TIMES DAILY PRN
Status: DISCONTINUED | OUTPATIENT
Start: 2021-01-01 | End: 2021-01-01 | Stop reason: HOSPADM

## 2021-01-01 RX ORDER — ONDANSETRON 4 MG/1
4 TABLET, FILM COATED ORAL EVERY 8 HOURS PRN
Status: DISCONTINUED | OUTPATIENT
Start: 2021-01-01 | End: 2021-01-01 | Stop reason: HOSPADM

## 2021-01-01 RX ORDER — ONDANSETRON 8 MG/1
8 TABLET, FILM COATED ORAL EVERY 8 HOURS PRN
Qty: 30 TABLET | Refills: 0 | Status: SHIPPED | OUTPATIENT
Start: 2021-01-01 | End: 2021-01-01

## 2021-01-01 RX ORDER — NITROGLYCERIN 0.4 MG/1
0.4 TABLET SUBLINGUAL EVERY 5 MIN PRN
Status: DISCONTINUED | OUTPATIENT
Start: 2021-01-01 | End: 2021-01-01 | Stop reason: HOSPADM

## 2021-01-01 RX ORDER — ANASTROZOLE 1 MG/1
1 TABLET ORAL DAILY
Status: DISCONTINUED | OUTPATIENT
Start: 2021-01-01 | End: 2021-01-01 | Stop reason: HOSPADM

## 2021-01-01 RX ORDER — ONDANSETRON 2 MG/ML
4 INJECTION INTRAMUSCULAR; INTRAVENOUS EVERY 6 HOURS PRN
Status: DISCONTINUED | OUTPATIENT
Start: 2021-01-01 | End: 2021-01-01 | Stop reason: HOSPADM

## 2021-01-01 RX ORDER — ACETYLCYSTEINE 200 MG/ML
2 SOLUTION ORAL; RESPIRATORY (INHALATION) AT BEDTIME
Status: DISCONTINUED | OUTPATIENT
Start: 2021-01-01 | End: 2021-01-01 | Stop reason: HOSPADM

## 2021-01-01 RX ORDER — POTASSIUM CHLORIDE 1500 MG/1
20 TABLET, EXTENDED RELEASE ORAL
Status: DISPENSED | OUTPATIENT
Start: 2021-01-01 | End: 2021-01-01

## 2021-01-01 RX ORDER — LIDOCAINE HYDROCHLORIDE 20 MG/ML
INJECTION, SOLUTION INFILTRATION; PERINEURAL PRN
Status: DISCONTINUED | OUTPATIENT
Start: 2021-01-01 | End: 2021-01-01

## 2021-01-01 RX ORDER — ANASTROZOLE 1 MG/1
1 TABLET ORAL DAILY
Qty: 90 TABLET | Refills: 3 | Status: SHIPPED | OUTPATIENT
Start: 2021-01-01

## 2021-01-01 RX ORDER — OMEPRAZOLE 20 MG/1
20 TABLET, DELAYED RELEASE ORAL
Qty: 60 TABLET | Refills: 3 | Status: SHIPPED | OUTPATIENT
Start: 2021-01-01 | End: 2021-01-01

## 2021-01-01 RX ORDER — MAGNESIUM OXIDE 400 MG/1
400 TABLET ORAL ONCE
Status: COMPLETED | OUTPATIENT
Start: 2021-01-01 | End: 2021-01-01

## 2021-01-01 RX ORDER — PREDNISONE 20 MG/1
40 TABLET ORAL DAILY
Qty: 14 TABLET | Refills: 0 | Status: SHIPPED | OUTPATIENT
Start: 2021-01-01 | End: 2021-01-01

## 2021-01-01 RX ORDER — ACETYLCYSTEINE 100 MG/ML
4 SOLUTION ORAL; RESPIRATORY (INHALATION) EVERY 4 HOURS
COMMUNITY
End: 2021-01-01

## 2021-01-01 RX ORDER — MAGNESIUM OXIDE 400 MG/1
400 TABLET ORAL 2 TIMES DAILY
Status: DISCONTINUED | OUTPATIENT
Start: 2021-01-01 | End: 2021-01-01 | Stop reason: HOSPADM

## 2021-01-01 RX ORDER — ACETYLCYSTEINE 200 MG/ML
2 SOLUTION ORAL; RESPIRATORY (INHALATION) 2 TIMES DAILY PRN
Qty: 240 ML | Refills: 4 | Status: SHIPPED | OUTPATIENT
Start: 2021-01-01 | End: 2021-01-01

## 2021-01-01 RX ORDER — GABAPENTIN 100 MG/1
100 CAPSULE ORAL 2 TIMES DAILY PRN
Status: DISCONTINUED | OUTPATIENT
Start: 2021-01-01 | End: 2021-01-01 | Stop reason: HOSPADM

## 2021-01-01 RX ORDER — ACETYLCYSTEINE 200 MG/ML
2 SOLUTION ORAL; RESPIRATORY (INHALATION) ONCE
Status: COMPLETED | OUTPATIENT
Start: 2021-01-01 | End: 2021-01-01

## 2021-01-01 RX ORDER — FAMOTIDINE 20 MG/1
20 TABLET, FILM COATED ORAL DAILY
Status: DISCONTINUED | OUTPATIENT
Start: 2021-01-01 | End: 2021-01-01 | Stop reason: HOSPADM

## 2021-01-01 RX ORDER — ANASTROZOLE 1 MG/1
TABLET ORAL
Qty: 90 TABLET | Refills: 1 | Status: SHIPPED | OUTPATIENT
Start: 2021-01-01 | End: 2021-01-01

## 2021-01-01 RX ORDER — LORAZEPAM 0.5 MG/1
0.5 TABLET ORAL EVERY 6 HOURS PRN
Qty: 30 TABLET | Refills: 0 | Status: SHIPPED | OUTPATIENT
Start: 2021-01-01 | End: 2021-01-01

## 2021-01-01 RX ORDER — LEVOFLOXACIN 500 MG/1
500 TABLET, FILM COATED ORAL DAILY
Qty: 14 TABLET | Refills: 0 | Status: SHIPPED | OUTPATIENT
Start: 2021-01-01 | End: 2021-01-01

## 2021-01-01 RX ORDER — HYDROCODONE BITARTRATE AND ACETAMINOPHEN 5; 325 MG/1; MG/1
TABLET ORAL
COMMUNITY
Start: 2021-01-01 | End: 2021-01-01

## 2021-01-01 RX ORDER — ONDANSETRON 4 MG/1
4 TABLET, FILM COATED ORAL EVERY 8 HOURS PRN
Qty: 30 TABLET | Refills: 0 | Status: SHIPPED | OUTPATIENT
Start: 2021-01-01

## 2021-01-01 RX ORDER — ONDANSETRON 4 MG/1
4 TABLET, FILM COATED ORAL EVERY 8 HOURS PRN
Qty: 30 TABLET | Refills: 3 | Status: SHIPPED | OUTPATIENT
Start: 2021-01-01 | End: 2021-01-01

## 2021-01-01 RX ORDER — ONDANSETRON 2 MG/ML
4 INJECTION INTRAMUSCULAR; INTRAVENOUS EVERY 30 MIN PRN
Status: DISCONTINUED | OUTPATIENT
Start: 2021-01-01 | End: 2021-01-01 | Stop reason: HOSPADM

## 2021-01-01 RX ORDER — FAMOTIDINE 20 MG/1
20 TABLET, FILM COATED ORAL 2 TIMES DAILY
Status: DISCONTINUED | OUTPATIENT
Start: 2021-01-01 | End: 2021-01-01

## 2021-01-01 RX ORDER — LIDOCAINE HYDROCHLORIDE 10 MG/ML
INJECTION, SOLUTION EPIDURAL; INFILTRATION; INTRACAUDAL; PERINEURAL
Status: COMPLETED | OUTPATIENT
Start: 2021-01-01 | End: 2021-01-01

## 2021-01-01 RX ORDER — PROPOFOL 10 MG/ML
INJECTION, EMULSION INTRAVENOUS PRN
Status: DISCONTINUED | OUTPATIENT
Start: 2021-01-01 | End: 2021-01-01

## 2021-01-01 RX ORDER — LORAZEPAM 0.5 MG/1
0.5 TABLET ORAL
Status: DISCONTINUED | OUTPATIENT
Start: 2021-01-01 | End: 2021-01-01 | Stop reason: HOSPADM

## 2021-01-01 RX ORDER — FUROSEMIDE 20 MG
20 TABLET ORAL DAILY
Qty: 90 TABLET | Refills: 2 | Status: SHIPPED | OUTPATIENT
Start: 2021-01-01

## 2021-01-01 RX ORDER — HYDROCODONE BITARTRATE AND ACETAMINOPHEN 5; 325 MG/1; MG/1
1 TABLET ORAL EVERY 4 HOURS PRN
Qty: 60 TABLET | Refills: 0 | Status: SHIPPED | OUTPATIENT
Start: 2021-01-01 | End: 2021-01-01

## 2021-01-01 RX ORDER — POTASSIUM CHLORIDE 7.45 MG/ML
10 INJECTION INTRAVENOUS
Status: COMPLETED | OUTPATIENT
Start: 2021-01-01 | End: 2021-01-01

## 2021-01-01 RX ORDER — ACETAMINOPHEN AND CODEINE PHOSPHATE 300; 30 MG/1; MG/1
1 TABLET ORAL EVERY 6 HOURS PRN
Qty: 30 TABLET | Refills: 0 | Status: SHIPPED | OUTPATIENT
Start: 2021-01-01 | End: 2021-01-01

## 2021-01-01 RX ORDER — LORAZEPAM 0.5 MG/1
TABLET ORAL
Qty: 60 TABLET | Refills: 0 | Status: SHIPPED | OUTPATIENT
Start: 2021-01-01

## 2021-01-01 RX ORDER — MAGNESIUM OXIDE 400 MG/1
400 TABLET ORAL DAILY
Qty: 30 TABLET | Refills: 0 | Status: SHIPPED | OUTPATIENT
Start: 2021-01-01 | End: 2022-01-01

## 2021-01-01 RX ORDER — ACETAMINOPHEN 325 MG/1
650 TABLET ORAL EVERY 6 HOURS PRN
Status: DISCONTINUED | OUTPATIENT
Start: 2021-01-01 | End: 2021-01-01 | Stop reason: HOSPADM

## 2021-01-01 RX ORDER — ACETYLCYSTEINE 200 MG/ML
2 SOLUTION ORAL; RESPIRATORY (INHALATION) 2 TIMES DAILY PRN
Qty: 240 ML | Refills: 11 | Status: SHIPPED | OUTPATIENT
Start: 2021-01-01 | End: 2021-01-01

## 2021-01-01 RX ORDER — ALBUTEROL SULFATE 90 UG/1
2 AEROSOL, METERED RESPIRATORY (INHALATION) EVERY 6 HOURS PRN
Qty: 1 INHALER | Refills: 11 | Status: SHIPPED | OUTPATIENT
Start: 2021-01-01

## 2021-01-01 RX ORDER — POTASSIUM CHLORIDE 1.5 G/1.58G
40 POWDER, FOR SOLUTION ORAL ONCE
Status: COMPLETED | OUTPATIENT
Start: 2021-01-01 | End: 2021-01-01

## 2021-01-01 RX ORDER — ACETAMINOPHEN 650 MG/1
650 SUPPOSITORY RECTAL EVERY 6 HOURS PRN
Status: DISCONTINUED | OUTPATIENT
Start: 2021-01-01 | End: 2021-01-01 | Stop reason: HOSPADM

## 2021-01-01 RX ORDER — LIDOCAINE 40 MG/G
CREAM TOPICAL
Status: DISCONTINUED | OUTPATIENT
Start: 2021-01-01 | End: 2021-01-01 | Stop reason: HOSPADM

## 2021-01-01 RX ORDER — ONDANSETRON 4 MG/1
4 TABLET, ORALLY DISINTEGRATING ORAL EVERY 6 HOURS PRN
Status: DISCONTINUED | OUTPATIENT
Start: 2021-01-01 | End: 2021-01-01 | Stop reason: HOSPADM

## 2021-01-01 RX ORDER — GABAPENTIN 100 MG/1
CAPSULE ORAL
Qty: 180 CAPSULE | Refills: 3 | Status: SHIPPED | OUTPATIENT
Start: 2021-01-01

## 2021-01-01 RX ORDER — ONDANSETRON 4 MG/1
4 TABLET, ORALLY DISINTEGRATING ORAL ONCE
Status: COMPLETED | OUTPATIENT
Start: 2021-01-01 | End: 2021-01-01

## 2021-01-01 RX ORDER — HEPARIN SODIUM 5000 [USP'U]/.5ML
5000 INJECTION, SOLUTION INTRAVENOUS; SUBCUTANEOUS EVERY 12 HOURS
Status: DISCONTINUED | OUTPATIENT
Start: 2021-01-01 | End: 2021-01-01 | Stop reason: HOSPADM

## 2021-01-01 RX ORDER — POTASSIUM CHLORIDE 1.5 G/1.58G
20 POWDER, FOR SOLUTION ORAL DAILY
Qty: 30 PACKET | Refills: 0 | Status: SHIPPED | OUTPATIENT
Start: 2021-01-01 | End: 2022-01-01

## 2021-01-01 RX ORDER — QUETIAPINE FUMARATE 25 MG/1
25 TABLET, FILM COATED ORAL 3 TIMES DAILY PRN
Status: DISCONTINUED | OUTPATIENT
Start: 2021-01-01 | End: 2021-01-01 | Stop reason: HOSPADM

## 2021-01-01 RX ORDER — SODIUM CHLORIDE 9 MG/ML
INJECTION, SOLUTION INTRAVENOUS CONTINUOUS
Status: DISCONTINUED | OUTPATIENT
Start: 2021-01-01 | End: 2021-01-01

## 2021-01-01 RX ORDER — BENZTROPINE MESYLATE 0.5 MG/1
1 TABLET ORAL 3 TIMES DAILY PRN
Status: DISCONTINUED | OUTPATIENT
Start: 2021-01-01 | End: 2021-01-01 | Stop reason: HOSPADM

## 2021-01-01 RX ORDER — PROPOFOL 10 MG/ML
INJECTION, EMULSION INTRAVENOUS CONTINUOUS PRN
Status: DISCONTINUED | OUTPATIENT
Start: 2021-01-01 | End: 2021-01-01

## 2021-01-01 RX ORDER — ROSUVASTATIN CALCIUM 10 MG/1
10 TABLET, COATED ORAL AT BEDTIME
Qty: 90 TABLET | Refills: 3 | Status: SHIPPED | OUTPATIENT
Start: 2021-01-01

## 2021-01-01 RX ADMIN — METHYLPREDNISOLONE ACETATE 80 MG: 80 INJECTION, SUSPENSION INTRA-ARTICULAR; INTRALESIONAL; INTRAMUSCULAR; SOFT TISSUE at 10:42

## 2021-01-01 RX ADMIN — POTASSIUM CHLORIDE 10 MEQ: 7.46 INJECTION, SOLUTION INTRAVENOUS at 21:17

## 2021-01-01 RX ADMIN — ROSUVASTATIN CALCIUM 10 MG: 10 TABLET, FILM COATED ORAL at 22:34

## 2021-01-01 RX ADMIN — POTASSIUM CHLORIDE 10 MEQ: 7.46 INJECTION, SOLUTION INTRAVENOUS at 18:46

## 2021-01-01 RX ADMIN — MAGNESIUM OXIDE TAB 400 MG (241.3 MG ELEMENTAL MG) 400 MG: 400 (241.3 MG) TAB at 21:27

## 2021-01-01 RX ADMIN — QUETIAPINE FUMARATE 12.5 MG: 25 TABLET ORAL at 06:19

## 2021-01-01 RX ADMIN — IOPAMIDOL 100 ML: 755 INJECTION, SOLUTION INTRAVENOUS at 14:05

## 2021-01-01 RX ADMIN — GABAPENTIN 100 MG: 100 CAPSULE ORAL at 16:47

## 2021-01-01 RX ADMIN — IPRATROPIUM BROMIDE AND ALBUTEROL SULFATE 3 ML: .5; 3 SOLUTION RESPIRATORY (INHALATION) at 18:09

## 2021-01-01 RX ADMIN — HEPARIN SODIUM 5000 UNITS: 10000 INJECTION, SOLUTION INTRAVENOUS; SUBCUTANEOUS at 09:56

## 2021-01-01 RX ADMIN — IPRATROPIUM BROMIDE AND ALBUTEROL SULFATE 3 ML: .5; 3 SOLUTION RESPIRATORY (INHALATION) at 12:36

## 2021-01-01 RX ADMIN — METOPROLOL TARTRATE 100 MG: 25 TABLET, FILM COATED ORAL at 10:07

## 2021-01-01 RX ADMIN — FAMOTIDINE 20 MG: 20 TABLET, FILM COATED ORAL at 08:06

## 2021-01-01 RX ADMIN — IOPAMIDOL 75 ML: 755 INJECTION, SOLUTION INTRAVENOUS at 15:00

## 2021-01-01 RX ADMIN — PROPOFOL 75 MCG/KG/MIN: 10 INJECTION, EMULSION INTRAVENOUS at 14:17

## 2021-01-01 RX ADMIN — MAGNESIUM OXIDE TAB 400 MG (241.3 MG ELEMENTAL MG) 400 MG: 400 (241.3 MG) TAB at 10:06

## 2021-01-01 RX ADMIN — ONDANSETRON 4 MG: 4 TABLET, ORALLY DISINTEGRATING ORAL at 12:39

## 2021-01-01 RX ADMIN — POTASSIUM CHLORIDE 20 MEQ: 1500 TABLET, EXTENDED RELEASE ORAL at 23:12

## 2021-01-01 RX ADMIN — ACETAMINOPHEN 650 MG: 325 TABLET ORAL at 08:06

## 2021-01-01 RX ADMIN — SODIUM CHLORIDE, POTASSIUM CHLORIDE, SODIUM LACTATE AND CALCIUM CHLORIDE 100 ML/HR: 600; 310; 30; 20 INJECTION, SOLUTION INTRAVENOUS at 22:00

## 2021-01-01 RX ADMIN — LORAZEPAM 0.5 MG: 2 INJECTION INTRAMUSCULAR; INTRAVENOUS at 02:27

## 2021-01-01 RX ADMIN — SODIUM CHLORIDE, POTASSIUM CHLORIDE, SODIUM LACTATE AND CALCIUM CHLORIDE: 600; 310; 30; 20 INJECTION, SOLUTION INTRAVENOUS at 08:06

## 2021-01-01 RX ADMIN — LORAZEPAM 0.5 MG: 0.5 TABLET ORAL at 21:27

## 2021-01-01 RX ADMIN — MAGNESIUM OXIDE TAB 400 MG (241.3 MG ELEMENTAL MG) 400 MG: 400 (241.3 MG) TAB at 08:10

## 2021-01-01 RX ADMIN — HEPARIN SODIUM 5000 UNITS: 10000 INJECTION, SOLUTION INTRAVENOUS; SUBCUTANEOUS at 21:27

## 2021-01-01 RX ADMIN — POTASSIUM CHLORIDE FOR ORAL SOLUTION 40 MEQ: 1.5 POWDER, FOR SOLUTION ORAL at 18:37

## 2021-01-01 RX ADMIN — IOPAMIDOL 75 ML: 755 INJECTION, SOLUTION INTRAVENOUS at 16:01

## 2021-01-01 RX ADMIN — POTASSIUM CHLORIDE 10 MEQ: 7.46 INJECTION, SOLUTION INTRAVENOUS at 22:34

## 2021-01-01 RX ADMIN — ALBUTEROL SULFATE 2.5 MG: 2.5 SOLUTION RESPIRATORY (INHALATION) at 11:35

## 2021-01-01 RX ADMIN — HYDROCODONE BITARTRATE AND ACETAMINOPHEN 1 TABLET: 5; 325 TABLET ORAL at 21:27

## 2021-01-01 RX ADMIN — ANASTROZOLE 1 MG: 1 TABLET ORAL at 10:06

## 2021-01-01 RX ADMIN — HEPARIN SODIUM 5000 UNITS: 10000 INJECTION, SOLUTION INTRAVENOUS; SUBCUTANEOUS at 23:09

## 2021-01-01 RX ADMIN — POTASSIUM CHLORIDE 10 MEQ: 7.46 INJECTION, SOLUTION INTRAVENOUS at 02:50

## 2021-01-01 RX ADMIN — ONDANSETRON 4 MG: 2 INJECTION INTRAMUSCULAR; INTRAVENOUS at 21:28

## 2021-01-01 RX ADMIN — LIDOCAINE HYDROCHLORIDE 60 MG: 20 INJECTION, SOLUTION INFILTRATION; PERINEURAL at 14:17

## 2021-01-01 RX ADMIN — HYDROCODONE BITARTRATE AND ACETAMINOPHEN 1 TABLET: 5; 325 TABLET ORAL at 17:34

## 2021-01-01 RX ADMIN — QUETIAPINE FUMARATE 25 MG: 25 TABLET ORAL at 00:28

## 2021-01-01 RX ADMIN — MAGNESIUM OXIDE TAB 400 MG (241.3 MG ELEMENTAL MG) 400 MG: 400 (241.3 MG) TAB at 18:39

## 2021-01-01 RX ADMIN — LIDOCAINE HYDROCHLORIDE 1 ML: 10 INJECTION, SOLUTION EPIDURAL; INFILTRATION; INTRACAUDAL; PERINEURAL at 10:42

## 2021-01-01 RX ADMIN — ONDANSETRON 4 MG: 4 TABLET, FILM COATED ORAL at 17:05

## 2021-01-01 RX ADMIN — POTASSIUM CHLORIDE 10 MEQ: 7.46 INJECTION, SOLUTION INTRAVENOUS at 16:21

## 2021-01-01 RX ADMIN — IPRATROPIUM BROMIDE AND ALBUTEROL SULFATE 3 ML: .5; 3 SOLUTION RESPIRATORY (INHALATION) at 16:50

## 2021-01-01 RX ADMIN — METOPROLOL TARTRATE 100 MG: 25 TABLET, FILM COATED ORAL at 21:27

## 2021-01-01 RX ADMIN — SODIUM CHLORIDE: 9 INJECTION, SOLUTION INTRAVENOUS at 18:46

## 2021-01-01 RX ADMIN — Medication 1 MG: at 00:28

## 2021-01-01 RX ADMIN — SODIUM CHLORIDE, POTASSIUM CHLORIDE, SODIUM LACTATE AND CALCIUM CHLORIDE: 600; 310; 30; 20 INJECTION, SOLUTION INTRAVENOUS at 16:27

## 2021-01-01 RX ADMIN — HYDROCODONE BITARTRATE AND ACETAMINOPHEN 1 TABLET: 5; 325 TABLET ORAL at 12:39

## 2021-01-01 RX ADMIN — ONDANSETRON 4 MG: 2 INJECTION INTRAMUSCULAR; INTRAVENOUS at 00:34

## 2021-01-01 RX ADMIN — ACETYLCYSTEINE 2 ML: 200 SOLUTION ORAL; RESPIRATORY (INHALATION) at 11:33

## 2021-01-01 RX ADMIN — METOPROLOL TARTRATE 100 MG: 25 TABLET, FILM COATED ORAL at 08:06

## 2021-01-01 RX ADMIN — ANASTROZOLE 1 MG: 1 TABLET ORAL at 09:56

## 2021-01-01 RX ADMIN — PROPOFOL 20 MG: 10 INJECTION, EMULSION INTRAVENOUS at 14:17

## 2021-01-01 RX ADMIN — FAMOTIDINE 20 MG: 20 TABLET, FILM COATED ORAL at 10:06

## 2021-01-01 ASSESSMENT — ACTIVITIES OF DAILY LIVING (ADL)
ADLS_ACUITY_SCORE: 7
ADLS_ACUITY_SCORE: 7
ADLS_ACUITY_SCORE: 3
ADLS_ACUITY_SCORE: 7
ADLS_ACUITY_SCORE: 3
ADLS_ACUITY_SCORE: 3
ADLS_ACUITY_SCORE: 9
ADLS_ACUITY_SCORE: 3
ADLS_ACUITY_SCORE: 9
ADLS_ACUITY_SCORE: 7
ADLS_ACUITY_SCORE: 3
ADLS_ACUITY_SCORE: 7
ADLS_ACUITY_SCORE: 7
ADLS_ACUITY_SCORE: 9
ADLS_ACUITY_SCORE: 3
DEPENDENT_IADLS:: CLEANING;COOKING;LAUNDRY;SHOPPING;MEAL PREPARATION;TRANSPORTATION
ADLS_ACUITY_SCORE: 9
ADLS_ACUITY_SCORE: 9
ADLS_ACUITY_SCORE: 3
ADLS_ACUITY_SCORE: 7
ADLS_ACUITY_SCORE: 7
ADLS_ACUITY_SCORE: 9
ADLS_ACUITY_SCORE: 9
ADLS_ACUITY_SCORE: 3
ADLS_ACUITY_SCORE: 3
ADLS_ACUITY_SCORE: 7
ADLS_ACUITY_SCORE: 3
ADLS_ACUITY_SCORE: 3
ADLS_ACUITY_SCORE: 7
ADLS_ACUITY_SCORE: 7
ADLS_ACUITY_SCORE: 9
ADLS_ACUITY_SCORE: 7
ADLS_ACUITY_SCORE: 9
ADLS_ACUITY_SCORE: 9
ADLS_ACUITY_SCORE: 7
ADLS_ACUITY_SCORE: 3
ADLS_ACUITY_SCORE: 3
ADLS_ACUITY_SCORE: 7

## 2021-01-01 ASSESSMENT — ASTHMA QUESTIONNAIRES
QUESTION_2 LAST FOUR WEEKS HOW OFTEN HAVE YOU HAD SHORTNESS OF BREATH: MORE THAN ONCE A DAY
QUESTION_5 LAST FOUR WEEKS HOW WOULD YOU RATE YOUR ASTHMA CONTROL: SOMEWHAT CONTROLLED
QUESTION_3 LAST FOUR WEEKS HOW OFTEN DID YOUR ASTHMA SYMPTOMS (WHEEZING, COUGHING, SHORTNESS OF BREATH, CHEST TIGHTNESS OR PAIN) WAKE YOU UP AT NIGHT OR EARLIER THAN USUAL IN THE MORNING: TWO OR THREE NIGHTS A WEEK
ACT_TOTALSCORE: 19
QUESTION_4 LAST FOUR WEEKS HOW OFTEN HAVE YOU USED YOUR RESCUE INHALER OR NEBULIZER MEDICATION (SUCH AS ALBUTEROL): ONE OR TWO TIMES PER DAY
ACT_TOTALSCORE: 22
ACT_TOTALSCORE: 10
ACT_TOTALSCORE: 10
ACT_TOTALSCORE: 12
QUESTION_1 LAST FOUR WEEKS HOW MUCH OF THE TIME DID YOUR ASTHMA KEEP YOU FROM GETTING AS MUCH DONE AT WORK, SCHOOL OR AT HOME: MOST OF THE TIME

## 2021-01-01 ASSESSMENT — MIFFLIN-ST. JEOR
SCORE: 1062.72
SCORE: 927.55
SCORE: 1089.94
SCORE: 1062.72
SCORE: 1035.5
SCORE: 1067.26
SCORE: 1080.86

## 2021-01-01 ASSESSMENT — PAIN SCALES - GENERAL
PAINLEVEL: EXTREME PAIN (8)
PAINLEVEL: MODERATE PAIN (4)

## 2021-01-01 ASSESSMENT — ENCOUNTER SYMPTOMS: DYSRHYTHMIAS: 1

## 2021-01-05 ENCOUNTER — OFFICE VISIT - HEALTHEAST (OUTPATIENT)
Dept: INTERNAL MEDICINE | Facility: CLINIC | Age: 86
End: 2021-01-05

## 2021-01-05 ENCOUNTER — COMMUNICATION - HEALTHEAST (OUTPATIENT)
Dept: INTERNAL MEDICINE | Facility: CLINIC | Age: 86
End: 2021-01-05

## 2021-01-05 DIAGNOSIS — J45.40 MODERATE PERSISTENT ASTHMA WITHOUT COMPLICATION: ICD-10-CM

## 2021-01-05 DIAGNOSIS — N39.0 RECURRENT UTI: ICD-10-CM

## 2021-01-05 DIAGNOSIS — I10 ESSENTIAL HYPERTENSION: ICD-10-CM

## 2021-01-08 ENCOUNTER — COMMUNICATION - HEALTHEAST (OUTPATIENT)
Dept: INTERNAL MEDICINE | Facility: CLINIC | Age: 86
End: 2021-01-08

## 2021-01-08 ENCOUNTER — COMMUNICATION - HEALTHEAST (OUTPATIENT)
Dept: ONCOLOGY | Facility: HOSPITAL | Age: 86
End: 2021-01-08

## 2021-01-08 DIAGNOSIS — C50.412 MALIGNANT NEOPLASM OF UPPER-OUTER QUADRANT OF LEFT BREAST IN FEMALE, ESTROGEN RECEPTOR POSITIVE (H): ICD-10-CM

## 2021-01-08 DIAGNOSIS — Z17.0 MALIGNANT NEOPLASM OF UPPER-OUTER QUADRANT OF LEFT BREAST IN FEMALE, ESTROGEN RECEPTOR POSITIVE (H): ICD-10-CM

## 2021-01-08 DIAGNOSIS — I25.10 CAD (CORONARY ARTERY DISEASE): ICD-10-CM

## 2021-01-12 ENCOUNTER — COMMUNICATION - HEALTHEAST (OUTPATIENT)
Dept: PULMONOLOGY | Facility: OTHER | Age: 86
End: 2021-01-12

## 2021-01-12 ENCOUNTER — AMBULATORY - HEALTHEAST (OUTPATIENT)
Dept: PULMONOLOGY | Facility: OTHER | Age: 86
End: 2021-01-12

## 2021-01-12 DIAGNOSIS — J45.909 ASTHMA: ICD-10-CM

## 2021-01-19 ENCOUNTER — OFFICE VISIT - HEALTHEAST (OUTPATIENT)
Dept: INTERNAL MEDICINE | Facility: CLINIC | Age: 86
End: 2021-01-19

## 2021-01-19 ENCOUNTER — AMBULATORY - HEALTHEAST (OUTPATIENT)
Dept: PULMONOLOGY | Facility: OTHER | Age: 86
End: 2021-01-19

## 2021-01-19 DIAGNOSIS — I10 ESSENTIAL HYPERTENSION: ICD-10-CM

## 2021-01-19 DIAGNOSIS — J45.40 MODERATE PERSISTENT ASTHMA WITHOUT COMPLICATION: ICD-10-CM

## 2021-01-19 DIAGNOSIS — J45.909 ASTHMA: ICD-10-CM

## 2021-01-30 ENCOUNTER — COMMUNICATION - HEALTHEAST (OUTPATIENT)
Dept: INTERNAL MEDICINE | Facility: CLINIC | Age: 86
End: 2021-01-30

## 2021-01-30 DIAGNOSIS — E78.5 DYSLIPIDEMIA: ICD-10-CM

## 2021-02-01 ENCOUNTER — COMMUNICATION - HEALTHEAST (OUTPATIENT)
Dept: INTERNAL MEDICINE | Facility: CLINIC | Age: 86
End: 2021-02-01

## 2021-02-01 DIAGNOSIS — N39.0 RECURRENT UTI: ICD-10-CM

## 2021-03-26 NOTE — PROGRESS NOTES
This is a recent snapshot of the patient's [facility] medical record.  For current drug dose and complete information and questions, call [phone] or In Basket pool, [facility] home infusion ([id number])  CSN Number:  [id number]

## 2021-03-29 NOTE — PROGRESS NOTES
This is a recent snapshot of the patient's [facility] medical record.  For current drug dose and complete information and questions, call [phone] or In Basket pool, fv home infusion ([id number])  CSN Number:  [id number]

## 2021-05-28 NOTE — TELEPHONE ENCOUNTER
Refill Request  Did you contact pharmacy: Yes  Medication name: lisinopril (PRINIVIL,ZESTRIL) 10 MG tablet  Patient dose was increased from 1 pill to 1 1/2 pills per day so she is now running low. She called the Pharmacy to request a refill, but was told it was too soon to get a refill. Patient requests the prescription be updated with the Pharmacy, and refill called in for her.  Requested Prescriptions      No prescriptions requested or ordered in this encounter     Who prescribed the medication: Stephen Sin  Pharmacy Name and Location: [facility]  Is patient out of medication: No.  3 days left  Patient notified refills processed in 72 hours:  no  Okay to leave a detailed message: yes

## 2021-05-29 NOTE — TELEPHONE ENCOUNTER
Triage call:   In on Monday for a UTI- started on Doxycycline. Patient reports that she has been nauseated and threw up medication for the last 2 doses. Patient is afraid to take anymore doses of the medication. Reports she has been taking medication with food and drinking plenty of water.     Patient is wondering if Stephen would recommend a medication change? Requesting advice- Provider please advise.     Reviewed additional care advice with patient and she verbalizes understanding.     Pharmacy and allergies verified.     *Ok to leave a detailed message upon call back    Catia Jaramillo RN [facility] Care Connection Triage/Med Refill 6/19/2019 1:01 PM    Reason for Disposition    Vomiting a prescription medication    Protocols used: VOMITING-A-OH

## 2021-05-29 NOTE — PROGRESS NOTES
Clinic Note    Assessment:     Assessment and Plan:  1. Dysuria  Urinalysis today revealed potential UTI.  We will start her on doxycycline while the urine culture pends.  - Urinalysis-UC if Indicated  - Culture, Urine  - doxycycline (VIBRAMYCIN) 100 MG capsule; Take 1 capsule (100 mg total) by mouth 2 (two) times a day for 10 days.  Dispense: 20 capsule; Refill: 0         Patient Instructions   I sent in an antibiotic for you.  It is called doxycycline.  Take 1 tablet every 12 hours for the next 10 days.    Drink plenty of water.    If the urine culture necessitates a change in antibiotics, we will call and let you know.    Follow-up with either I or Dr. Hannah if your symptoms do not get any better.    Return for If symptoms do not start to improve in two weeks..         Subjective:      Patient comes to clinic today for dysuria.    She has had some of the last 3 to 4 days.  She describes having cloudy urine, increased frequency, and some lower back pressure.    No fevers.  No hematuria.  No follow odor.  She has not had any nausea or vomiting.    She has had issues with frequent UTIs in the past.  Her PCP placed her on Cipro 250 mg daily for prophylaxis.    The following portions of the patient's history were reviewed and updated as appropriate: Allergies, medications, problems, prior note    Review of Systems:    Review is otherwise negative except for what is mentioned above.     Social Hx:    Social History     Tobacco Use   Smoking Status Never Smoker   Smokeless Tobacco Never Used         Objective:     Vitals:    06/17/19 1129   BP: 142/64   Pulse: 60   Weight: 153 lb (69.4 kg)       Exam:    General: No apparent distress. Calm. Alert and Oriented X3. Pt behavior is appropriate.      Patient Active Problem List   Diagnosis     Chronic Sinusitis     Hypercholesterolemia     Moderate persistent asthma     Hypertension     Osteoporosis Senile     Osteoarthritis     CAD (coronary artery disease)     GI bleed      Anemia     Insomnia     Wrist fracture     Malignant neoplasm of upper-outer quadrant of left female breast (H)     Aromatase inhibitor use     Current Outpatient Medications   Medication Sig Dispense Refill     ALPRAZolam (XANAX) 0.25 MG tablet Take 1 tablet (0.25 mg total) by mouth at bedtime as needed for sleep. 30 tablet 0     anastrozole (ARIMIDEX) 1 mg tablet TAKE ONE TABLET BY MOUTH EVERY DAY 90 tablet 3     ascorbic acid (VITAMIN C) 1000 MG tablet Take 1,000 mg by mouth every morning.        aspirin 81 MG EC tablet Take 81 mg by mouth bedtime.       BREO ELLIPTA 200-25 mcg/dose DsDv inhaler INHALE 1 PUFF BY MOUTH EVERY DAY 1 each 6     calcium citrate-vitamin D (CITRACAL+D) 315-200 mg-unit per tablet Take 1 tablet by mouth bedtime.       ciprofloxacin HCl (CIPRO) 250 MG tablet TAKE 0.5 TABLETS (125 MG TOTAL) BY MOUTH DAILY. 90 tablet 0     cyanocobalamin (VITAMIN B-12) 50 mcg tablet Take 50 mcg by mouth every morning.        DOCOSAHEXANOIC ACID/EPA (FISH OIL ORAL) Take 2 g by mouth daily.       furosemide (LASIX) 20 MG tablet TAKE ONE TABLET EVERY DAY 90 tablet 2     INCRUSE ELLIPTA 62.5 mcg/actuation DsDv inhaler INHALE ONE PUFF BY MOUTH EVERY DAY 1 each 6     ipratropium-albuterol (COMBIVENT RESPIMAT)  mcg/actuation Mist inhaler Inhale 1 puff 4 (four) times a day. 1 Inhaler 5     ipratropium-albuterol (DUO-NEB) 0.5-2.5 mg/mL nebulizer INHALE 1 VIAL VIA NEBULIZER EVERY 6 HOURS 1080 mL 3     LACTOBACILLUS ACIDOPHILUS (ACIDOPHILUS ORAL) Take 1 tablet by mouth every morning.        lisinopril (PRINIVIL,ZESTRIL) 10 MG tablet Take 1.5 tablets (15 mg total) by mouth daily. 135 tablet 1     magnesium 250 mg Tab Take 250 mg by mouth every morning.       metoprolol tartrate (LOPRESSOR) 100 MG tablet Take 1 tablet (100 mg total) by mouth 2 (two) times a day. 180 tablet 3     nitroglycerin (NITROSTAT) 0.4 MG SL tablet Place 1 tablet (0.4 mg total) under the tongue every 5 (five) minutes as needed for chest  pain. 25 tablet 3     rosuvastatin (CRESTOR) 10 MG tablet TAKE 1 TABLET (10 MG TOTAL) BY MOUTH AT BEDTIME. TAKE WITH A 5 MG PILL FOR A TOTAL OF 15 MG NIGHTLY 90 tablet 3     doxycycline (VIBRAMYCIN) 100 MG capsule Take 1 capsule (100 mg total) by mouth 2 (two) times a day for 10 days. 20 capsule 0     Current Facility-Administered Medications   Medication Dose Route Frequency Provider Last Rate Last Dose     denosumab 60 mg (PROLIA 60 mg/ml)  60 mg Subcutaneous Q6 Months Wilbur Hannah MD   60 mg at 06/14/19 0950           Gordon Sin (Rob), MORENO    6/17/2019

## 2021-05-29 NOTE — TELEPHONE ENCOUNTER
UCX is negative. She does not need to take ABx. If he symptoms are still present, I would suggest visit with Urologist.

## 2021-05-29 NOTE — PROGRESS NOTES
"Prolia Injection Phone Screen      Screening questions have been asked 2-3 days prior to administration visit for Prolia. If any questions are answered with \"Yes,\" this phone encounter were will routed to ordering provider for further evaluation.     1.  When was the last injection?  12/12/2018    2.  Has insurance for this injection been verified?  Yes    3.  Did you experience any new onset achiness or rashes that lasted for over a month with your previous Prolia injection?   No    4.  Do you have a fever over 101?F or a new deep cough that is unusual for you today? No    5.  Have you started any new medications in the last 6 months that you were told could affect your immune system? These may have been prescribed by oncologist, transplant, rheumatology, or dermatology.   No    6.  In the last 6 months have you have gastric bypass or parathyroid surgery?   No    7.  Do you plan dental work requiring drilling into the bone such as implants/extractions or oral surgery in the next 2-3 months?   No            The following steps were completed to comply with the REMS program for Prolia:  1. Ordering provider has previously reviewed information in the Medication Guide and Patient Counseling Chart, including the serious risks of Prolia  and the symptoms of each risk and have been advised to seek prompt medical attention if they have signs or symptoms of any of the serious risks.  2. Provided each patient a copy of the Medication Guide and Patient Brochure.  See MAR for administration details.   Indication: Prolia  (denosumab) is a prescription medicine used to treat osteoporosis in patients who:   Are at high risk for fracture, meaning patients who have had a fracture related to osteoporosis, or who have multiple risk factors for fracture; Cannot use another osteoporosis medicine or other osteoporosis medicines did not work well.   The timeline for early/late injections would be 4 weeks early and any time after the 6 " month estefania. If a patient receives their injection late, then the subsequent injection would be 6 months from the date that they actually received the injection    Have the screening questions been asked prior to this administration? Yes    Nalini Rausch CMA  12:17 PM  6/14/2019

## 2021-05-29 NOTE — PATIENT INSTRUCTIONS - HE
I sent in an antibiotic for you.  It is called doxycycline.  Take 1 tablet every 12 hours for the next 10 days.    Drink plenty of water.    If the urine culture necessitates a change in antibiotics, we will call and let you know.    Follow-up with either I or Dr. Hannah if your symptoms do not get any better.

## 2021-05-29 NOTE — TELEPHONE ENCOUNTER
Refill Approved    Rx renewed per Medication Renewal Policy. Medication was last renewed on 7/31/18.    Xochitl Keen, Care Connection Triage/Med Refill 5/23/2019     Requested Prescriptions   Pending Prescriptions Disp Refills     furosemide (LASIX) 20 MG tablet 90 tablet 2     Sig: TAKE ONE TABLET EVERY DAY       Diuretics/Combination Diuretics Refill Protocol  Passed - 5/23/2019  1:31 PM        Passed - Visit with PCP or prescribing provider visit in past 12 months     Last office visit with prescriber/PCP: 6/14/2018 Wilbur Hannah MD OR same dept: 3/18/2019 Gordon Sin CNP OR same specialty: 3/18/2019 Gordon Sin CNP  Last physical: 12/14/2018 Last MTM visit: Visit date not found   Next visit within 3 mo: Visit date not found  Next physical within 3 mo: Visit date not found  Prescriber OR PCP: Wilbur Hannah MD  Last diagnosis associated with med order: 1. CAD (coronary artery disease)  - furosemide (LASIX) 20 MG tablet; TAKE ONE TABLET EVERY DAY  Dispense: 90 tablet; Refill: 2    If protocol passes may refill for 12 months if within 3 months of last provider visit (or a total of 15 months).             Passed - Serum Potassium in past 12 months      Lab Results   Component Value Date    Potassium 4.3 03/18/2019             Passed - Serum Sodium in past 12 months      Lab Results   Component Value Date    Sodium 138 03/18/2019             Passed - Blood pressure on file in past 12 months     BP Readings from Last 1 Encounters:   03/18/19 136/70             Passed - Serum Creatinine in past 12 months      Creatinine   Date Value Ref Range Status   03/18/2019 0.85 0.60 - 1.10 mg/dL Final

## 2021-05-29 NOTE — TELEPHONE ENCOUNTER
Left voicemail to call back, need to do pre screening questions.     Nalini Rausch CMA  3:16 PM  6/12/2019

## 2021-05-30 NOTE — TELEPHONE ENCOUNTER
Provider Communication  Who is calling:  Paddy Ellen MAYER / [facility]  Facility in which provider is associated:  [facility]  Reason for call:  Paddy Hughes / [facility] states that she does annual health assessment visits and part of that is as screening for peripheral artery disease. The test used is Quantaflo.     Paddy Ellen MAYER reports that the patient had a positive Quantaflo test and a finding of moderate on both the right and left leg. Paddy Hughes CNP could feel pulses in her feet, no symptoms though she states that the patient will need follow up on this finding.     Dr. Hannah will get a written report of this though the time is not specified when it will arrive.     Urgency for return call:  She states that she does not a return phone call.   Okay to leave detailed message?:  No

## 2021-05-30 NOTE — TELEPHONE ENCOUNTER
Pls call the pt with this report an I would recommend visit with the Vascular clinic for peripheral arterial disease. Thanks

## 2021-05-30 NOTE — TELEPHONE ENCOUNTER
Order pended for referral to vascular clinic.    Left voicemail for patient to return call to clinic. When patient returns call, please give them below message.    Paddy Hughes CNP from [facility] called us to report that the patient had a positive Quantaflo test and a finding of moderate on both the right and left leg. Dr. Hannah would recommend visit with the Vascular clinic for peripheral arterial disease. Thanks   Cheryl Ryan CMA ............... 11:07 AM, 07/03/19

## 2021-05-30 NOTE — TELEPHONE ENCOUNTER
Patient Returning Call  Reason for call:  PVD  Information relayed to patient:  The writer read the following to patient per CMA: Paddy Hughes CNP from [facility] called us to report that the patient had a positive Quantaflo test and a finding of moderate on both the right and left leg. Dr. Hannah would recommend visit with the Vascular clinic for peripheral arterial disease.  Patient has additional questions:  Yes  If YES, what are your questions/concerns:  Will they call her to make appointment .  Writer did confirm Vascular will call in a few business days.   Okay to leave a detailed message?: No call back needed

## 2021-05-30 NOTE — PROGRESS NOTES
PULMONARY PROGRESS NOTE      HPI:  Yanna Handy is a 88 y.o. female followed in the Pulmonary clinic for reactive airways disease. She  history of chronic rhinosinusitis with asthma seen previously at the [facility] and was previously intranasal antibiotics.  She has been seeing me for the last several years for her asthma and had been doing fairly well.  Does state that she has had more coughing and wheezing particularly at night despite being compliant with both her Breo and Incruse.  She has use her Combivent inhaler on occasion and found relief of symptoms from this.  Her last clinic visit she has also been using her Acapella valve and states that this does help with expectoration of secretions although she stated that she forgot to use her DuoNeb's with the flutter valve.  Of note she initiated her lisinopril several months ago and thinks that she has been coughing more consistently since then.      Current Outpatient Medications on File Prior to Visit   Medication Sig Dispense Refill     ALPRAZolam (XANAX) 0.25 MG tablet Take 1 tablet (0.25 mg total) by mouth at bedtime as needed for sleep. 30 tablet 0     anastrozole (ARIMIDEX) 1 mg tablet TAKE ONE TABLET BY MOUTH EVERY DAY 90 tablet 3     ascorbic acid (VITAMIN C) 1000 MG tablet Take 1,000 mg by mouth every morning.        aspirin 81 MG EC tablet Take 81 mg by mouth bedtime.       BREO ELLIPTA 200-25 mcg/dose DsDv inhaler INHALE 1 PUFF BY MOUTH EVERY DAY 1 each 6     calcium citrate-vitamin D (CITRACAL+D) 315-200 mg-unit per tablet Take 1 tablet by mouth bedtime.       ciprofloxacin HCl (CIPRO) 250 MG tablet TAKE 0.5 TABLETS (125 MG TOTAL) BY MOUTH DAILY. 45 tablet 1     cyanocobalamin (VITAMIN B-12) 50 mcg tablet Take 50 mcg by mouth every morning.        DOCOSAHEXANOIC ACID/EPA (FISH OIL ORAL) Take 2 g by mouth daily.       furosemide (LASIX) 20 MG tablet TAKE ONE TABLET EVERY DAY 90 tablet 2     INCRUSE ELLIPTA 62.5 mcg/actuation DsDv inhaler INHALE  ONE PUFF BY MOUTH EVERY DAY 1 each 6     ipratropium-albuterol (COMBIVENT RESPIMAT)  mcg/actuation Mist inhaler Inhale 1 puff 4 (four) times a day. 1 Inhaler 5     ipratropium-albuterol (DUO-NEB) 0.5-2.5 mg/mL nebulizer INHALE 1 VIAL VIA NEBULIZER EVERY 6 HOURS 1080 mL 3     LACTOBACILLUS ACIDOPHILUS (ACIDOPHILUS ORAL) Take 1 tablet by mouth every morning.        lisinopril (PRINIVIL,ZESTRIL) 10 MG tablet Take 1.5 tablets (15 mg total) by mouth daily. 135 tablet 1     magnesium 250 mg Tab Take 250 mg by mouth every morning.       metoprolol tartrate (LOPRESSOR) 100 MG tablet Take 1 tablet (100 mg total) by mouth 2 (two) times a day. 180 tablet 3     nitroglycerin (NITROSTAT) 0.4 MG SL tablet Place 1 tablet (0.4 mg total) under the tongue every 5 (five) minutes as needed for chest pain. 25 tablet 3     OXYGEN-AIR DELIVERY SYSTEMS [facility] Use 2 L As Directed. 2L at bedtime  [employer]       rosuvastatin (CRESTOR) 10 MG tablet TAKE 1 TABLET (10 MG TOTAL) BY MOUTH AT BEDTIME. TAKE WITH A 5 MG PILL FOR A TOTAL OF 15 MG NIGHTLY 90 tablet 3     Current Facility-Administered Medications on File Prior to Visit   Medication Dose Route Frequency Provider Last Rate Last Dose     denosumab 60 mg (PROLIA 60 mg/ml)  60 mg Subcutaneous Q6 Months Wilbur Hannah MD   60 mg at 06/14/19 0950     Allergies   Allergen Reactions     Alendronate Nausea And Vomiting     Metoclopramide Hcl Anxiety     Rofecoxib Diarrhea and Nausea Only     Risedronate      Sulfa (Sulfonamide Antibiotics) Anaphylaxis           EXAM  /58   Pulse (!) 48   Resp 20   Wt 148 lb 9.6 oz (67.4 kg)   LMP 07/11/1971   SpO2 94% Comment: RA  BMI 29.02 kg/m      Physical Exam   Constitutional: She is oriented to person, place, and time. She appears well-developed and well-nourished. No distress.   HENT:   Head: Normocephalic and atraumatic.   Nose: Nose normal.   Eyes: Pupils are equal, round, and reactive to light. Conjunctivae and EOM are normal. No  scleral icterus.   Neck: Neck supple. No tracheal deviation present.   Pulmonary/Chest: Effort normal. No stridor. She has no wheezes.   Musculoskeletal: Normal range of motion. She exhibits no edema.   Neurological: She is alert and oriented to person, place, and time. She has normal reflexes.   Skin: Skin is warm and dry. She is not diaphoretic. No pallor.   Psychiatric: She has a normal mood and affect.   Vitals reviewed.      PFTS 2/23/15 (unchanged from prior visit):  FEV1/FVC is 69% and is normal.   FEV1 is 1.26 L (87%) predicted and is normal.   FVC is 1.83 L (92%) predicted and normal.   There was no improvement in spirometry after a single inhaled dose of bronchodilator.   TLC is 3.52 L (79%) predicted and is normal.   RV is 1.17 L (51%) predicted and is reduced.   DLCO is 45% predicted and is reduced when it is corrected for hemoglobin.   Impression: Full Pulmonary Function Test is abnormal.   Spirometry and flow volume loop are within normal limits   Spirometry is not consistent with reversibility.   There is no hyperinflation.   There is no air-trapping.   Diffusion capacity when corrected for hemoglobin is moderately reduced.    SIX MINUTE WALK TEST / HOME O2 EVALUATION  6/9/15  (unchanged from prior visit):  SpO2 at rest on RA 95%   SpO2 after walking 6 minutes on RA 95%   Distance covered 672 feet   Recovery phase, SpO2 after 1 minute rest on RA was 93%     Impression:   No desaturation with activities.   No need for O2 supplementation.    NM Stress Test 6/14/18:    When compared to the images of 10/10/2016, there has been no significant change.    Lexiscan stress ECG is negative for inducible myocardial ischemia.    Lexiscan nuclear study is negative for inducible myocardial ischemia or infarction.    Normal left ventricular size, wall motion and function. The calculated left ventricular ejection fraction is 72%.    CXR 5/5/16  (unchanged from prior visit):  Mild atelectasis or fibrosis in the lung  bases. Upper lungs are clear. Mild cardiomegaly with normal pulmonary vascularity. Scoliosis of the thoracolumbar  spine.      IMPRESSION:  85-year-old female with a history of moderate persistent asthma, coronary artery disease status post stenting and some fibrotic changes noted on imaging studies presents to clinic today with concern for a follow-up visit for her asthma and was unfortunately unable to afford her Spiriva Respimat.  For the present we will recommend:    1. Asthma moderate persistent, with preserved spirometry.  Is describing increasing secretions.    Continue fluticasone/Vilanterol 1 puff daily, she knows to gargle after using this.    Continue Incuse Ellipta.    I have explained to the patient that she should only use the Combivent for rescue.    Instructed her to use her DuoNeb's with a flutter valve twice a day to help expectorate his secretions.      Reminded her to start using her omeprazole again.    I sent a note to Dr. Hannah the patient's primary care provider to see if we could perhaps switch her over from lisinopril to some other agent to control her blood pressure.  2. Overnight hypoxia: Noted on overnight oximetry. Is continuing to use supplemental oxygen overnight.   3. CAD: Has undergone placement of stents to the proximal and mid LAD.  Recent echocardiogram continues to be stable.  4. Isolated diffusion defect: Likely secondary to her fibrosis noted on imaging studies.    5. Chronic sinusitis: Has discontinued her tobramycin nebs per advice from the [facility]. I have advised that she start using sinus washes daily.  6. Follow-up: 6 months.    Lisa Amin  Pulmonary and Critical Care  [id number]

## 2021-05-30 NOTE — TELEPHONE ENCOUNTER
Left voicemail for patient to return call to clinic. When patient returns call, please give them below message.    Cheryl Ryan CMA ............... 2:45 PM, 07/11/19

## 2021-05-30 NOTE — TELEPHONE ENCOUNTER
RN cannot approve Refill Request    RN can NOT refill this medication med is not covered by policy/route to provider     . Last office visit: 6/14/2018 Wilbur Hannah MD Last Physical: 12/14/2018 Last MTM visit: Visit date not found Last visit same specialty: 6/17/2019 Gordon Sin CNP.  Next visit within 3 mo: Visit date not found  Next physical within 3 mo: Visit date not found      Xochitl Keen, Care Connection Triage/Med Refill 6/26/2019    Requested Prescriptions   Pending Prescriptions Disp Refills     ciprofloxacin HCl (CIPRO) 250 MG tablet [Pharmacy Med Name: CIPROFLOXACIN  MG TAB] 45 tablet 1     Sig: TAKE 0.5 TABLETS (125 MG TOTAL) BY MOUTH DAILY.       There is no refill protocol information for this order

## 2021-05-30 NOTE — TELEPHONE ENCOUNTER
Spoke with patient and she states she got the message when she called back yesterday. She did  new prescription and started it as well. I did help her schedule f/u with Stephen on 7/31/19.  Cheryl Ryan CMA ............... 11:25 AM, 07/12/19

## 2021-05-30 NOTE — TELEPHONE ENCOUNTER
Please call the pt. I received the message from her Lung doctor. They suggested stopping lisinopril because of the persistent cough and switching to amlodipine. I sent new Rx for amlodipine. Please tell pt to stop lisinopril. She should see me or Stephen in 2-3 weeks for BP check.

## 2021-05-30 NOTE — TELEPHONE ENCOUNTER
Refill Approved    Rx renewed per Medication Renewal Policy. Medication was last renewed on 7/31/18.    Justa Beal, [facility] Triage/Med Refill 7/21/2019     Requested Prescriptions   Pending Prescriptions Disp Refills     metoprolol tartrate (LOPRESSOR) 100 MG tablet [Pharmacy Med Name: METOPROLOL TARTRATE 100 MG TAB] 180 tablet 3     Sig: TAKE 1 TABLET BY MOUTH TWICE A DAY       Beta-Blockers Refill Protocol Passed - 7/21/2019 10:16 AM        Passed - PCP or prescribing provider visit in past 12 months or next 3 months     Last office visit with prescriber/PCP: 6/14/2018 Wilbur Hannah MD OR same dept: 6/17/2019 Gordon Sin CNP OR same specialty: 6/17/2019 Gordon Sin CNP  Last physical: 12/14/2018 Last MTM visit: Visit date not found   Next visit within 3 mo: Visit date not found  Next physical within 3 mo: Visit date not found  Prescriber OR PCP: Wilbur Hannah MD  Last diagnosis associated with med order: 1. Essential hypertension with goal blood pressure less than 140/90  - metoprolol tartrate (LOPRESSOR) 100 MG tablet [Pharmacy Med Name: METOPROLOL TARTRATE 100 MG TAB]; TAKE 1 TABLET BY MOUTH TWICE A DAY  Dispense: 180 tablet; Refill: 3    If protocol passes may refill for 12 months if within 3 months of last provider visit (or a total of 15 months).             Passed - Blood pressure filed in past 12 months     BP Readings from Last 1 Encounters:   07/03/19 122/58

## 2021-05-30 NOTE — PATIENT INSTRUCTIONS - HE
Please use your duonebs with your flutter valve and this may help you better bring up secretions. You can use this up to 4 times a day.

## 2021-05-30 NOTE — TELEPHONE ENCOUNTER
Orders being requested: DEXA  Reason service is needed/diagnosis: Osteoporosis senile   When are orders needed by: As able  Where to send Orders: Place in chart and call patient to schedule   Okay to leave detailed message?  Yes

## 2021-05-31 NOTE — PATIENT INSTRUCTIONS - HE
1. You can use the nebulizer machine with the [employer] valve as needed to help you bring up your secretions. If you do not have significant secretions you do not have to use this.

## 2021-05-31 NOTE — PATIENT INSTRUCTIONS - HE
Stop the lisinopril.    Start losartan 50 mg daily.    Follow-up with me in 3 weeks for BP check and nonfasting lab work to evaluate kidney function and potassium levels on new blood pressure medication.    I placed an order for home PT.  Someone will be calling you within the next 24 to 48 hours to help set this up.

## 2021-05-31 NOTE — PROGRESS NOTES
PULMONARY PROGRESS NOTE      HPI:  Yanna Handy is a 88 y.o. female followed in the Pulmonary clinic for reactive airways disease. She  history of chronic rhinosinusitis with asthma seen previously at the [facility] and was previously intranasal antibiotics.  She has been seeing me for the last several years for her asthma and had been doing fairly well.  She describes that since the discontinuation of lisinopril her coughing is almost completely resolved.  She continues to be compliant with Breo and Incruse daily and states that she also uses her nebs twice a day with her flutter valve as instructed by us.  She has wheezing very occasionally but otherwise denies fevers, chills, night sweats, weight change or chest tightness.  ACT is 5+ 5+5+5+5 =25    Current Outpatient Medications on File Prior to Visit   Medication Sig Dispense Refill     ALPRAZolam (XANAX) 0.25 MG tablet Take 1 tablet (0.25 mg total) by mouth at bedtime as needed for sleep. 30 tablet 0     anastrozole (ARIMIDEX) 1 mg tablet TAKE ONE TABLET BY MOUTH EVERY DAY 90 tablet 3     ascorbic acid (VITAMIN C) 1000 MG tablet Take 1,000 mg by mouth every morning.        aspirin 81 MG EC tablet Take 81 mg by mouth bedtime.       BREO ELLIPTA 200-25 mcg/dose DsDv inhaler INHALE 1 PUFF BY MOUTH EVERY DAY 1 each 6     calcium citrate-vitamin D (CITRACAL+D) 315-200 mg-unit per tablet Take 1 tablet by mouth bedtime.       ciprofloxacin HCl (CIPRO) 250 MG tablet TAKE 0.5 TABLETS (125 MG TOTAL) BY MOUTH DAILY. 45 tablet 1     cyanocobalamin (VITAMIN B-12) 50 mcg tablet Take 50 mcg by mouth every morning.        DOCOSAHEXANOIC ACID/EPA (FISH OIL ORAL) Take 2 g by mouth daily.       furosemide (LASIX) 20 MG tablet TAKE ONE TABLET EVERY DAY 90 tablet 2     INCRUSE ELLIPTA 62.5 mcg/actuation DsDv inhaler INHALE ONE PUFF BY MOUTH EVERY DAY 1 each 6     LACTOBACILLUS ACIDOPHILUS (ACIDOPHILUS ORAL) Take 1 tablet by mouth every morning.        losartan (COZAAR) 50 MG  tablet Take 1 tablet (50 mg total) by mouth daily. 30 tablet 0     magnesium 250 mg Tab Take 250 mg by mouth every morning.       metoprolol tartrate (LOPRESSOR) 100 MG tablet Take 1 tablet (100 mg total) by mouth 2 (two) times a day. 180 tablet 3     nitroglycerin (NITROSTAT) 0.4 MG SL tablet Place 1 tablet (0.4 mg total) under the tongue every 5 (five) minutes as needed for chest pain. 25 tablet 3     OXYGEN[place] [facility] [place] As Directed. 2L at bedtime  [facility]       rosuvastatin (CRESTOR) 10 MG tablet TAKE 1 TABLET (10 MG TOTAL) BY MOUTH AT BEDTIME. TAKE WITH A 5 MG PILL FOR A TOTAL OF 15 MG NIGHTLY (Patient taking differently: Take 10 mg by mouth at bedtime.       ) 90 tablet 3     [DISCONTINUED] ipratropium-albuterol (COMBIVENT RESPIMAT)  mcg/actuation Mist inhaler Inhale 1 puff 4 (four) times a day. 1 Inhaler 5     [DISCONTINUED] ipratropium-albuterol (DUO-NEB) 0.5-2.5 mg/mL nebulizer INHALE 1 VIAL VIA NEBULIZER EVERY 6 HOURS 1080 mL 3     Current Facility-Administered Medications on File Prior to Visit   Medication Dose Route Frequency Provider Last Rate Last Dose     denosumab 60 mg (PROLIA 60 mg/ml)  60 mg Subcutaneous Q6 Months Wilbur Hannah MD   60 mg at 06/14/19 0950     Allergies   Allergen Reactions     Alendronate Nausea And Vomiting     Metoclopramide Hcl Anxiety     Rofecoxib Diarrhea and Nausea Only     Lisinopril Cough     Risedronate      Sulfa (Sulfonamide Antibiotics) Anaphylaxis           EXAM  /72   Pulse (!) 58   Resp 21   Wt 148 lb (67.1 kg)   LMP 07/11/1971   SpO2 97% Comment: RA  BMI 28.90 kg/m      Physical Exam   Constitutional: She is oriented to person, place, and time. She appears well-developed and well-nourished. No distress.   HENT:   Head: Normocephalic and atraumatic.   Nose: Nose normal.   Eyes: Pupils are equal, round, and reactive to light. Conjunctivae and EOM are normal. No scleral icterus.   Neck: Neck supple. No tracheal deviation  present.   Pulmonary/Chest: Effort normal. No stridor. She has no wheezes.   Musculoskeletal: Normal range of motion. She exhibits no edema.   Neurological: She is alert and oriented to person, place, and time. She has normal reflexes.   Skin: Skin is warm and dry. She is not diaphoretic. No pallor.   Psychiatric: She has a normal mood and affect.   Vitals reviewed.      PFTS 2/23/15 (unchanged from prior visit):  FEV1/FVC is 69% and is normal.   FEV1 is 1.26 L (87%) predicted and is normal.   FVC is 1.83 L (92%) predicted and normal.   There was no improvement in spirometry after a single inhaled dose of bronchodilator.   TLC is 3.52 L (79%) predicted and is normal.   RV is 1.17 L (51%) predicted and is reduced.   DLCO is 45% predicted and is reduced when it is corrected for hemoglobin.   Impression: Full Pulmonary Function Test is abnormal.   Spirometry and flow volume loop are within normal limits   Spirometry is not consistent with reversibility.   There is no hyperinflation.   There is no air-trapping.   Diffusion capacity when corrected for hemoglobin is moderately reduced.    SIX MINUTE WALK TEST / HOME O2 EVALUATION  6/9/15  (unchanged from prior visit):  SpO2 at rest on RA 95%   SpO2 after walking 6 minutes on RA 95%   Distance covered 672 feet   Recovery phase, SpO2 after 1 minute rest on RA was 93%     Impression:   No desaturation with activities.   No need for O2 supplementation.    NM Stress Test 6/14/18:    When compared to the images of 10/10/2016, there has been no significant change.    Lexiscan stress ECG is negative for inducible myocardial ischemia.    Lexiscan nuclear study is negative for inducible myocardial ischemia or infarction.    Normal left ventricular size, wall motion and function. The calculated left ventricular ejection fraction is 72%.    CXR 5/5/16  (unchanged from prior visit):  Mild atelectasis or fibrosis in the lung bases. Upper lungs are clear. Mild cardiomegaly with normal  pulmonary vascularity. Scoliosis of the thoracolumbar  spine.      IMPRESSION:  85-year-old female with a history of moderate persistent asthma, coronary artery disease status post stenting and some fibrotic changes noted on imaging studies presents to clinic today with concern for a follow-up visit for her asthma and was unfortunately unable to afford her Spiriva Respimat.  For the present we will recommend:    1. Asthma moderate persistent, with preserved spirometry.  Is doing very well.    Continue fluticasone/Vilanterol 1 puff daily, she knows to gargle after using this.    Continue Incuse Ellipta.    I have explained to the patient that she should only use the Combivent for rescue.    Instructed her to use her DuoNeb's with a flutter valve twice a day to help expectorate his secretions; she can switch to as needed given that her coughing is improved significantly.    Reminded her to start using her omeprazole again.  2. Overnight hypoxia: Noted on overnight oximetry. Is continuing to use supplemental oxygen overnight.   3. CAD: Has undergone placement of stents to the proximal and mid LAD.  Recent echocardiogram continues to be stable.  4. Isolated diffusion defect: Likely secondary to her fibrosis noted on imaging studies.    5. Chronic sinusitis: Has discontinued her tobramycin nebs per advice from the [facility]. I have advised that she start using sinus washes daily.  6. Follow-up: 6 months.    Lisa Amin  Pulmonary and Critical Care  [id number]

## 2021-05-31 NOTE — PATIENT INSTRUCTIONS - HE
Call [phone] to schedule physical therapy for your neck.  I put [facility] as your preferred to place for physical therapy in the order.    Recheck kidney labs and electrolytes today after starting new blood pressure medication.    Continue monitoring your sleep.  Follow-up with either I or Dr. Hannah if sleep becomes a problem.    Blood pressure looks good today.  No changes in blood pressure medications.    Follow-up with Dr. Hannah in December for physical exam.

## 2021-05-31 NOTE — PROGRESS NOTES
Clinic Note    Assessment:     Assessment and Plan:  1. Hypertension  She was told to discontinue her lisinopril by her pulmonologist due to persistent cough.  She tried amlodipine but had swelling.  We will switch to losartan and have her come back to see me in 3 weeks for BP check and nonfasting BMP  - losartan (COZAAR) 50 MG tablet; Take 1 tablet (50 mg total) by mouth daily.  Dispense: 30 tablet; Refill: 0    2. Neck pain  Physical therapy has worked quite well for her in the past.  She struggles with driving, and due to her current respiratory status I think she would be a good candidate for home PT.  - Ambulatory referral to Home Health       Patient Instructions   Stop the lisinopril.    Start losartan 50 mg daily.    Follow-up with me in 3 weeks for BP check and nonfasting lab work to evaluate kidney function and potassium levels on new blood pressure medication.    I placed an order for home PT.  Someone will be calling you within the next 24 to 48 hours to help set this up.    Return in about 3 weeks (around 8/21/2019).         Subjective:      Patient comes to the clinic today for follow-up of her hypertension.    She saw her pulmonologist in early July for her persistent cough/asthma.  She had her lisinopril discontinued at that time.  Her PCP started her on amlodipine 5 mg daily.    She used amlodipine for 1 week but had problematic lower extremity swelling.  She took herself off the amlodipine and restarted lisinopril.    Today, patient states that her cough seems to be better.  She is using her inhalers as prescribed by her pulmonologist.    She has some persistent right-sided neck pain that has been problematic for her over the last few weeks.  She is been taking Tylenol which helps minimally and seems to irritate her stomach.  She tries to stay away from NSAIDs.  She has been using topical analgesics which seems to moderately improve her pain.  She is interested in physical therapy but does not like  to drive and tries to keep her appointments to a minimum.  Her  recently passed away and had been doing the majority of driving up until that point.    The following portions of the patient's history were reviewed and updated as appropriate: Allergies, medications, problems, prior note.    Review of Systems:    Review is otherwise negative except for what is mentioned above.     Social Hx:    Social History     Tobacco Use   Smoking Status Never Smoker   Smokeless Tobacco Never Used         Objective:     Vitals:    07/31/19 0949   BP: 140/60   Pulse: (!) 56   Weight: 148 lb 9.6 oz (67.4 kg)       Exam:    General: No apparent distress. Calm. Alert and Oriented X3. Pt behavior is appropriate.  Head:Atraumatic. Normocephalic, non-tender to palpation  Neck: Supple. No JVD. Full ROM. No adenopathy  Eyes: PERRL, No discharge. No strabismus. No nystagmus.  Ears: TMs pearly gray with landmarks visible.   Nose/Mouth/Throat: Patent nares, no oral lesions, pharynx clear and without exudate. Uvula mid-line. Nasal septum mid-line. Clear turbinates.   Lymph: No axillar or cervical adenopathy.   Chest/Lungs: Normal chest wall, clear to auscultation, normal respiratory effort and rate.   Heart/Pulses: Regular rate and rhythm, strong and equal radial pulses, no murmurs. Capillary refill <2 seconds. No edema.   Abdomen: Soft, no palpable masses. No hepatosplenomegaly, no tenderness with palpation noted. Bowel sounds active in all quadrants. No increased tympany.   Genitalia: Not examined.   Musculoskeletal: Pain with palpation to the right cervical spinal musculature  Neurologic: Interactive, alert, no focal findings, CNs intact.   Skin: Warm, dry. Normal hair pattern. Free of lesions. Normal skin turgor.       Patient Active Problem List   Diagnosis     Chronic Sinusitis     Hypercholesterolemia     Moderate persistent asthma     Hypertension     Osteoporosis Senile     Osteoarthritis     CAD (coronary artery disease)      GI bleed     Anemia     Insomnia     Wrist fracture     Malignant neoplasm of upper-outer quadrant of left female breast (H)     Aromatase inhibitor use     Current Outpatient Medications   Medication Sig Dispense Refill     ALPRAZolam (XANAX) 0.25 MG tablet Take 1 tablet (0.25 mg total) by mouth at bedtime as needed for sleep. 30 tablet 0     anastrozole (ARIMIDEX) 1 mg tablet TAKE ONE TABLET BY MOUTH EVERY DAY 90 tablet 3     ascorbic acid (VITAMIN C) 1000 MG tablet Take 1,000 mg by mouth every morning.        aspirin 81 MG EC tablet Take 81 mg by mouth bedtime.       BREO ELLIPTA 200-25 mcg/dose DsDv inhaler INHALE 1 PUFF BY MOUTH EVERY DAY 1 each 6     calcium citrate-vitamin D (CITRACAL+D) 315-200 mg-unit per tablet Take 1 tablet by mouth bedtime.       ciprofloxacin HCl (CIPRO) 250 MG tablet TAKE 0.5 TABLETS (125 MG TOTAL) BY MOUTH DAILY. 45 tablet 1     cyanocobalamin (VITAMIN B-12) 50 mcg tablet Take 50 mcg by mouth every morning.        DOCOSAHEXANOIC ACID/EPA (FISH OIL ORAL) Take 2 g by mouth daily.       furosemide (LASIX) 20 MG tablet TAKE ONE TABLET EVERY DAY 90 tablet 2     INCRUSE ELLIPTA 62.5 mcg/actuation DsDv inhaler INHALE ONE PUFF BY MOUTH EVERY DAY 1 each 6     ipratropium-albuterol (COMBIVENT RESPIMAT)  mcg/actuation Mist inhaler Inhale 1 puff 4 (four) times a day. 1 Inhaler 5     ipratropium-albuterol (DUO-NEB) 0.5-2.5 mg/mL nebulizer INHALE 1 VIAL VIA NEBULIZER EVERY 6 HOURS 1080 mL 3     LACTOBACILLUS ACIDOPHILUS (ACIDOPHILUS ORAL) Take 1 tablet by mouth every morning.        lisinopril (PRINIVIL,ZESTRIL) 10 MG tablet Take 10 mg by mouth daily. Take 1 and 1/2 tablets daily by mouth       magnesium 250 mg Tab Take 250 mg by mouth every morning.       metoprolol tartrate (LOPRESSOR) 100 MG tablet Take 1 tablet (100 mg total) by mouth 2 (two) times a day. 180 tablet 3     nitroglycerin (NITROSTAT) 0.4 MG SL tablet Place 1 tablet (0.4 mg total) under the tongue every 5 (five) minutes as  needed for chest pain. 25 tablet 3     OXYGEN[place] [facility] [place]. 2L at bedtime  [facility]       rosuvastatin (CRESTOR) 10 MG tablet TAKE 1 TABLET (10 MG TOTAL) BY MOUTH AT BEDTIME. TAKE WITH A 5 MG PILL FOR A TOTAL OF 15 MG NIGHTLY 90 tablet 3     losartan (COZAAR) 50 MG tablet Take 1 tablet (50 mg total) by mouth daily. 30 tablet 0     Current Facility-Administered Medications   Medication Dose Route Frequency Provider Last Rate Last Dose     denosumab 60 mg (PROLIA 60 mg/ml)  60 mg Subcutaneous Q6 Months Wilbur Hannah MD   60 mg at 06/14/19 0950           Gordon Sin CNP (Rob)    7/31/2019

## 2021-05-31 NOTE — PROGRESS NOTES
Clinic Note    Assessment:     Assessment and Plan:  1. Hypertension  BP is 134/65 today.  No changes in medications today.  Recheck basic metabolic panel.  Follow-up with PCP in 4 months for annual physical exam    - losartan (COZAAR) 50 MG tablet; Take 1 tablet (50 mg total) by mouth daily.  Dispense: 90 tablet; Refill: 3  - Basic Metabolic Panel    2. Neck pain  She had issues with scheduling PT after her last appointment.  She like to do physical therapy at [facility] for her neck pain.    - Ambulatory referral to PT/OT       Patient Instructions   Call [phone] to schedule physical therapy for your neck.  I put [facility] as your preferred to place for physical therapy in the order.    Recheck kidney labs and electrolytes today after starting new blood pressure medication.    Continue monitoring your sleep.  Follow-up with either I or Dr. Hannah if sleep becomes a problem.    Blood pressure looks good today.  No changes in blood pressure medications.    Follow-up with Dr. Hannah in December for physical exam.    Return in about 4 months (around 12/21/2019).         Subjective:      Patient comes to the clinic today for BP follow-up.    I saw her 1 month ago.  At that time, she had been dealing with some issues with cough, thought to be due to her lisinopril prescription.    We switched her to losartan 50 mg.    She saw her pulmonologist recently.  Blood pressure was mildly elevated at that time.  She slept really well last night, and had a good blood pressure this morning while being checked into her exam room.  She attributes her restful sleep to her improvements in BP.    She is still dealing with some neck pain.  She is not a candidate for home PT but would like to do physical therapy at [facility] because it is close to her home.    The following portions of the patient's history were reviewed and updated as appropriate: Allergies, medications, problems, prior note.    Review of Systems:    Review  is otherwise negative except for what is mentioned above.     Social Hx:    Social History     Tobacco Use   Smoking Status Never Smoker   Smokeless Tobacco Never Used         Objective:     Vitals:    08/21/19 0942   BP: 134/65   Pulse: 64   Weight: 148 lb (67.1 kg)       Exam:    General: No apparent distress. Calm. Alert and Oriented X3. Pt behavior is appropriate.        Patient Active Problem List   Diagnosis     Chronic Sinusitis     Hypercholesterolemia     Moderate persistent asthma     Hypertension     Osteoporosis Senile     Osteoarthritis     CAD (coronary artery disease)     GI bleed     Anemia     Insomnia     Wrist fracture     Malignant neoplasm of upper-outer quadrant of left female breast (H)     Aromatase inhibitor use     Current Outpatient Medications   Medication Sig Dispense Refill     ALPRAZolam (XANAX) 0.25 MG tablet Take 1 tablet (0.25 mg total) by mouth at bedtime as needed for sleep. 30 tablet 0     anastrozole (ARIMIDEX) 1 mg tablet TAKE ONE TABLET BY MOUTH EVERY DAY 90 tablet 3     ascorbic acid (VITAMIN C) 1000 MG tablet Take 1,000 mg by mouth every morning.        aspirin 81 MG EC tablet Take 81 mg by mouth bedtime.       BREO ELLIPTA 200-25 mcg/dose DsDv inhaler INHALE 1 PUFF BY MOUTH EVERY DAY 1 each 6     calcium citrate-vitamin D (CITRACAL+D) 315-200 mg-unit per tablet Take 1 tablet by mouth bedtime.       ciprofloxacin HCl (CIPRO) 250 MG tablet TAKE 0.5 TABLETS (125 MG TOTAL) BY MOUTH DAILY. 45 tablet 1     cyanocobalamin (VITAMIN B-12) 50 mcg tablet Take 50 mcg by mouth every morning.        DOCOSAHEXANOIC ACID/EPA (FISH OIL ORAL) Take 2 g by mouth daily.       furosemide (LASIX) 20 MG tablet TAKE ONE TABLET EVERY DAY 90 tablet 2     INCRUSE ELLIPTA 62.5 mcg/actuation DsDv inhaler INHALE ONE PUFF BY MOUTH EVERY DAY 1 each 6     ipratropium-albuterol (COMBIVENT RESPIMAT)  mcg/actuation Mist inhaler Inhale 1 puff 4 (four) times a day. 1 Inhaler 5     ipratropium-albuterol  (DUO-NEB) 0.5-2.5 mg/3 mL nebulizer Take 3 mL by nebulization 2 (two) times a day as needed. 1080 mL 3     LACTOBACILLUS ACIDOPHILUS (ACIDOPHILUS ORAL) Take 1 tablet by mouth every morning.        losartan (COZAAR) 50 MG tablet Take 1 tablet (50 mg total) by mouth daily. 90 tablet 3     magnesium 250 mg Tab Take 250 mg by mouth every morning.       metoprolol tartrate (LOPRESSOR) 100 MG tablet Take 1 tablet (100 mg total) by mouth 2 (two) times a day. 180 tablet 3     nitroglycerin (NITROSTAT) 0.4 MG SL tablet Place 1 tablet (0.4 mg total) under the tongue every 5 (five) minutes as needed for chest pain. 25 tablet 3     OXYGEN-AIR DELIVERY SYSTEMS [facility] [place] 2 L As Directed. 2L at bedtime  [facility]       rosuvastatin (CRESTOR) 10 MG tablet TAKE 1 TABLET (10 MG TOTAL) BY MOUTH AT BEDTIME. TAKE WITH A 5 MG PILL FOR A TOTAL OF 15 MG NIGHTLY (Patient taking differently: Take 10 mg by mouth at bedtime.       ) 90 tablet 3     Current Facility-Administered Medications   Medication Dose Route Frequency Provider Last Rate Last Dose     denosumab 60 mg (PROLIA 60 mg/ml)  60 mg Subcutaneous Q6 Months Wilbur Hannah MD   60 mg at 06/14/19 0950         Gordon Sin CNP (Rob)    8/21/2019

## 2021-05-31 NOTE — PROGRESS NOTES
[facility] Hematology and Oncology Progress Note    Patient: Yanna Handy  MRN: [id number]  Date of Service:         Reason for Visit    Chief Complaint   Patient presents with     HE Cancer     Malignant neoplasm of upper-outer quadrant of left breast       Assessment and Plan    T2 N1 M0, stage IIb left breast cancer status post lumpectomy August 2, 2017  Osteopenia    Patient doing well with no evidence of recurrence of breast cancer.  Recent mammogram is benign.  Tolerating anastrozole well and she will continue the medication.  We will see her again for follow-up in 6 months.    She continues on calcium and vitamin D and Prolia for the osteopenia.    Plan: Continue anastrozole  Follow-up in 6 months      Measurable disease: None postoperatively    Current therapy: Anastrozole 1 mg p.o. daily started October 20, 2017    Treatment history: Lumpectomy in August 2017  Adjuvant radiation to 40-56 cGy in 16 fractions completed October 5, 2017        ECOG Performance   ECOG Performance Status: 1    Distress Assessment  Distress Assessment Score: No distress    Pain           Problem List    1. Malignant neoplasm of upper-outer quadrant of left breast in female, estrogen receptor positive (H)          CC: Wilbur Hannah MD    ______________________________________________________________________________    History of Present Illness    Ms. Yanna Handy is here for follow-up.  She was seen last February.  Continues on anastrozole.  No side effects from the medication.  No headaches or dizziness.  No change with her breathing.  No cough.  No breast changes.  Recent mammogram is benign.  No new bone or abdominal pain.  ECOG status is 1.  She continues to recover from the loss of her  last November.      Pain Status  Currently in Pain: No/denies    Review of Systems    Constitutional  Constitutional (WDL): Exceptions to WDL  Fatigue: Fatigue relieved by rest  Neurosensory  Neurosensory (WDL): All  "neurosensory elements are within defined limits  Cardiovascular  Cardiovascular (WDL): Exceptions to WDL  Edema: Yes(Feet/ankles. )  Pulmonary  Respiratory (WDL): Exceptions to WDL  Dyspnea: Shortness of breath with moderate exertion(Hx: Asthma.)  Gastrointestinal  Gastrointestinal (WDL): All gastrointestinal elements are within defined limits  Genitourinary  Genitourinary (WDL): All genitourinary elements are within defined limits  Integumentary  Integumentary (WDL): All integumentary elements are within defined limits  Patient Coping  Patient Coping: Accepting  Distress Assessment  Distress Assessment Score: No distress  Accompanied by  Accompanied by: Alone    Past History  Past Medical History:   Diagnosis Date     Anemia 8/21/2015     Asthma      Breast cancer (H) 07/25/2017    left     CAD (coronary artery disease) 9/15/2014     Chronic bronchitis (H)      Chronic Sinusitis     Created by Conversion  Replacement Utility updated for latest IMO load     Emphysema     Created by Conversion      Fx ankle 8/2000     Fx wrist 12/2006    Right wrist      GI bleed 1/12/2015     Hx of radiation therapy 2017    left     Hypercholesterolemia     Created by Conversion      Hypertension      Insomnia 1/13/2016     Osteoarthritis     Created by Conversion  Replacement Utility updated for latest IMO load     Osteoporosis Senile     Created by Conversion      Squamous Cell Carcinoma Of The Skin     Created by Conversion          Past Surgical History:   Procedure Laterality Date     APPENDECTOMY  1971     BREAST LUMPECTOMY Left 2017    ca     BREAST SURGERY  08/02/2017    left lumpectomy, Dr. Pope     COLONOSCOPY N/A 1/15/2015    Procedure: COLONOSCOPY with biopsy of polyp;  Surgeon: Joel Coppola MD;  Location: [facility];  Service:      CORONARY STENT PLACEMENT      \"I had 2 stent placements\"     EYE SURGERY      Cataracts     HYSTERECTOMY  1971     JOINT REPLACEMENT  1/2003    Right TKA     KNEE ARTHROSCOPY Left " "10/2004     OOPHORECTOMY  1971    One removed     NC REMOVAL OF TONSILS,<11 Y/O      Description: Tonsillectomy;  Recorded: 02/12/2009;  Comments: 1941     NC REVISE SECONDARY VARICOSITY      Description: Varicose Vein Ligation;  Recorded: 02/12/2009;  Comments: 1988     NC TOTAL ABDOM HYSTERECTOMY      Description: Hysterectomy;  Recorded: 02/12/2009;  Comments: 1971     SINUS SURGERY      \"I had 3 sinus surgeries.\"       Physical Exam    Recent Vitals 8/12/2019   Height -   Weight 149 lbs 11 oz   BSA (m2) 1.7 m2   /63   Pulse 50   Temp 98.8   Temp src 1   SpO2 93   Some recent data might be hidden       GENERAL: Alert and oriented. Seated comfortably. In no distress.    HEAD: Atraumatic and normocephalic.  Has a full head of hair.    EYES: KENRICK, EOMI.  No pallor.  No icterus.    Oral cavity: no mucosal lesion or tonsillar enlargement.    NECK: supple. JVP normal.  No thyroid enlargement.    LYMPH NODES: No palpable, cervical, axillary or inguinal lymphadenopathy.    CHEST: clear to auscultation bilaterally.  Resonant to percussion throughout bilaterally.  Symmetrical breath movements bilaterally.    CVS: S1 and S2 are heard. Regular rate and rhythm.  No murmur or gallop or rub heard.    Breast exam: Deferred today      ABDOMEN: Soft. Not tender. Not distended.  No palpable hepatomegaly or splenomegaly.  No other mass palpable.  Bowel sounds heard.    EXTREMITIES: Warm.  No peripheral edema.    SKIN: no rash, or bruising or purpura.        Lab Results    No results found for this or any previous visit (from the past 168 hour(s)).    Imaging    Mammo Screening Bilateral    Result Date: 7/18/2019  BILATERAL FULL FIELD DIGITAL SCREENING MAMMOGRAM Performed on: 7/18/19 Compared to: 07/16/2018 Mammo Diagnostic Bilateral and 07/11/2017 Mammo Diagnostic Bilateral Findings: The breasts have scattered areas of fibroglandular densities.  There are postsurgical lumpectomy changes in the left breast. No evidence for " malignancy and no change from the previous exam(s). This study was evaluated with the assistance of Computer-Aided Detection. Continue routine screening mammogram as recommended. ACR BI-RADS Category 2: Benign Findings        Signed by: Mindy Rodas MD

## 2021-06-01 NOTE — TELEPHONE ENCOUNTER
"Call from Mary.  States she had CT done a week ago and wondering about results?  She is still struggling with congestion and \"coughing stuff up\".   Has had \"cold\" for past 3 weeks.    "

## 2021-06-01 NOTE — PROGRESS NOTES
PULMONARY PROGRESS NOTE      HPI:  Yanna Handy is a 88 y.o. female followed in the Pulmonary clinic for reactive airways disease. She  history of chronic rhinosinusitis with asthma seen previously at the [facility] and was previously intranasal antibiotics.  She has been seeing me for the last several years for her asthma and had been doing fairly well.  She describes that since the discontinuation of lisinopril her coughing is almost completely resolved.    About 3 weeks ago she noted that she had developed pretty significant cough that was associated with initially white and then a green productive element and that significantly worsened her shortness of breath with vigorous activity such as vacuuming.  She has continued to use her inhalers as instructed and also has been using her nebulizers with the flutter valve to help with expectoration of secretions.  She no longer has fevers or chills.      Current Outpatient Medications on File Prior to Visit   Medication Sig Dispense Refill     ALPRAZolam (XANAX) 0.25 MG tablet Take 1 tablet (0.25 mg total) by mouth at bedtime as needed for sleep. 30 tablet 0     anastrozole (ARIMIDEX) 1 mg tablet TAKE ONE TABLET BY MOUTH EVERY DAY 90 tablet 3     ascorbic acid (VITAMIN C) 1000 MG tablet Take 1,000 mg by mouth every morning.        aspirin 81 MG EC tablet Take 81 mg by mouth bedtime.       BREO ELLIPTA 200-25 mcg/dose DsDv inhaler INHALE 1 PUFF BY MOUTH EVERY DAY 1 each 6     calcium citrate-vitamin D (CITRACAL+D) 315-200 mg-unit per tablet Take 1 tablet by mouth bedtime.       ciprofloxacin HCl (CIPRO) 250 MG tablet TAKE 0.5 TABLETS (125 MG TOTAL) BY MOUTH DAILY. 45 tablet 1     cyanocobalamin (VITAMIN B-12) 50 mcg tablet Take 50 mcg by mouth every morning.        DOCOSAHEXANOIC ACID/EPA (FISH OIL ORAL) Take 2 g by mouth daily.       furosemide (LASIX) 20 MG tablet TAKE ONE TABLET EVERY DAY 90 tablet 2     INCRUSE ELLIPTA 62.5 mcg/actuation DsDv inhaler INHALE ONE  PUFF BY MOUTH EVERY DAY 1 each 6     ipratropium-albuterol (COMBIVENT RESPIMAT)  mcg/actuation Mist inhaler Inhale 1 puff 4 (four) times a day. 1 Inhaler 5     ipratropium-albuterol (DUO-NEB) 0.5-2.5 mg/3 mL nebulizer Take 3 mL by nebulization 2 (two) times a day as needed. 1080 mL 3     LACTOBACILLUS ACIDOPHILUS (ACIDOPHILUS ORAL) Take 1 tablet by mouth every morning.        losartan (COZAAR) 50 MG tablet Take 1 tablet (50 mg total) by mouth daily. 90 tablet 3     magnesium 250 mg Tab Take 250 mg by mouth every morning.       metoprolol tartrate (LOPRESSOR) 100 MG tablet Take 1 tablet (100 mg total) by mouth 2 (two) times a day. 180 tablet 3     nitroglycerin (NITROSTAT) 0.4 MG SL tablet Place 1 tablet (0.4 mg total) under the tongue every 5 (five) minutes as needed for chest pain. 25 tablet 3     OXYGEN-AIR DELIVERY SYSTEMS [facility] Use 2 L As Directed. 2L at bedtime  [employer]       rosuvastatin (CRESTOR) 10 MG tablet TAKE 1 TABLET (10 MG TOTAL) BY MOUTH AT BEDTIME. TAKE WITH A 5 MG PILL FOR A TOTAL OF 15 MG NIGHTLY (Patient taking differently: Take 10 mg by mouth at bedtime.       ) 90 tablet 3     Current Facility-Administered Medications on File Prior to Visit   Medication Dose Route Frequency Provider Last Rate Last Dose     denosumab 60 mg (PROLIA 60 mg/ml)  60 mg Subcutaneous Q6 Months Wilbur Hannah MD   60 mg at 06/14/19 0950     Allergies   Allergen Reactions     Alendronate Nausea And Vomiting     Metoclopramide Hcl Anxiety     Rofecoxib Diarrhea and Nausea Only     Lisinopril Cough     Risedronate      Sulfa (Sulfonamide Antibiotics) Anaphylaxis           EXAM  Wt 148 lb 14.4 oz (67.5 kg)   LMP 07/11/1971   BMI 29.08 kg/m      Physical Exam   Constitutional: She is oriented to person, place, and time. She appears well-developed and well-nourished. No distress.   HENT:   Head: Normocephalic and atraumatic.   Nose: Nose normal.   Eyes: Pupils are equal, round, and reactive to light. Conjunctivae and  EOM are normal. No scleral icterus.   Neck: Neck supple. No tracheal deviation present.   Pulmonary/Chest: Effort normal. No stridor. She has no wheezes.   Musculoskeletal: Normal range of motion. She exhibits no edema.   Neurological: She is alert and oriented to person, place, and time. She has normal reflexes.   Skin: Skin is warm and dry. She is not diaphoretic. No pallor.   Psychiatric: She has a normal mood and affect.   Vitals reviewed.      PFTS 2/23/15 (unchanged from prior visit):  FEV1/FVC is 69% and is normal.   FEV1 is 1.26 L (87%) predicted and is normal.   FVC is 1.83 L (92%) predicted and normal.   There was no improvement in spirometry after a single inhaled dose of bronchodilator.   TLC is 3.52 L (79%) predicted and is normal.   RV is 1.17 L (51%) predicted and is reduced.   DLCO is 45% predicted and is reduced when it is corrected for hemoglobin.   Impression: Full Pulmonary Function Test is abnormal.   Spirometry and flow volume loop are within normal limits   Spirometry is not consistent with reversibility.   There is no hyperinflation.   There is no air-trapping.   Diffusion capacity when corrected for hemoglobin is moderately reduced.    SIX MINUTE WALK TEST / HOME O2 EVALUATION  6/9/15  (unchanged from prior visit):  SpO2 at rest on RA 95%   SpO2 after walking 6 minutes on RA 95%   Distance covered 672 feet   Recovery phase, SpO2 after 1 minute rest on RA was 93%     Impression:   No desaturation with activities.   No need for O2 supplementation.    CT Chest W/o Contrast 9/4/19:  1.  Bilateral lower lobe atelectasis and/or fibrosis has increased from the prior study.  2.  Scattered pulmonary nodules are likely infectious or inflammatory in nature and have not significantly changed from the prior study.  3.  There is debris in the airways. There are a few areas of mucous plugging.  4.  Cardiomegaly.  5.  Atherosclerotic disease including coronary artery calcification.     NM Stress Test  6/14/18:    When compared to the images of 10/10/2016, there has been no significant change.    Lexiscan stress ECG is negative for inducible myocardial ischemia.    Lexiscan nuclear study is negative for inducible myocardial ischemia or infarction.    Normal left ventricular size, wall motion and function. The calculated left ventricular ejection fraction is 72%.    CXR 5/5/16  (unchanged from prior visit):  Mild atelectasis or fibrosis in the lung bases. Upper lungs are clear. Mild cardiomegaly with normal pulmonary vascularity. Scoliosis of the thoracolumbar  spine.      IMPRESSION:  85-year-old female with a history of moderate persistent asthma, coronary artery disease status post stenting and some fibrotic changes noted on imaging studies presents to clinic today with concern for a follow-up visit for her asthma and was unfortunately unable to afford her Spiriva Respimat.  For the present we will recommend:    1. Asthma moderate persistent, with preserved spirometry.  Has been having worsening shortness of breath which I suspect is related to her recent URI.    I will give her a one-week course of levofloxacin in addition to 40 mg of prednisone.    Continue fluticasone/Vilanterol 1 puff daily, she knows to gargle after using this.    Continue Incuse Ellipta.    I have explained to the patient that she should only use the Combivent for rescue.    Encouraged her to continue to use her DuoNeb's with a flutter valve twice a day to help expectorate his secretions; she can switch to as needed given that her coughing is improved significantly.    Reminded her to start using her omeprazole again.  2. Overnight hypoxia: Noted on overnight oximetry. Is continuing to use supplemental oxygen overnight.   3. CAD: Has undergone placement of stents to the proximal and mid LAD.  Recent echocardiogram continues to be stable.  4. Isolated diffusion defect: Likely secondary to her fibrosis noted on imaging studies; was noted to  be slightly worse on imaging studies but I am not convinced that her current shortness of breath is related to this.  I will reassess once she has completed her antibiotics and steroids.  5. Chronic sinusitis: Has discontinued her tobramycin nebs per advice from the [facility]. I have advised that she start using sinus washes daily.  6. Follow-up: 6 months.    Lisa Goodmanqvi  Pulmonary and Critical Care  [id number]

## 2021-06-02 NOTE — TELEPHONE ENCOUNTER
Controlled Substance Refill Request  Medication:   Requested Prescriptions     Pending Prescriptions Disp Refills     ALPRAZolam (XANAX) 0.25 MG tablet [Pharmacy Med Name: ALPRAZOLAM 0.25 MG TABLET] 30 tablet 0     Sig: TAKE 1 TABLET (0.25 MG TOTAL) BY MOUTH AT BEDTIME AS NEEDED FOR SLEEP.     Date Last Fill: 6.17.19  Pharmacy: [facility]   Submit electronically to pharmacy  Controlled Substance Agreement on File:   Encounter-Level CSA Scan Date:    There are no encounter-level csa scan date.       Last office visit: Last office visit pertaining to requested medication was 9.16.19.

## 2021-06-02 NOTE — PROGRESS NOTES
Chief Complaint   Patient presents with     Cold     head congestion, ears plug, coughing and wheezing.            HPI      Patient is here for 2 wks of productive cough, and nasal congestion, associated with intermittent wheezing. She also reported having plugged ears without pain. She has hx of asthma and used her Albuterol with only temp relief. No fever, chills, chest pain, shortness of breath more than usual.     ROS: Pertinent ROS noted in HPI.     Allergies   Allergen Reactions     Alendronate Nausea And Vomiting     Metoclopramide Hcl Anxiety     Rofecoxib Diarrhea and Nausea Only     Lisinopril Cough     Risedronate      Sulfa (Sulfonamide Antibiotics) Anaphylaxis       Patient Active Problem List   Diagnosis     Chronic Sinusitis     Hypercholesterolemia     Moderate persistent asthma     Hypertension     Osteoporosis Senile     Osteoarthritis     CAD (coronary artery disease)     GI bleed     Anemia     Insomnia     Wrist fracture     Malignant neoplasm of upper-outer quadrant of left female breast (H)     Aromatase inhibitor use       Family History   Problem Relation Age of Onset     Heart attack Father      Breast cancer Maternal Aunt 55     Lung cancer Brother 65     Colon cancer Maternal Grandmother 90     Lung cancer Maternal Grandfather 65     Brain cancer Son 25     Rectal cancer Brother 64       Social History     Socioeconomic History     Marital status:      Spouse name: Not on file     Number of children: Not on file     Years of education: Not on file     Highest education level: Not on file   Occupational History     Not on file   Social Needs     Financial resource strain: Not on file     Food insecurity:     Worry: Not on file     Inability: Not on file     Transportation needs:     Medical: Not on file     Non-medical: Not on file   Tobacco Use     Smoking status: Never Smoker     Smokeless tobacco: Never Used   Substance and Sexual Activity     Alcohol use: Yes     Comment: Wine  occassionally     Drug use: No     Sexual activity: Never   Lifestyle     Physical activity:     Days per week: Not on file     Minutes per session: Not on file     Stress: Not on file   Relationships     Social connections:     Talks on phone: Not on file     Gets together: Not on file     Attends Rastafarian service: Not on file     Active member of club or organization: Not on file     Attends meetings of clubs or organizations: Not on file     Relationship status: Not on file     Intimate partner violence:     Fear of current or ex partner: Not on file     Emotionally abused: Not on file     Physically abused: Not on file     Forced sexual activity: Not on file   Other Topics Concern     Not on file   Social History Narrative     Not on file         Objective:    Vitals:    10/26/19 1106   BP: 172/67   Pulse: (!) 52   Resp: 20   Temp: 98.2  F (36.8  C)   SpO2: 96%       Gen:NAD  Throat: oropharynx clear, tonsils normal  Ears: TMs clear without effusions, left ear canal normal without cerumen. Initial exam showed complete cerumen impaction of right ear. After ear lavage exam showed normal left TM and canal.   Nose: No discharge  Neck: No adenopathy  CV: RRR, normal S1S2, no M, R, G  Pulm: CTAB, normal effort    CXR - no evidence of pneumonia per my interpretation, discussed during visit.     Cough - likely viral etiology, with bronchospasm.   -     XR Chest 2 Views  -     predniSONE (DELTASONE) 20 MG tablet; Take 40 mg by mouth daily for 5 days.  -     benzonatate (TESSALON) 200 MG capsule; Take 1 capsule (200 mg total) by mouth 3 (three) times a day as needed for cough.    Impacted cerumen of right ear - ear wax removed by staff without event.   -     Ear cerumen removal; Future

## 2021-06-04 NOTE — PROGRESS NOTES
PULMONARY PROGRESS NOTE      HPI:  Yanna Handy is a 88 y.o. female followed in the Pulmonary clinic for reactive airways disease. She  history of chronic rhinosinusitis with asthma seen previously at the [facility] and was previously intranasal antibiotics.  She has been seeing me for the last several years for her asthma and had been doing fairly well.  She has been doing fairly well since her last clinic visit with me in September but does note that she had developed pretty significant cough that improved after course of prednisone and Tessalon Perles.  She does continue to use her flutter valve with some improvement in secretion clearance with this.  She describes that she has been experiencing pain in her neck with numbness and tingling down her left arm but has no motor weakness.  She has been applying Echinacea cream to this with some relief of symptoms.  In the past 4 weeks, how much of the time did your asthma keep you from getting as much done at work, school, or at home?: A little of the time  During the past 4 weeks, how often have you had shortness of breath?: Once a day  During the past 4 weeks, how often did your asthma symptoms (wheezing, coughing, shortness of breath, chest tightness or pain) wake you up at night or earlier in the morning?: Not at all  During the past 4 weeks, how often have you used your rescue inhaler or nebulizer medication (such as albuterol)?: Once a week or less  How would you rate your asthma control during the past 4 weeks?: Well controlled  ACT Total Score: (!) 19  In the past 12 months, have you visited the emergency room due to your asthma?: No  In the past 12 months, have you been hospitalized due to your asthma?: No        Current Outpatient Medications on File Prior to Visit   Medication Sig Dispense Refill     ALPRAZolam (XANAX) 0.25 MG tablet TAKE 1 TABLET (0.25 MG TOTAL) BY MOUTH AT BEDTIME AS NEEDED FOR SLEEP. 30 tablet 0     anastrozole (ARIMIDEX) 1 mg  tablet TAKE ONE TABLET BY MOUTH EVERY DAY 90 tablet 3     ascorbic acid (VITAMIN C) 1000 MG tablet Take 1,000 mg by mouth every morning.        aspirin 81 MG EC tablet Take 81 mg by mouth bedtime.       benzonatate (TESSALON) 200 MG capsule Take 1 capsule (200 mg total) by mouth 3 (three) times a day as needed for cough. 30 capsule 0     BREO ELLIPTA 200-25 mcg/dose DsDv inhaler INHALE 1 PUFF BY MOUTH EVERY DAY 1 each 6     calcium citrate-vitamin D (CITRACAL+D) 315-200 mg-unit per tablet Take 1 tablet by mouth bedtime.       ciprofloxacin HCl (CIPRO) 250 MG tablet TAKE 0.5 TABLETS (125 MG TOTAL) BY MOUTH DAILY. 45 tablet 1     cyanocobalamin (VITAMIN B-12) 50 mcg tablet Take 50 mcg by mouth every morning.        DOCOSAHEXANOIC ACID/EPA (FISH OIL ORAL) Take 2 g by mouth daily.       furosemide (LASIX) 20 MG tablet TAKE ONE TABLET EVERY DAY 90 tablet 2     INCRUSE ELLIPTA 62.5 mcg/actuation DsDv inhaler INHALE ONE PUFF BY MOUTH EVERY DAY 1 each 6     ipratropium-albuterol (COMBIVENT RESPIMAT)  mcg/actuation Mist inhaler Inhale 1 puff 4 (four) times a day. 1 Inhaler 5     ipratropium-albuterol (DUO-NEB) 0.5-2.5 mg/3 mL nebulizer Take 3 mL by nebulization 2 (two) times a day as needed. 1080 mL 3     LACTOBACILLUS ACIDOPHILUS (ACIDOPHILUS ORAL) Take 1 tablet by mouth every morning.        losartan (COZAAR) 50 MG tablet Take 1 tablet (50 mg total) by mouth daily. 90 tablet 3     magnesium 250 mg Tab Take 250 mg by mouth every morning.       metoprolol tartrate (LOPRESSOR) 100 MG tablet Take 1 tablet (100 mg total) by mouth 2 (two) times a day. 180 tablet 3     nitroglycerin (NITROSTAT) 0.4 MG SL tablet Place 1 tablet (0.4 mg total) under the tongue every 5 (five) minutes as needed for chest pain. 25 tablet 3     OXYGEN-AIR DELIVERY SYSTEMS [facility] Use 2 L As Directed. 2L at bedtime  [facility]       rosuvastatin (CRESTOR) 10 MG tablet TAKE 1 TABLET (10 MG TOTAL) BY MOUTH AT BEDTIME. TAKE WITH A 5 MG PILL FOR A TOTAL OF  15 MG NIGHTLY (Patient taking differently: Take 10 mg by mouth at bedtime.       ) 90 tablet 3     Current Facility-Administered Medications on File Prior to Visit   Medication Dose Route Frequency Provider Last Rate Last Dose     denosumab 60 mg (PROLIA 60 mg/ml)  60 mg Subcutaneous Q6 Months Wilbur Hannah MD   60 mg at 06/14/19 0950     denosumab 60 mg (PROLIA 60 mg/ml)  60 mg Subcutaneous Q6 Months Wilbur Hannah MD         Allergies   Allergen Reactions     Alendronate Nausea And Vomiting     Metoclopramide Hcl Anxiety     Rofecoxib Diarrhea and Nausea Only     Lisinopril Cough     Risedronate      Sulfa (Sulfonamide Antibiotics) Anaphylaxis           EXAM  /62   Pulse (!) 55   Resp 12   Ht 5' (1.524 m)   Wt 151 lb (68.5 kg)   LMP 07/11/1971   SpO2 95%   Breastfeeding No   BMI 29.49 kg/m    Physical Exam  Vitals signs reviewed.   Constitutional:       General: She is not in acute distress.     Appearance: She is well-developed. She is not diaphoretic.   HENT:      Head: Normocephalic and atraumatic.      Nose: Nose normal.   Eyes:      General: No scleral icterus.     Conjunctiva/sclera: Conjunctivae normal.      Pupils: Pupils are equal, round, and reactive to light.   Neck:      Musculoskeletal: Neck supple.      Trachea: No tracheal deviation.   Pulmonary:      Effort: Pulmonary effort is normal.      Breath sounds: No stridor. No wheezing.   Musculoskeletal: Normal range of motion.   Skin:     General: Skin is warm and dry.      Coloration: Skin is not pale.   Neurological:      Mental Status: She is alert and oriented to person, place, and time.      Deep Tendon Reflexes: Reflexes are normal and symmetric.         PFTS 2/23/15 (unchanged from prior visit):  FEV1/FVC is 69% and is normal.   FEV1 is 1.26 L (87%) predicted and is normal.   FVC is 1.83 L (92%) predicted and normal.   There was no improvement in spirometry after a single inhaled dose of bronchodilator.   TLC is 3.52 L (79%)  predicted and is normal.   RV is 1.17 L (51%) predicted and is reduced.   DLCO is 45% predicted and is reduced when it is corrected for hemoglobin.   Impression: Full Pulmonary Function Test is abnormal.   Spirometry and flow volume loop are within normal limits   Spirometry is not consistent with reversibility.   There is no hyperinflation.   There is no air-trapping.   Diffusion capacity when corrected for hemoglobin is moderately reduced.    SIX MINUTE WALK TEST / HOME O2 EVALUATION  6/9/15  (unchanged from prior visit):  SpO2 at rest on RA 95%   SpO2 after walking 6 minutes on RA 95%   Distance covered 672 feet   Recovery phase, SpO2 after 1 minute rest on RA was 93%     Impression:   No desaturation with activities.   No need for O2 supplementation.    CT Chest W/o Contrast 9/4/19:  1.  Bilateral lower lobe atelectasis and/or fibrosis has increased from the prior study.  2.  Scattered pulmonary nodules are likely infectious or inflammatory in nature and have not significantly changed from the prior study.  3.  There is debris in the airways. There are a few areas of mucous plugging.  4.  Cardiomegaly.  5.  Atherosclerotic disease including coronary artery calcification.     NM Stress Test 6/14/18:    When compared to the images of 10/10/2016, there has been no significant change.    Lexiscan stress ECG is negative for inducible myocardial ischemia.    Lexiscan nuclear study is negative for inducible myocardial ischemia or infarction.    Normal left ventricular size, wall motion and function. The calculated left ventricular ejection fraction is 72%.    CXR 5/5/16  (unchanged from prior visit):  Mild atelectasis or fibrosis in the lung bases. Upper lungs are clear. Mild cardiomegaly with normal pulmonary vascularity. Scoliosis of the thoracolumbar  spine.      IMPRESSION:  85-year-old female with a history of moderate persistent asthma, coronary artery disease status post stenting and some fibrotic changes noted  on imaging studies presents to clinic today with concern for a follow-up visit for her asthma and also describes some pain in her cervical region and tingling in her left arm which I suspect is related to cervical spine disease.  For the present we will recommend:    1. Asthma moderate persistent, with preserved spirometry:  Likely had a recent flare that responded to prednisone and Tessalon Perles but is not her baseline.    Continue fluticasone/Vilanterol 1 puff daily, she knows to gargle after using this.    Continue Incuse Ellipta.    I have explained to the patient that she should only use the Combivent for rescue.    Reminded her to continue to use her DuoNeb's with a flutter valve twice a day to help expectorate his secretions; she can switch to as needed given that her coughing is improved significantly.  Omeprazole 40mg daily.  2. Overnight hypoxia: Noted on overnight oximetry. Is continuing to use supplemental oxygen overnight.   3. Cervical spine pain with left arm tingling up to her fingertips: I explained to her that this was likely secondary to some sort of degenerative cervical spine disease.    I have emailed her a copy of some exercises that she should do to help with her neck pain.    I recommended that she use a heating pad around her neck and avoid hunching as this is likely worsening her symptoms.  4. CAD: Has undergone placement of stents to the proximal and mid LAD.  Recent echocardiogram continues to be stable.  5. Isolated diffusion defect: Likely secondary to her fibrosis noted on imaging studies; was noted to be slightly worse on imaging studies but I am not convinced that her current shortness of breath is related to this.  I will reassess once she has completed her antibiotics and steroids.  6. Chronic sinusitis: Has discontinued her tobramycin nebs per advice from the [facility]. I have advised that she start using sinus washes daily.  7. Follow-up: 4 months.    Lisa VELOZ  [facility]  Pulmonary and Critical Care  [id number]

## 2021-06-04 NOTE — PATIENT INSTRUCTIONS - HE
Discussed the importance of core strengthening, ROM, stretching exercises with the patient and how each of these entities is important in decreasing pain.  Explained to the patient that the purpose of physical therapy is to teach the patient a home exercise program.  These exercises need to be performed every day in order to decrease pain and prevent future occurrences of pain.        ~Please call Nurse Navigation line [phone] with any questions or concerns about your treatment plan, if symptoms worsen and you would like to be seen urgently, or if you have problems controlling bladder and bowel function.  ~Follow Up Appointment time slots with Kathrine Sorensen CNP with the [facility], are also available at the [facility] location near [facility] on the first and third THURSDAY afternoons of each month.    Prescribed Gabapentin today, 100 mg tablets, to be titrated up to 3 tablets 3 times a day as tolerated for your nerve pain. Please follow Gabapentin dosing chart below.    Gabapentin 100mg Dosing Chart    DATE  MORNING AFTERNOON BEDTIME    Day 1 0 0 1    Day 2 0 0 1    Day 3 0 0 1    Day 4 1 0 1    Day 5 1 0 1    Day 6 1 0 1    Day 7 1 1 1    Day 8 1 1 1    Day 9 1 1 1    Day 10 1 1 2    Day 11 1 1 2    Day 12 1 1 2    Day 13 2 1 2    Day 14 2 1 2    Day 15 2 1 2    Day 16 2 2 2    Day 17 2 2 2    Day 18 2 2 2    Day 19 2 2 3    Day 20 2 2 3    Day 21 2 2 3    Day 22 3 2 3    Day 23 3 2 3    Day 24 3 2 3    Day 25 3 3 3    Day 26 3 3 3    Day 27 3 3 3     Continue medication, taking 3 capsules three times daily    Please call if you have any questions regarding how to take your medication

## 2021-06-04 NOTE — PATIENT INSTRUCTIONS - HE
Prolia 10th today.  Prolia 11th in 6 months with me.    DXA in 10/2021 .   Phone number to schedule [phone].    Daily calcium need is 6448-2866 mg a day from the diet and supplements.  Calcium citrate is easier to digest.  Vitamin D 2000 IU daily recommended.    Blood work today.  You can schedule PT and if not better, see Spine clinic.

## 2021-06-04 NOTE — PROGRESS NOTES
Assessment/Plan:        1. Osteoporosis Senile     2. Cervical radiculopathy  Ambulatory referral to PT/OT    Ambulatory referral to [facility]   3. Moderate persistent asthma without complication     4. Malignant neoplasm of upper-outer quadrant of left breast in female, estrogen receptor positive (H)     5. Coronary artery disease involving native coronary artery of native heart without angina pectoris     6. Hypertension  Comprehensive Metabolic Panel   7. Insomnia, unspecified type  ALPRAZolam (XANAX) 0.25 MG tablet     1. Osteoporosis - due for Prolia today. Still on Arimidex and has history of multiple fractures.  DXA scan showed normal bone density, but she has significant artifacts from the arthritis. Wrist not done because of the previous wist fracture.  Patient was educated on safety of Prolia utilizing Patient Counseling Chart for Healthcare Providers, as outlined by the Prolia REMS progam.   2. Asthma, stable. Saw Lung specialist yesterday.  3. Breast cancer - seeing Oncology, on Arimidex.  4. CAD, stable, no anginal pain. Will see cardiologist next month.  5. HTN, stable.  6. Insomnia, taking 1/2 pill of alprazolam as needed. Otherwise, sleeps only 2 hours per night.  7. Over the last few weeks, has neck pain that sometimes radiates to the left arm. No pain today, but worse if she reads or band her neck for longer time. No weakness, tingling or numbness. She will start PT/OT and if not better, see Spine clinic. Exercise, heating pad and OTC meds discussed.  This note has been dictated using voice recognition software. Any grammatical or context distortions are unintentional and inherent to the software.      Return in about 6 months (around 6/18/2020) for Annual physical.    Patient Instructions   Prolia 10th today.  Prolia 11th in 6 months with me.    DXA in 10/2021 .   Phone number to schedule [phone].    Daily calcium need is 2345-5450 mg a day from the diet and supplements.  Calcium citrate is  easier to digest.  Vitamin D 2000 IU daily recommended.    Blood work today.  You can schedule PT and if not better, see Spine clinic.          Subjective:    Yanna Handy is a 88 y.o. female  here for    Chief Complaint   Patient presents with     Osteoporosis Follow Up     Osteo F/U    Follow up chronic medical problems:  1. Osteoporosis - due for Prolia today. Still on Arimidex and has history of multiple fractures.  DXA scan showed normal bone density, but she has significant artifacts from the arthritis. Wrist not done because of the previous wist fracture.  2. Asthma, stable. Saw Lung specialist yesterday. I reviewed Dr Amin's note.  3. Breast cancer - seeing Oncology, on Arimidex. I reviewed Dr Monte note from 8/12/19.  4. CAD, stable, no anginal pain. Will see cardiologist next month. Last visit with Dr Javier reviewed from 12/4/18.  5. HTN, stable since losartan added to furosemide and metoprolol. Losartan was added in 7/2019 instead of lisinopril since she had cough. Labs were done for check of renal function. All reviewed. I will recheck metabolic panel today.  Component      Latest Ref Rng & Units 8/21/2019   Sodium      136 - 145 mmol/L 138   Potassium      3.5 - 5.0 mmol/L 4.1   Chloride      98 - 107 mmol/L 106   CO2      22 - 31 mmol/L 21 (L)   Anion Gap, Calculation      5 - 18 mmol/L 11   Glucose      70 - 125 mg/dL 91   Calcium      8.5 - 10.5 mg/dL 9.0   BUN      8 - 28 mg/dL 10   Creatinine      0.60 - 1.10 mg/dL 1.08   GFR MDRD Af Amer      >60 mL/min/1.73m2 58 (L)   GFR MDRD Non Af Amer      >60 mL/min/1.73m2 48 (L)     6. Insomnia, taking 1/2 pill of alprazolam as needed. Otherwise, sleeps only 2 hours per night.  7. Over the last few weeks, has neck pain that sometimes radiates to the left arm. No pain today, but worse if she reads or band her neck for longer time. No weakness, tingling or numbness.    Social History     Socioeconomic History     Marital status:      Spouse  name: Not on file     Number of children: Not on file     Years of education: Not on file     Highest education level: Not on file   Occupational History     Not on file   Social Needs     Financial resource strain: Not on file     Food insecurity:     Worry: Not on file     Inability: Not on file     Transportation needs:     Medical: Not on file     Non-medical: Not on file   Tobacco Use     Smoking status: Never Smoker     Smokeless tobacco: Never Used   Substance and Sexual Activity     Alcohol use: Yes     Comment: Wine occassionally     Drug use: No     Sexual activity: Never   Lifestyle     Physical activity:     Days per week: Not on file     Minutes per session: Not on file     Stress: Not on file   Relationships     Social connections:     Talks on phone: Not on file     Gets together: Not on file     Attends [employer] service: Not on file     Active member of club or organization: Not on file     Attends meetings of clubs or organizations: Not on file     Relationship status: Not on file     Intimate partner violence:     Fear of current or ex partner: Not on file     Emotionally abused: Not on file     Physically abused: Not on file     Forced sexual activity: Not on file   Other Topics Concern     Not on file   Social History Narrative     Not on file       Family History   Problem Relation Age of Onset     Heart attack Father      Breast cancer Maternal Aunt 55     Lung cancer Brother 65     Colon cancer Maternal Grandmother 90     Lung cancer Maternal Grandfather 65     Brain cancer Son 25     Rectal cancer Brother 64     Review of Systems:     A 12 point comprehensive review of systems was negative except as noted in HPI.            Objective:    Physical Exam   /63   Pulse (!) 58   Wt 151 lb (68.5 kg)   LMP 07/11/1971   SpO2 95%   BMI 29.49 kg/m      Constitutional: oriented to person, place, and time, appears well-nourished. No distress.   HENT:   Head: Normocephalic.   Mouth/Throat:  Oropharynx is clear and moist.   Eyes: Conjunctivae are normal. Pupils are equal, round, and reactive to light.   Neck: Decreased range of motion and stiffness. Spurling negative. Neck supple.   Cardiovascular: Normal rate, regular rhythm and normal heart sounds.    Pulmonary/Chest: Effort normal and breath sounds normal.  Abdominal: Soft. Bowel sounds are normal.   Musculoskeletal: Normal range of motion.   Neurological: alert and oriented to person, place, and time.  Psychiatric:  normal mood and affect.    Patient Active Problem List   Diagnosis     Chronic Sinusitis     Hypercholesterolemia     Moderate persistent asthma     Hypertension     Osteoporosis Senile     Osteoarthritis     CAD (coronary artery disease)     GI bleed     Anemia     Insomnia     Wrist fracture     Malignant neoplasm of upper-outer quadrant of left female breast (H)     Aromatase inhibitor use       Current Outpatient Medications on File Prior to Visit   Medication Sig Dispense Refill     anastrozole (ARIMIDEX) 1 mg tablet TAKE ONE TABLET BY MOUTH EVERY DAY 90 tablet 3     ascorbic acid (VITAMIN C) 1000 MG tablet Take 1,000 mg by mouth every morning.        aspirin 81 MG EC tablet Take 81 mg by mouth bedtime.       benzonatate (TESSALON) 200 MG capsule Take 1 capsule (200 mg total) by mouth 3 (three) times a day as needed for cough. 30 capsule 0     BREO ELLIPTA 200-25 mcg/dose DsDv inhaler INHALE 1 PUFF BY MOUTH EVERY DAY 1 each 6     calcium citrate-vitamin D (CITRACAL+D) 315-200 mg-unit per tablet Take 1 tablet by mouth bedtime.       ciprofloxacin HCl (CIPRO) 250 MG tablet TAKE 0.5 TABLETS (125 MG TOTAL) BY MOUTH DAILY. 45 tablet 1     cyanocobalamin (VITAMIN B-12) 50 mcg tablet Take 50 mcg by mouth every morning.        DOCOSAHEXANOIC ACID/EPA (FISH OIL ORAL) Take 2 g by mouth daily.       furosemide (LASIX) 20 MG tablet TAKE ONE TABLET EVERY DAY 90 tablet 2     INCRUSE ELLIPTA 62.5 mcg/actuation DsDv inhaler INHALE ONE PUFF BY MOUTH  EVERY DAY 1 each 6     ipratropium-albuterol (COMBIVENT RESPIMAT)  mcg/actuation Mist inhaler Inhale 1 puff 4 (four) times a day. 1 Inhaler 5     ipratropium-albuterol (DUO-NEB) 0.5-2.5 mg/3 mL nebulizer Take 3 mL by nebulization 2 (two) times a day as needed. 1080 mL 3     LACTOBACILLUS ACIDOPHILUS (ACIDOPHILUS ORAL) Take 1 tablet by mouth every morning.        losartan (COZAAR) 50 MG tablet Take 1 tablet (50 mg total) by mouth daily. 90 tablet 3     magnesium 250 mg Tab Take 250 mg by mouth every morning.       metoprolol tartrate (LOPRESSOR) 100 MG tablet Take 1 tablet (100 mg total) by mouth 2 (two) times a day. 180 tablet 3     nitroglycerin (NITROSTAT) 0.4 MG SL tablet Place 1 tablet (0.4 mg total) under the tongue every 5 (five) minutes as needed for chest pain. 25 tablet 3     OXYGEN-AIR DELIVERY SYSTEMS [facility] [place] [facility] Use 2 L As Directed. 2L at bedtime  [facility]       rosuvastatin (CRESTOR) 10 MG tablet TAKE 1 TABLET (10 MG TOTAL) BY MOUTH AT BEDTIME. TAKE WITH A 5 MG PILL FOR A TOTAL OF 15 MG NIGHTLY (Patient taking differently: Take 10 mg by mouth at bedtime.       ) 90 tablet 3     [DISCONTINUED] ALPRAZolam (XANAX) 0.25 MG tablet TAKE 1 TABLET (0.25 MG TOTAL) BY MOUTH AT BEDTIME AS NEEDED FOR SLEEP. 30 tablet 0     Current Facility-Administered Medications on File Prior to Visit   Medication Dose Route Frequency Provider Last Rate Last Dose     denosumab 60 mg (PROLIA 60 mg/ml)  60 mg Subcutaneous Q6 Months Wilbur Hannah MD   60 mg at 12/18/19 1343     [DISCONTINUED] denosumab 60 mg (PROLIA 60 mg/ml)  60 mg Subcutaneous Q6 Months Wilbur Hannah MD   60 mg at 06/14/19 0950     [DISCONTINUED] denosumab 60 mg (PROLIA 60 mg/ml)  60 mg Subcutaneous Q6 Months Wilbur Hannah MD Maja Visekruna  12/18/2019

## 2021-06-04 NOTE — PATIENT INSTRUCTIONS - HE
"1. Please apply Shay-dallas to your neck to help with the pain.  2. Use a \"shawl heating pad\" when you are sitting down to help with the spasm.  3. Use a donut pillow when you are sitting around.  "

## 2021-06-04 NOTE — PROGRESS NOTES
ASSESSMENT: Yanna Handy is a 88 y.o. female who presents for consultation at the request of PCP Wilbur Hannah MD, with a past medical history significant for hypertension, hypercholesterolemia, CAD, moderate persistent asthma, osteoporosis, osteoarthritis, history of GI bleed on Plavix medication, wrist fracture 2015 post fall, left breast cancer, who presents today for new patient evaluation of:    -Chronic progressive midline low back pain with left lumbar radiculitis L5 dermatomal pattern that stops at the knee however.  Previous CT scan did show L5-S1 spondylolisthesis with foraminal stenosis at this level left greater than right.    -Cervicalgia with left cervical radiculitis C7 dermatomal pattern, less intense than her back pain currently.  Cervical CT scan previously showed multilevel degenerative changes.    Patient is neurologically intact on exam. No myelopathic or red flag symptoms.     LINSEY Score: 38  NDI Score: 36    WHO 5: 16     Diagnoses and all orders for this visit:    Left lumbar radiculitis  -     MR Lumbar Spine Without Contrast; Future; Expected date: 01/06/2020  -     Ambulatory referral to PT/OT  -     gabapentin (NEURONTIN) 100 MG capsule; Take 300 mg by mouth 3 (three) times a day. Follow Gabapentin Dosing chart given  Dispense: 270 capsule; Refill: 3    Chronic bilateral low back pain with left-sided sciatica  -     MR Lumbar Spine Without Contrast; Future; Expected date: 01/06/2020  -     Ambulatory referral to PT/OT    Spondylolisthesis of lumbar region  -     MR Lumbar Spine Without Contrast; Future; Expected date: 01/06/2020  -     Ambulatory referral to PT/OT    Lumbar foraminal stenosis  -     MR Lumbar Spine Without Contrast; Future; Expected date: 01/06/2020  -     Ambulatory referral to PT/OT    Radiculitis of left cervical region  -     MR Cervical Spine Without Contrast; Future; Expected date: 01/06/2020  -     Ambulatory referral to PT/OT  -     gabapentin (NEURONTIN)  100 MG capsule; Take 300 mg by mouth 3 (three) times a day. Follow Gabapentin Dosing chart given  Dispense: 270 capsule; Refill: 3    Foraminal stenosis of cervical region  -     MR Cervical Spine Without Contrast; Future; Expected date: 01/06/2020  -     Ambulatory referral to PT/OT    DDD (degenerative disc disease), cervical  -     MR Cervical Spine Without Contrast; Future; Expected date: 01/06/2020  -     Ambulatory referral to PT/OT    Myofascial pain  -     Ambulatory referral to PT/OT      PLAN:  Reviewed spine anatomy and disease process. Discussed diagnosis and treatment options with the patient today. A shared decision making model was used.  The patient's values and choices were respected. The following represents what was discussed and decided upon by the provider and the patient.      -DIAGNOSTIC TESTS:  Images were personally reviewed and interpreted and explained to patient today using spine model.   --Ordered cervical and lumbar spine MRI to further evaluate progressive cervical and lumbar radicular pain.  --Lumbar CT scan 6/20/2017 shows 5 mm L5-S1 spondylolisthesis with with chronic left pars defect with facet arthropathy with left greater than right foraminal stenosis.  L4-5 moderate left and mild right foraminal stenosis.  --Cervical CT scan 6/20/2017 shows mild right convex curvature, 2 mm C3-4 spondylolisthesis, multilevel degenerative changes with endplate sclerosis C4-C7, greatest at C5-6, ankylosis of the left facet joint at C4-5.    -PHYSICAL THERAPY: Referral to physical therapy optimum placed to establish home exercises for chronic neck and back pain.  Discussed the importance of core strengthening, ROM, stretching exercises with the patient and how each of these entities is important in decreasing pain.  Explained to the patient that the purpose of physical therapy is to teach the patient a home exercise program.  These exercises need to be performed every day in order to decrease pain  and prevent future occurrences of pain.        -MEDICATIONS: Prescribed gabapentin low-dose 100 mg to titrate up to 3 tablets 3 times daily as tolerated for radicular pain.  -Dosing chart given.  Discussed multiple medication options today with patient. Discussed risks, side effects, and proper use of medications. Patient verbalized understanding.    -INTERVENTIONS: Did discuss that we could consider lumbar CHRISTIAN which she is interested in, consider L5-S1 left TF CHRISTIAN pending updated MRI imaging.  --Discussed that we could also consider neck injections for left cervical radiculitis down the road however currently her back pain is worse than her neck.  Discussed risks and benefits of injections with patient today.    -PATIENT EDUCATION:  45 minutes of total visit time was spent face to face with the patient today, greater than 50% of total time spent with patient was spent on counseling, education, and coordinating care.   -10 minutes spent outside of visit time, non-face-to-face time, reviewing chart.    -FOLLOW-UP:   Follow-up after obtaining MRI to review images and ongoing plan.    Advised patient to call the [facility] if symptoms worsen or you have problems controlling bladder and bowel function.   ______________________________________________________________________    SUBJECTIVE:  HPI:  Yanna Handy  Is a 88 y.o. female who presents today for new patient evaluation of low back pain at the lumbosacral junction that radiates to the left lateral thigh and lateral hip that stops at the knee that is been ongoing for a long time however worse in the last 3 months.  She reports that she has had injections for this similar type of pain in 2015 which did give her benefit however she was on Plavix At that time and did end up having a rectal bleed shortly after that she was worried that was because of the injection and the Plavix combination.  Currently she denies right leg symptoms, denies recent trips or falls or  balance changes, denies bowel or bladder loss control.  She does report that currently her pain is a 5/10 up to an 8 at its worst and a 2 at its best typically worse with prolonged walking as well as generalized activity specifically vacuuming can be very aggravating.  Patient also endorses neck pain worse in the last couple months that did actually start off more intense however has improved more so, currently her pain is left greater than right that radiates in the left arm specifically third fourth and fifth fingers with associated numbness and tingling.  Patient denies upper extremity weakness, denies right arm symptoms, she is right-hand dominant otherwise.    Patient does currently live alone and she reports she is very active still mows her lawn, she just typically takes her time with generalized activity and does well.    -Treatment to Date: No prior spinal surgery.    History of lumbar epidural spinal injection 2015 [place].  Patient denies any history of prior neck injections.    -Medications:  Arnica over-the-counter gel with benefit  Tylenol 650 mg every 6 hours with some benefit.    Current Outpatient Medications on File Prior to Encounter   Medication Sig Dispense Refill     acetaminophen (TYLENOL) 650 MG CR tablet Take 650 mg by mouth every 8 (eight) hours as needed for pain.       ALPRAZolam (XANAX) 0.25 MG tablet Take 1 tablet (0.25 mg total) by mouth at bedtime as needed for sleep. 30 tablet 0     anastrozole (ARIMIDEX) 1 mg tablet TAKE ONE TABLET BY MOUTH EVERY DAY 90 tablet 3     ascorbic acid (VITAMIN C) 1000 MG tablet Take 1,000 mg by mouth every morning.        aspirin 81 MG EC tablet Take 81 mg by mouth bedtime.       benzonatate (TESSALON) 200 MG capsule Take 1 capsule (200 mg total) by mouth 3 (three) times a day as needed for cough. 30 capsule 0     BREO ELLIPTA 200-25 mcg/dose DsDv inhaler INHALE 1 PUFF BY MOUTH EVERY DAY 1 each 6     calcium citrate-vitamin D (CITRACAL+D) 315-200 mg-unit  per tablet Take 1 tablet by mouth bedtime.       ciprofloxacin HCl (CIPRO) 250 MG tablet TAKE 0.5 TABLETS (125 MG TOTAL) BY MOUTH DAILY. 45 tablet 1     cyanocobalamin (VITAMIN B-12) 50 mcg tablet Take 50 mcg by mouth every morning.        DOCOSAHEXANOIC ACID/EPA (FISH OIL ORAL) Take 2 g by mouth daily.       furosemide (LASIX) 20 MG tablet TAKE ONE TABLET EVERY DAY 90 tablet 2     INCRUSE ELLIPTA 62.5 mcg/actuation DsDv inhaler INHALE ONE PUFF BY MOUTH EVERY DAY 1 each 6     ipratropium-albuterol (COMBIVENT RESPIMAT)  mcg/actuation Mist inhaler Inhale 1 puff 4 (four) times a day. 1 Inhaler 5     ipratropium-albuterol (DUO-NEB) 0.5-2.5 mg/3 mL nebulizer Take 3 mL by nebulization 2 (two) times a day as needed. 1080 mL 3     LACTOBACILLUS ACIDOPHILUS (ACIDOPHILUS ORAL) Take 1 tablet by mouth every morning.        losartan (COZAAR) 50 MG tablet Take 1 tablet (50 mg total) by mouth daily. 90 tablet 3     magnesium 250 mg Tab Take 250 mg by mouth every morning.       metoprolol tartrate (LOPRESSOR) 100 MG tablet Take 1 tablet (100 mg total) by mouth 2 (two) times a day. 180 tablet 3     nitroglycerin (NITROSTAT) 0.4 MG SL tablet Place 1 tablet (0.4 mg total) under the tongue every 5 (five) minutes as needed for chest pain. 25 tablet 3     OXYGEN-AIR DELIVERY SYSTEMS [facility] Use 2 L As Directed. 2L at bedtime  [facility]       rosuvastatin (CRESTOR) 10 MG tablet TAKE 1 TABLET (10 MG TOTAL) BY MOUTH AT BEDTIME. TAKE WITH A 5 MG PILL FOR A TOTAL OF 15 MG NIGHTLY (Patient taking differently: Take 10 mg by mouth at bedtime.       ) 90 tablet 3     Current Facility-Administered Medications on File Prior to Encounter   Medication Dose Route Frequency Provider Last Rate Last Dose     denosumab 60 mg (PROLIA 60 mg/ml)  60 mg Subcutaneous Q6 Months Wilbur Hannah MD   60 mg at 12/18/19 1343       Allergies   Allergen Reactions     Alendronate Nausea And Vomiting     Metoclopramide Hcl Anxiety     Rofecoxib Diarrhea and Nausea  "Only     Lisinopril Cough     Risedronate      Sulfa (Sulfonamide Antibiotics) Anaphylaxis       Past Medical History:   Diagnosis Date     Anemia 8/21/2015     Asthma      Breast cancer (H) 07/25/2017    left     CAD (coronary artery disease) 9/15/2014     Chronic bronchitis (H)      Chronic Sinusitis     Created by Conversion  Replacement Utility updated for latest IMO load     Emphysema     Created by Conversion      Fx ankle 8/2000     Fx wrist 12/2006    Right wrist      GI bleed 1/12/2015     Hx of radiation therapy 2017    left     Hypercholesterolemia     Created by Conversion      Hypertension      Insomnia 1/13/2016     Osteoarthritis     Created by Conversion  Replacement Utility updated for latest IMO load     Osteoporosis Senile     Created by Conversion      Squamous Cell Carcinoma Of The Skin     Created by Conversion         Patient Active Problem List   Diagnosis     Chronic Sinusitis     Hypercholesterolemia     Moderate persistent asthma     Hypertension     Osteoporosis Senile     Osteoarthritis     CAD (coronary artery disease)     GI bleed     Anemia     Insomnia     Wrist fracture     Malignant neoplasm of upper-outer quadrant of left female breast (H)     Aromatase inhibitor use       Past Surgical History:   Procedure Laterality Date     APPENDECTOMY  1971     BREAST LUMPECTOMY Left 2017    ca     BREAST SURGERY  08/02/2017    left lumpectomy, Dr. Pope     COLONOSCOPY N/A 1/15/2015    Procedure: COLONOSCOPY with biopsy of polyp;  Surgeon: Joel Coppola MD;  [facility];  Service:      CORONARY STENT PLACEMENT      \"I had 2 stent placements\"     EYE SURGERY      Cataracts     HYSTERECTOMY  1971     JOINT REPLACEMENT  1/2003    Right TKA     KNEE ARTHROSCOPY Left 10/2004     OOPHORECTOMY  1971    One removed     HI REMOVAL OF TONSILS,<11 Y/O      Description: Tonsillectomy;  Recorded: 02/12/2009;  Comments: 1941     HI REVISE SECONDARY VARICOSITY      Description: Varicose " "Vein Ligation;  Recorded: 02/12/2009;  Comments: 1988     IA TOTAL ABDOM HYSTERECTOMY      Description: Hysterectomy;  Recorded: 02/12/2009;  Comments: 1971     SINUS SURGERY      \"I had 3 sinus surgeries.\"       Family History   Problem Relation Age of Onset     Heart attack Father      Breast cancer Maternal Aunt 55     Lung cancer Brother 65     Colon cancer Maternal Grandmother 90     Lung cancer Maternal Grandfather 65     Brain cancer Son 25     Rectal cancer Brother 64       Reviewed past medical, surgical, and family history with patient found on new patient intake packet located in EMR Media tab.     SOCIAL HX: Patient is retired, , does live alone.  Patient typically is very active with walking but not as much in the wintertime.  Patient denies smoking/tobacco use, does occasionally drink wine but denies history being heavy drinker, denies recreational drug use.    ROS: Positive for joint pain, muscle pain, sciatica, itching, insomnia, anxiety, cough, wheezing, reflux, leg swelling.  Specifically negative for bowel/bladder dysfunction, balance changes, headache, dizziness, foot drop, fevers, chills, appetite changes, nausea/vomiting, unexplained weight loss. Otherwise 13 systems reviewed are negative. Please see the patient's intake questionnaire from today for details.    OBJECTIVE:  /79 (Patient Site: Left Arm, Patient Position: Sitting, Cuff Size: Adult Regular)   Pulse 63   Ht 5' (1.524 m)   Wt 152 lb 6.4 oz (69.1 kg)   LMP 07/11/1971   BMI 29.76 kg/m      PHYSICAL EXAMINATION:    --CONSTITUTIONAL:  Vital signs as above.  No acute distress.  The patient is well nourished and well groomed.  --PSYCHIATRIC:  Appropriate mood and affect. The patient is awake, alert, oriented to person, place, time and answering questions appropriately with clear speech.    --SKIN:  Skin over the face, bilateral lower extremities, and posterior torso is clean, dry, intact without rashes.    --RESPIRATORY: " Normal rhythm and effort. No abnormal accessory muscle breathing patterns noted.   --ABDOMINAL:  Soft, non-distended, non-tender throughout all quadrants.   --STANDING EXAMINATION:  Normal lumbar lordosis noted, no lateral shift.  --MUSCULOSKELETAL: Lumbar spine inspection reveals no evidence of deformity. Range of motion is not limited in lumbar extension, lateral rotation, mild low back pain with forward flexion. No tenderness to palpation lumbar spine. Straight leg raising in the supine position is negative to radicular pain on the right but positive on the left. Sciatic notch non-tender.  --SACROILIAC JOINT: One Finger point test negative.  --GROSS MOTOR: Gait is non-antalgic. Easily arises from a seated position. Toe walking and heel walking are normal without significant difficulty.    --LOWER EXTREMITY MOTOR TESTING:  Plantar flexion left 5/5, right 5/5   Dorsiflexion left 5/5, right 5/5   Great toe MTP extension left 5/5, right 5/5  Knee flexion left 5/5, right 5/5  Knee extension left 5/5, right 5/5   Hip flexion left 5/5, right 5/5  Hip abduction left 5/5, right 5/5  Hip adduction left 5/5, right 5/5   --HIPS: Full range of motion bilaterally.   --NEUROLOGICAL:  1/4 patellar, medial hamstring, and achilles reflexes bilaterally.  Sensation to light touch is intact in the bilateral L4, L5, and S1 dermatomes. Babinski is negative. No clonus.  Negative Mary reflex bilaterally.  --VASCULAR:  Bilateral lower extremities are warm.      CERVICAL:   --HEENT:  Sclera are non-injected.  Extraocular muscles are intact.  Thyroid moves easily upon swallowing.  Moist oral mucosa.  --SKIN:  Skin over the face, bilateral upper extremities, and posterior torso is clean, dry, intact without rashes.  --LYMPH NODES:  No palpable or tender anterior/posterior cervical, submandibular, or supraclavicular lymph nodes.     --UPPER EXTREMITY MOTOR TESTING:  Wrist flexion left 5/5, right 5/5  Wrist extension left 5/5, right  5/5  Pronators left 5/5, right 5/5  Biceps left 5/5, right 5/5   Triceps left 5/5, right 5/5   Shoulder abduction left 5/5, right 5/5   left 5/5, right 5/5  --NEUROLOGIC:  CN III-XII are grossly intact.  Bilateral patellar and achilles reflexes are physiologic and symmetric. 2/4 symmetric biceps, brachioradialis, triceps reflexes bilaterally.  Sensation to upper extremities is intact.  Negative Jeffrey's bilaterally.    --VASCULAR:  2/4 radial pulses bilaterally.  Warm upper limbs bilaterally.  Capillary refill in the upper extremities is less than 1 second. No obvious lower extremity swelling or varicosities.   --CERVICAL SPINE: Inspection reveals no evidence of deformity. Range of motion is not limited in cervical flexion, extension, or lateral rotation. No tenderness to palpation. Spurlings maneuver is negative to radicular pain.    --SHOULDERS: Full range of motion bilaterally. Negative empty can.    RESULTS: Prior medical records from [facility] 6/20/2017 to current and [facility] everywhere were reviewed today.    Imaging: Lumbar spine Imaging was personally reviewed and interpreted today. The images were shown to the patient and the findings were explained using a spine model.      CT CERVICAL SPINE WO CONTRAST  6/20/2017   INDICATION: Mild or moderate acute head injury. Mechanical fall landing on face. Forehead laceration. Cheek swelling. Neck pain.  FINDINGS:  CT CERVICAL SPINE: There is mild convex right lower cervical curvature. Straightening normal cervical lordosis. 2 mm anterolisthesis C3 on C4. Congruent appearance of the apposing endplates and moderate hypertrophic facet changes favor a chronic finding. No associated prevertebral soft tissue swelling.  Craniocervical junction is intact. Mild degenerative change anterior C1-C2 articulation. Moderate endplate irregularity and sclerosis apposing endplates from C4 to C7 and to a lesser extent C7-T1. Somewhat more focal sclerosis is seen along the  left inferior endplate of C5, nonspecific but felt most likely to be degenerative in etiology. A small bone island or osseous metastasis could have a similar appearance.  Shallow disc osteophyte complexes C4-C5, C5-C6 and C6-C7 without significant canal stenosis. Mild bilateral C4-C5, moderate left C5-C6, and moderate bilateral C6-C7 neural foraminal stenosis. Moderate facet arthropathy on the right at C2-C3 and C3-C4.   Moderate hypertrophic facet changes with solid ankylosis across the left C4-C5 facet.  Dorsal paraspinal soft tissues are unremarkable. No prevertebral soft tissue swelling. Heavy atherosclerotic calcifications both carotid bulbs/bifurcations and visualized aortic arch. Visualized lung apices are clear.  IMPRESSION:   CT BRAIN:  1.  Small left forehead superficial soft tissue contusion. Underlying calvarium is intact.  2.  4 mm calcification right cerebellar hemisphere without surrounding vasogenic edema. This is nonspecific but felt most likely to reflect a small area of dystrophic calcification from prior/remote insult. Small cavernoma could have this appearance.  3.  No other finding for intracranial hemorrhage, mass, or acute infarct.  CT FACIAL BONES:  1.  No finding for acute fracture. There is left inferolateral facial superficial soft tissue contusion/hemorrhage.  2.  Evidence of prior functional endoscopic surgery. Extensive sclerosis and thickening of the walls of the maxillary sinuses, compatible with chronic sequela of inflammatory change. Mild to moderate maxillary and mild ethmoid and sphenoid sinus mucosal   thickening without air-fluid levels.  CT CERVICAL SPINE:  1.  Mild convex-right lower cervical curvature and straightening of normal cervical lordosis. Low-grade anterolisthesis C3 on C4 is nonspecific but felt to be degenerative in etiology.  2.  Moderate cervical spondylosis without significant canal stenosis.      CT LUMBAR SPINE WO CONTRAST  6/20/2017   INDICATION: Spine  fracture, traumatic, lumbar  TECHNIQUE: Helical images were obtained of the lumbar spine and evaluated in the axial plane with sagittal and coronal reformations. No contrast was administered. Dose reduction techniques were used.  COMPARISON: Report of an MRI from 12/30/2014.  FINDINGS: Nomenclature is based on 5 lumbar type vertebral bodies. The vertebral body heights are well-maintained throughout. There is a slight retrolisthesis of L2 in relation to L3 and a mild anterior listhesis of L5 in relation to the sacrum of approximately 5 mm. There is no evidence of a destructive bony process or acute lumbar spine fracture. There may be a chronic left L5 pars defect but there is very prominent facet arthropathy in this region leading to suboptimal visualization. Grossly normal paraspinal soft tissues. The aorta has a normal diameter but there is diffuse vascular calcification throughout the abdominal aorta and its branches. There is a mild levoscoliosis of the upper lumbar spine. There are degenerative changes of the sacroiliac joints bilaterally.  T12-L1: There is a moderate loss of disc space height, endplate changes and vacuum disc phenomenon. There is a diffuse annular bulge more prominent to the posterior lateral regions. There is no evidence of canal compromise. There is prominent facet arthropathy but the lateral recesses are patent bilaterally. There is mild left neural foraminal narrowing noted.  L1-L2: There is a mild loss of disc space height, endplate changes and a vacuum disc phenomenon. There is a diffuse annular bulge more prominent to the posterior lateral regions. There is no significant canal compromise. There is prominent facet arthropathy leading to bilateral lateral recess narrowing and to mild bilateral neural foraminal narrowing.   L2-L3: There is a significant loss of disc space height and endplate changes. There is a diffuse annular bulge more prominent to the posterior lateral regions. This  along with the facet arthropathy leads to minimal canal compromise along with bilateral lateral recess narrowing and significant right neural foraminal narrowing and mild left neural foraminal narrowing.  L3-L4: There is a loss mild loss of height, endplate changes and vacuum disc phenomenon. There is a diffuse annular bulge more prominent to the posterior lateral regions. There is facet arthropathy that leads to moderate canal compromise with bilateral lateral recess narrowing and moderate bilateral neural foraminal narrowing.  L4-L5: There is a mild loss of height along with prominent degenerative endplate changes. There is a diffuse annular bulge more prominent to the posterior lateral regions. This along with the facet arthropathy leads to mild to moderate canal compromise with bilateral lateral recess narrowing along with mild right neural foraminal narrowing and moderate left neural foraminal narrowing.  L5-S1: There is a mild loss of disc space height along with a vacuum disc phenomenon. There is a diffuse annular bulge but no significant canal compromise. There is very prominent facet arthropathy that does lead to mild left greater than right lateral recess narrowing along with moderate right neural foraminal narrowing and significant left neural foraminal narrowing.  CONCLUSION:  1.  There is no evidence of a discrete lumbar spine fracture. There may be a chronic left L5 pars defect present but there is very prominent facet arthropathy leading to suboptimal visualization.  2.  The vertebral body heights are maintained and there is a slight retrolisthesis of L2 in relation to L3 and a mild anterior listhesis of L5 in relation to the sacrum which was stated in the report previously.  3.  At the T12-L1 disc space level there is mild left neural foraminal narrowing.  4.  At the L1-2 disc space level there is bilateral lateral recess narrowing and mild bilateral neural foraminal narrowing.  5.  At the L2-3 disc  space level there is minimal canal compromise with bilateral lateral recess narrowing along with significant right neural foraminal narrowing and mild left neural foraminal narrowing.  This probably has mildly progressed in the interval.  6.  At the L3-4 disc space level there is moderate canal compromise with bilateral lateral recess narrowing and moderate bilateral neural right vertebral narrowing.  This has probably progressed in the interval   7.  At the L4-5 disc space level there is mild to moderate canal compromise with bilateral lateral recess narrowing along with mild right neural foraminal narrowing and moderate left neural foraminal narrowing. This level also has probably progressed in the interval.   8.  At the L5-S1 disc space level there is mild left greater than right lateral recess narrowing along with moderate right neural foraminal narrowing and significant left neural foraminal narrowing.The neuroforaminal narrowing has probably progressed in the interval.

## 2021-06-05 NOTE — TELEPHONE ENCOUNTER
Medication Question or Clarification  Who is calling: Sena the Pharmacist at [facility]  What medication are you calling about (include dose and sig)?:   rosuvastatin (CRESTOR) 10 MG tablet 90 tablet 3 2/5/2020     Sig - Route: Take 1 tablet (10 mg total) by mouth at bedtime. Take with a 5 mg pill for a total of 15 mg nightly - Oral    Sent to pharmacy as: rosuvastatin 10 mg tablet (CRESTOR)        Who prescribed the medication?: ZHANG DIAZ  What is your question/concern?: Caller stated The sig reads to take a 5 mg pill totally 15 mg but they never received the 5 mg Rx. Please clarify and return call to pharmacy.  Requested Pharmacy: [facility]  Okay to leave a detailed message?: Yes

## 2021-06-05 NOTE — TELEPHONE ENCOUNTER
FYI - Status Update  Who is Calling: Patient  Update: Patient has one dose left of Cipro which Dr. Hannah has her one as a preventative measure.  She takes this medication daily.   Okay to leave a detailed message?:  No return call needed

## 2021-06-05 NOTE — TELEPHONE ENCOUNTER
RN cannot approve Refill Request    RN can NOT refill this medication med is not covered by policy/route to provider       . Last office visit: 12/18/2019 Wilbur Hannah MD Last Physical: 12/14/2018 Last MTM visit: Visit date not found Last visit same specialty: 12/18/2019 Wilbur Hannah MD.  Next visit within 3 mo: Visit date not found  Next physical within 3 mo: Visit date not found      Xochitl Keen, [facility] Triage/Med Refill 1/7/2020    Requested Prescriptions   Pending Prescriptions Disp Refills     ciprofloxacin HCl (CIPRO) 250 MG tablet 45 tablet 1     Sig: Take 0.5 tablets (125 mg total) by mouth daily.       There is no refill protocol information for this order

## 2021-06-05 NOTE — TELEPHONE ENCOUNTER
Refill Approved    Rx renewed per Medication Renewal Policy. Medication was last renewed on 2/7/19.    Xochitl Keen, [facility] Triage/Med Refill 2/4/2020     Requested Prescriptions   Pending Prescriptions Disp Refills     rosuvastatin (CRESTOR) 10 MG tablet [Pharmacy Med Name: ROSUVASTATIN CALCIUM 10 MG TAB] 90 tablet 3     Sig: TAKE 1 TABLET (10 MG TOTAL) BY MOUTH AT BEDTIME. TAKE WITH A 5 MG PILL FOR A TOTAL OF 15 MG NIGHTLY       Statins Refill Protocol (Hmg CoA Reductase Inhibitors) Passed - 2/4/2020  9:33 AM        Passed - PCP or prescribing provider visit in past 12 months      Last office visit with prescriber/PCP: Visit date not found OR same dept: 12/18/2019 Wilbur Hannah MD OR same specialty: 12/18/2019 Wilbur Hannah MD  Last physical: Visit date not found Last MTM visit: Visit date not found   Next visit within 3 mo: Visit date not found  Next physical within 3 mo: Visit date not found  Prescriber OR PCP: Kirk Chung MD  Last diagnosis associated with med order: 1. Dyslipidemia  - rosuvastatin (CRESTOR) 10 MG tablet [Pharmacy Med Name: ROSUVASTATIN CALCIUM 10 MG TAB]; TAKE 1 TABLET (10 MG TOTAL) BY MOUTH AT BEDTIME. TAKE WITH A 5 MG PILL FOR A TOTAL OF 15 MG NIGHTLY  Dispense: 90 tablet; Refill: 3    If protocol passes may refill for 12 months if within 3 months of last provider visit (or a total of 15 months).

## 2021-06-05 NOTE — PATIENT INSTRUCTIONS - HE
Please follow up two weeks post procedure with Kathrine to evaluate your plan of care.       DISCHARGE INSTRUCTIONS    During office hours (8:00 a.m.- 4:00 p.m.) questions or concerns may be answered  by calling [facility] Navigation Nurses at  [phone].  Messages received after hours will be returned the following business day.      In the case of an emergency, please dial 911 or seek assistance at the nearest Emergency Room/Urgent Care facility.     All Patients:    ? You may experience an increase in your symptoms for the first 2 days (It may take anywhere between 2 days- 2 weeks for the steroid to have maximum effect).    ? You may use ice on the injection site, as frequently as 20 minutes each hour if needed.    ? You may take your pain medicine.    ? You may continue taking your regular medication after your injection. If you have had a Medial Branch Block you may resume pain medication once your pain diary is completed.    ? You may shower. No swimming, tub bath or hot tub for 48 hours.  You may remove your bandaid/bandage as soon as you are home.    ? You may resume light activities, as tolerated.    ? Resume your usual diet as tolerated.    ? It is strongly advised that you do not drive for 1-3 hours post injection.    ? If you have had oral sedation:  Do not drive for 8 hours post injection.      ? If you have had IV sedation:  Do not drive for 24 hours post injection.  Do not operate hazardous machinery or make important personal/business decisions for 24 hours.      POSSIBLE STEROID SIDE EFFECTS (If steroid/cortisone was used for your procedure)    -If you experience these symptoms, it should only last for a short period      Swelling of the legs                Skin redness (flushing)       Mouth (oral) irritation     Blood sugar (glucose) levels              Sweats                      Mood changes    Headache    Sleeplessness         POSSIBLE PROCEDURE SIDE EFFECTS  -Call the Spine Center if you  are concerned    Increased Pain             Increased numbness/tingling        Nausea/Vomiting            Bruising/bleeding at site        Redness or swelling                                                Difficulty walking        Weakness             Fever greater than 100.5    *In the event of a severe headache after an epidural steroid injection that is relieved by lying down, please call the [facility] to speak with a clinical staff member*

## 2021-06-05 NOTE — TELEPHONE ENCOUNTER
Phone call to patient to review results and provider's recommendations. Results given and explained. Questions answered. Discussed recommendations for PT and the lumbar CHRISTIAN. Patient would like to have the injection. Injection requirements reviewed with patient. Stated understanding. Transferred to scheduling to make appointment.

## 2021-06-05 NOTE — TELEPHONE ENCOUNTER
Medication Question or Clarification  Who is calling: Patient  What medication are you calling about (include dose and sig)?:   rosuvastatin (CRESTOR) 10 MG tablet 90 tablet 3 2/4/2020     Sig - Route: TAKE 1 TABLET (10 MG TOTAL) BY MOUTH AT BEDTIME. TAKE WITH A 5 MG PILL FOR A TOTAL OF 15 MG NIGHTLY - Oral    Sent to pharmacy as: rosuvastatin 10 mg tablet (CRESTOR)    E-Prescribing Status: Receipt confirmed by pharmacy (2/4/2020  2:45 PM CST)        Who prescribed the medication?:   Kirk Chung MD  What is your question/concern?:   Patient states Pharmacy is having a problem transferring script to [facility].  Please re-send above script to [facility] per patient request.  Requested Pharmacy: [id number]  Okay to leave a detailed message?: Yes    Patient is requesting to get this medication thru her PCP.

## 2021-06-05 NOTE — TELEPHONE ENCOUNTER
----- Message from Kathrine Sorensen CNP sent at 1/22/2020 12:40 PM CST -----  Please call patient and notify her that I did review her imaging.  Cervical spine imaging shows multilevel nerve compression due to degenerative changes with moderate spinal stenosis at as well as moderate to severe multilevel nerve compression on the sides left greater than right.  If neck symptoms are not improving with physical therapy we could trial epidural steroid injections for her left arm symptoms.  Lumbar spine MRI does show multilevel degenerative changes  with severe left foraminal stenosis with left L5 nerve root compression at  L5-S1.  There is also a disc bulge at L3-4 and L4-5 with left nerve compression.  We definitely recommend continuing physical therapy for neck and back pain again, for her low back we could consider a left L5-S1 TFESI I did place an order for this since we discussed it in clinic.

## 2021-06-06 NOTE — TELEPHONE ENCOUNTER
Medication Question or Clarification  Who is calling: patient  What medication are you calling about (include dose and sig)?:   furosemide (LASIX) 20 MG tablet           Summary: TAKE 1 TABLET BY MOUTH EVERY DAY, Normal   Start: 2/15/2020  Ord/Sold: 2/15/2020 (O)  Report  Adh:   Taking          Who prescribed the medication?: Wilbur Hannah MD  What is your question/concern?: The patient is not happy.  The patient does not use [facility] and changed her pharmacy in clinic.  Please change the order to [facility] .  Update patient when corrected.   Requested Pharmacy: [facility]  Okay to leave a detailed message?: Yes

## 2021-06-06 NOTE — PROGRESS NOTES
Assessment:     Diagnoses and all orders for this visit:    Chronic bilateral low back pain with left-sided sciatica    Left lumbar radiculitis    Spondylolisthesis of lumbar region    Lumbar foraminal stenosis    Radiculitis of left cervical region    Foraminal stenosis of cervical region    DDD (degenerative disc disease), cervical    Myofascial pain       Yanna Handy is a 88 y.o. y.o. female with past medical history significant for hypertension, hypercholesterolemia, CAD, moderate persistent asthma, osteoporosis, osteoarthritis, history of GI bleed on Plavix medication, wrist fracture 2015 post fall, left breast cancer who presents today for follow-up regarding:     -Chronic midline low back pain with left lumbar radiculitis with 75% relief with recent right L5-S1 TFESI.    -Chronic right-sided neck pain-cervical spine most consistent with facet arthropathy.    -Intermittent left arm Pain consistent with radiculopathy that is tolerable.    Plan:     A shared decision making plan was used.  The patient's values and choices were respected. Prior medical records from 1/6/2020 were reviewed today. The following represents what was discussed and decided upon by the provider and the patient.     -DIAGNOSTIC TESTS: Images were personally reviewed and interpreted.   --Cervical spine MRI 1/20/2020 shows multilevel degenerative changes with moderate spinal stenosis and moderate to severe multilevel foraminal stenosis left greater than right.  --Lumbar spine MRI 1/20/2020 shows multilevel degenerative changes with left renal stenosis L5-S1 with left L5 nerve root compression.  Disc bulge at L3-4 and L4-5 with left nerve compression.   --Lumbar CT scan 6/20/2017 shows 5 mm L5-S1 spondylolisthesis with with chronic left pars defect with facet arthropathy with left greater than right foraminal stenosis.  L4-5 moderate left and mild right foraminal stenosis.  --Cervical CT scan 6/20/2017 shows mild right convex  curvature, 2 mm C3-4 spondylolisthesis, multilevel degenerative changes with endplate sclerosis C4-C7, greatest at C5-6, ankylosis of the left facet joint at C4-5.    -INTERVENTIONS: Discussed considering right C2-3 and C3-4 facet joint steroid injections cervical spine if symptoms or not improving with physical therapy.  She does have left arm radicular symptoms however very minimal, her right neck pain is most bothersome.    -Did discuss with patient that if her left lower extremity pain returns we can repeat left L5-S1 TFESI down the road, discussed with patient that typically we like to limit injections to 4 steroid injections per year, patient verbalized understanding.    -MEDICATIONS: No change in medications advised patient to continue gabapentin at this time.  Discussed side effects of medications and proper use. Patient verbalized understanding.    -PHYSICAL THERAPY: Encourage patient to continue with physical therapy which she  is going to schedule as well as continue with home exercises on a regular basis.  Discussed the importance of core strengthening, ROM, stretching exercises with the patient and how each of these entities is important in decreasing pain.  Explained to the patient that the purpose of physical therapy is to teach the patient a home exercise program.  These exercises need to be performed every day in order to decrease pain and prevent future occurrences of pain.        -PATIENT EDUCATION: 20 minutes of total visit time was spent face to face with the patient today, 60 % of the visit was spent on counseling, education, and coordinating care.   -5 minutes spent outside of visit time, non-face-to-face time, reviewing chart.    -FOLLOW UP: Follow-up as needed.  Advised to contact clinic if symptoms worsen or change.    Subjective:     Yanna Handy is a 88 y.o. female who presents today for follow-up regarding 2 weeks post left L5-S1 TFESI in which she received 75% relief and is very  happy with the results.  She does have chronic low back pain that was rating to the left lateral thigh and lateral hip however now it is more intermittent pain.  Patient denies any lower extremity weakness, denies recent trips or falls.  She does report chronic numbness and tingling in the left lower extremity however.  Patient also endorses ongoing right-sided neck pain upper cervical spine at C2-3 and C3-4 region with turning her head and reading when her head is forward flexed, however that is more mild to moderate pain and she would like to trial physical therapy before considering any neck injections.  Patient does endorse left arm numbness and tingling as well as an achiness in her left arm however typically that is very tolerable and manageable and her neck pain on the right is more bothersome.    No myelopathic symptoms.     -Treatment to Date: No prior spinal surgery.     History of lumbar epidural spinal injection 2015 [place].  Patient denies any history of prior neck injections.  Left L5-S1 TFESI 2/4/2020 with 75% relief.  Preprocedure 4/10, post 2/10.     -Medications:  Arnica over-the-counter gel with benefit  Tylenol 650 mg every 6 hours with some benefit.    Patient Active Problem List   Diagnosis     Chronic Sinusitis     Hypercholesterolemia     Moderate persistent asthma     Hypertension     Osteoporosis Senile     Osteoarthritis     CAD (coronary artery disease)     GI bleed     Anemia     Insomnia     Wrist fracture     Malignant neoplasm of upper-outer quadrant of left female breast (H)     Aromatase inhibitor use       Current Outpatient Medications on File Prior to Encounter   Medication Sig Dispense Refill     acetaminophen (TYLENOL) 650 MG CR tablet Take 650 mg by mouth every 8 (eight) hours as needed for pain.       ALPRAZolam (XANAX) 0.25 MG tablet Take 1 tablet (0.25 mg total) by mouth at bedtime as needed for sleep. 30 tablet 0     anastrozole (ARIMIDEX) 1 mg tablet TAKE ONE TABLET BY  MOUTH EVERY DAY 90 tablet 3     ascorbic acid (VITAMIN C) 1000 MG tablet Take 1,000 mg by mouth every morning.        aspirin 81 MG EC tablet Take 81 mg by mouth bedtime.       BREO ELLIPTA 200-25 mcg/dose DsDv inhaler INHALE 1 PUFF BY MOUTH EVERY DAY 1 each 6     calcium citrate-vitamin D (CITRACAL+D) 315-200 mg-unit per tablet Take 1 tablet by mouth bedtime.       ciprofloxacin HCl (CIPRO) 250 MG tablet Take 0.5 tablets (125 mg total) by mouth daily. 45 tablet 1     cyanocobalamin (VITAMIN B-12) 50 mcg tablet Take 50 mcg by mouth every morning.        DOCOSAHEXANOIC ACID/EPA (FISH OIL ORAL) Take 2 g by mouth daily.       furosemide (LASIX) 20 MG tablet TAKE 1 TABLET BY MOUTH EVERY DAY 90 tablet 3     gabapentin (NEURONTIN) 100 MG capsule Take 300 mg by mouth 3 (three) times a day. Follow Gabapentin Dosing chart given (Patient taking differently: Take 200 mg by mouth at bedtime. 2 caps at bedtime as 2/24/2020) 270 capsule 3     INCRUSE ELLIPTA 62.5 mcg/actuation DsDv inhaler INHALE ONE PUFF BY MOUTH EVERY DAY 1 each 6     ipratropium-albuterol (DUO-NEB) 0.5-2.5 mg/3 mL nebulizer Take 3 mL by nebulization 2 (two) times a day as needed. 1080 mL 3     LACTOBACILLUS ACIDOPHILUS (ACIDOPHILUS ORAL) Take 1 tablet by mouth every morning.        losartan (COZAAR) 50 MG tablet Take 1 tablet (50 mg total) by mouth daily. 90 tablet 3     magnesium 250 mg Tab Take 250 mg by mouth every morning.       metoprolol tartrate (LOPRESSOR) 100 MG tablet Take 1 tablet (100 mg total) by mouth 2 (two) times a day. 180 tablet 3     nitroglycerin (NITROSTAT) 0.4 MG SL tablet Place 1 tablet (0.4 mg total) under the tongue every 5 (five) minutes as needed for chest pain. 25 tablet 3     OXYGEN-AIR DELIVERY SYSTEMS [facility] Use 2 L As Directed. 2L at bedtime  [employer]       rosuvastatin (CRESTOR) 10 MG tablet Take 1 tablet (10 mg total) by mouth at bedtime. Take with a 5 mg pill for a total of 15 mg nightly 90 tablet 3     rosuvastatin (CRESTOR) 5  MG tablet Take 1 tablet a day in addition to a 10 mg tablet for a total of 15 mg daily dose. 90 tablet 3     Current Facility-Administered Medications on File Prior to Encounter   Medication Dose Route Frequency Provider Last Rate Last Dose     denosumab 60 mg (PROLIA 60 mg/ml)  60 mg Subcutaneous Q6 Months Wilbur Hannah MD   60 mg at 12/18/19 1343       Allergies   Allergen Reactions     Alendronate Nausea And Vomiting     Metoclopramide Hcl Anxiety     Rofecoxib Diarrhea and Nausea Only     Lisinopril Cough     Risedronate      Sulfa (Sulfonamide Antibiotics) Anaphylaxis       Past Medical History:   Diagnosis Date     Anemia 8/21/2015     Asthma      Breast cancer (H) 07/25/2017    left     CAD (coronary artery disease) 9/15/2014     Chronic bronchitis (H)      Chronic Sinusitis     Created by Conversion  Replacement Utility updated for latest IMO load     Emphysema     Created by Conversion      Fx ankle 8/2000     Fx wrist 12/2006    Right wrist      GI bleed 1/12/2015     Hx of radiation therapy 2017    left     Hypercholesterolemia     Created by Conversion      Hypertension      Insomnia 1/13/2016     Osteoarthritis     Created by Conversion  Replacement Utility updated for latest IMO load     Osteoporosis Senile     Created by Conversion      Squamous Cell Carcinoma Of The Skin     Created by Conversion         Review of Systems  ROS: Specifically negative for bowel/bladder dysfunction, balance changes, headache, dizziness, foot drop, fevers, chills, appetite changes, nausea/vomiting, unexplained weight loss. Otherwise 13 systems reviewed are negative. Please see the patient's intake questionnaire from today for details.    Reviewed Social, Family, Past Medical and Past Surgical history with patient, no significant changes noted since prior visit.     Objective:     /57 (Patient Site: Left Arm, Patient Position: Sitting, Cuff Size: Adult Regular)   Pulse 63   Ht 5' (1.524 m)   Wt 155 lb (70.3  kg)   LMP 07/11/1971   BMI 30.27 kg/m      PHYSICAL EXAMINATION:   --CONSTITUTIONAL: Vital signs as above. No acute distress. The patient is well nourished and well groomed.  --PSYCHIATRIC:  The patient is awake, alert, oriented to person, place, time and answering questions appropriately with clear speech. Appropriate mood and affect   --HEENT: Sclera are non-injected. Extraocular muscles are intact.   --SKIN: Skin over the face, bilateral upper extremities, and posterior torso is clean, dry, intact without rashes.  --RESPIRATORY: Normal rhythm and effort. No abnormal accessory muscle breathing patterns noted.   --GROSS MOTOR: Easily arises from a seated position.   --CERVICAL SPINE: Inspection reveals no evidence of deformity.   --UPPER EXTREMITY MOTOR TESTING:  Wrist flexion left 5/5, right 5/5  Wrist extension left 5/5, right 5/5  Biceps left 5/5, right 5/5   Triceps left 5/5, right 5/5   Shoulder abduction left 5/5, right 5/5   left 5/5, right 5/5  --NEUROLOGIC: CN III-XII are grossly intact. 2/4 symmetric biceps, brachioradialis, triceps reflexes bilaterally. Sensation to upper extremities is intact. Negative Jeffrey's bilaterally.   --VASCULAR: Warm upper limbs bilaterally.     Imaging of the cervical spine: Cervical spine imaging was reviewed today. The images were shown to the patient and the findings were explained using a spine model.      MR CERVICAL SPINE WO CONTRAST  LOCATION: [facility]  DATE/TIME: 1/20/2020   INDICATION: Cervical radiculopathy. Evaluate chronic neck pain, left cervical radiculitis C7 pattern.  COMPARISON: CT 06/20/2017.  TECHNIQUE: Without IV contrast.  FINDINGS:   Cervical vertebral body heights are unchanged. Mild height loss along the inferior C7 endplate is unchanged from 2017. The remaining vertebral bodies are unremarkable in height. Marrow signal is heterogeneous but without specific evidence of a marrow-replacing process. No pathologic bony edema is  demonstrated.  There is mild C3-C4, C7-T1, T1-T2 and T2-T3 anterolisthesis, unchanged from 2017. Facet alignment is symmetric.  No cervical cord signal abnormalities are demonstrated.  There is mild periarticular edema adjacent to the bilateral C3-C4 facet joints. The paraspinal soft tissues are unremarkable. The major cervical vascular flow voids appear preserved.  Limited images of the intracranial compartment demonstrate atrophic changes of the brain parenchyma.  Craniovertebral junction and C1-C2: Mild degenerative change at the anterior C1-C2 articulation. Slightly patulous appearance of the thecal sac.  C2-C3: Preserved intervertebral disc height. No significant posterior disc abnormality. Prominent left-sided uncovertebral spurring. Moderate left facet arthropathy. No significant right facet arthropathy. No significant spinal canal stenosis. No significant right neural foraminal stenosis. Moderate left neural foraminal stenosis.   C3-C4: Moderate intervertebral disc height loss with broad-based disc-osteophyte complex and bilateral uncovertebral spurring, left greater than right. Mild facet arthropathy. Moderate spinal canal stenosis. No significant right neural foraminal stenosis. Severe left neural foraminal stenosis.   C4-C5: Moderate to severe intervertebral disc height loss with broad-based disc-osteophyte complex and bilateral uncovertebral spurring. Mild right and moderate left facet arthropathy. Mild spinal canal stenosis. Moderate right and moderate to severe left neural foraminal stenosis   C5-C6: Moderate to severe intervertebral disc height loss with broad-based disc-osteophyte complex and bilateral uncovertebral spurring, left much greater than right. Mild bilateral facet arthropathy. Mild spinal canal stenosis. No significant right neural foraminal stenosis. Severe left neural foraminal stenosis.   C6-C7: Severe intervertebral disc height loss with broad-based disc-osteophyte complex and  bilateral uncovertebral spurring. No significant facet arthropathy. Moderate spinal canal stenosis. Moderate to severe right and severe left neural foraminal stenosis.  C7-T1: Preserved intervertebral disc height. Shallow disc-osteophyte complex. Moderate left facet arthropathy. No significant spinal canal stenosis or right neural foraminal stenosis. Mild to moderate left neural foraminal stenosis.  IMPRESSION:   1.  No evidence of cervical cord signal abnormalities.  2.  At C2-C3, there is moderate left neural foraminal stenosis.  3.  At C3-C4, there is moderate spinal canal stenosis and severe left neural foraminal stenosis.  4.  At C4-C5, there is moderate right and moderate to severe left neural foraminal stenosis.  5.  At C5-C6, there is severe left neural foraminal stenosis.  6.  At C6-C7, there is moderate spinal canal stenosis, moderate to severe right neural foraminal stenosis, and severe left neural foraminal stenosis.  7.  Additional degenerative changes as above.      MR LUMBAR SPINE WO CONTRAST  LOCATION: [facility]  DATE/TIME: 1/20/2020   INDICATION: Spondylolisthesis back pain or radiculopathy, > 6 wks Lumbar spondylolisthesis with left lumbar radiculitis L5 pattern.  COMPARISON: CT 06/20/2017. MRI 12/30/2014.  TECHNIQUE: Without IV contrast.  FINDINGS:  Nomenclature presumes 5 lumbar type vertebral bodies.  Vertebral body heights are unremarkable and unchanged. Marrow signal is heterogeneous but without specific evidence of a marrow-replacing process. There are chronic degenerative endplate signal changes at multiple levels, most notably L2-L3 and T10-T11. No pathologic bony edema is demonstrated.  There is mild L5-S1 anterolisthesis and mild retrolisthesis at L2-L3, L1-L2 and T12-L1. There is levoconvex lumbar curvature and dextroconvex thoracolumbar curvature.  The conus terminates at L2. No signal abnormalities of the imaged cord or cauda equina nerve roots are demonstrated.  The  paraspinal soft tissues are unremarkable. Limited images of the abdominal cavity demonstrate no acute abnormality. There is no evidence of abdominal aortic aneurysm.  T9-T10: There is a calcified central disc protrusion versus disc-osteophyte complex which contributes to mild-to-moderate spinal canal stenosis with mild flattening of the ventral cord. There is no overt cord compression. There is mild right and left neural foraminal stenosis. Evaluation of this level is limited by the absence of axial images.  T10-T11: There is severe intervertebral disc height loss with a broad-based disc-osteophyte complex, eccentric to the left. There is mild spinal canal stenosis, mild right neural foraminal stenosis and moderate left neural foraminal stenosis.  T12-L1: Moderate intervertebral disc height loss with broad-based disc-osteophyte complex and superimposed central disc herniation. There is mild spinal canal stenosis. There is mild bilateral facet arthropathy. There is mild right and mild left neural foraminal stenosis.  L1-L2: Mild intervertebral disc height loss with loss of T2 signal suggesting desiccation. There is shallow broad-based disc bulging. There is moderate right and mild left facet arthropathy. There is mild spinal canal stenosis, moderate right neural foraminal stenosis and mild left neural foraminal stenosis.   L2-L3: Mild to moderate intervertebral disc height loss with loss of T2 signal suggesting desiccation. There is broad-based disc bulging. There is mild bilateral facet arthropathy. There is mild spinal canal stenosis. Moderate right and mild left neural foraminal stenosis.   L3-L4: Mild intervertebral disc height loss with loss of T2 signal suggesting desiccation. There is broad-based disc bulging with contact of both descending L4 nerve roots. There is moderate-to-severe right and left foraminal stenosis. There is mild spinal canal stenosis. There is moderate right neural foraminal stenosis with  contact between the exiting right L3 nerve root and bulging foraminal disc material. There is mild left neural foraminal stenosis.  L4-L5: Mild intervertebral disc height loss with partial loss of T2 signal suggesting desiccation. There is circumferential disc bulging. There is moderate-to-severe right and moderate facet arthropathy. There is ligamentum flavum thickening. There is   mild spinal canal stenosis. Bulging disc material contacts the descending left L5 nerve root. There is also left lateral recess effacement with displacement of the descending left L5 nerve root. There is no significant right neural foraminal stenosis. There is moderate left neural foraminal stenosis. There is contact between the exiting left L4 nerve root and foraminal disc material.  L5-S1: Mild intervertebral disc height loss with loss of T2 signal suggesting desiccation. There is shallow circumferential disc bulging. There is severe bilateral facet arthropathy. There is no significant spinal canal stenosis. There is mild right neural foraminal stenosis. There is severe left neural foraminal stenosis with compression of the exiting left L5 nerve root.  IMPRESSION:   1.  Unremarkable cord and cauda equina nerve roots.  2.  At L5-S1, there is severe left neural foraminal stenosis with compression of the exiting left L5 nerve root.  3.  At L4-L5, a broad-based disc bulge contacts the descending left L5 nerve root.  4.  At L4-L5, there is also left lateral recess effacement with medial displacement of the descending left L5 nerve root. There is moderate left neural foraminal stenosis with contact between the exiting left L4 nerve root and foraminal disc material.  5.  At L3-L4, there is moderate right neural foraminal stenosis with contact between the exiting right L3 nerve root and foraminal disc material.  6.  At L1-L2 and L2-L3, there is moderate right neural foraminal stenosis.  7.  Additional degenerative changes as above.      CT  CERVICAL SPINE WO CONTRAST  6/20/2017   INDICATION: Mild or moderate acute head injury. Mechanical fall landing on face. Forehead laceration. Cheek swelling. Neck pain.  FINDINGS:  CT CERVICAL SPINE: There is mild convex right lower cervical curvature. Straightening normal cervical lordosis. 2 mm anterolisthesis C3 on C4. Congruent appearance of the apposing endplates and moderate hypertrophic facet changes favor a chronic finding. No associated prevertebral soft tissue swelling.  Craniocervical junction is intact. Mild degenerative change anterior C1-C2 articulation. Moderate endplate irregularity and sclerosis apposing endplates from C4 to C7 and to a lesser extent C7-T1. Somewhat more focal sclerosis is seen along the left inferior endplate of C5, nonspecific but felt most likely to be degenerative in etiology. A small bone island or osseous metastasis could have a similar appearance.  Shallow disc osteophyte complexes C4-C5, C5-C6 and C6-C7 without significant canal stenosis. Mild bilateral C4-C5, moderate left C5-C6, and moderate bilateral C6-C7 neural foraminal stenosis. Moderate facet arthropathy on the right at C2-C3 and C3-C4.   Moderate hypertrophic facet changes with solid ankylosis across the left C4-C5 facet.  Dorsal paraspinal soft tissues are unremarkable. No prevertebral soft tissue swelling. Heavy atherosclerotic calcifications both carotid bulbs/bifurcations and visualized aortic arch. Visualized lung apices are clear.  IMPRESSION:   CT BRAIN:  1.  Small left forehead superficial soft tissue contusion. Underlying calvarium is intact.  2.  4 mm calcification right cerebellar hemisphere without surrounding vasogenic edema. This is nonspecific but felt most likely to reflect a small area of dystrophic calcification from prior/remote insult. Small cavernoma could have this appearance.  3.  No other finding for intracranial hemorrhage, mass, or acute infarct.  CT FACIAL BONES:  1.  No finding for acute  fracture. There is left inferolateral facial superficial soft tissue contusion/hemorrhage.  2.  Evidence of prior functional endoscopic surgery. Extensive sclerosis and thickening of the walls of the maxillary sinuses, compatible with chronic sequela of inflammatory change. Mild to moderate maxillary and mild ethmoid and sphenoid sinus mucosal   thickening without air-fluid levels.  CT CERVICAL SPINE:  1.  Mild convex-right lower cervical curvature and straightening of normal cervical lordosis. Low-grade anterolisthesis C3 on C4 is nonspecific but felt to be degenerative in etiology.  2.  Moderate cervical spondylosis without significant canal stenosis.        CT LUMBAR SPINE WO CONTRAST  6/20/2017   INDICATION: Spine fracture, traumatic, lumbar  TECHNIQUE: Helical images were obtained of the lumbar spine and evaluated in the axial plane with sagittal and coronal reformations. No contrast was administered. Dose reduction techniques were used.  COMPARISON: Report of an MRI from 12/30/2014.  FINDINGS: Nomenclature is based on 5 lumbar type vertebral bodies. The vertebral body heights are well-maintained throughout. There is a slight retrolisthesis of L2 in relation to L3 and a mild anterior listhesis of L5 in relation to the sacrum of approximately 5 mm. There is no evidence of a destructive bony process or acute lumbar spine fracture. There may be a chronic left L5 pars defect but there is very prominent facet arthropathy in this region leading to suboptimal visualization. Grossly normal paraspinal soft tissues. The aorta has a normal diameter but there is diffuse vascular calcification throughout the abdominal aorta and its branches. There is a mild levoscoliosis of the upper lumbar spine. There are degenerative changes of the sacroiliac joints bilaterally.  T12-L1: There is a moderate loss of disc space height, endplate changes and vacuum disc phenomenon. There is a diffuse annular bulge more prominent to the  posterior lateral regions. There is no evidence of canal compromise. There is prominent facet arthropathy but the lateral recesses are patent bilaterally. There is mild left neural foraminal narrowing noted.  L1-L2: There is a mild loss of disc space height, endplate changes and a vacuum disc phenomenon. There is a diffuse annular bulge more prominent to the posterior lateral regions. There is no significant canal compromise. There is prominent facet arthropathy leading to bilateral lateral recess narrowing and to mild bilateral neural foraminal narrowing.   L2-L3: There is a significant loss of disc space height and endplate changes. There is a diffuse annular bulge more prominent to the posterior lateral regions. This along with the facet arthropathy leads to minimal canal compromise along with bilateral lateral recess narrowing and significant right neural foraminal narrowing and mild left neural foraminal narrowing.  L3-L4: There is a loss mild loss of height, endplate changes and vacuum disc phenomenon. There is a diffuse annular bulge more prominent to the posterior lateral regions. There is facet arthropathy that leads to moderate canal compromise with bilateral lateral recess narrowing and moderate bilateral neural foraminal narrowing.  L4-L5: There is a mild loss of height along with prominent degenerative endplate changes. There is a diffuse annular bulge more prominent to the posterior lateral regions. This along with the facet arthropathy leads to mild to moderate canal compromise with bilateral lateral recess narrowing along with mild right neural foraminal narrowing and moderate left neural foraminal narrowing.  L5-S1: There is a mild loss of disc space height along with a vacuum disc phenomenon. There is a diffuse annular bulge but no significant canal compromise. There is very prominent facet arthropathy that does lead to mild left greater than right lateral recess narrowing along with moderate right  neural foraminal narrowing and significant left neural foraminal narrowing.  CONCLUSION:  1.  There is no evidence of a discrete lumbar spine fracture. There may be a chronic left L5 pars defect present but there is very prominent facet arthropathy leading to suboptimal visualization.  2.  The vertebral body heights are maintained and there is a slight retrolisthesis of L2 in relation to L3 and a mild anterior listhesis of L5 in relation to the sacrum which was stated in the report previously.  3.  At the T12-L1 disc space level there is mild left neural foraminal narrowing.  4.  At the L1-2 disc space level there is bilateral lateral recess narrowing and mild bilateral neural foraminal narrowing.  5.  At the L2-3 disc space level there is minimal canal compromise with bilateral lateral recess narrowing along with significant right neural foraminal narrowing and mild left neural foraminal narrowing.  This probably has mildly progressed in the interval.  6.  At the L3-4 disc space level there is moderate canal compromise with bilateral lateral recess narrowing and moderate bilateral neural right vertebral narrowing.  This has probably progressed in the interval   7.  At the L4-5 disc space level there is mild to moderate canal compromise with bilateral lateral recess narrowing along with mild right neural foraminal narrowing and moderate left neural foraminal narrowing. This level also has probably progressed in the interval.   8.  At the L5-S1 disc space level there is mild left greater than right lateral recess narrowing along with moderate right neural foraminal narrowing and significant left neural foraminal narrowing.The neuroforaminal narrowing has probably progressed in the interval.

## 2021-06-06 NOTE — PROGRESS NOTES
[facility] Hematology and Oncology Progress Note    Patient: Yanna Handy  MRN: [id number]  Date of Service: 02/18/2020        Reason for Visit    Chief Complaint   Patient presents with     HE Cancer       Assessment and Plan  Cancer Staging  Malignant neoplasm of upper-outer quadrant of left female breast (H)  Staging form: Breast, AJCC 7th Edition  - Pathologic stage from 7/13/2017: Stage IIB (T2, N1, cM0) - Signed by Reyna Haro CNP on 4/19/2018  ER Status: Positive  AL Status: Positive  HER2 Status: Negative    Self-discovered ER/AL positive left breast cancer, post lumpectomy / radiation and now on aromatase inhibitor with excellent tolerance and no indication for local or distant recurrence  Monitor at 6 month intervals, annual screening mammogram, per prior plan.      ECOG Performance   ECOG Performance Status: 1     Distress Assessment  Distress Assessment Score: 2    Pain  Currently in Pain: No/denies      Problem List    1. Malignant neoplasm of upper-outer quadrant of left breast in female, estrogen receptor positive (H)  Mammo Screening Bilateral   2. Breast cancer screening by mammogram   Mammo Screening Bilateral      Patient Active Problem List   Diagnosis     Chronic Sinusitis     Hypercholesterolemia     Moderate persistent asthma     Hypertension     Osteoporosis Senile     Osteoarthritis     CAD (coronary artery disease)     GI bleed     Anemia     Insomnia     Wrist fracture     Malignant neoplasm of upper-outer quadrant of left female breast (H)     Aromatase inhibitor use       ______________________________________________________________________________    Interval History  Really delightful and self-sufficient senior still lives in her home, does all her own IADL with some assist from son and daughter in law.  Only significant recent medical event was epidural injection for back pain (and this helped).  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    History of Present Illness as recorded 18 August 2017 by Janak Rodas MD:  T2 N1 M0, stage IIb left breast cancer status post lumpectomy August 2, 2017  Osteopenia     Pathology is reviewed and shows invasive ductal cancer which is strongly ER IA positive and HER-2/rudy negative.  Final pathology shows 2.3 cm, grade 2 tumor with negative margins.  2 sentinel lymph nodes are removed and one shows a micro metastasis.  Reviewed situation with patient and her .  She may be at relatively high risk for recurrence because of lymph node involvement.  However given her age and comorbidities there is probably no benefit to adjuvant chemotherapy in this situation.  Therefore I do not recommend adjuvant chemotherapy for this patient.  I do recommend consideration for adjuvant radiation therapy again because the tumor is more than 2 cm and there is lymph node involvement.  She will be referred for radiation therapy.     I do recommend adjuvant hormonal therapy after completion of radiation therapy.  She does have history of osteopenia and is scheduled for a follow-up bone density in September.  We will evaluate the bone density and then decide on whether to use aromatase inhibitor such as anastrozole or tamoxifen for adjuvant hormonal therapy.  I reviewed rationale for adjuvant hormonal therapy and that it is typically recommended for duration of 5 years.     I discussed typical follow-up for patients with breast cancer.  I explained that apart from mammogram there will be no other lab or radiologic testing that is typically recommended.     Plan: No adjuvant chemotherapy is recommended  Refer for radiation therapy  Follow-up in 2 months with bone density to discuss adjuvant hormonal therapy with either anastrozole or tamoxifen    Pain Status  Currently in Pain: No/denies    Review of Systems    Oncology Nurse Assessment/CMA Intake:  Constitutional  Constitutional (WDL): Exceptions to WDL  Fatigue: Fatigue relieved by rest  Neurosensory  Neurosensory (WDL): Exceptions to WDL  Peripheral Sensory Neuropathy: Asymptomatic, loss of deep tendon reflexes or paresthesia(right arm and hand)  Eye   Eye Disorder (WDL): All eye disorder elements are within defined limits  Ear  Ear Disorder (WDL): All ear disorder elements are within defined limits  Cardiovascular  Cardiovascular (WDL): All cardiovascular elements are within defined limits  Pulmonary  Respiratory (WDL): Exceptions to WDL  Cough: Mild symptoms, nonprescription intervention indicated  Dyspnea: Shortness of breath with minimal exertion, limiting instrumental ADL  Gastrointestinal  Gastrointestinal (WDL): All gastrointestinal elements are within defined limits  Genitourinary  Genitourinary (WDL): All genitourinary elements are within defined limits  Lymphatic  Lymph (WDL): All lymph disorder elements are within defined limits  Musculoskeletal and Connective Tissue  Musculoskeletal and Connetive Tissue Disorders (WDL): Exceptions to WDL  Arthralgia: Mild pain  Integumentary  Integumentary (WDL): All integumentary elements are within defined limits  Patient Coping  Patient Coping: Accepting  Distress Assessment  Distress Assessment Score: 2  Accompanied by  Accompanied by: Alone  Oral Chemo Adherence         Past History  Past Medical History:   Diagnosis Date     Anemia 8/21/2015     Asthma      Breast cancer (H) 07/25/2017    left     CAD (coronary artery disease) 9/15/2014     Chronic bronchitis (H)      Chronic Sinusitis     Created by Conversion  Replacement Utility updated for latest IMO load     Emphysema     Created by Conversion      Fx ankle 8/2000     Fx wrist 12/2006    Right wrist      GI bleed 1/12/2015     Hx of radiation therapy 2017    left     Hypercholesterolemia     Created by Conversion      Hypertension      Insomnia 1/13/2016     Osteoarthritis     Created by Conversion   Replacement Utility updated for latest IMO load     Osteoporosis Senile     Created by Conversion      Squamous Cell Carcinoma Of The Skin     Created by Conversion        Physical Exam    Recent Vitals 2/18/2020   Height -   Weight 155 lbs 3 oz   BSA (m2) 1.73 m2   /78   Pulse 60   Temp 97.6   Temp src 1   SpO2 95   Some recent data might be hidden       General: alert, appears stated age and cooperative  HEENT: Head: Normocephalic, no lesions, without obvious abnormality.  Pharynx: Dental Hygiene adequate. Normal buccal mucosa. Normal pharynx.  Breasts:  Mature scar at 4 o'clock on left breast;  Diffuse and nonspecific thickening of left breast relative to right.  No visible or palpable findings for locally recurrent cancer;  Right breast is normal to inspection and palpation.  Chest: kyphotic with prolonged expiratory phase  Cardiac: regular rate and rhythm, S1, S2 normal, no murmur, click, rub or gallop  Abdomen: abdomen is soft without significant tenderness, masses, organomegaly or guarding  Extremities: normal strength, tone, and muscle mass  no deformities  no erythema, induration, or nodules  Skin: normal  CNS: normal without focal findings, mental status, speech normal, alert and oriented x3, KENRICK and absent patellar reflexes bilaterally  Lymphatics: No abnormally enlarged lymph nodes.    Lab Results    No results found for this or any previous visit (from the past 168 hour(s)).    Imaging    Mr Cervical Spine Without Contrast    Result Date: 1/20/2020  EXAM: MR CERVICAL SPINE WO CONTRAST LOCATION: [facility] DATE/TIME: 1/20/2020 10:40 AM INDICATION: Cervical radiculopathy. Evaluate chronic neck pain, left cervical radiculitis C7 pattern. COMPARISON: CT 06/20/2017. TECHNIQUE: Without IV contrast. FINDINGS: Cervical vertebral body heights are unchanged. Mild height loss along the inferior C7 endplate is unchanged from 2017. The remaining vertebral bodies are unremarkable in height. Marrow  signal is heterogeneous but without specific evidence of a marrow-replacing process. No pathologic bony edema is demonstrated. There is mild C3-C4, C7-T1, T1-T2 and T2-T3 anterolisthesis, unchanged from 2017. Facet alignment is symmetric. No cervical cord signal abnormalities are demonstrated. There is mild periarticular edema adjacent to the bilateral C3-C4 facet joints. The paraspinal soft tissues are unremarkable. The major cervical vascular flow voids appear preserved. Limited images of the intracranial compartment demonstrate atrophic changes of the brain parenchyma. Craniovertebral junction and C1-C2: Mild degenerative change at the anterior C1-C2 articulation. Slightly patulous appearance of the thecal sac. C2-C3: Preserved intervertebral disc height. No significant posterior disc abnormality. Prominent left-sided uncovertebral spurring. Moderate left facet arthropathy. No significant right facet arthropathy. No significant spinal canal stenosis. No significant right neural foraminal stenosis. Moderate left neural foraminal stenosis. C3-C4: Moderate intervertebral disc height loss with broad-based disc-osteophyte complex and bilateral uncovertebral spurring, left greater than right. Mild facet arthropathy. Moderate spinal canal stenosis. No significant right neural foraminal stenosis. Severe left neural foraminal stenosis. C4-C5: Moderate to severe intervertebral disc height loss with broad-based disc-osteophyte complex and bilateral uncovertebral spurring. Mild right and moderate left facet arthropathy. Mild spinal canal stenosis. Moderate right and moderate to severe left neural foraminal stenosis C5-C6: Moderate to severe intervertebral disc height loss with broad-based disc-osteophyte complex and bilateral uncovertebral spurring, left much greater than right. Mild bilateral facet arthropathy. Mild spinal canal stenosis. No significant right neural foraminal stenosis. Severe left neural foraminal stenosis.  C6-C7: Severe intervertebral disc height loss with broad-based disc-osteophyte complex and bilateral uncovertebral spurring. No significant facet arthropathy. Moderate spinal canal stenosis. Moderate to severe right and severe left neural foraminal stenosis. C7-T1: Preserved intervertebral disc height. Shallow disc-osteophyte complex. Moderate left facet arthropathy. No significant spinal canal stenosis or right neural foraminal stenosis. Mild to moderate left neural foraminal stenosis.     1.  No evidence of cervical cord signal abnormalities. 2.  At C2-C3, there is moderate left neural foraminal stenosis. 3.  At C3-C4, there is moderate spinal canal stenosis and severe left neural foraminal stenosis. 4.  At C4-C5, there is moderate right and moderate to severe left neural foraminal stenosis. 5.  At C5-C6, there is severe left neural foraminal stenosis. 6.  At C6-C7, there is moderate spinal canal stenosis, moderate to severe right neural foraminal stenosis, and severe left neural foraminal stenosis. 7.  Additional degenerative changes as above.     Mr Lumbar Spine Without Contrast    Result Date: 1/20/2020  EXAM: MR LUMBAR SPINE WO CONTRAST LOCATION: [facility] DATE/TIME: 1/20/2020 10:40 AM INDICATION: Spondylolisthesis back pain or radiculopathy, > 6 wks Lumbar spondylolisthesis with left lumbar radiculitis L5 pattern. COMPARISON: CT 06/20/2017. MRI 12/30/2014. TECHNIQUE: Without IV contrast. FINDINGS: Nomenclature presumes 5 lumbar type vertebral bodies. Vertebral body heights are unremarkable and unchanged. Marrow signal is heterogeneous but without specific evidence of a marrow-replacing process. There are chronic degenerative endplate signal changes at multiple levels, most notably L2-L3 and T10-T11. No pathologic bony edema is demonstrated. There is mild L5-S1 anterolisthesis and mild retrolisthesis at L2-L3, L1-L2 and T12-L1. There is levoconvex lumbar curvature and dextroconvex thoracolumbar  curvature. The conus terminates at L2. No signal abnormalities of the imaged cord or cauda equina nerve roots are demonstrated. The paraspinal soft tissues are unremarkable. Limited images of the abdominal cavity demonstrate no acute abnormality. There is no evidence of abdominal aortic aneurysm. T9-T10: There is a calcified central disc protrusion versus disc-osteophyte complex which contributes to mild-to-moderate spinal canal stenosis with mild flattening of the ventral cord. There is no overt cord compression. There is mild right and left neural foraminal stenosis. Evaluation of this level is limited by the absence of axial images. T10-T11: There is severe intervertebral disc height loss with a broad-based disc-osteophyte complex, eccentric to the left. There is mild spinal canal stenosis, mild right neural foraminal stenosis and moderate left neural foraminal stenosis. T12-L1: Moderate intervertebral disc height loss with broad-based disc-osteophyte complex and superimposed central disc herniation. There is mild spinal canal stenosis. There is mild bilateral facet arthropathy. There is mild right and mild left neural foraminal stenosis. L1-L2: Mild intervertebral disc height loss with loss of T2 signal suggesting desiccation. There is shallow broad-based disc bulging. There is moderate right and mild left facet arthropathy. There is mild spinal canal stenosis, moderate right neural foraminal stenosis and mild left neural foraminal stenosis. L2-L3: Mild to moderate intervertebral disc height loss with loss of T2 signal suggesting desiccation. There is broad-based disc bulging. There is mild bilateral facet arthropathy. There is mild spinal canal stenosis. Moderate right and mild left neural foraminal stenosis. L3-L4: Mild intervertebral disc height loss with loss of T2 signal suggesting desiccation. There is broad-based disc bulging with contact of both descending L4 nerve roots. There is moderate-to-severe  right and left foraminal stenosis. There is mild spinal canal stenosis. There is moderate right neural foraminal stenosis with contact between the exiting right L3 nerve root and bulging foraminal disc material. There is mild left neural foraminal stenosis. L4-L5: Mild intervertebral disc height loss with partial loss of T2 signal suggesting desiccation. There is circumferential disc bulging. There is moderate-to-severe right and moderate facet arthropathy. There is ligamentum flavum thickening. There is mild spinal canal stenosis. Bulging disc material contacts the descending left L5 nerve root. There is also left lateral recess effacement with displacement of the descending left L5 nerve root. There is no significant right neural foraminal stenosis. There is moderate left neural foraminal stenosis. There is contact between the exiting left L4 nerve root and foraminal disc material. L5-S1: Mild intervertebral disc height loss with loss of T2 signal suggesting desiccation. There is shallow circumferential disc bulging. There is severe bilateral facet arthropathy. There is no significant spinal canal stenosis. There is mild right neural foraminal stenosis. There is severe left neural foraminal stenosis with compression of the exiting left L5 nerve root.     1.  Unremarkable cord and cauda equina nerve roots. 2.  At L5-S1, there is severe left neural foraminal stenosis with compression of the exiting left L5 nerve root. 3.  At L4-L5, a broad-based disc bulge contacts the descending left L5 nerve root. 4.  At L4-L5, there is also left lateral recess effacement with medial displacement of the descending left L5 nerve root. There is moderate left neural foraminal stenosis with contact between the exiting left L4 nerve root and foraminal disc material. 5.  At L3-L4, there is moderate right neural foraminal stenosis with contact between the exiting right L3 nerve root and foraminal disc material. 6.  At L1-L2 and L2-L3,  there is moderate right neural foraminal stenosis. 7.  Additional degenerative changes as above.        Signed by: Lou Parker MD

## 2021-06-06 NOTE — TELEPHONE ENCOUNTER
Patient notified of clinician's message and verbalized understanding. No further questions at this time.      Family

## 2021-06-06 NOTE — PATIENT INSTRUCTIONS - HE
Discussed the importance of core strengthening, ROM, stretching exercises with the patient and how each of these entities is important in decreasing pain.  Explained to the patient that the purpose of physical therapy is to teach the patient a home exercise program.  These exercises need to be performed every day in order to decrease pain and prevent future occurrences of pain.        ~Please call Nurse Navigation line [phone] with any questions or concerns about your treatment plan, if symptoms worsen and you would like to be seen urgently, or if you have problems controlling bladder and bowel function.  ~Follow Up Appointment time slots with Kathrine Sorensen CNP with [facility], are also available at the [facility] location near [facility] on the first and third THURSDAY afternoons of each month.

## 2021-06-06 NOTE — TELEPHONE ENCOUNTER
Refill Approved    Rx renewed per Medication Renewal Policy. Medication was last renewed on 5/23/19    Maxine Ballesteros, [facility] Triage/Med Refill 2/15/2020     Requested Prescriptions   Pending Prescriptions Disp Refills     furosemide (LASIX) 20 MG tablet [Pharmacy Med Name: FUROSEMIDE 20 MG TABLET] 90 tablet 2     Sig: TAKE 1 TABLET BY MOUTH EVERY DAY       Diuretics/Combination Diuretics Refill Protocol  Passed - 2/11/2020  1:38 AM        Passed - Visit with PCP or prescribing provider visit in past 12 months     Last office visit with prescriber/PCP: 12/18/2019 Wilbur Hannah MD OR same dept: 12/18/2019 Wilbur Hannah MD OR same specialty: 12/18/2019 Wilbur Hannah MD  Last physical: 12/14/2018 Last MTM visit: Visit date not found   Next visit within 3 mo: Visit date not found  Next physical within 3 mo: Visit date not found  Prescriber OR PCP: Wilbur Hannah MD  Last diagnosis associated with med order: 1. CAD (coronary artery disease)  - furosemide (LASIX) 20 MG tablet [Pharmacy Med Name: FUROSEMIDE 20 MG TABLET]; TAKE 1 TABLET BY MOUTH EVERY DAY  Dispense: 90 tablet; Refill: 2    If protocol passes may refill for 12 months if within 3 months of last provider visit (or a total of 15 months).             Passed - Serum Potassium in past 12 months      Lab Results   Component Value Date    Potassium 4.2 12/18/2019             Passed - Serum Sodium in past 12 months      Lab Results   Component Value Date    Sodium 136 12/18/2019             Passed - Blood pressure on file in past 12 months     BP Readings from Last 1 Encounters:   02/05/20 166/60             Passed - Serum Creatinine in past 12 months      Creatinine   Date Value Ref Range Status   12/18/2019 0.82 0.60 - 1.10 mg/dL Final

## 2021-06-06 NOTE — TELEPHONE ENCOUNTER
Controlled Substance Refill Request  Medication Name:   Requested Prescriptions     Pending Prescriptions Disp Refills     ALPRAZolam (XANAX) 0.25 MG tablet 30 tablet 0     Sig: Take 1 tablet (0.25 mg total) by mouth at bedtime as needed for sleep.     Date Last Fill: 02/18/20  Is patient out of medication?:  Yes  Patient notified refills processed within 3 business days:  Yes  Requested Pharmacy: [facility]  Submit electronically to pharmacy  Controlled Substance Agreement on file:   Encounter-Level CSA Scan Date:    There are no encounter-level csa scan date.        Last office visit:  02/18/20

## 2021-06-07 NOTE — PROGRESS NOTES
"Yanna Handy is a 89 y.o. female who is being evaluated via a billable telephone visit.      The patient has been notified of following:     \"This telephone visit will be conducted via a call between you and your physician/provider. We have found that certain health care needs can be provided without the need for a physical exam.  This service lets us provide the care you need with a short phone conversation.  If a prescription is necessary we can send it directly to your pharmacy.  If lab work is needed we can place an order for that and you can then stop by our lab to have the test done at a later time.    Telephone visits are billed at different rates depending on your insurance coverage. During this emergency period, for some insurers they may be billed the same as an in-person visit.  Please reach out to your insurance provider with any questions.    If during the course of the call the physician/provider feels a telephone visit is not appropriate, you will not be charged for this service.\"    Patient has given verbal consent to a Telephone visit? Yes    Patient would like to receive their AVS by AVS Preference: Rodolfo.    Additional provider notes:   PULMONARY PROGRESS NOTE      HPI:  Yanna Handy is a 89 y.o. female followed in the Pulmonary clinic for reactive airways disease. She  history of chronic rhinosinusitis with asthma seen previously at the [facility] and was previously intranasal antibiotics.  She has been seeing me for the last several years for her asthma and had been doing fairly well.  She has been doing fairly well since her last clinic visit with me in states that due to the coronavirus outbreak and the fact that she has hardly been getting out of the house her coughing has resolved and she has had no shortness of breath.  She continues to have back and neck pain and states that although the neck pain has improved with application of a hot pack her back and pain has extended to " her leg and that her sciatica has been acting up.  She was evaluated by the [facility] and received an epidural steroid injection which provided temporary relief for about 4 weeks but describes that for the last 3 weeks she has been experiencing more discomfort again.  She was prescribed 300 mg 3 times daily of gabapentin but thought that this was not astronomical dose to take and had been using 100 mg at night and has recently increase this to 100 mg at night and 100 mg in the morning to help with her discomfort.  She denies fevers, chills, night sweats, change in her weight, chest pain, chest tightness or wheezing.  In the past 4 weeks, how much of the time did your asthma keep you from getting as much done at work, school, or at home?: None of the time  During the past 4 weeks, how often have you had shortness of breath?: Not at all  During the past 4 weeks, how often did your asthma symptoms (wheezing, coughing, shortness of breath, chest tightness or pain) wake you up at night or earlier in the morning?: Not at all  During the past 4 weeks, how often have you used your rescue inhaler or nebulizer medication (such as albuterol)?: 1 or 2 times per day  How would you rate your asthma control during the past 4 weeks?: Completely controlled  ACT Total Score: 22  In the past 12 months, have you visited the emergency room due to your asthma?: No  In the past 12 months, have you been hospitalized due to your asthma?: No        Current Outpatient Medications on File Prior to Visit   Medication Sig Dispense Refill     acetaminophen (TYLENOL) 650 MG CR tablet Take 650 mg by mouth every 8 (eight) hours as needed for pain.       anastrozole (ARIMIDEX) 1 mg tablet TAKE ONE TABLET BY MOUTH EVERY DAY 90 tablet 3     ascorbic acid (VITAMIN C) 1000 MG tablet Take 1,000 mg by mouth every morning.        aspirin 81 MG EC tablet Take 81 mg by mouth bedtime.       calcium citrate-vitamin D (CITRACAL+D) 315-200 mg-unit per  tablet Take 1 tablet by mouth bedtime.       ciprofloxacin HCl (CIPRO) 250 MG tablet Take 0.5 tablets (125 mg total) by mouth daily. 45 tablet 1     cyanocobalamin (VITAMIN B-12) 50 mcg tablet Take 50 mcg by mouth every morning.        DOCOSAHEXANOIC ACID/EPA (FISH OIL ORAL) Take 2 g by mouth daily.       fluticasone furoate-vilanteroL (BREO ELLIPTA) 200-25 mcg/dose DsDv inhaler Inhale 1 puff daily. Rinse mouth with water, gargle,spit after each use 180 each 3     furosemide (LASIX) 20 MG tablet TAKE 1 TABLET BY MOUTH EVERY DAY 90 tablet 3     gabapentin (NEURONTIN) 100 MG capsule Take 300 mg by mouth 3 (three) times a day. Follow Gabapentin Dosing chart given (Patient taking differently: Take 200 mg by mouth at bedtime. 2 caps at bedtime as 2/24/2020) 270 capsule 3     LACTOBACILLUS ACIDOPHILUS (ACIDOPHILUS ORAL) Take 1 tablet by mouth every morning.        losartan (COZAAR) 50 MG tablet Take 1 tablet (50 mg total) by mouth daily. 90 tablet 3     magnesium 250 mg Tab Take 250 mg by mouth every morning.       metoprolol tartrate (LOPRESSOR) 100 MG tablet Take 1 tablet (100 mg total) by mouth 2 (two) times a day. 180 tablet 3     OXYGEN-AIR DELIVERY SYSTEMS [facility] Use 2 L As Directed. 2L at bedtime  [employer]       rosuvastatin (CRESTOR) 10 MG tablet Take 1 tablet (10 mg total) by mouth at bedtime. Take with a 5 mg pill for a total of 15 mg nightly (Patient taking differently: Take 10 mg by mouth at bedtime. Take with a 5 mg pill for a total of 10 mg nightly) 90 tablet 3     umeclidinium (INCRUSE ELLIPTA) 62.5 mcg/actuation DsDv inhaler Inhale 1 puff daily. 3 each 3     [DISCONTINUED] rosuvastatin (CRESTOR) 5 MG tablet Take 1 tablet a day in addition to a 10 mg tablet for a total of 15 mg daily dose. 90 tablet 3     ipratropium-albuterol (DUO-NEB) 0.5-2.5 mg/3 mL nebulizer Take 3 mL by nebulization 2 (two) times a day as needed. 1080 mL 3     nitroglycerin (NITROSTAT) 0.4 MG SL tablet Place 1 tablet (0.4 mg total) under  the tongue every 5 (five) minutes as needed for chest pain. 25 tablet 3     [DISCONTINUED] ALPRAZolam (XANAX) 0.25 MG tablet Take 1 tablet (0.25 mg total) by mouth at bedtime as needed for sleep. 30 tablet 0     Current Facility-Administered Medications on File Prior to Visit   Medication Dose Route Frequency Provider Last Rate Last Dose     denosumab 60 mg (PROLIA 60 mg/ml)  60 mg Subcutaneous Q6 Months Wilbur Hannah MD   60 mg at 12/18/19 1343     Allergies   Allergen Reactions     Alendronate Nausea And Vomiting     Metoclopramide Hcl Anxiety     Rofecoxib Diarrhea and Nausea Only     Lisinopril Cough     Risedronate      Sulfa (Sulfonamide Antibiotics) Anaphylaxis       SIX MINUTE WALK TEST / HOME O2 EVALUATION  6/9/15  (unchanged from prior visit):  SpO2 at rest on RA 95%   SpO2 after walking 6 minutes on RA 95%   Distance covered 672 feet   Recovery phase, SpO2 after 1 minute rest on RA was 93%     Impression:   No desaturation with activities.   No need for O2 supplementation.    CT Chest W/o Contrast 9/4/19:  1.  Bilateral lower lobe atelectasis and/or fibrosis has increased from the prior study.  2.  Scattered pulmonary nodules are likely infectious or inflammatory in nature and have not significantly changed from the prior study.  3.  There is debris in the airways. There are a few areas of mucous plugging.  4.  Cardiomegaly.  5.  Atherosclerotic disease including coronary artery calcification.     NM Stress Test 6/14/18:    When compared to the images of 10/10/2016, there has been no significant change.    Lexiscan stress ECG is negative for inducible myocardial ischemia.    Lexiscan nuclear study is negative for inducible myocardial ischemia or infarction.    Normal left ventricular size, wall motion and function. The calculated left ventricular ejection fraction is 72%.    CXR 5/5/16  (unchanged from prior visit):  Mild atelectasis or fibrosis in the lung bases. Upper lungs are clear. Mild  cardiomegaly with normal pulmonary vascularity. Scoliosis of the thoracolumbar  spine.      IMPRESSION:  85-year-old female with a history of moderate persistent asthma, coronary artery disease status post stenting and some fibrotic changes noted on imaging studies presents to virtual clinic today for follow-up of her asthma and complains of pain from sciatica.  For the present we will recommend:    1. Asthma moderate persistent, with preserved spirometry: Has been doing quite well.    Continue fluticasone/Vilanterol 1 puff daily, she knows to gargle after using this.    Continue Incuse Ellipta.    I have explained to the patient that she should only use the Combivent for rescue.    Reminded her to continue to use her DuoNeb's with a flutter valve twice a day to help expectorate his secretions when she needs.  Omeprazole 40mg daily.  2. Overnight hypoxia: Noted on overnight oximetry. Is continuing to use supplemental oxygen overnight.   3. Cervical spine pain with left arm tingling up to her fingertips: This is improved significantly with application of a hot pack.  4. Sciatica: Received an epidural injection for the same but has had more pain over the last 3 weeks.    Recommended that she increase her gabapentin to 100 mg 3 times daily.  5. Follow-up:2 months.    Lisa Goodmanqvi  Pulmonary and Critical Care  [id number]    Phone call duration: 10 minutes

## 2021-06-09 NOTE — PROGRESS NOTES
Assessment and Plan:       1. Routine general medical examination at a health care facility      2. Osteoporosis Senile  Due for Prolia today. She is on Arimidex for breast cancer. No new falls and fractures.  Patient was educated on safety of Prolia utilizing Patient Counseling Chart for Healthcare Providers, as outlined by the Prolia REMS progam.   - Urinalysis-UC if Indicated  - Vitamin D, Total (25-Hydroxy)  - Lipid Profile  - HM2(CBC w/o Differential)  - Comprehensive Metabolic Panel    3. Moderate persistent asthma without complication  Stable. Had TV with Dr Linares on 4/22/20:  1. Asthma moderate persistent, with preserved spirometry: Has been doing quite well.    Continue fluticasone/Vilanterol 1 puff daily, she knows to gargle after using this.    Continue Incuse Ellipta.    I have explained to the patient that she should only use the Combivent for rescue.    Reminded her to continue to use her DuoNeb's with a flutter valve twice a day to help expectorate his secretions when she needs.    Omeprazole 40mg daily.  2. Overnight hypoxia: Noted on overnight oximetry. Is continuing to use supplemental oxygen overnight  - Urinalysis-UC if Indicated  - Vitamin D, Total (25-Hydroxy)  - Lipid Profile  - HM2(CBC w/o Differential)  - Comprehensive Metabolic Panel    4. Coronary artery disease involving native coronary artery of native heart without angina pectoris  Stable, no chest pain. Saw Dr Javier in February.  - Urinalysis-UC if Indicated  - Vitamin D, Total (25-Hydroxy)  - Lipid Profile  - HM2(CBC w/o Differential)  - Comprehensive Metabolic Panel    5. Malignant neoplasm of upper-outer quadrant of left breast in female, estrogen receptor positive (H)  Seeing Oncology, on Arimidex. Has scheduled mammogram in July.  - Urinalysis-UC if Indicated  - Vitamin D, Total (25-Hydroxy)  - Lipid Profile  - HM2(CBC w/o Differential)  - Comprehensive Metabolic Panel    6. Hypercholesterolemia  On statin.  - Urinalysis-UC  if Indicated  - Vitamin D, Total (25-Hydroxy)  - Lipid Profile  - HM2(CBC w/o Differential)  - Comprehensive Metabolic Panel    7. Hypertension  BP higher today and when she saw Cardiology. I will add 50 mg losartan in the morning. She will continue metoprolol, furosemide and night does losartan. She will check BP at home. We will do TV in 2 weeks.  - Urinalysis-UC if Indicated  - Vitamin D, Total (25-Hydroxy)  - Lipid Profile  - HM2(CBC w/o Differential)  - Comprehensive Metabolic Panel  - losartan (COZAAR) 50 MG tablet; Take 1 tablet (50 mg total) by mouth 2 (two) times a day.  Dispense: 180 tablet; Refill: 3     The patient's current medical problems were reviewed.    I have had an Advance Directives discussion with the patient.  The following health maintenance schedule was reviewed with the patient and provided in printed form in the after visit summary:   Health Maintenance   Topic Date Due     ASTHMA ACTION PLAN  04/[birth year]/1931     ZOSTER VACCINES (2 of 3) 07/20/2012     MEDICARE ANNUAL WELLNESS VISIT  12/14/2019     TD 18+ HE  10/04/2020     FALL RISK ASSESSMENT  08/12/2020     ASTHMA CONTROL TEST  04/22/2021     DXA SCAN  10/08/2021     ADVANCE CARE PLANNING  12/14/2023     PNEUMOCOCCAL IMMUNIZATION 65+ LOW/MEDIUM RISK  Completed     INFLUENZA VACCINE RULE BASED  Completed        Subjective:   Chief Complaint: Yanna Handy is an 89 y.o. female here for an Annual Wellness visit.   HPI:  Acute and chronic medical problems discussed as above.    Review of Systems:    Please see above.  The rest of the review of systems are negative for all systems.    Patient Care Team:  Wilbur Hannah MD as PCP - Mindy Stephens MD as Physician (Hematology and Oncology)  Lashonda Taylor RN as Oncology Nurse Navigator (Hematology and Oncology)  Wilbur Hannah MD as Assigned PCP     Patient Active Problem List   Diagnosis     Chronic Sinusitis     Hypercholesterolemia     Moderate persistent  "asthma     Hypertension     Osteoporosis Senile     Osteoarthritis     CAD (coronary artery disease)     GI bleed     Anemia     Insomnia     Wrist fracture     Malignant neoplasm of upper-outer quadrant of left female breast (H)     Aromatase inhibitor use     Past Medical History:   Diagnosis Date     Anemia 8/21/2015     Asthma      Breast cancer (H) 07/25/2017    left     CAD (coronary artery disease) 9/15/2014     Chronic bronchitis (H)      Chronic Sinusitis     Created by Conversion  Replacement Utility updated for latest IMO load     Emphysema     Created by Conversion      Fx ankle 8/2000     Fx wrist 12/2006    Right wrist      GI bleed 1/12/2015     Hx of radiation therapy 2017    left     Hypercholesterolemia     Created by Conversion      Hypertension      Insomnia 1/13/2016     Osteoarthritis     Created by Conversion  Replacement Utility updated for latest IMO load     Osteoporosis Senile     Created by Conversion      Squamous Cell Carcinoma Of The Skin     Created by Conversion       Past Surgical History:   Procedure Laterality Date     APPENDECTOMY  1971     BREAST LUMPECTOMY Left 2017    [place]     BREAST SURGERY  08/02/2017    left lumpectomy, Dr. Pope     COLONOSCOPY N/A 1/15/2015    Procedure: COLONOSCOPY with biopsy of polyp;  Surgeon: Joel Coppola MD;  Location: [facility];  Service:      CORONARY STENT PLACEMENT      \"I had 2 stent placements\"     EYE SURGERY      Cataracts     HYSTERECTOMY  1971     JOINT REPLACEMENT  1/2003    Right TKA     KNEE ARTHROSCOPY Left 10/2004     OOPHORECTOMY  1971    One removed     [place] REMOVAL OF TONSILS,<11 Y/O      Description: Tonsillectomy;  Recorded: 02/12/2009;  Comments: 1941     [place] REVISE SECONDARY VARICOSITY      Description: Varicose Vein Ligation;  Recorded: 02/12/2009;  Comments: 1988     [place] TOTAL ABDOM HYSTERECTOMY      Description: Hysterectomy;  Recorded: 02/12/2009;  Comments: 1971     SINUS SURGERY      \"I had 3 sinus surgeries.\"    "   Family History   Problem Relation Age of Onset     Heart attack Father      Breast cancer Maternal Aunt 55     Lung cancer Brother 65     Colon cancer Maternal Grandmother 90     Lung cancer Maternal Grandfather 65     Brain cancer Son 25     Rectal cancer Brother 64      Social History     Socioeconomic History     Marital status:      Spouse name: Not on file     Number of children: Not on file     Years of education: Not on file     Highest education level: Not on file   Occupational History     Not on file   Social Needs     Financial resource strain: Not on file     Food insecurity     Worry: Not on file     Inability: Not on file     Transportation needs     Medical: Not on file     Non-medical: Not on file   Tobacco Use     Smoking status: Never Smoker     Smokeless tobacco: Never Used   Substance and Sexual Activity     Alcohol use: Yes     Comment: Wine occassionally     Drug use: No     Sexual activity: Never   Lifestyle     Physical activity     Days per week: Not on file     Minutes per session: Not on file     Stress: Not on file   Relationships     Social connections     Talks on phone: Not on file     Gets together: Not on file     Attends Mosque service: Not on file     Active member of club or organization: Not on file     Attends meetings of clubs or organizations: Not on file     Relationship status: Not on file     Intimate partner violence     Fear of current or ex partner: Not on file     Emotionally abused: Not on file     Physically abused: Not on file     Forced sexual activity: Not on file   Other Topics Concern     Not on file   Social History Narrative     Not on file      Current Outpatient Medications   Medication Sig Dispense Refill     acetaminophen (TYLENOL) 650 MG CR tablet Take 650 mg by mouth every 8 (eight) hours as needed for pain.       anastrozole (ARIMIDEX) 1 mg tablet TAKE ONE TABLET BY MOUTH EVERY DAY 90 tablet 3     ascorbic acid (VITAMIN C) 1000 MG tablet Take  1,000 mg by mouth every morning.        aspirin 81 MG EC tablet Take 81 mg by mouth bedtime.       calcium citrate-vitamin D (CITRACAL+D) 315-200 mg-unit per tablet Take 1 tablet by mouth bedtime.       ciprofloxacin HCl (CIPRO) 250 MG tablet Take 0.5 tablets (125 mg total) by mouth daily. 45 tablet 1     cyanocobalamin (VITAMIN B-12) 50 mcg tablet Take 50 mcg by mouth every morning.        DOCOSAHEXANOIC ACID/EPA (FISH OIL ORAL) Take 2 g by mouth daily.       fluticasone furoate-vilanteroL (BREO ELLIPTA) 200-25 mcg/dose DsDv inhaler Inhale 1 puff daily. Rinse mouth with water, gargle,spit after each use 180 each 3     furosemide (LASIX) 20 MG tablet TAKE 1 TABLET BY MOUTH EVERY DAY 90 tablet 3     gabapentin (NEURONTIN) 100 MG capsule Take 300 mg by mouth 3 (three) times a day. Follow Gabapentin Dosing chart given (Patient taking differently: Take 200 mg by mouth at bedtime. 2 caps at bedtime as 2/24/2020) 270 capsule 3     ipratropium-albuterol (DUO-NEB) 0.5-2.5 mg/3 mL nebulizer Take 3 mL by nebulization 2 (two) times a day as needed. 1080 mL 3     LACTOBACILLUS ACIDOPHILUS (ACIDOPHILUS ORAL) Take 1 tablet by mouth every morning.        losartan (COZAAR) 50 MG tablet Take 1 tablet (50 mg total) by mouth daily. 90 tablet 3     magnesium 250 mg Tab Take 250 mg by mouth every morning.       metoprolol tartrate (LOPRESSOR) 100 MG tablet Take 1 tablet (100 mg total) by mouth 2 (two) times a day. 180 tablet 3     nitroglycerin (NITROSTAT) 0.4 MG SL tablet Place 1 tablet (0.4 mg total) under the tongue every 5 (five) minutes as needed for chest pain. 25 tablet 3     OXYGEN-AIR DELIVERY SYSTEMS [facility] [place] Use 2 L As Directed. 2L at bedtime  [employer]       rosuvastatin (CRESTOR) 10 MG tablet Take 1 tablet (10 mg total) by mouth at bedtime. Take with a 5 mg pill for a total of 15 mg nightly (Patient taking differently: Take 10 mg by mouth at bedtime. Take with a 5 mg pill for a total of 10 mg nightly) 90 tablet 3     umeclidinium  (INCRUSE ELLIPTA) 62.5 mcg/actuation DsDv inhaler Inhale 1 puff daily. 3 each 3     Current Facility-Administered Medications   Medication Dose Route Frequency Provider Last Rate Last Dose     denosumab 60 mg (PROLIA 60 mg/ml)  60 mg Subcutaneous Q6 Months Wilbur Hannah MD   60 mg at 12/18/19 1343      Objective:   Vital Signs:   Visit Vitals  /70   Pulse 66   Ht 5' (1.524 m)   Wt 159 lb 9.6 oz (72.4 kg)   LMP 07/11/1971   SpO2 90%   BMI 31.17 kg/m           VisionScreening:  No exam data present     PHYSICAL EXAM  General Appearance: Alert, cooperative, no distress, appears stated age.  Head: Normocephalic, without obvious abnormality, atraumatic  Eyes: PERRL, conjunctiva/corneas clear, EOM's intact  Ears: Normal TM's and external ear canals, both ears  Nose: Nares normal, septum midline,mucosa normal, no drainage  Throat: Lips, mucosa, and tongue normal; teeth and gums normal  Neck: Supple, symmetrical, trachea midline, no adenopathy;  thyroid: not enlarged, symmetric, no tenderness/mass/nodules; no carotid bruit or JVD  Back: Symmetric, no curvature, ROM normal, no CVA tenderness.  Lungs: Clear to auscultation bilaterally, respirations unlabored.  Breasts: No breast masses, tenderness, asymmetry, or nipple discharge.  Heart: Regular rate and rhythm, S1 and S2 normal, no murmur, rub, or gallop.  Abdomen: Soft, non-tender, bowel sounds active all four quadrants,  no masses, no organomegaly.  Pelvic:not done  Extremities: Extremities normal, atraumatic, no cyanosis or edema.  Skin: Skin color, texture, turgor normal, no rashes or lesions.  Lymph nodes: Cervical, supraclavicular, and axillary nodes normal.  Neurologic: No focal neurological findings.      Assessment Results 6/18/2020   Activities of Daily Living No help needed   Instrumental Activities of Daily Living No help needed   Mini Cog Total Score 5   Some recent data might be hidden     A Mini-Cog score of 0-2 suggests the possibility of dementia,  score of 3-5 suggests no dementia    Identified Health Risks:     The patient was counseled and encouraged to consider modifying their diet and eating habits. She was provided with information on recommended healthy diet options.  Information on urinary incontinence and treatment options given to patient.  Patient's advanced directive was discussed and I am comfortable with the patient's wishes.

## 2021-06-09 NOTE — TELEPHONE ENCOUNTER
RN cannot approve Refill Request    RN can NOT refill this medication med is not covered by policy/route to provider     . Last office visit: 12/18/2019 Wilbur Hannah MD Last Physical: 6/18/2020 Last MTM visit: Visit date not found Last visit same specialty: 12/18/2019 Wilbur Hannah MD.  Next visit within 3 mo: Visit date not found  Next physical within 3 mo: Visit date not found      Xochitl Keen, [facility] Triage/Med Refill 6/29/2020    Requested Prescriptions   Pending Prescriptions Disp Refills     ciprofloxacin HCl (CIPRO) 250 MG tablet [Pharmacy Med Name: Ciprofloxacin HCl Oral Tablet 250 MG] 45 tablet 0     Sig: Take 1/2 tablet by mouth once daily       There is no refill protocol information for this order

## 2021-06-09 NOTE — PROGRESS NOTES
"Yanna Handy is a 89 y.o. female who is being evaluated via a billable telephone visit.      The patient has been notified of following:     \"This telephone visit will be conducted via a call between you and your physician/provider. We have found that certain health care needs can be provided without the need for a physical exam.  This service lets us provide the care you need with a short phone conversation.  If a prescription is necessary we can send it directly to your pharmacy.  If lab work is needed we can place an order for that and you can then stop by our lab to have the test done at a later time.    Telephone visits are billed at different rates depending on your insurance coverage. During this emergency period, for some insurers they may be billed the same as an in-person visit.  Please reach out to your insurance provider with any questions.    If during the course of the call the physician/provider feels a telephone visit is not appropriate, you will not be charged for this service.\"    Patient has given verbal consent to a Telephone visit? Yes    What phone number would you like to be contacted at? [phone]    Patient would like to receive their AVS by AVS Preference: Mail a copy.        Assessment/Plan:  1. Chronic right-sided low back pain without sciatica  Patient has chronic back pain and last steroid epidural injection was in February. The pain is bad now again, especially in this last week, mostly in her right lower back, but not going down to her leg. It is worse in the morning and slightly better after she moves. She is taking Tylenol arthritis 650 mg as needed and gabapentin 300 mg three times a day. We discussed increasing Tylenol dose to three times a day and adding 1-2 more gabapentin pills per day for the pain management. She will call Spine clinic to schedule epidural injection again.    2. Hypertension  Lara is taking losartan 50 mg two times a day, metoprolol 100 mg two times a " day and furosemide 20 mg daily. BP was around 120-130s most of the time, but last night went up to 167/73. She has a lot of back pain again and I think that contributed to occasional BP elevation. We will not change medications today. She will continue checking at home and will call me if BP is high.    F/u in 3 months.        Phone call duration:  14 minutes    Barbara Briggs

## 2021-06-09 NOTE — PROGRESS NOTES
"Yanna Handy is a 89 y.o. female who is being evaluated via a billable telephone visit.      The patient has been notified of following:     \"This telephone visit will be conducted via a call between you and your physician/provider. We have found that certain health care needs can be provided without the need for a physical exam.  This service lets us provide the care you need with a short phone conversation.  If a prescription is necessary we can send it directly to your pharmacy.  If lab work is needed we can place an order for that and you can then stop by our lab to have the test done at a later time.    Telephone visits are billed at different rates depending on your insurance coverage. During this emergency period, for some insurers they may be billed the same as an in-person visit.  Please reach out to your insurance provider with any questions.    If during the course of the call the physician/provider feels a telephone visit is not appropriate, you will not be charged for this service.\"    Patient has given verbal consent to a Telephone visit? Yes    What phone number would you like to be contacted at? [phone]    Patient would like to receive their AVS by AVS Preference: Mail a copy.        Assessment/Plan:  1. Hypertension  Lara is doing well. No acute problems. Staying at home and family is delivering food and helping with everything she needs.  When I saw her 2 weeks ago, we added 50 mg losartan in the morning. SHe is taking now losartan 50 mg two times a day, metoprolol 100 mg two times a day and furosemide 20 mg daily. She reports improvement in her BP and most of her numbers are < 150/80, pulse in 50s-60s.  We will continue same medications and she will continue checking BP.  I will schedule her for another TV in 2-3 weeks.        Phone call duration:  6 minutes    Barbara Briggs    "

## 2021-06-09 NOTE — TELEPHONE ENCOUNTER
Refill Approved    Rx renewed per Medication Renewal Policy. Medication was last renewed on 7/21/19.    Xochitl Keen, [facility] Triage/Med Refill 7/14/2020     Requested Prescriptions   Pending Prescriptions Disp Refills     metoprolol tartrate (LOPRESSOR) 100 MG tablet [Pharmacy Med Name: Metoprolol Tartrate Oral Tablet 100 MG] 180 tablet 0     Sig: TAKE ONE TABLET BY MOUTH TWICE DAILY       Beta-Blockers Refill Protocol Passed - 7/14/2020  2:21 PM        Passed - PCP or prescribing provider visit in past 12 months or next 3 months     Last office visit with prescriber/PCP: 12/18/2019 Wilbur Hannah MD OR same dept: 12/18/2019 Wilbur Hannah MD OR same specialty: 12/18/2019 Wilbur Hannah MD  Last physical: 6/18/2020 Last MTM visit: Visit date not found   Next visit within 3 mo: Visit date not found  Next physical within 3 mo: Visit date not found  Prescriber OR PCP: Wilbur Hannah MD  Last diagnosis associated with med order: 1. Essential hypertension with goal blood pressure less than 140/90  - metoprolol tartrate (LOPRESSOR) 100 MG tablet [Pharmacy Med Name: Metoprolol Tartrate Oral Tablet 100 MG]; TAKE ONE TABLET BY MOUTH TWICE DAILY   Dispense: 180 tablet; Refill: 0    If protocol passes may refill for 12 months if within 3 months of last provider visit (or a total of 15 months).             Passed - Blood pressure filed in past 12 months     BP Readings from Last 1 Encounters:   06/18/20 173/70

## 2021-06-10 NOTE — PROGRESS NOTES
[facility] Hematology and Oncology Progress Note    Patient: Yanna Handy  MRN: [id number]  Date of Service: 08/18/2020        Reason for Visit    Chief Complaint   Patient presents with     Breast Cancer     HE Cancer       Assessment and Plan  Cancer Staging  Malignant neoplasm of upper-outer quadrant of left female breast (H)  Staging form: Breast, AJCC 7th Edition  - Pathologic stage from 7/13/2017: Stage IIB (T2, N1, cM0) - Signed by Reyna Haro CNP on 4/19/2018  ER Status: Positive  HI Status: Positive  HER2 Status: Negative      1. Breast cancer, stage IIb: pt had lumpectomy in August 2017. Then had radiation. Did fine with them. Started anastrozole in October 2017. She is tolerating very well. She will continue the plan. She will return in 6 months with MD visit. Normal mammo in July. will continue annually.  No clinical evidence of recurrence.     2. Osteopenia: being managed by Dr. Hannah. She gets prolia every 6 months. Continue calcium and vitamin d. Continue to monitor closely while on anastrozole. Last DEXA was normal in October 2019. Will repeat in October 2021      ECOG Performance   ECOG Performance Status: 1     Distress Assessment  Distress Assessment Score: 2    Pain  Currently in Pain: Yes  Pain Score (Initial OR Reassessment): 0  Location: low back, and neck      Problem List    No diagnosis found.     ______________________________________________________________________________    History of Present Illness    Measurable disease: None postoperatively     Current therapy: Anastrozole 1 mg p.o. daily started October 20, 2017     Treatment history: Lumpectomy in August 2017  Adjuvant radiation to 40-56 cGy in 16 fractions completed October 5, 2017    Interim History:   Pt is here today for a 6 month follow up visit. She is doing quite well and really has no complaints. Denies any issues with anastrozole.     Pain Status  Currently in Pain: Yes    Review of  "Systems    Constitutional  Constitutional (WDL): Exceptions to WDL  Fatigue: Fatigue relieved by rest  Neurosensory  Neurosensory (WDL): Exceptions to WDL  Peripheral Motor Neuropathy: Asymptomatic, clinical or diagnostic observations only, intervention not indicated  Ataxia: Asymptomatic, clinical or diagnostic observations only, intervention not indicated  Peripheral Sensory Neuropathy: Asymptomatic, loss of deep tendon reflexes or paresthesia(left arm due to \"back issue\")  Eye   Eye Disorder (WDL): Assessment not pertinent to visit  Ear  Ear Disorder (WDL): Assessment not pertinent to visit  Cardiovascular  Cardiovascular (WDL): Exceptions to WDL  Edema: Yes  Edema Limbs: 5 - 10% inter-limb discrepancy in volume or circumference at point of greatest visible difference, swelling or obscuration of anatomic architecture on close inspection(mild, occ)  Pulmonary  Respiratory (WDL): Exceptions to WDL(recent asthma exacerbation)  Cough: Mild symptoms, nonprescription intervention indicated(\"asthmatic cough\" with exertion)  Dyspnea: Shortness of breath with moderate exertion  Gastrointestinal  Gastrointestinal (WDL): All gastrointestinal elements are within defined limits  Genitourinary  Genitourinary (WDL): All genitourinary elements are within defined limits  Lymphatic  Lymph (WDL): Assessment not pertinent to visit  Musculoskeletal and Connective Tissue  Musculoskeletal and Connetive Tissue Disorders (WDL): Exceptions to WDL  Arthralgia: Mild pain  Bone Pain: Mild pain  Muscle Weakness : Symptomatic, perceived by patient but not evident on physical exam  Myalgia: Mild pain  Integumentary  Integumentary (WDL): All integumentary elements are within defined limits  Patient Coping  Patient Coping: Open/discussion  Distress Assessment  Distress Assessment Score: 2  Accompanied by     Oral Chemo Adherence       Past History  Past Medical History:   Diagnosis Date     Anemia 8/21/2015     Asthma      Breast cancer (H) " 07/25/2017    left     CAD (coronary artery disease) 9/15/2014     Chronic bronchitis (H)      Chronic Sinusitis     Created by Conversion  Replacement Utility updated for latest IMO load     Emphysema     Created by Conversion      Fx ankle 8/2000     Fx wrist 12/2006    Right wrist      GI bleed 1/12/2015     Hx of radiation therapy 2017    left     Hypercholesterolemia     Created by Conversion      Hypertension      Insomnia 1/13/2016     Osteoarthritis     Created by Conversion  Replacement Utility updated for latest IMO load     Osteoporosis Senile     Created by Conversion      Squamous Cell Carcinoma Of The Skin     Created by Conversion        PHYSICAL EXAM  /78   Pulse (!) 54   Temp 98.5  F (36.9  C) (Oral)   Wt 160 lb 8 oz (72.8 kg)   LMP 07/11/1971   SpO2 96%   BMI 31.35 kg/m      GENERAL: no acute distress. Cooperative in conversation. Here alone due to visitor restrictions. Mask on  RESP: Regular respiratory rate. No expiratory wheezes   MUSCULOSKELETAL: no bilateral leg swelling  NEURO: non focal. Alert and oriented x3.   PSYCH: within normal limits. No depression or anxiety.  SKIN: exposed skin is dry intact.   BREAST: deferred since she just had mammo.           Lab Results    Recent Results (from the past 168 hour(s))   ECG 12 lead nursing unit performed   Result Value Ref Range    SYSTOLIC BLOOD PRESSURE 202 mmHg    DIASTOLIC BLOOD PRESSURE 95 mmHg    VENTRICULAR RATE 55 BPM    ATRIAL RATE 55 BPM    P-R INTERVAL 194 ms    QRS DURATION 84 ms    Q-T INTERVAL 436 ms    QTC CALCULATION (BEZET) 417 ms    P Axis 55 degrees    R AXIS -18 degrees    T AXIS 3 degrees    MUSE DIAGNOSIS       Sinus bradycardia  Nonspecific ST abnormality  Abnormal ECG  When compared with ECG of 29-AUG-2017 09:37,  Nonspecific T wave abnormality no longer evident in Anterior leads  Confirmed by SEE ED PROVIDER NOTE FOR, ECG INTERPRETATION ([id number]),  LU MOHAMUD ([id number]) on 8/13/2020 8:20:01 AM     Basic  Metabolic Panel   Result Value Ref Range    Sodium 139 136 - 145 mmol/L    Potassium 4.9 3.5 - 5.0 mmol/L    Chloride 107 98 - 107 mmol/L    CO2 23 22 - 31 mmol/L    Anion Gap, Calculation 9 5 - 18 mmol/L    Glucose 97 70 - 125 mg/dL    Calcium 8.9 8.5 - 10.5 mg/dL    BUN 12 8 - 28 mg/dL    Creatinine 0.81 0.60 - 1.10 mg/dL    GFR MDRD Af Amer >60 >60 mL/min/1.73m2    GFR MDRD Non Af Amer >60 >60 mL/min/1.73m2   Troponin I   Result Value Ref Range    Troponin I 0.01 0.00 - 0.29 ng/mL   BNP(B-type Natriuretic Peptide)   Result Value Ref Range     (H) 0 - 167 pg/mL   HM1 (CBC with Diff)   Result Value Ref Range    WBC 7.6 4.0 - 11.0 thou/uL    RBC 4.00 3.80 - 5.40 mill/uL    Hemoglobin 12.0 12.0 - 16.0 g/dL    Hematocrit 37.2 35.0 - 47.0 %    MCV 93 80 - 100 fL    MCH 30.0 27.0 - 34.0 pg    MCHC 32.3 32.0 - 36.0 g/dL    RDW 13.3 11.0 - 14.5 %    Platelets 249 140 - 440 thou/uL    MPV 9.4 8.5 - 12.5 fL    Neutrophils % 66 50 - 70 %    Lymphocytes % 21 20 - 40 %    Monocytes % 9 2 - 10 %    Eosinophils % 3 0 - 6 %    Basophils % 1 0 - 2 %    Neutrophils Absolute 5.0 2.0 - 7.7 thou/uL    Lymphocytes Absolute 1.6 0.8 - 4.4 thou/uL    Monocytes Absolute 0.7 0.0 - 0.9 thou/uL    Eosinophils Absolute 0.2 0.0 - 0.4 thou/uL    Basophils Absolute 0.1 0.0 - 0.2 thou/uL   COVID-19 Virus PCR MRF    Specimen: Respiratory   Result Value Ref Range    COVID-19 VIRUS SPECIMEN SOURCE Nasopharyngeal     2019-nCOV       Test received-See reflex to IDDL test SARS CoV2 (COVID-19) Virus RT-PCR   SARS-CoV-2 (COVID-19) RT-PCR-IDDL    Specimen: Respiratory   Result Value Ref Range    SARS-CoV-2 Virus Specimen Source Nasopharyngeal     SARS-CoV-2 PCR Result NEGATIVE     SARS-COV-2 PCR COMMENT       Testing was performed using the Xpert Xpress SARS-CoV-2 Assay on the CEYX       Imaging    Xr Chest 1 View Portable    Result Date: 8/13/2020  EXAM: XR CHEST 1 VIEW PORTABLE LOCATION: [facility] DATE/TIME: 8/13/2020 7:53 AM  INDICATION: sob and hx of asthma COMPARISON: PA and lateral views of the chest 10/26/2019; high-resolution chest CT 9/4/2019     Unchanged enlargement of the cardiac silhouette particularly the left ventricular heart border. There are atheromatous calcifications in the aortic arch. The vascular pedicle with is not increased. The lungs are symmetrically inflated. There are a few bands of platelike atelectasis in the lower lobes near the diaphragm. No airspace opacities or interstitial thickening. No pleural fluid or pneumothorax is present.    Mammo Screening Bilateral    Result Date: 7/20/2020  BILATERAL FULL FIELD DIGITAL SCREENING MAMMOGRAM WITH TOMOSYNTHESIS Performed on: 7/20/20 Compared to: 07/18/2019 Mammo Screening Bilateral, 07/16/2018 Mammo Diagnostic Bilateral, 07/11/2017 Mammo Diagnostic Bilateral, 11/05/2012 Mammo Diagnostic Left, 11/01/2012 Mammo Screening Bilateral, and 10/31/2011 Mammo Screening Bilateral Findings: The breasts have scattered areas of fibroglandular densities. There is no radiographic evidence of malignancy. There are breast conservation changes within the left breast. This study was evaluated with the assistance of Computer-Aided Detection. Breast Tomosynthesis was used in interpretation. Repeat routine screening mammogram in one year is recommended. ACR BI-RADS Category 2: Benign        Signed by: Reyna Haro CNP

## 2021-06-10 NOTE — PROGRESS NOTES
Lara is here today for ongoing management of breast cancer. Today is a 6 month f/u with OV with Reyna Haro NP. Laura Bennett

## 2021-06-10 NOTE — PROGRESS NOTES
PULMONARY PROGRESS NOTE      HPI:  Yanna Handy is a 86 y.o. female followed in the Pulmonary clinic for reactive airways disease. She  history of chronic rhinosinusitis with asthma seen previously at the [facility] and uses intranasal antibiotics.   She was switched Breo last year and continues to be compliant with this with very good symptom control.  States that over the last month she has only had to use her Combivent inhaler twice but does allude to the fact that she has been having a productive cough with green phlegm.  She denies fevers, chills, shortness of breath or other cold symptoms.  Her ACT today is 5+3+2+4+4 = 18.      Current Outpatient Prescriptions on File Prior to Visit   Medication Sig Dispense Refill     amoxicillin (AMOXIL) 500 MG tablet Two before Dental procedure       ascorbic acid (VITAMIN C) 1000 MG tablet Take 1,000 mg by mouth every morning.        aspirin 81 MG EC tablet Take 81 mg by mouth bedtime.       calcium citrate-vitamin D (CITRACAL+D) 315-200 mg-unit per tablet Take 1 tablet by mouth bedtime.       cholecalciferol, vitamin D3, 400 unit Tab Take 800 Units by mouth every morning.        ciprofloxacin HCl (CIPRO) 250 MG tablet TAKE ONE-HALF TABLET BY MOUTH EVERY  tablet 0     COMBIVENT RESPIMAT  mcg/actuation Mist inhaler INHALE 1 PUFF BY MOUTH FOUR TIMES DAILY AS DIRECTED (Patient taking differently: INHALE 1 PUFF BY MOUTH FOUR TIMES DAILY AS DIRECTED PRN) 4 g 0     cyanocobalamin (VITAMIN B-12) 50 mcg tablet Take 50 mcg by mouth every morning.        DOCOSAHEXANOIC ACID/EPA (FISH OIL ORAL) Take 2 g by mouth daily.       docusate sodium (COLACE) 100 MG capsule Take 100 mg by mouth bedtime.       fluticasone-vilanterol (BREO ELLIPTA) 200-25 mcg/dose DsDv Inhale 1 puff daily. 1 each 11     furosemide (LASIX) 20 MG tablet TAKE ONE TABLET EVERY DAY-Due for 3 Month Clinic Visit-Please Schedule 90 tablet 0     hydrocortisone 1 % ointment        ipratropium-albuterol  (DUO-NEB) 0.5-2.5 mg/mL nebulizer INHALE 1 VIAL VIA NEBULIZER EVERY 6 HOURS 1080 mL 3     LACTOBACILLUS ACIDOPHILUS (ACIDOPHILUS ORAL) Take 1 tablet by mouth every morning.        magnesium 250 mg Tab Take 250 mg by mouth every morning.       metoprolol (LOPRESSOR) 50 MG tablet Take 50 mg by mouth 2 (two) times a day.        multivitamin (MULTIVITAMIN) per tablet Take 1 tablet by mouth every morning.        nitroglycerin (NITROSTAT) 0.4 MG SL tablet Place 1 tablet (0.4 mg total) under the tongue every 5 (five) minutes as needed for chest pain. 25 tablet 3     nortriptyline (PAMELOR) 50 MG capsule TAKE ONE CAPSULE BY MOUTH EVERY DAY AT BEDTIME 90 capsule 1     omeprazole (PRILOSEC) 20 MG capsule Take 20 mg by mouth every morning.        rosuvastatin (CRESTOR) 5 MG tablet Take 2 tablets (10 mg total) by mouth bedtime. 90 tablet 3     [DISCONTINUED] Sterile water for injection Soln 1,000 mL with amphotericin B 50 mg SolR Irrigate with 25 mg as directed every evening. Nasal irrigation       [DISCONTINUED] SUMAtriptan succinate (IMITREX STATDOSE) 6 mg/0.5 mL kit Inject 6 mg under the skin every 2 (two) hours as needed for migraine.       [DISCONTINUED] TOBRAMYCIN, BULK, MISC Use As Directed every evening. Nasal irrigation       No current facility-administered medications on file prior to visit.      Allergies   Allergen Reactions     Alendronate Nausea And Vomiting     Metoclopramide Hcl Anxiety     Rofecoxib Diarrhea and Nausea Only     Risedronate      Sulfa (Sulfonamide Antibiotics) Anaphylaxis           EXAM  /82  Pulse 64  Resp 16  Wt 168 lb (76.2 kg)  SpO2 98% Comment: RA  BMI 32.27 kg/m2    Physical Exam   Constitutional: She is oriented to person, place, and time. She appears well-developed and well-nourished. No distress.   HENT:   Head: Normocephalic and atraumatic.   Nose: Nose normal.   Eyes: Conjunctivae and EOM are normal. Pupils are equal, round, and reactive to light. No scleral icterus.    Neck: Neck supple. No tracheal deviation present.   Pulmonary/Chest: Effort normal. No stridor. Wheezes: rare exp wheeze over right post chest.   Musculoskeletal: Normal range of motion. She exhibits no edema.   Neurological: She is alert and oriented to person, place, and time. She has normal reflexes.   Skin: Skin is warm and dry. She is not diaphoretic. No pallor.   Psychiatric: She has a normal mood and affect.   Vitals reviewed.      PFTS 2/23/15 (unchanged from prior visit):  FEV1/FVC is 69% and is normal.   FEV1 is 1.26 L (87%) predicted and is normal.   FVC is 1.83 L (92%) predicted and normal.   There was no improvement in spirometry after a single inhaled dose of bronchodilator.   TLC is 3.52 L (79%) predicted and is normal.   RV is 1.17 L (51%) predicted and is reduced.   DLCO is 45% predicted and is reduced when it is corrected for hemoglobin.   Impression: Full Pulmonary Function Test is abnormal.   Spirometry and flow volume loop are within normal limits   Spirometry is not consistent with reversibility.   There is no hyperinflation.   There is no air-trapping.   Diffusion capacity when corrected for hemoglobin is moderately reduced.    SIX MINUTE WALK TEST / HOME O2 EVALUATION  6/9/15  (unchanged from prior visit):  SpO2 at rest on RA 95%   SpO2 after walking 6 minutes on RA 95%   Distance covered 672 feet   Recovery phase, SpO2 after 1 minute rest on RA was 93%   Impression:   No desaturation with activities.   No need for O2 supplementation.      CXR 5/5/16  (unchanged from prior visit):  Mild atelectasis or fibrosis in the lung bases. Upper lungs are clear. Mild cardiomegaly with normal pulmonary vascularity. Scoliosis of the thoracolumbar  spine.      IMPRESSION:  85-year-old female with a history of moderate persistent asthma, coronary artery disease status post stenting and some fibrotic changes noted on imaging studies presents to clinic today with concern for a follow-up visit for her asthma  and presently has very minimal symptoms..  For the present we will recommend:  1. Asthma moderate persistent, stable, with preserved spirometry.      Good symptom control with Fluticasone/Vilanterol 1 puff daily, she knows to gargle after using this.    I have explained to the patient that she should only use the Combivent for rescue.  2. Overnight hypoxia: Noted on overnight oximetry. Is continuing to use supplemental oxygen overnight.  3. CAD: Has undergone placement of stents to the proximal and mid LAD. No symptoms currently   4. Isolated diffusion defect: Likely secondary to fibrosis noted on imaging studies.  No intervention presently.  5. Chronic sinusitis: Has discontinued her tobramycin nebs per advice from the [facility]. I have advised that she start using sinus washes daily.  6. Follow-up: 4 months.    Lisa Amin  Pulmonary and Critical Care  [id number]

## 2021-06-11 NOTE — PROGRESS NOTES
Assessment/Plan:        1. Moderate persistent asthma without complication     2. CAD (coronary artery disease)  furosemide (LASIX) 20 MG tablet   3. Wrist fracture     4. Essential hypertension with goal blood pressure less than 140/90  HM2(CBC w/o Differential)    Lipid Profile    Comprehensive Metabolic Panel   5. Hypercholesterolemia  HM2(CBC w/o Differential)    Lipid Profile    Comprehensive Metabolic Panel   6. Osteoporosis Senile  DXA Bone Density Scan        1. HTN, well managed  2. Asthma, I provided to her Rx for prednisone and levaquin to have at home and start if any respiratory infection. She will use O2 at night as Lung spec. recommended.  3. We will check fasting lipids today, she will stay on Crestor.  4. Insomnia, she will continue nortriptyline and Tylenol PM.  5. Osteoporosis and new wrist fracture - due for DXA, will probably restart Prolia    This note has been dictated using voice recognition software. Any grammatical or context distortions are unintentional and inherent to the software.      Return in about 6 months (around 12/16/2017) for Recheck.    There are no Patient Instructions on file for this visit.        Subjective:    Yanna Handy is a 86 y.o. female  here for    Chief Complaint   Patient presents with     Asthma     3 month follow up     Patient is here today for follow-up of her chronic medical problems. Blood pressure well managed. Chronic asthma is well managed with Breo  and occasional use of Combivent and albuterol. she did have acute asthma exacerbation with respiratory infection few months ago when took prednisone and Levaquin. Recurrent UTI is very well managed with the Cipro prophylactic dose.   She does have a coronary artery disease , Lipitor dose was increased to 80 mg when she noticed severe aches in her both upper arms present daily and disturbing her sleep. It was switched to Crestor and she is tolerating it well.She denies any anginal type of pain. No SOB,  wheezing. She is on lasix 20 mg daily.  For insomnia,  She does take 50 mg of nortriptyline at the bedtime and Tylenol PM.  Temazepam did work for her but now has to pay 300 $ per month. Trazodone did not work. Rozerem did not work. She did take Ambien and that made her very drowsy in the morning.  She had wrist fracture few months ago, falling from the standing height, healing well.        Social History     Social History     Marital status:      Spouse name: N/A     Number of children: N/A     Years of education: N/A     Occupational History     Not on file.     Social History Main Topics     Smoking status: Never Smoker     Smokeless tobacco: Never Used     Alcohol use 3.0 oz/week     5 Cans of beer per week     Drug use: Not on file     Sexual activity: Not on file     Other Topics Concern     Not on file     Social History Narrative       Family History   Problem Relation Age of Onset     Heart attack Father      Review of Systems:     A 12 point comprehensive review of systems was negative except as noted in HPI.            Objective:    Physical Exam   /70  Pulse 68  Wt 169 lb (76.7 kg)  BMI 32.46 kg/m2    Constitutional: oriented to person, place, and time, appears well-nourished. No distress.   HENT:   Head: Normocephalic.   Mouth/Throat: Oropharynx is clear and moist.   Eyes: Conjunctivae are normal. Pupils are equal, round, and reactive to light.   Neck: Normal range of motion. Neck supple.   Cardiovascular: Normal rate, regular rhythm and normal heart sounds.    Pulmonary/Chest: Effort normal and breath sounds normal. with some end expiratory  Wheezing.  Abdominal: Soft. Bowel sounds are normal.   Musculoskeletal: Normal range of motion.   Neurological: alert and oriented to person, place, and time.  Psychiatric:  normal mood and affect.    Patient Active Problem List   Diagnosis     Chronic Sinusitis     Hypercholesterolemia     Moderate persistent asthma     Hypertension      Osteoporosis Senile     Osteoarthritis     CAD (coronary artery disease)     GI bleed     Anemia     Insomnia     Wrist fracture       Current Outpatient Prescriptions on File Prior to Visit   Medication Sig Dispense Refill     amoxicillin (AMOXIL) 500 MG tablet Two before Dental procedure       ascorbic acid (VITAMIN C) 1000 MG tablet Take 1,000 mg by mouth every morning.        aspirin 81 MG EC tablet Take 81 mg by mouth bedtime.       calcium citrate-vitamin D (CITRACAL+D) 315-200 mg-unit per tablet Take 1 tablet by mouth bedtime.       cholecalciferol, vitamin D3, 400 unit Tab Take 800 Units by mouth every morning.        ciprofloxacin HCl (CIPRO) 250 MG tablet TAKE ONE-HALF TABLET BY MOUTH EVERY  tablet 0     COMBIVENT RESPIMAT  mcg/actuation Mist inhaler INHALE 1 PUFF BY MOUTH FOUR TIMES DAILY AS DIRECTED (Patient taking differently: INHALE 1 PUFF BY MOUTH FOUR TIMES DAILY AS DIRECTED PRN) 4 g 0     cyanocobalamin (VITAMIN B-12) 50 mcg tablet Take 50 mcg by mouth every morning.        DOCOSAHEXANOIC ACID/EPA (FISH OIL ORAL) Take 2 g by mouth daily.       docusate sodium (COLACE) 100 MG capsule Take 100 mg by mouth bedtime.       fluticasone-vilanterol (BREO ELLIPTA) 200-25 mcg/dose DsDv Inhale 1 puff daily. 1 each 11     hydrocortisone 1 % ointment        ipratropium-albuterol (DUO-NEB) 0.5-2.5 mg/mL nebulizer INHALE 1 VIAL VIA NEBULIZER EVERY 6 HOURS 1080 mL 3     LACTOBACILLUS ACIDOPHILUS (ACIDOPHILUS ORAL) Take 1 tablet by mouth every morning.        magnesium 250 mg Tab Take 250 mg by mouth every morning.       metoprolol (LOPRESSOR) 50 MG tablet Take 50 mg by mouth 2 (two) times a day.        multivitamin (MULTIVITAMIN) per tablet Take 1 tablet by mouth every morning.        nitroglycerin (NITROSTAT) 0.4 MG SL tablet Place 1 tablet (0.4 mg total) under the tongue every 5 (five) minutes as needed for chest pain. 25 tablet 3     nortriptyline (PAMELOR) 50 MG capsule TAKE ONE CAPSULE BY MOUTH  EVERY DAY AT BEDTIME 90 capsule 1     omeprazole (PRILOSEC) 20 MG capsule Take 20 mg by mouth every morning.        rosuvastatin (CRESTOR) 5 MG tablet Take 2 tablets (10 mg total) by mouth bedtime. 90 tablet 3     No current facility-administered medications on file prior to visit.                Wilbur Hannah  6/16/2017

## 2021-06-11 NOTE — PROGRESS NOTES
Assessment and Plan:Yanna Handy is a 89 y.o. with a past medical history significant for moderate persistent asthma who presents to clinic today for an acute asthma exacerbation.  She was partially treated with steroids but then reflared.  Given the purulence of her sputum and persistent course, I would like to treat her with an antibiotic as well.  Finally she could focus her efforts on her nasal passages to help cut down on post nasal drip.     1) Asthma - continue Breo and Incruse for daily cares.  As needed albuterol  2) Asthma exacerbation  - start prednisone 30mg for 3 days, 20mg for 3 days, 10mg for 3 days.  Also try levaquin 500mg for 10 days  3) Post nasal drip - start nasal saline irrigation followed by flonase 1-2 times per day, also take the OTC antihistamine daily until the first cold snap  4) RTC with Dr. Amin in 3 months         CCx: cough/asthma    HPI: Ms. Handy is a 89 year old female with a history of moderate persistent asthma.  Over the past  Month she has been battling increased fatigue and cough with wheezing.  She reported to the ER a couple of weeks ago, got 5 days of prednisone which helped, but then 5 days after stopping her symptoms restarted.  She is coughing up a yellow/gray sputum.  She denies fever.  Her COVID test was negative in the ER as was her Xray.  She is using her albuterol 4 times a day now in addition to her Breo and Incruse.    ROS:  Review of Systems - History obtained from the patient  General ROS: negative, fatigue  Psychological ROS: negative  ENT ROS: negative  Allergy and Immunology ROS: negative  Endocrine ROS: negative  Respiratory ROS: positive for - cough, shortness of breath, sputum changes and wheezing  negative for - hemoptysis, orthopnea or pleuritic pain  Cardiovascular ROS: no chest pain or palpitations  Gastrointestinal ROS: no abdominal pain, change in bowel habits, or black or bloody stools  Genito-Urinary ROS: no dysuria, trouble voiding, or  hematuria  Musculoskeletal ROS: negative  Neurological ROS: no TIA or stroke symptoms  Dermatological ROS: negative      Current Meds:  Current Outpatient Medications   Medication Sig     acetaminophen (TYLENOL) 650 MG CR tablet Take 650 mg by mouth every 8 (eight) hours as needed for pain.     anastrozole (ARIMIDEX) 1 mg tablet TAKE ONE TABLET BY MOUTH EVERY DAY     ascorbic acid (VITAMIN C) 1000 MG tablet Take 1,000 mg by mouth every morning.      aspirin 81 MG EC tablet Take 81 mg by mouth bedtime.     calcium citrate-vitamin D (CITRACAL+D) 315-200 mg-unit per tablet Take 1 tablet by mouth bedtime.     ciprofloxacin HCl (CIPRO) 250 MG tablet Take 1/2 tablet by mouth once daily     cyanocobalamin (VITAMIN B-12) 50 mcg tablet Take 50 mcg by mouth every morning.      DOCOSAHEXANOIC ACID/EPA (FISH OIL ORAL) Take 2 g by mouth daily.     fluticasone furoate-vilanteroL (BREO ELLIPTA) 200-25 mcg/dose DsDv inhaler Inhale 1 puff daily. Rinse mouth with water, gargle,spit after each use     furosemide (LASIX) 20 MG tablet TAKE 1 TABLET BY MOUTH EVERY DAY     gabapentin (NEURONTIN) 100 MG capsule Take 300 mg by mouth 3 (three) times a day. Follow Gabapentin Dosing chart given (Patient taking differently: Take 200 mg by mouth 3 (three) times a day. 300mg AM ,300 mg PM. 600 mg HS)     ipratropium-albuterol (DUO-NEB) 0.5-2.5 mg/3 mL nebulizer Take 3 mL by nebulization 2 (two) times a day as needed.     LACTOBACILLUS ACIDOPHILUS (ACIDOPHILUS ORAL) Take 1 tablet by mouth every morning.      losartan (COZAAR) 50 MG tablet Take 1 tablet (50 mg total) by mouth 2 (two) times a day.     magnesium 250 mg Tab Take 250 mg by mouth every morning.     metoprolol tartrate (LOPRESSOR) 100 MG tablet TAKE ONE TABLET BY MOUTH TWICE DAILY      nitroglycerin (NITROSTAT) 0.4 MG SL tablet Place 1 tablet (0.4 mg total) under the tongue every 5 (five) minutes as needed for chest pain.     OXYGEN-AIR DELIVERY SYSTEMS [facility] [place] Use 2 L As Directed. 2L at  bedtime  [facility]     predniSONE (DELTASONE) 20 MG tablet Prednisone 20 mg tablets: 2 a day for 5 days..     rosuvastatin (CRESTOR) 10 MG tablet Take 1 tablet (10 mg total) by mouth at bedtime. Take with a 5 mg pill for a total of 15 mg nightly (Patient taking differently: Take 10 mg by mouth at bedtime. Take with a 5 mg pill for a total of 10 mg nightly)     umeclidinium (INCRUSE ELLIPTA) 62.5 mcg/actuation DsDv inhaler Inhale 1 puff daily.       Labs:  No results found for this or any previous visit (from the past 72 hour(s)).    I have personally reviewed all pertinent imaging studies and PFT results unless otherwise noted.    Imaging studies:  Xr Chest 1 View Portable    Result Date: 8/13/2020  EXAM: XR CHEST 1 VIEW PORTABLE LOCATION: [facility] DATE/TIME: 8/13/2020 7:53 AM INDICATION: sob and hx of asthma COMPARISON: PA and lateral views of the chest 10/26/2019; high-resolution chest CT 9/4/2019     Unchanged enlargement of the cardiac silhouette particularly the left ventricular heart border. There are atheromatous calcifications in the aortic arch. The vascular pedicle with is not increased. The lungs are symmetrically inflated. There are a few bands of platelike atelectasis in the lower lobes near the diaphragm. No airspace opacities or interstitial thickening. No pleural fluid or pneumothorax is present.        Physical Exam:  /86   Pulse (!) 57   Ht 5' (1.524 m)   Wt 156 lb (70.8 kg)   LMP 07/11/1971   SpO2 96% Comment: RA  BMI 30.47 kg/m    General - Well nourished  Ears/Mouth -  OP pink moist, no thrush  Neck - no cervical lymphadenopathy  Lungs - end expiratory low pitched wheeze L>R  CVS - regular rhythm with no murmurs, rubs or gallups  Abdomen - soft, NT, ND, NABS  Ext - no cyanosis, clubbing or edema  Skin - no rash  Psychology - alert and oriented, answers appropriate        Electronically signed by:    Piter Bruno MD PhD  [facility]  Medicine

## 2021-06-11 NOTE — PROGRESS NOTES
"I spoke with \"Lara\" today to inform her of the malignant results from her 7/13/17, left breast biopsy. I reviewed breast anatomy, invasive ductal carcinoma, grade, receptors, and next steps. Lara requested that I schedule the appt with Dr Pope for her, so I have made that appt for her and she will see Dr Pope on Tues, 7/25/17, at 1430. I offered her emotional support and she was appreciative of the call and time taken to explain the results. I offered her a packet of information on breast cancer and she would like that mailed to her home, so I confirmed her mailing address and put that in the [place] mail today. I also included an appt card for Dr Pope, plus the business card of the nurse navigator, Lashonda Taylor. I encouraged her to call me with any questions or concerns, or if there was any thing I could assist her with. She is a very pleasant lady and it was a pleasure to talk to her.    "

## 2021-06-11 NOTE — PROGRESS NOTES
Yanna comes in for a couple of issues today.  She ripped in her basement 6 days ago over a box of Easter decorations.  She hit her head on the corner of a table and also fell directly onto her left chest wall.  She went to the ER, and a CT of the head as well as a CT of the entire spine was unremarkable.  Chest x-ray was also unremarkable, specifically for rib fractures.  She had a 2 cm laceration on the left side of her head which was reapproximated with 7 sutures.  She is here today for suture removal.  She states that her chest wall still is painful but the head wound is not painful at all.  She denies any headaches, dizziness, or nausea/vomiting.  She is also noticed a breast lump in the left breast over the last week.  She was just in to see Dr. Hannah on 6/16 and the patient stated that she did not have it at that time.  It has not been growing and it has not been painful.  No skin changes over the breast.    Objective: Vital signs are as per the EMR.  In general the patient is alert pleasant and in no acute distress.  She appears healthy.    Skin: There is a well-healed laceration on the left forehead with 7 simple interrupted sutures present.    Left breast.  No skin erythema or other changes noted.  At approximately the 5 o'clock position just lateral to the left areola there is approximately a 2 cm well circumscribed, freely mobile mass that is easily palpable.  No tenderness to palpation.    Assessment and plan: Left forehead laceration.  Her sutures were removed today.  She will use triple antibiotic ointment for the next couple of days.  Follow-up as needed.    Left breast lump.  I am going to order a diagnostic mammogram and ultrasound.  We will call her with results and further recommendations.

## 2021-06-11 NOTE — PATIENT INSTRUCTIONS - HE
1) Let try levaquin and prednisone to help clear up your lungs  2) I would like you to take the allergy pill every day until the cold snaps  3) I also want you to retry the saltwater nasal irrigation followed by Flonase.  I suspect drying up your nose will help your cough quite a bit.  4) I'll bring you back in to talk to Dr. Amin in another 3 months

## 2021-06-12 NOTE — PROGRESS NOTES
RADIATION ONCOLOGY WEEKLY TREATMENT VISIT NOTE      Assessment / Impression       1. Malignant neoplasm of upper-outer quadrant of left female breast       No matching staging information was found for the patient.   Tolerating radiation therapy well.  All questions and concerns addressed.      Plan:     Continue radiation treatment as prescribed.  Radiation: Site: left breast  Today's Dose: 266  Total Dose for Breast: 4256  Today's Fraction/Total Fraction Breast: 1/16        Subjective:      HPI: Yanna Handy is a 86 y.o. female with    1. Malignant neoplasm of upper-outer quadrant of left female breast         The following portions of the patient's history were reviewed and updated as appropriate: allergies, current medications, past family history, past medical history, past social history, past surgical history and problem list.      Objective:     Exam:     Vitals:    09/13/17 1209   BP: (!) 185/77   Pulse: 64   Temp: 97.6  F (36.4  C)   TempSrc: Oral   SpO2: 96%   Weight: 169 lb 11.2 oz (77 kg)       Wt Readings from Last 8 Encounters:   09/13/17 169 lb 11.2 oz (77 kg)   08/31/17 169 lb 6.4 oz (76.8 kg)   08/29/17 169 lb (76.7 kg)   08/24/17 169 lb 12.8 oz (77 kg)   08/18/17 169 lb 11.2 oz (77 kg)   07/27/17 170 lb (77.1 kg)   07/25/17 168 lb (76.2 kg)   06/26/17 170 lb (77.1 kg)       General: Alert and oriented, in no acute distress  Yanna has no Erythema.    Treatment Summary to Date    [facility] chart and setup information reviewed    Eugenia Cuellar MD   (2) very limited

## 2021-06-12 NOTE — CONSULTS
[facility] Hematology and Oncology Consult Note    Patient: Yanna Handy  MRN: [id number]  Date of Service: 08/18/2017        Reason for Visit    I was consulted by Dr. Pope regarding breast cancer    Assessment/Plan    T2 N1 M0, stage IIb left breast cancer status post lumpectomy August 2, 2017  Osteopenia    Pathology is reviewed and shows invasive ductal cancer which is strongly ER [place] positive and HER-2/rudy negative.  Final pathology shows 2.3 cm, grade 2 tumor with negative margins.  2 sentinel lymph nodes are removed and one shows a micro metastasis.  Reviewed situation with patient and her .  She may be at relatively high risk for recurrence because of lymph node involvement.  However given her age and comorbidities there is probably no benefit to adjuvant chemotherapy in this situation.  Therefore I do not recommend adjuvant chemotherapy for this patient.  I do recommend consideration for adjuvant radiation therapy again because the tumor is more than 2 cm and there is lymph node involvement.  She will be referred for radiation therapy.    I do recommend adjuvant hormonal therapy after completion of radiation therapy.  She does have history of osteopenia and is scheduled for a follow-up bone density in September.  We will evaluate the bone density and then decide on whether to use aromatase inhibitor such as anastrozole or tamoxifen for adjuvant hormonal therapy.  I reviewed rationale for adjuvant hormonal therapy and that it is typically recommended for duration of 5 years.    I discussed typical follow-up for patients with breast cancer.  I explained that apart from mammogram there will be no other lab or radiologic testing that is typically recommended.    Plan: No adjuvant chemotherapy is recommended  Refer for radiation therapy  Follow-up in 2 months with bone density to discuss adjuvant hormonal therapy with either anastrozole or tamoxifen          ECOG Performance   ECOG Performance Status:  1  Distress Assessment  Distress Assessment Score: 4        Problem List    1. Malignant neoplasm of upper-outer quadrant of left female breast  Ambulatory referral to Radiation Oncology           CC: Wilbur Hannah MD      ______________________________________________________________________________      Staging History    No matching staging information was found for the patient.    History    Ms. Yanna Handy is a 86-year-old with past history of asthma, oxygen at nighttime, coronary artery disease, hypertension who is been diagnosed with left breast cancer.  Patient herself felt a mass and underwent mammogram and ultrasound which revealed masses up in the left upper outer quadrant breast.  Patient underwent a needle biopsy which showed invasive ductal cancer which is ER [place] positive and HER-2/rudy negative.  Patient then underwent lumpectomy in .  She is healing up well from her surgery.  She denies headaches dizziness or focal weakness.  No dysphagia odynophagia.  No fever chills or sweats.  Appetite and weight are stable.  She does have chronic shortness of breath related to asthma.  She is on nocturnal oxygen.  She is on inhalers as well.  Denies abdominal or bone pain.  No change of bowels or urine.  No bleeding or rash.    Past surgical history includes hysterectomy, vein stripping, sick sinus surgeries, 2    Knee replacements, broken arm surgery and the recent lumpectomy.  She is  and lives with her .  She is retired.  She has 2 kids one living and one .  Maternal aunt had breast cancer but no other family history of breast and no history of ovarian cancer.  Patient does not smoke and drinks only occasionally.    Past History  Past Medical History:   Diagnosis Date     Anemia 2015     Asthma      Breast cancer 2017    left     CAD (coronary artery disease) 9/15/2014     Chronic bronchitis      Chronic Sinusitis     Created by Conversion  Replacement Utility  "updated for latest IMO load     Emphysema     Created by Conversion      Fx ankle 8/2000     Fx wrist 12/2006    Right wrist      GI bleed 1/12/2015     Hypercholesterolemia     Created by Conversion      Hypertension      Insomnia 1/13/2016     Osteoarthritis     Created by Conversion  Replacement Utility updated for latest IMO load     Osteoporosis Senile     Created by Conversion      Squamous Cell Carcinoma Of The Skin     Created by Conversion      Past Surgical History:   Procedure Laterality Date     APPENDECTOMY  1971     BREAST SURGERY  08/02/2017    left lumpectomy, Dr. Pope     COLONOSCOPY N/A 1/15/2015    Procedure: COLONOSCOPY with biopsy of polyp;  Surgeon: Joel Coppola MD;  Location: [facility];  Service:      CORONARY STENT PLACEMENT      \"I had 2 stent placements\"     EYE SURGERY      Cataracts     HYSTERECTOMY  1971     JOINT REPLACEMENT  1/2003    Right TKA     KNEE ARTHROSCOPY Left 10/2004     OOPHORECTOMY  1971    One removed     NH REMOVAL OF TONSILS,<11 Y/O      Description: Tonsillectomy;  Recorded: 02/12/2009;  Comments: 1941     NH REVISE SECONDARY VARICOSITY      Description: Varicose Vein Ligation;  Recorded: 02/12/2009;  Comments: 1988     NH TOTAL ABDOM HYSTERECTOMY      Description: Hysterectomy;  Recorded: 02/12/2009;  Comments: 1971     SINUS SURGERY      \"I had 3 sinus surgeries.\"     Family History   Problem Relation Age of Onset     Heart attack Father      Breast cancer Maternal Aunt 55     Lung cancer Brother 65     Colon cancer Maternal Grandmother 90     Lung cancer Maternal Grandfather 65     Brain cancer Son 25     Rectal cancer Brother 64     Social History     Social History     Marital status:      Spouse name: N/A     Number of children: N/A     Years of education: N/A     Social History Main Topics     Smoking status: Never Smoker     Smokeless tobacco: Never Used     Alcohol use Yes      Comment: Wine occassionally     Drug use: No     Sexual " activity: No     Other Topics Concern     None     Social History Narrative     Allergies    Allergies   Allergen Reactions     Alendronate Nausea And Vomiting     Metoclopramide Hcl Anxiety     Rofecoxib Diarrhea and Nausea Only     Risedronate      Sulfa (Sulfonamide Antibiotics) Anaphylaxis       Review of Systems    General  General (WDL): All general elements are within defined limits  ENT  ENT (WDL): Exceptions to WDL  Glasses or Contacts: Yes - Chronic (Greater than 3 months)  Respiratory  Respiratory (WDL): Exceptions to WDL  Dyspnea: Yes - Chronic (Greater than 3 months)  Is patient on O2?: Yes - Chronic (Greater than 3 months)  O2 Flow (L/min): 4 L/min (Only at night)  Cough: Yes - Chronic (Greater than 3 months)  Cardiovascular  Cardiovascular (WDL): All cardiovascular elements are within defined limits  Endocrine  Endocrine (WDL): All endocrine elements are within defined limits  Gastrointestinal  Gastrointestinal (WDL): Exceptions to WDL  Difficulty Swallowing: Yes - Chronic (Greater than 3 months)  Heartburn: Yes - Chronic (Greater than 3 months)  Musculoskeletal  Musculoskeletal (WDL): Exceptions to WDL  Back Pain: Yes - Chronic (Greater than 3 months)  Recent fall: Yes - Recent (Less than 3 months)  Neurological  Neurological (WDL): All neurological elements are within defined limits  Dominant Hand: Right  Psychological/Emotional  Psychological/Emotional (WDL): All psychological/emotional elements are within defined limits  Hematological/Lymphatic  Hematological/Lymphatic (WDL): All hematological/lymphatic elements are within defined limits  Dermatological  Dermatologic (WDL): All dermatological elements are within defined limits  Genitourinary/Reproductive  Genitourinary/Reproductive (WDL): Exceptions to WDL  Urinary Incontinence: Yes - Chronic (Greater than 3 months)  Reproductive (Females only)     Pain  Currently in Pain: Yes  Pain Score (Initial OR Reassessment): 7  Pain Frequency:  "Constant/continuous  Location: Lower back  Pain Characteristics : Aching  Pain Intervention(s): Home medication  Response to Interventions: Helpful 4/10    Physical Exam    Recent Vitals 8/18/2017   Height 5' 0.5\"   Weight 169 lbs 11 oz   BSA (m2) 1.81 m2   /70   Pulse 64   Temp 98.5   Temp src 1   SpO2 96   Some recent data might be hidden       GENERAL: Alert and oriented. Seated comfortably. In no distress.    HEAD: Atraumatic and normocephalic.  Has a full head of hair.    EYES: KENRICK, EOMI.  No pallor.  No icterus.    Oral cavity: no mucosal lesion or tonsillar enlargement.    NECK: supple. JVP normal.  No thyroid enlargement.    LYMPH NODES: No palpable, cervical, axillary or inguinal lymphadenopathy.    CHEST: clear to auscultation bilaterally.  Resonant to percussion throughout bilaterally.  Symmetrical breath movements bilaterally.    CVS: S1 and S2 are heard. Regular rate and rhythm.  No murmur or gallop or rub heard.    ABDOMEN: Soft. Not tender. Not distended.  No palpable hepatomegaly or splenomegaly.  No other mass palpable.  Bowel sounds heard.    EXTREMITIES: Warm.  No peripheral edema.    SKIN: no rash, or bruising or purpura.    CNS: Nonfocal      Lab Results    No results found for this or any previous visit (from the past 168 hour(s)).    Imaging Results    [place] Lymph Node Injection    Result Date: 8/2/2017   SENTINEL NODE INJECTION 8/2/2017 10:42 AM INDICATION: Left breast cancer in the lower outer quadrant. 525 uCi of technetium 99m Lymphoseek were injected into the dermis close to the areola. No imaging was done.         Signed by: Mindy Rodas MD  "

## 2021-06-12 NOTE — PROGRESS NOTES
Assessment/Plan:   1. Pre-op exam  2. Breast cancer, left  Patient approved for surgery with general or local anesthesia. Postoperative pain to be managed by surgeon during post-operative Global Surgical Package timeframe, typically 30-60 days for major surgery, and less for others. Postoperative Care will be managed by Hospital Service. Labs will be done as indicated. Follow up as needed. Low Risk Surgery.   - Electrocardiogram Perform and Read  - Hemoglobin  - Basic Metabolic Panel    3. EKG abnormality  History of CAD with 2 stent placement.  Due to changes in EKG I will like patient to be seen by cardiology to be cleared for surgery.  - Echo Complete; Future  - Ambulatory referral to Cardiology      Subjective:   Scheduled Procedure: left breast lumpectomy   Surgery Date:  8/2/17  Surgery Location:  [facility]  Surgeon:  Dr. Pope    Current Outpatient Prescriptions   Medication Sig Dispense Refill     amoxicillin (AMOXIL) 500 MG tablet Two before Dental procedure       ascorbic acid (VITAMIN C) 1000 MG tablet Take 1,000 mg by mouth every morning.        aspirin 81 MG EC tablet Take 81 mg by mouth bedtime.       calcium citrate-vitamin D (CITRACAL+D) 315-200 mg-unit per tablet Take 1 tablet by mouth bedtime.       cholecalciferol, vitamin D3, 400 unit Tab Take 800 Units by mouth every morning.        ciprofloxacin HCl (CIPRO) 250 MG tablet TAKE ONE-HALF TABLET BY MOUTH EVERY  tablet 0     COMBIVENT RESPIMAT  mcg/actuation Mist inhaler INHALE 1 PUFF BY MOUTH FOUR TIMES DAILY AS DIRECTED (Patient taking differently: INHALE 1 PUFF BY MOUTH FOUR TIMES DAILY AS DIRECTED PRN) 4 g 0     cyanocobalamin (VITAMIN B-12) 50 mcg tablet Take 50 mcg by mouth every morning.        DOCOSAHEXANOIC ACID/EPA (FISH OIL ORAL) Take 2 g by mouth daily.       docusate sodium (COLACE) 100 MG capsule Take 100 mg by mouth bedtime.       fluticasone-vilanterol (BREO ELLIPTA) 200-25 mcg/dose DsDv Inhale 1 puff daily. 1  each 11     furosemide (LASIX) 20 MG tablet TAKE ONE TABLET EVERY DAY 90 tablet 3     hydrocortisone 1 % ointment        ipratropium-albuterol (DUO-NEB) 0.5-2.5 mg/mL nebulizer INHALE 1 VIAL VIA NEBULIZER EVERY 6 HOURS 1080 mL 3     LACTOBACILLUS ACIDOPHILUS (ACIDOPHILUS ORAL) Take 1 tablet by mouth every morning.        magnesium 250 mg Tab Take 250 mg by mouth every morning.       metoprolol (LOPRESSOR) 50 MG tablet Take 50 mg by mouth 2 (two) times a day.        metoprolol tartrate (LOPRESSOR) 100 MG tablet TAKE ONE TABLET BY MOUTH TWICE A  tablet 1     multivitamin (MULTIVITAMIN) per tablet Take 1 tablet by mouth every morning.        nitroglycerin (NITROSTAT) 0.4 MG SL tablet Place 1 tablet (0.4 mg total) under the tongue every 5 (five) minutes as needed for chest pain. 25 tablet 3     nortriptyline (PAMELOR) 50 MG capsule TAKE ONE CAPSULE BY MOUTH EVERY DAY AT BEDTIME 90 capsule 1     omeprazole (PRILOSEC) 20 MG capsule Take 20 mg by mouth every morning.        rosuvastatin (CRESTOR) 5 MG tablet Take 3 tablets (15 mg total) by mouth at bedtime. 270 tablet 3     No current facility-administered medications for this visit.        Allergies   Allergen Reactions     Alendronate Nausea And Vomiting     Metoclopramide Hcl Anxiety     Rofecoxib Diarrhea and Nausea Only     Risedronate      Sulfa (Sulfonamide Antibiotics) Anaphylaxis       Immunization History   Administered Date(s) Administered     DT (pediatric) 01/01/2000     Influenza high dose, seasonal 08/30/2016     Influenza, Seasonal, Inj PF 10/04/2010, 10/04/2011, 09/05/2012     Influenza, inj, historic 10/16/2008, 10/13/2013, 09/23/2014     Influenza, seasonal,quad inj 36+ mos 10/08/2015     Influenza, seasonal,quad inj 6-35 mos 09/23/2014     Pneumo Conj 13-V (2010&after) 08/21/2015     Pneumo Polysac 23-V 01/01/1999, 10/16/2008, 09/03/2014     Td, historic 10/04/2010     ZOSTER 05/25/2012       Patient Active Problem List   Diagnosis     Chronic  "Sinusitis     Hypercholesterolemia     Moderate persistent asthma     Hypertension     Osteoporosis Senile     Osteoarthritis     CAD (coronary artery disease)     GI bleed     Anemia     Insomnia     Wrist fracture       Past Medical History:   Diagnosis Date     Anemia 8/21/2015     Asthma      Breast cancer 07/25/2017    left     CAD (coronary artery disease) 9/15/2014     Chronic Sinusitis     Created by Conversion  Replacement Utility updated for latest IMO load     Emphysema     Created by Conversion      Fx ankle 8/2000     Fx wrist 12/2006    Right wrist      GI bleed 1/12/2015     Hypercholesterolemia     Created by Conversion      Hypertension      Insomnia 1/13/2016     Osteoarthritis     Created by Conversion  Replacement Utility updated for latest IMO load     Osteoporosis Senile     Created by Conversion      Squamous Cell Carcinoma Of The Skin     Created by Conversion        Social History     Social History     Marital status:      Spouse name: N/A     Number of children: N/A     Years of education: N/A     Occupational History     Not on file.     Social History Main Topics     Smoking status: Never Smoker     Smokeless tobacco: Never Used     Alcohol use 3.0 oz/week     5 Cans of beer per week     Drug use: Not on file     Sexual activity: Not on file     Other Topics Concern     Not on file     Social History Narrative       Past Surgical History:   Procedure Laterality Date     APPENDECTOMY  1971     COLONOSCOPY N/A 1/15/2015    Procedure: COLONOSCOPY with biopsy of polyp;  Surgeon: Joel Coppola MD;  Location: [facility];  Service:      CORONARY STENT PLACEMENT      \"I had 2 stent placements\"     EYE SURGERY      Cataracts     HYSTERECTOMY  1971     JOINT REPLACEMENT  1/2003    Right TKA     KNEE ARTHROSCOPY Left 10/2004     OOPHORECTOMY  1971    One removed     [place] REMOVAL OF TONSILS,<13 Y/O      Description: Tonsillectomy;  Recorded: 02/12/2009;  Comments: 1941     [place] REVISE " "SECONDARY VARICOSITY      Description: Varicose Vein Ligation;  Recorded: 02/12/2009;  Comments: 1988     [place] TOTAL ABDOM HYSTERECTOMY      Description: Hysterectomy;  Recorded: 02/12/2009;  Comments: 1971     SINUS SURGERY      \"I had 3 sinus surgeries.\"       History of Present Illness  Yanna Handy, 86-year-old female with history of hypertension, coronary artery disease, hypercholesterolemia, moderate persistent asthma, osteoarthritis, anemia and insomnia.  Patient presented to the clinic today for preop physical.  Patient stated that few weeks ago while she was examining her breasts she felt a lump that was unusual then she contacted her PCP who then ordered an ultrasound and a mammogram.  Ultrasound revealed 2 new masses on her left breast.  Ultrasound guided aspiration was performed for biopsy which showed invasive ductal carcinoma.  Patient is scheduled for left breast lumpectomy with Dr. Pope.  Patient has no concerns today.    Recent Health  Fever: no  Chills: no  Fatigue: no  Chest Pain: no  Cough: yes  Dyspnea: no  Urinary Frequency: no  Nausea: no  Vomiting: no  Diarrhea: no  Abdominal Pain: no  Easy Bruising: no  Lower Extremity Swelling: no  Poor Exercise Tolerance: no    Most recent Health Maintenance Visit:  06/2017 ago    Pertinent History  Prior Anesthesia: yes  Previous Anesthesia Reaction:  no  Diabetes: no  Cardiovascular Disease: no  Pulmonary Disease: yes  asthma  Renal Disease: no  GI Disease: no  Sleep Apnea: no  Thromboembolic Problems: no  Clotting Disorder: no  Bleeding Disorder: no  Transfusion Reaction: no  Impaired Immunity: no  Steroid use in the last 6 months: no  Frequent Aspirin use: yes    Family history of father had heart attack    Social history of patient does not wear denture or partial plates    After surgery, the patient plans to recover at home with family.    Review of Systems  A 12 point comprehensive review of systems was negative except as noted.    "       Objective:         There were no vitals filed for this visit.    Physical Exam:  General appearance - alert, well appearing, and in no distress  Mental status - alert, oriented to person, place, and time  Eyes - pupils equal and reactive, extraocular eye movements intact  Ears - bilateral TM's and external ear canals normal  Nose - normal and patent, no erythema, discharge or polyps  Mouth - mucous membranes moist, pharynx normal without lesions  Neck - supple, no significant adenopathy  Lymphatics - no palpable lymphadenopathy, no hepatosplenomegaly  Chest - clear to auscultation, no wheezes, rales or rhonchi, symmetric air entry  Heart - normal rate, regular rhythm, normal S1, S2, no murmurs, rubs, clicks or gallops  Abdomen - soft, nontender, nondistended, no masses or organomegaly  Back exam - full range of motion, no tenderness, palpable spasm or pain on motion  Neurological - alert, oriented, normal speech, no focal findings or movement disorder noted  Musculoskeletal - no joint tenderness, deformity or swelling  Extremities - peripheral pulses normal, no pedal edema, no clubbing or cyanosis  Skin - normal coloration and turgor, no rashes, no suspicious skin lesions noted

## 2021-06-12 NOTE — PROGRESS NOTES
Yanna is scheduled for left lumpectomy with Dr. Pope on 8/2/17 at [facility]. Patient was given instructions of arrival time, need a , pre-op physical within 30days and NPO after midnight. Patient verbalized understanding.     [facility]  Physician    [facility]   [phone]

## 2021-06-12 NOTE — PROGRESS NOTES
Assessment/Plan:        1. Moderate persistent asthma without complication  levoFLOXacin (LEVAQUIN) 500 MG tablet    predniSONE (DELTASONE) 20 MG tablet   2. Hypertension  losartan (COZAAR) 50 MG tablet   3. Essential hypertension with goal blood pressure less than 140/90  metoprolol tartrate (LOPRESSOR) 100 MG tablet   4. Recurrent UTI  ciprofloxacin HCl (CIPRO) 250 MG tablet   5. LOCKHART (dyspnea on exertion)  Echo Complete    XR Chest 2 Views    levoFLOXacin (LEVAQUIN) 500 MG tablet    predniSONE (DELTASONE) 20 MG tablet    NM MPI with Lexiscan    CANCELED: NM MPI with Lexiscan   6. Coronary artery disease involving native coronary artery of native heart without angina pectoris  NM MPI with Lexiscan     1. Worsening shortness of breath over the last 2 months. She will continue Breo and Incruse and albuterol and Flonase. We will repeat CXR and do another course of levaquin and prednisone. She will schedule f/u visit with Lung clinic.  2. She will schedule ECHO and stress test - last time done in June 2018. She will increase furosemide for 3 days. She will watch salt intake.    This note has been dictated using voice recognition software. Any grammatical or context distortions are unintentional and inherent to the software.      Return in about 3 weeks (around 11/5/2020) for Recheck, Prolia injection.    Patient Instructions   Continue Flonase nasal spray.  Let's try nebulizer twice a day for a week.  You should schedule f/u visit with Lung clinic.  We will do another course of prednisone and Levaquin.  CXR today.  You will schedule ECHO and stress test.  You will take extra pill of furosemide today and next 2 days. 2 pills in the morning for 3 days.    Flu shot today.              Subjective:    Yanna Handy is a 89 y.o. female  here for    Chief Complaint   Patient presents with     Follow-up     F/U     88 yo female with CAD, HTN, breast cancer on Arimidex, and moderate persistent asthma is her for follow  up.  She was in the ER on 8/13/20 for worsening shortness of breath. BNP was very slightly elevated. CXR unremarkable.  She saw Pulmologist on 9/3/20 and was on Levaquin and prednisone for 10 days. She does feel slightly better, but still has dyspnea on minimal exertion. Has oxygen during the night only. sO2 around 94 during the day.  She had one episode of chest pain in August when she took NGL.  She denies fever, chills. She does cough up yellow sputum. She thinks he is more wheezy.  She denies any chest pain with deep breaths, nausea, vomiting. Her weight is 10 lbs higher today than on 9/3/20.  Social History     Socioeconomic History     Marital status:      Spouse name: Not on file     Number of children: Not on file     Years of education: Not on file     Highest education level: Not on file   Occupational History     Not on file   Social Needs     Financial resource strain: Not on file     Food insecurity     Worry: Not on file     Inability: Not on file     Transportation needs     Medical: Not on file     Non-medical: Not on file   Tobacco Use     Smoking status: Never Smoker     Smokeless tobacco: Never Used   Substance and Sexual Activity     Alcohol use: Yes     Comment: Wine occassionally     Drug use: No     Sexual activity: Never   Lifestyle     Physical activity     Days per week: Not on file     Minutes per session: Not on file     Stress: Not on file   Relationships     Social connections     Talks on phone: Not on file     Gets together: Not on file     Attends Buddhism service: Not on file     Active member of club or organization: Not on file     Attends meetings of clubs or organizations: Not on file     Relationship status: Not on file     Intimate partner violence     Fear of current or ex partner: Not on file     Emotionally abused: Not on file     Physically abused: Not on file     Forced sexual activity: Not on file   Other Topics Concern     Not on file   Social History Narrative      Not on file       Family History   Problem Relation Age of Onset     Heart attack Father      Breast cancer Maternal Aunt 55     Lung cancer Brother 65     Colon cancer Maternal Grandmother 90     Lung cancer Maternal Grandfather 65     Brain cancer Son 25     Rectal cancer Brother 64     Review of Systems:     A 12 point comprehensive review of systems was negative except as noted in HPI.            Objective:    Physical Exam   /80   Pulse 60   Wt 163 lb 3.2 oz (74 kg)   LMP 07/11/1971   SpO2 98%   BMI 31.87 kg/m      Constitutional: oriented to person, place, and time, appears well-nourished. No distress.   HENT:   Head: Normocephalic.   Mouth/Throat: Oropharynx is clear and moist.   Eyes: Conjunctivae are normal. Pupils are equal, round, and reactive to light.   Neck: Normal range of motion. Neck supple.   Cardiovascular: Normal rate, regular rhythm and normal heart sounds.    Pulmonary/Chest: Effort normal and breath sounds normal. NO significant wheezing.  Abdominal: Soft. Bowel sounds are normal.   Musculoskeletal: Normal range of motion. Has stockings on but I did not appreciate a lot of edema.  Neurological: alert and oriented to person, place, and time.  Psychiatric:  normal mood and affect.    Patient Active Problem List   Diagnosis     Chronic Sinusitis     Hypercholesterolemia     Moderate persistent asthma     Hypertension     Osteoporosis Senile     Osteoarthritis     CAD (coronary artery disease)     GI bleed     Anemia     Insomnia     Wrist fracture     Malignant neoplasm of upper-outer quadrant of left female breast (H)     Aromatase inhibitor use       Current Outpatient Medications on File Prior to Visit   Medication Sig Dispense Refill     acetaminophen (TYLENOL) 650 MG CR tablet Take 650 mg by mouth every 8 (eight) hours as needed for pain.       anastrozole (ARIMIDEX) 1 mg tablet TAKE ONE TABLET BY MOUTH DAILY 90 tablet 0     ascorbic acid (VITAMIN C) 1000 MG tablet Take 1,000  mg by mouth every morning.        aspirin 81 MG EC tablet Take 81 mg by mouth bedtime.       calcium citrate-vitamin D (CITRACAL+D) 315-200 mg-unit per tablet Take 1 tablet by mouth bedtime.       cyanocobalamin (VITAMIN B-12) 50 mcg tablet Take 50 mcg by mouth every morning.        DOCOSAHEXANOIC ACID/EPA (FISH OIL ORAL) Take 2 g by mouth daily.       fluticasone furoate-vilanteroL (BREO ELLIPTA) 200-25 mcg/dose DsDv inhaler Inhale 1 puff daily. Rinse mouth with water, gargle,spit after each use 180 each 3     furosemide (LASIX) 20 MG tablet TAKE 1 TABLET BY MOUTH EVERY DAY 90 tablet 3     gabapentin (NEURONTIN) 100 MG capsule Take 300 mg by mouth 3 (three) times a day. Follow Gabapentin Dosing chart given (Patient taking differently: Take 200 mg by mouth 3 (three) times a day. 300mg AM ,300 mg PM. 600 mg HS) 270 capsule 3     ipratropium-albuterol (DUO-NEB) 0.5-2.5 mg/3 mL nebulizer Take 3 mL by nebulization 2 (two) times a day as needed. 1080 mL 3     LACTOBACILLUS ACIDOPHILUS (ACIDOPHILUS ORAL) Take 1 tablet by mouth every morning.        magnesium 250 mg Tab Take 250 mg by mouth every morning.       nitroglycerin (NITROSTAT) 0.4 MG SL tablet Place 1 tablet (0.4 mg total) under the tongue every 5 (five) minutes as needed for chest pain. 25 tablet 3     OXYGEN-AIR DELIVERY SYSTEMS [place] Use 2 L As Directed. 2L at bedtime  [facility]       rosuvastatin (CRESTOR) 10 MG tablet Take 1 tablet (10 mg total) by mouth at bedtime. Take with a 5 mg pill for a total of 15 mg nightly (Patient taking differently: Take 10 mg by mouth at bedtime. Take with a 5 mg pill for a total of 10 mg nightly) 90 tablet 3     umeclidinium (INCRUSE ELLIPTA) 62.5 mcg/actuation DsDv inhaler Inhale 1 puff daily. 3 each 3     [DISCONTINUED] ciprofloxacin HCl (CIPRO) 250 MG tablet Take 1/2 tablet by mouth once daily 45 tablet 0     [DISCONTINUED] losartan (COZAAR) 50 MG tablet Take 1 tablet (50 mg total) by mouth 2 (two) times a day. 180 tablet 3      [DISCONTINUED] metoprolol tartrate (LOPRESSOR) 100 MG tablet TAKE ONE TABLET BY MOUTH TWICE DAILY  180 tablet 3     No current facility-administered medications on file prior to visit.                Wilbur Hannah  10/15/2020

## 2021-06-12 NOTE — PATIENT INSTRUCTIONS - HE
Continue Flonase nasal spray.  Let's try nebulizer twice a day for a week.  You should schedule f/u visit with Lung clinic.  We will do another course of prednisone and Levaquin.  CXR today.  You will schedule ECHO and stress test.  You will take extra pill of furosemide today and next 2 days. 2 pills in the morning for 3 days.    Flu shot today.

## 2021-06-12 NOTE — PROGRESS NOTES
Instructed on proper use of Spiriva resp. inhaler.  Demonstrated back correctly, first dose taken and instructed on importance of rinsing mouth after use.

## 2021-06-12 NOTE — PROGRESS NOTES
[facility] Radiation Oncology Consult Note    Patient: Yanna Handy  MRN: [id number]  Date of Service: 2017    Assessment / Impression     1. Malignant neoplasm of upper-outer quadrant of left female breast       No matching staging information was found for the patient.  ECOG Peformance Status  ECOG Performance Status: 0  Distress Assessment Score: No distress    Plan:   Although with a positive lymph node she doesn't fit into the tamoxifen alone criteria, I discuused with her the pros and cons of radiation and that hormonal  alone may be ok, but after our discussion she wishes to pursue radiation. We will still do short course.     Face to face time  60 minutes with > 75% spent on consultation, education and coordination of care.  Intent of Therapy: Curative  Patient on concurrent Herceptin No  Adjuvant hormonal therapy: Yes: Agent: TBD   Start: Following radiation  Chemotherapy: n/a   Intended fractionation schedule -  hypofractionation without boost    Breast cancer risk factors:     LMP Dates from Last 4 Encounters:   LMP: 1971   LMP: 1971   LMP: 1971   LMP: 1971       We recommend adjuvant radiation as part of her breast conserving therapy to decrease her chance of local recurrence to the single digits. ROM stretches and skin care were discussed with the patient. She understands that these maneuvers need to continue for 6 months following completion of radiation as skin and muscle fibrosis continue to form for weeks to months following completion of therapy.      Side effects that may occur during or within weeks after radiation therapy      Fatigue and general weakness    Darkening, irritation, itchiness, redness, dryness, erythema, peeling, scabbing, ulceration and contraction of the skin of the breast and chest    Swelling of the breast    Loss of armpit hair    Lung irritation    Decrease in appetite    Side effects that may occur months or years after radiation  therapy      Development of another tumor or cancer    Thickening, telangiectasias (development of spider like blood vessels in the skin) and ulceration of the skin of the breast and chest    Firming, fibrosis (scar tissue), fat necrosis, and distortion of the breast    Poor healing after a trauma or surgery in the irradiated area    Nerve damage resulting in loss of arm strength and sensation    Coronary artery blockage causing angina pain or a heart attack    Lung inflammation of fibrosis causing cough, fever and shortness of breath    Fracture of the ribs    Swellingof an arm and hand, muscle tightening causing shoulder injury     The risks, benefits and alternatives to radiation therapy were outlined with the patient. All questions were answered and a consent was signed.        Subjective:      HPI: Yanna Handy is a 86 y.o. female with left T2 N1 M0 ER+ HER-2 neg breast cancer    Self palpated. Biopsy showed invasive ductal cancer  ER HI positive and HER-2/rudy negative. Lumpectomy 8/22/2017 showed a 23mm grade II tumor with negative margins. 1/2 SLN shows a micromet. Uneventful pos top course.   Chief Complaint   Patient presents with     HE Cancer     Consult with Dr. Cuellar   .  Prior Radiation: No  Concurrent Chemotherapy: No    Current Outpatient Prescriptions   Medication Sig Dispense Refill     amoxicillin (AMOXIL) 500 MG tablet Two before Dental procedure       ascorbic acid (VITAMIN C) 1000 MG tablet Take 1,000 mg by mouth every morning.        aspirin 81 MG EC tablet Take 81 mg by mouth bedtime.       calcium citrate-vitamin D (CITRACAL+D) 315-200 mg-unit per tablet Take 1 tablet by mouth bedtime.       cholecalciferol, vitamin D3, 400 unit Tab Take 800 Units by mouth every morning.        ciprofloxacin HCl (CIPRO) 250 MG tablet TAKE ONE-HALF TABLET BY MOUTH EVERY  tablet 0     cyanocobalamin (VITAMIN B-12) 50 mcg tablet Take 50 mcg by mouth every morning.        DOCOSAHEXANOIC ACID/EPA  (FISH OIL ORAL) Take 2 g by mouth daily.       docusate sodium (COLACE) 100 MG capsule Take 100 mg by mouth bedtime.       fluticasone-vilanterol (BREO ELLIPTA) 200-25 mcg/dose DsDv Inhale 1 puff daily. 1 each 11     furosemide (LASIX) 20 MG tablet TAKE ONE TABLET EVERY DAY 90 tablet 3     hydrocortisone 1 % ointment        ipratropium-albuterol (COMBIVENT RESPIMAT)  mcg/actuation Mist inhaler Inhale 1 puff 4 (four) times a day. 1 Inhaler 5     ipratropium-albuterol (DUO-NEB) 0.5-2.5 mg/mL nebulizer INHALE 1 VIAL VIA NEBULIZER EVERY 6 HOURS 1080 mL 3     LACTOBACILLUS ACIDOPHILUS (ACIDOPHILUS ORAL) Take 1 tablet by mouth every morning.        magnesium 250 mg Tab Take 250 mg by mouth every morning.       metoprolol tartrate (LOPRESSOR) 100 MG tablet TAKE ONE TABLET BY MOUTH TWICE A DAY (Patient taking differently: 50mg BID) 360 tablet 1     multivitamin (MULTIVITAMIN) per tablet Take 1 tablet by mouth every morning.        nitroglycerin (NITROSTAT) 0.4 MG SL tablet Place 1 tablet (0.4 mg total) under the tongue every 5 (five) minutes as needed for chest pain. 25 tablet 3     nortriptyline (PAMELOR) 50 MG capsule TAKE ONE CAPSULE BY MOUTH EVERY DAY AT BEDTIME 90 capsule 1     omeprazole (PRILOSEC) 20 MG capsule Take 20 mg by mouth every morning.        rosuvastatin (CRESTOR) 5 MG tablet Take 3 tablets (15 mg total) by mouth at bedtime. 270 tablet 3     tiotropium bromide (SPIRIVA RESPIMAT) 2.5 mcg/actuation Mist Inhale 2 puffs daily. 4 g 0     No current facility-administered medications for this visit.      Past Medical History:   Diagnosis Date     Anemia 8/21/2015     Asthma      Breast cancer 07/25/2017    left     CAD (coronary artery disease) 9/15/2014     Chronic bronchitis      Chronic Sinusitis     Created by Conversion  Replacement Utility updated for latest IMO load     Emphysema     Created by Conversion      Fx ankle 8/2000     Fx wrist 12/2006    Right wrist      GI bleed 1/12/2015      "Hypercholesterolemia     Created by Conversion      Hypertension      Insomnia 1/13/2016     Osteoarthritis     Created by Conversion  Replacement Utility updated for latest IMO load     Osteoporosis Senile     Created by Conversion      Squamous Cell Carcinoma Of The Skin     Created by Conversion      Past Surgical History:   Procedure Laterality Date     APPENDECTOMY  1971     BREAST SURGERY  08/02/2017    left lumpectomy, Dr. Pope     COLONOSCOPY N/A 1/15/2015    Procedure: COLONOSCOPY with biopsy of polyp;  Surgeon: Joel Coppola MD;  Location: [facility];  Service:      CORONARY STENT PLACEMENT      \"I had 2 stent placements\"     EYE SURGERY      Cataracts     HYSTERECTOMY  1971     JOINT REPLACEMENT  1/2003    Right TKA     KNEE ARTHROSCOPY Left 10/2004     OOPHORECTOMY  1971    One removed     MO REMOVAL OF TONSILS,<11 Y/O      Description: Tonsillectomy;  Recorded: 02/12/2009;  Comments: 1941     MO REVISE SECONDARY VARICOSITY      Description: Varicose Vein Ligation;  Recorded: 02/12/2009;  Comments: 1988     MO TOTAL ABDOM HYSTERECTOMY      Description: Hysterectomy;  Recorded: 02/12/2009;  Comments: 1971     SINUS SURGERY      \"I had 3 sinus surgeries.\"     Alendronate; Metoclopramide hcl; Rofecoxib; Risedronate; and Sulfa (sulfonamide antibiotics)  Family History   Problem Relation Age of Onset     Heart attack Father      Breast cancer Maternal Aunt 55     Lung cancer Brother 65     Colon cancer Maternal Grandmother 90     Lung cancer Maternal Grandfather 65     Brain cancer Son 25     Rectal cancer Brother 64     Social History     Social History     Marital status:      Spouse name: N/A     Number of children: N/A     Years of education: N/A     Occupational History     Not on file.     Social History Main Topics     Smoking status: Never Smoker     Smokeless tobacco: Never Used     Alcohol use Yes      Comment: Wine occassionally     Drug use: No     Sexual activity: No     Other " "Topics Concern     Not on file     Social History Narrative        Review of Systems:        General  Constitutional (WDL): Exceptions to WDL  Fatigue: Fatigue relieved by rest  EENT  Eye Disorder (WDL): All eye disorder elements are within defined limits (wears glasses)  Ear Disorder (WDL): All ear disorder elements are within defined limits  Respiratory       Respiratory (WDL): Exceptions to WDL  Dyspnea: Shortness of breath with moderate exertion (hx of asthma)  Cardiovascular  Cardiovascular (WDL): Exceptions to WDL  Edema: Yes  Edema Limbs: 5 - 10% inter-limb discrepancy in volume or circumference at point of greatest visible difference, swelling or obscuration of anatomic architecture on close inspection  Endocrine     Gastrointestinal  Gastrointestinal (WDL): All gastrointestinal elements are within defined limits  Musculoskeletal  Musculoskeletal and Connetive Tissue Disorders (WDL): All Musculoskeletal and Connetive Tissue Disorder elements are within defined limits  Integumentary               Integumentary (WDL): Exceptions to WDL (healing incision in left breast, surgery 8/2/17)  Neurological  Neurosensory (WDL): Exceptions to WDL  Ataxia: Asymptomatic, clinical or diagnostic observations only, intervention not indicated  Psychological/Emotional   Patient Coping: Accepting  Hematological/Lymphatic  Lymph (WDL): All lymph disorder elements are within defined limits  Dermatologic     Genitourinary/Reproductive  Genitourinary (WDL): Exceptions to WDL (some stress incontinence, nocturia 0-1 times)  Reproductive     Pain              Currently in Pain: No/denies  Accompanied by  Accompanied by: Family Member      Objective:     Physical Exam    Vitals:    08/31/17 0911   BP: 150/67   Pulse: (!) 56   Temp: 98.1  F (36.7  C)   TempSrc: Oral   SpO2: 97%   Weight: 169 lb 6.4 oz (76.8 kg)   Height: 5' 0.5\" (1.537 m)            Physical Exam     Head: Normocephalic, without obvious abnormality, atraumatic  Neck: no " adenopathy and supple, symmetrical, trachea midline  Lungs: wheezing  Bilaterally, no SOB   Breasts:  expected post operative changes, no masses skin changes suggestive of recurrence or infection.   Heart: regular rate and rhythm  Skin: Skin color, texture, turgor normal. No rashes or lesions  Lymph nodes: Cervical, supraclavicular, and axillary nodes normal.  Extremities: No lymphedema, full ROM UE.   Neurologic: Grossly normal  Pysch: affect normal, thought content appropriate.     Recent Labs:   Recent Results (from the past 168 hour(s))   ECG 12 lead with MUSE   Result Value Ref Range    SYSTOLIC BLOOD PRESSURE  mmHg    DIASTOLIC BLOOD PRESSURE  mmHg    VENTRICULAR RATE 63 BPM    ATRIAL RATE 63 BPM    P-R INTERVAL 188 ms    QRS DURATION 86 ms    Q-T INTERVAL 432 ms    QTC CALCULATION (BEZET) 442 ms    P Axis 63 degrees    R AXIS -20 degrees    T AXIS -2 degrees    MUSE DIAGNOSIS       Normal sinus rhythm  Minimal voltage criteria for LVH, may be normal variant  Nonspecific ST and T wave abnormality  Abnormal ECG  When compared with ECG of 27-JUL-2017 08:57,  Criteria for Septal infarct are no longer Present  Confirmed by GERALD BULLARD MD LOC: ([id number]) on 8/29/2017 4:43:26 PM         Imaging: mammograms reviewed.     Pathology:   Results for orders placed or performed during the hospital encounter of 07/13/17 (from the past 8760 hour(s))   Surgical Pathology Exam   Result Value    Case Report      Surgical Pathology                                Case: [id number]                                    Authorizing Provider:  Compa Bennett MD   Collected:           07/13/2017 1030              Ordering Location:     [facility] Breast Received:            07/13/2017 [address] [place] [address] Center Ultrasound                                                       Pathologist:           Zainab Lynch MD                                                           Specimen:    Breast,  "Left, 4:00 zone 2                                                                  Final Diagnosis      BREAST, LEFT, 4:00, ZONE 2, ULTRASOUND-GUIDED CORE BIOPSY:     1) INVASIVE DUCTAL CARCINOMA         a) ORALIA GRADE: II (OF III)         b) SCORE: 6 (OF 9)     2) NEGATIVE FOR DUCTAL CARCINOMA IN SITU     3) ADDITIONAL STUDIES:         a) ESTROGEN RECEPTOR: POSITIVE (>95% OF TUMOR NUCLEI, STRONG STAINING)         b) PROGESTERONE RECEPTOR: POSITIVE (>95% OF TUMOR NUCLEI, STRONG STAINING)         c) HER2: NEGATIVE (SCORE 0+)    MCSS    Comment      Dr. Minerva Pierson has reviewed this case and concurs.     Black ink is confirmed.    Microscopic Description      Microscopic examination performed, substantiating the above diagnosis.    Clinical Information      Clinical history: Left breast mass  Reason for procedure: Left breast mass  Time placed in formalin:  1127    Method: Ultrasound  Container: Sterile saline  Breast: Left  Quadrant or Site:  4 o'clock, Zone 2   Number of Cores: 3  Indication: Mass  Pre-test Suspicion: High   Microcalcifications on Specimen Radiograph: NA   Microcalcifications are in Box: NA  Radiologist:  Airam Hollingsworth MD    Gross Description      Received in saline and subsequently placed into formalin, labeled with the patient's name Yanna Handy and \"left breast, 4 o'clock, Zone 2,\" are three, cylindrical, tan-white to yellow cores of tissue averaging 1.5 x 0.2 cm. The cores are inked black.  TE-1C     Note: The specimen is placed into formalin at 11:27 on 07/13/17.  RJR:dls  Time in formalin: ~11 hours.    Special Stains      All controls stain appropriately.    Note - Estrogen and Progesterone Receptor Immunoperoxidase Stains:  These stains were done using the following criteria:    Specimen fixative: Formalin-fixed paraffin-embedded sections    Detection system: Biotin-free multimer-based technology detection system ([employer])    Retrieval method: CC1 pretreatment; a willem-based " buffer with a slightly basic PH used at an elevated     temperature (95+5 degrees C)    Clone:  Estrogen receptor-SP1, Rabbit monoclonal ([place])     Progesterone receptor-1E2, Rabbit monoclonal ([place])    Scoring method: Intensity of nuclear stain, strong vs weak vs absent; % of cells stained     Note - HER-2/rudy Immunoperoxidase Stain:  This stain was done using the following criteria:    Specimen fixative: Formalin-fixed paraffin-embedded sections    Detection system: Biotin-free multimer-based technology detection system ([employer])    Retrieval method: CC1 pretreatment; a willem-based buffer with a slightly basic PH used at an  elevated     temperature (95 plus or minus 5 degrees C)    Clone:  4B5, Rabbit monoclonal ([place] [employer])    Scoring method: IHC 3+ - Positive (Circumferential membrane staining that is complete, intense, and     within greater than 10% of tumor cells)       IHC 2+ - Equivocal (Circumferential Membrane staining that is incomplete and/or     weak/moderate and within greater than 10% of tumor cells or complete and     circumferential membrane staining that is intense and less than or equal to 10% of tumor cells)       IHC1+ - Negative (Incomplete membrane staining that is faint/barely perceptible     and within greater than 10% of tumor cells       IHC 0 - Negative (No staining is Observed or Membrane staining that is incomplete and     is faint/barely perceptible and within less than or equal to 10% of tumor cells)    REFERENCES:  1) Magdalena WALLER et al: Recommendations for Human Epidermal Growth Factor Receptor 2 Testing in Breast Cancer.      American Society of Clinical  Oncology/College of American Pathologists Clinical Practice Guideline Update.      Arch Pathol Lab Med. 2014;138: 241-256     * HER-2/rudy stain performed using the [place] [employer]'s FDA approved methodology.    Charges      CPT:  23908, 88360 x3   ICD-10:  C50.912    PQRS:  3394F      CC:   Airam Hollingsworth MD    Result  Flag Malignant (!)     Comment:   SPECIMEN PROCESSING:    All histology slide preparation and stains; and cytology slide preparation, staining, and cytotechnologist screening done at [facility] are performed at [facility], [address] [place] [address], [id number], with final interpretation, frozen section analysis, and cytology adequacy assessment at indicated laboratory.         Pathology Results     Malignant - Core biopsy, Left - 7/13/2017      Pathology Code Malignancy Type    Invasive ductal carcinoma Invasive          Additional Information            Concordance:  Not Entered Estrogen:  Positive     Progesterone:  Positive    Stage:  3A     Histology Grade:  G2 Margin Status:  Uninvolved    HER2/rudy IHC:  0           Removed:  2     Positive:  1          Overall Size:  23 mm     Additional Comments:  Updated with 8/2/17 [facility] report [id number]. MSC. Dr Nancy Pope.                       Signed by: Eugenia Cuellar MD

## 2021-06-12 NOTE — PROGRESS NOTES
"This is a 86 y.o.  female who I'm asked to see by Wilbur Hannah MD for evaluation of a left breast cancer.  This was picked up by the patient.  She underwent a mammogram and ultrasound.  A needle biopsy was done which shows an invasive ductal carcinoma.  It is estrogen receptor positive, progesterone receptor positive and HER-2 negative.    She has no significant family history of breast cancer.      PAST MEDICAL HISTORY:  Past Medical History:   Diagnosis Date     Anemia 8/21/2015     Asthma      Breast cancer 07/25/2017    left     CAD (coronary artery disease) 9/15/2014     Chronic Sinusitis     Created by Conversion  Replacement Utility updated for latest IMO load     Emphysema     Created by Conversion      Fx ankle 8/2000     Fx wrist 12/2006    Right wrist      GI bleed 1/12/2015     Hypercholesterolemia     Created by Conversion      Hypertension      Insomnia 1/13/2016     Osteoarthritis     Created by Conversion  Replacement Utility updated for latest IMO load     Osteoporosis Senile     Created by Conversion      Squamous Cell Carcinoma Of The Skin     Created by Conversion      Past Surgical History:   Procedure Laterality Date     APPENDECTOMY  1971     COLONOSCOPY N/A 1/15/2015    Procedure: COLONOSCOPY with biopsy of polyp;  Surgeon: Joel Coppola MD;  Location: [facility];  Service:      CORONARY STENT PLACEMENT      \"I had 2 stent placements\"     EYE SURGERY      Cataracts     HYSTERECTOMY  1971     JOINT REPLACEMENT  1/2003    Right TKA     KNEE ARTHROSCOPY Left 10/2004     OOPHORECTOMY  1971    One removed     [place] REMOVAL OF TONSILS,<13 Y/O      Description: Tonsillectomy;  Recorded: 02/12/2009;  Comments: 1941     AZ REVISE SECONDARY VARICOSITY      Description: Varicose Vein Ligation;  Recorded: 02/12/2009;  Comments: 1988     [place] TOTAL ABDOM HYSTERECTOMY      Description: Hysterectomy;  Recorded: 02/12/2009;  Comments: 1971     SINUS SURGERY      \"I had 3 sinus surgeries.\" "       Medications:    Current Outpatient Prescriptions:      amoxicillin (AMOXIL) 500 MG tablet, Two before Dental procedure, Disp: , Rfl:      ascorbic acid (VITAMIN C) 1000 MG tablet, Take 1,000 mg by mouth every morning. , Disp: , Rfl:      aspirin 81 MG EC tablet, Take 81 mg by mouth bedtime., Disp: , Rfl:      calcium citrate-vitamin D (CITRACAL+D) 315-200 mg-unit per tablet, Take 1 tablet by mouth bedtime., Disp: , Rfl:      cholecalciferol, vitamin D3, 400 unit Tab, Take 800 Units by mouth every morning. , Disp: , Rfl:      ciprofloxacin HCl (CIPRO) 250 MG tablet, TAKE ONE-HALF TABLET BY MOUTH EVERY DAY, Disp: 225 tablet, Rfl: 0     COMBIVENT RESPIMAT  mcg/actuation Mist inhaler, INHALE 1 PUFF BY MOUTH FOUR TIMES DAILY AS DIRECTED (Patient taking differently: INHALE 1 PUFF BY MOUTH FOUR TIMES DAILY AS DIRECTED PRN), Disp: 4 g, Rfl: 0     cyanocobalamin (VITAMIN B-12) 50 mcg tablet, Take 50 mcg by mouth every morning. , Disp: , Rfl:      DOCOSAHEXANOIC ACID/EPA (FISH OIL ORAL), Take 2 g by mouth daily., Disp: , Rfl:      docusate sodium (COLACE) 100 MG capsule, Take 100 mg by mouth bedtime., Disp: , Rfl:      fluticasone-vilanterol (BREO ELLIPTA) 200-25 mcg/dose DsDv, Inhale 1 puff daily., Disp: 1 each, Rfl: 11     furosemide (LASIX) 20 MG tablet, TAKE ONE TABLET EVERY DAY, Disp: 90 tablet, Rfl: 3     hydrocortisone 1 % ointment, , Disp: , Rfl:      ipratropium-albuterol (DUO-NEB) 0.5-2.5 mg/mL nebulizer, INHALE 1 VIAL VIA NEBULIZER EVERY 6 HOURS, Disp: 1080 mL, Rfl: 3     LACTOBACILLUS ACIDOPHILUS (ACIDOPHILUS ORAL), Take 1 tablet by mouth every morning. , Disp: , Rfl:      magnesium 250 mg Tab, Take 250 mg by mouth every morning., Disp: , Rfl:      metoprolol (LOPRESSOR) 50 MG tablet, Take 50 mg by mouth 2 (two) times a day. , Disp: , Rfl:      metoprolol tartrate (LOPRESSOR) 100 MG tablet, TAKE ONE TABLET BY MOUTH TWICE A DAY, Disp: 360 tablet, Rfl: 1     multivitamin (MULTIVITAMIN) per tablet, Take 1  tablet by mouth every morning. , Disp: , Rfl:      nitroglycerin (NITROSTAT) 0.4 MG SL tablet, Place 1 tablet (0.4 mg total) under the tongue every 5 (five) minutes as needed for chest pain., Disp: 25 tablet, Rfl: 3     nortriptyline (PAMELOR) 50 MG capsule, TAKE ONE CAPSULE BY MOUTH EVERY DAY AT BEDTIME, Disp: 90 capsule, Rfl: 1     omeprazole (PRILOSEC) 20 MG capsule, Take 20 mg by mouth every morning. , Disp: , Rfl:      rosuvastatin (CRESTOR) 5 MG tablet, Take 3 tablets (15 mg total) by mouth at bedtime., Disp: 270 tablet, Rfl: 3    Allergies:  Allergies   Allergen Reactions     Alendronate Nausea And Vomiting     Metoclopramide Hcl Anxiety     Rofecoxib Diarrhea and Nausea Only     Risedronate      Sulfa (Sulfonamide Antibiotics) Anaphylaxis       Social History:   reports that she has never smoked. She has never used smokeless tobacco. She reports that she drinks about 3.0 oz of alcohol per week     ROS:  A 12 point comprehensive review of systems was negative except as noted.    Physical Exam  /69 (Patient Site: Left Arm, Patient Position: Sitting, Cuff Size: Adult Regular)  Pulse 64  Wt 168 lb (76.2 kg)  LMP 07/11/1971  BMI 32.27 kg/m2  General:alert, appears stated age, cooperative and moderately obese  Lungs:clear to auscultation bilaterally  CV:regular rate and rhythm, S1, S2 normal, no murmur, click, rub or gallop  Breasts: Clearly palpable mass in the lower outer aspect of the left breast.  Measures about 2 cm in size.  No other masses.  No skin changes.  Lymph Nodes:no axillary nodes palpated  Neuro:Grossly normal      Reviewed her mammograms and ultrasound and pathology.     Impression: Left Breast Cancer.  Clinically T2, N0 lesion.  Discussed the surgical options for treatment of breast cancer which generally are a lumpectomy (partial mastectomy) combined with radiation versus a mastectomy.  Explained that the survival benefit is the same for both.  The difference is in local recurrence  risk.  The patient is a Excellent candidate for a lumpectomy.  At her age likely she will need the need radiation.  Discussed SLN biopsy.  The procedure and rationale were explained.  If her sentinel node is positive then I probably would recommend radiation to her.  Discussed that we would not typically use chemotherapy in somebody her age and she is strongly ER NE positive so will be a good candidate for hormone therapy.      Plan: We'll schedule for a left partial mastectomy with sentinel lymph node biopsy. This is typically an outpatient procedure under local MAC anesthetic.  The risks and benefits of surgery were explained.  Also talked about expected recovery time.

## 2021-06-12 NOTE — PROGRESS NOTES
Patient here ambulatory, accompanied by  in wheelchair, for radiation consult for breast cancer.  Patient states she is doing well and healing from surgery.  20 minutes spent in review of radiation process and potential side effects.  Written information given:  ACS RT book, Jeri RT handouts, RT to breast handouts, team photo sheet, HE class schedule.  Seen by Dr. Cuellar.  Plan RTC for follow up as directed by physician.

## 2021-06-12 NOTE — TELEPHONE ENCOUNTER
Who is calling:  Patient  Reason for Call:  States she has an appointment with the provider this Wednesday. States she has not seen the pulmonary doctor or done her stress test yet and was wondering if she should cancel this Wednesday appointment until after she see's pulmonary on November 23.  Please advise.    Okay to leave a detailed message: Yes

## 2021-06-12 NOTE — PROGRESS NOTES
In for follow-up of her left lumpectomy  with sentinel lymph node biopsy.  She is feeling well.  She is having very minimal pain.      Physical exam:  Appears well.  Does not appear in any discomfort.  Breasts: Incisions are healing nicely with no signs of infection.  No swelling.    Pathology: The tumor was 2.3 cm.  The margins are negative.  Her sentinel lymph node is positive for micrometastases.    Impression: Postop visit. Doing well.  Went over her pathology.  Her sentinel node was positive but only for a tiny micrometastases.  I think she should visit with the oncologist to discuss hormone therapy.  I still do not necessarily think she needs radiation but it may be worth a visit to the radiation oncologist and will talk about her at tumor conference.  Oncotype is not indicated at her age.    Plan:  We'll refer her onto [facility] oncology.  Follow-up with me in 1 year with bilateral mammograms.

## 2021-06-12 NOTE — PROGRESS NOTES
PULMONARY PROGRESS NOTE      HPI:  Yanna Handy is a 86 y.o. female followed in the Pulmonary clinic for reactive airways disease. She  history of chronic rhinosinusitis with asthma seen previously at the [facility] and uses intranasal antibiotics.   She was switched Breo last year and continues to be compliant with this; she was initially noted to have very good control with this but states that over the last several weeks she has had more shortness of breath with exertion that requires the use of her Combivent rescue.  She has no chest tightness, she denies fevers chills, night sweats or nighttime awakenings with shortness of breath.    Unfortunately, Ms. Handy was diagnosed with breast cancer last month after discovering a lump and underwent a lumpectomy with sentinel lymph node excision therapy also.  She is about to be initiated on an aromatase inhibitor and is due to see Dr. Rodas in the oncology clinic next week.    She also fell and hit her head fairly badly because of which she was evaluated in the emergency room did not have any fractures.  Her ACT today is 5+4+5+5+4 = 23.        Current Outpatient Prescriptions on File Prior to Visit   Medication Sig Dispense Refill     ascorbic acid (VITAMIN C) 1000 MG tablet Take 1,000 mg by mouth every morning.        aspirin 81 MG EC tablet Take 81 mg by mouth bedtime.       calcium citrate-vitamin D (CITRACAL+D) 315-200 mg-unit per tablet Take 1 tablet by mouth bedtime.       cholecalciferol, vitamin D3, 400 unit Tab Take 800 Units by mouth every morning.        ciprofloxacin HCl (CIPRO) 250 MG tablet TAKE ONE-HALF TABLET BY MOUTH EVERY  tablet 0     cyanocobalamin (VITAMIN B-12) 50 mcg tablet Take 50 mcg by mouth every morning.        DOCOSAHEXANOIC ACID/EPA (FISH OIL ORAL) Take 2 g by mouth daily.       docusate sodium (COLACE) 100 MG capsule Take 100 mg by mouth bedtime.       fluticasone-vilanterol (BREO ELLIPTA) 200-25 mcg/dose DsDv Inhale 1  puff daily. 1 each 11     furosemide (LASIX) 20 MG tablet TAKE ONE TABLET EVERY DAY 90 tablet 3     hydrocortisone 1 % ointment        ipratropium-albuterol (COMBIVENT RESPIMAT)  mcg/actuation Mist inhaler Inhale 1 puff 4 (four) times a day. 1 Inhaler 5     ipratropium-albuterol (DUO-NEB) 0.5-2.5 mg/mL nebulizer INHALE 1 VIAL VIA NEBULIZER EVERY 6 HOURS 1080 mL 3     LACTOBACILLUS ACIDOPHILUS (ACIDOPHILUS ORAL) Take 1 tablet by mouth every morning.        magnesium 250 mg Tab Take 250 mg by mouth every morning.       metoprolol tartrate (LOPRESSOR) 100 MG tablet TAKE ONE TABLET BY MOUTH TWICE A DAY (Patient taking differently: 50mg BID) 360 tablet 1     multivitamin (MULTIVITAMIN) per tablet Take 1 tablet by mouth every morning.        nitroglycerin (NITROSTAT) 0.4 MG SL tablet Place 1 tablet (0.4 mg total) under the tongue every 5 (five) minutes as needed for chest pain. 25 tablet 3     nortriptyline (PAMELOR) 50 MG capsule TAKE ONE CAPSULE BY MOUTH EVERY DAY AT BEDTIME 90 capsule 1     omeprazole (PRILOSEC) 20 MG capsule Take 20 mg by mouth every morning.        rosuvastatin (CRESTOR) 5 MG tablet Take 3 tablets (15 mg total) by mouth at bedtime. 270 tablet 3     amoxicillin (AMOXIL) 500 MG tablet Two before Dental procedure       No current facility-administered medications on file prior to visit.      Allergies   Allergen Reactions     Alendronate Nausea And Vomiting     Metoclopramide Hcl Anxiety     Rofecoxib Diarrhea and Nausea Only     Risedronate      Sulfa (Sulfonamide Antibiotics) Anaphylaxis           EXAM  /68  Pulse 74  Resp 18  Wt 169 lb 12.8 oz (77 kg)  LMP 07/11/1971  SpO2 93% Comment: RA-pt on 4L of O2 at night  BMI 32.62 kg/m2    Physical Exam   Constitutional: She is oriented to person, place, and time. She appears well-developed and well-nourished. No distress.   HENT:   Head: Normocephalic and atraumatic.   Nose: Nose normal.   Eyes: Conjunctivae and EOM are normal. Pupils are  equal, round, and reactive to light. No scleral icterus.   Neck: Neck supple. No tracheal deviation present.   Pulmonary/Chest: Effort normal. No stridor. She has no wheezes.   Musculoskeletal: Normal range of motion. She exhibits no edema.   Neurological: She is alert and oriented to person, place, and time. She has normal reflexes.   Skin: Skin is warm and dry. She is not diaphoretic. No pallor.   Psychiatric: She has a normal mood and affect.   Vitals reviewed.      PFTS 2/23/15 (unchanged from prior visit):  FEV1/FVC is 69% and is normal.   FEV1 is 1.26 L (87%) predicted and is normal.   FVC is 1.83 L (92%) predicted and normal.   There was no improvement in spirometry after a single inhaled dose of bronchodilator.   TLC is 3.52 L (79%) predicted and is normal.   RV is 1.17 L (51%) predicted and is reduced.   DLCO is 45% predicted and is reduced when it is corrected for hemoglobin.   Impression: Full Pulmonary Function Test is abnormal.   Spirometry and flow volume loop are within normal limits   Spirometry is not consistent with reversibility.   There is no hyperinflation.   There is no air-trapping.   Diffusion capacity when corrected for hemoglobin is moderately reduced.    SIX MINUTE WALK TEST / HOME O2 EVALUATION  6/9/15  (unchanged from prior visit):  SpO2 at rest on RA 95%   SpO2 after walking 6 minutes on RA 95%   Distance covered 672 feet   Recovery phase, SpO2 after 1 minute rest on RA was 93%   Impression:   No desaturation with activities.   No need for O2 supplementation.      CXR 5/5/16  (unchanged from prior visit):  Mild atelectasis or fibrosis in the lung bases. Upper lungs are clear. Mild cardiomegaly with normal pulmonary vascularity. Scoliosis of the thoracolumbar  spine.      IMPRESSION:  85-year-old female with a history of moderate persistent asthma, coronary artery disease status post stenting and some fibrotic changes noted on imaging studies presents to clinic today with concern for a  follow-up visit for her asthma and is presently having more symptoms leading me to believe that we may have to stop her therapy up especially as this does seem to respond to rescue inhalers.  For the present we will recommend:  1. Asthma moderate persistent, with preserved spirometry.      Continue fluticasone/Vilanterol 1 puff daily, she knows to gargle after using this.    We have initiated her on Spiriva Respimat in clinic and given her a sample today.  She knows to call us in a few weeks if this does alleviate her symptoms we will prescribe this for her.    I have explained to the patient that she should only use the Combivent for rescue.  2. Overnight hypoxia: Noted on overnight oximetry. Is continuing to use supplemental oxygen overnight.  3. CAD: Has undergone placement of stents to the proximal and mid LAD.  She is due to be evaluated by Dr. Schaefer in the cardiology clinic next week as she did undergo an echocardiogram recently which appears stable.  4. Isolated diffusion defect: Likely secondary to fibrosis noted on imaging studies.  No intervention presently.  5. Chronic sinusitis: Has discontinued her tobramycin nebs per advice from the [facility]. I have advised that she start using sinus washes daily.  6. Follow-up: 3 months.    Lisa Amin  Pulmonary and Critical Care  [id number]

## 2021-06-13 NOTE — PROGRESS NOTES
Radiation Treatment Summary    Patient: Yanna Handy   MRN: [id number]  : 1931  Care Provider: Eugenia Cuellar    Date of Service: 10/04/2017      HISTORY: Yanna Handy was treated with 3D conformal radiation therapy with DIBH (cardiac sparing) for a Left breast 23mm ER+ AL+ HER-2 neg IDC  1/2 LN    SITE TREATED: left breast   TOTAL DOSE: 4256 cGy   NUMBER OF FRACTIONS: 16  DATES COMPLETED: 10/5/2017  CONCURRENT CHEMOTHERAPY: No  ADJUVANT THERAPY:Yes: TBD    Yanna tolerated the treatment without unexpected side effects.     PLAN: Discharge instructions were given and Yanna knows to call if questions/issues arise. Yanna will be seen in f/u in 6 weeks without a scan.       Signed by: Eugenia Cuellar MD

## 2021-06-13 NOTE — TELEPHONE ENCOUNTER
I called and spoke with pt. Per confirmed she is taking Gabapentin 100 mg (1-1-3) and this is helping with her back and leg pain a lot. She really want to keep taking Gabapentin versus considering injections.     Informed pt I will route refill request to Kathrine, just wanted to confirm her current dose. Per pt, she could not tolerate the higher dose Kathrine wanted her to do so just taking (1-1-3).       Pharmacy sent refill request for gabapentin   --Med last Rx 1/6/20 #270, 3 refill   --Last OV 2/24/20   --Future appt: none  --Please advise

## 2021-06-13 NOTE — PROGRESS NOTES
[facility] Radiation Oncology Follow Up Note    Patient: Yanna Handy  MRN: [id number]  Date of Service: 11/03/2017    Assessment / Impression     1. Malignant neoplasm of upper-outer quadrant of left female breast        No matching staging information was found for the patient.  ECOG Peformance Status  ECOG Performance Status: 0  Distress Assessment Score  Distress Assessment Score: No distress  Body site: Breast     Plan:   Continue ROM stretches.  F/u with Dr. Rodas.  F/u with me PRN.    Face to face time  25 minutes with > 75% spent on consultation, education and coordination of care.    Subjective:      HPI: Yanna Handy is a 86 y.o. female here for routine f/u.  She was treated with 3D conformal radiation therapy with DIBH (cardiac sparing) for a Left breast 23mm ER+ MN+ HER-2 neg IDC  1/2 LN     SITE TREATED: left breast   TOTAL DOSE: 4256 cGy   NUMBER OF FRACTIONS: 16  DATES COMPLETED: 10/5/2017  CONCURRENT CHEMOTHERAPY: No  ADJUVANT THERAPY:Yes: Anastrozole      Yanna tolerated the treatment without unexpected side effects.     Chief Complaint   Patient presents with     HE Cancer     Radiation   .    Current Outpatient Prescriptions   Medication Sig Dispense Refill     amoxicillin (AMOXIL) 500 MG tablet Two before Dental procedure       anastrozole (ARIMIDEX) 1 mg tablet Take 1 tablet (1 mg total) by mouth daily. 90 tablet 3     ascorbic acid (VITAMIN C) 1000 MG tablet Take 1,000 mg by mouth every morning.        aspirin 81 MG EC tablet Take 81 mg by mouth bedtime.       calcium citrate-vitamin D (CITRACAL+D) 315-200 mg-unit per tablet Take 1 tablet by mouth bedtime.       cholecalciferol, vitamin D3, 400 unit Tab Take 800 Units by mouth every morning.        ciprofloxacin HCl (CIPRO) 250 MG tablet TAKE ONE-HALF TABLET BY MOUTH EVERY  tablet 0     cyanocobalamin (VITAMIN B-12) 50 mcg tablet Take 50 mcg by mouth every morning.        DOCOSAHEXANOIC ACID/EPA (FISH OIL ORAL) Take 2  g by mouth daily.       docusate sodium (COLACE) 100 MG capsule Take 100 mg by mouth bedtime.       fluticasone furoate 100 mcg/actuation DsDv Inhale 1 puff daily. 30 each 11     fluticasone-vilanterol (BREO ELLIPTA) 200-25 mcg/dose DsDv Inhale 1 puff daily. 1 each 11     furosemide (LASIX) 20 MG tablet TAKE ONE TABLET EVERY DAY 90 tablet 3     hydrocortisone 1 % ointment        ipratropium-albuterol (COMBIVENT RESPIMAT)  mcg/actuation Mist inhaler Inhale 1 puff 4 (four) times a day. 1 Inhaler 5     ipratropium-albuterol (DUO-NEB) 0.5-2.5 mg/mL nebulizer INHALE 1 VIAL VIA NEBULIZER EVERY 6 HOURS 1080 mL 3     LACTOBACILLUS ACIDOPHILUS (ACIDOPHILUS ORAL) Take 1 tablet by mouth every morning.        magnesium 250 mg Tab Take 250 mg by mouth every morning.       metoprolol tartrate (LOPRESSOR) 100 MG tablet TAKE ONE TABLET BY MOUTH TWICE A DAY (Patient taking differently: 50mg BID) 360 tablet 1     multivitamin (MULTIVITAMIN) per tablet Take 1 tablet by mouth every morning.        nitroglycerin (NITROSTAT) 0.4 MG SL tablet Place 1 tablet (0.4 mg total) under the tongue every 5 (five) minutes as needed for chest pain. 25 tablet 3     nortriptyline (PAMELOR) 50 MG capsule TAKE ONE CAPSULE BY MOUTH EVERY DAY AT BEDTIME 90 capsule 0     omeprazole (PRILOSEC) 20 MG capsule Take 20 mg by mouth every morning.        rosuvastatin (CRESTOR) 5 MG tablet Take 3 tablets (15 mg total) by mouth at bedtime. 270 tablet 3     No current facility-administered medications for this visit.        The following portions of the patient's history were reviewed and updated as appropriate: allergies, current medications, past family history, past medical history, past social history, past surgical history and problem list.    Review of Systems    Constitutional  Constitutional (WDL): Exceptions to WDL  Fatigue: Fatigue relieved by rest  Neurosensory  Neurosensory (WDL): All neurosensory elements are within defined limits  Eye        Eye  Disorder (WDL): All eye disorder elements are within defined limits  Ear     Cardiovascular  Cardiovascular (WDL): Exceptions to WDL  Edema: Yes  Pulmonary  Respiratory (WDL): Exceptions to WDL  Dyspnea: Shortness of breath with moderate exertion  Gastrointestinal  Gastrointestinal (WDL): All gastrointestinal elements are within defined limits  Genitourinary  Genitourinary (WDL): Exceptions to WDL (stress incontinence)              Musculoskeletal              Musculoskeletal and Connetive Tissue Disorders (WDL): Exceptions to WDL  Arthralgia: Mild pain (fingers)  Integumentary  Integumentary (WDL): All integumentary elements are within defined limits  Patient Coping  Patient Coping: Accepting  Pain              Currently in Pain: No/denies  Accompanied by  Accompanied by: Family Member    Objective:     Physical Exam    Vitals:    11/03/17 1411   BP: 127/63   Pulse: 65   Temp: 98.3  F (36.8  C)   TempSrc: Oral   SpO2: 93%   Weight: 170 lb (77.1 kg)        Physical Exam     Head: Normocephalic, without obvious abnormality, atraumatic  Neck: no adenopathy and supple, symmetrical, trachea midline  Lungs: clear to auscultation bilaterally  Breasts:  expected post radiation changes  Heart: regular rate and rhythm  Skin: Skin color, texture, turgor normal. No rashes or lesions  Lymph nodes: Cervical, supraclavicular, and axillary nodes normal.  Extremities: No lymphedema, full ROM UE.   Neurologic: Grossly normal  Pysch: affect normal, thought content appropriate.     Recent Labs: No results found for this or any previous visit (from the past 168 hour(s)).    Imaging: Imaging results 30 days: No results found.    Signed by: Eugenia Cuellar MD MPH

## 2021-06-13 NOTE — PROGRESS NOTES
RADIATION ONCOLOGY WEEKLY TREATMENT VISIT NOTE      Assessment / Impression       1. Malignant neoplasm of upper-outer quadrant of left female breast         Tolerating radiation therapy well.  All questions and concerns addressed.    Plan:   Done today  F/u 6 weeks   Cont ROM   Continue radiation treatment as prescribed.  Radiation: Site: left breast  Stereotactic Radiosurgery: No  Stereotactic Radiosurgery: No  Concurrent Therapy: No  Today's Dose: 4256  Total Dose for Breast: 4256  Today's Fraction/Total Fraction Breast: 16/16        Subjective:      HPI: Yanna Handy is a 86 y.o. female with    1. Malignant neoplasm of upper-outer quadrant of left female breast         The following portions of the patient's history were reviewed and updated as appropriate: allergies, current medications, past family history, past medical history, past social history, past surgical history and problem list.      Objective:     Exam:     Vitals:    10/04/17 1126   BP: 162/72   Pulse: (!) 52   Temp: 97.5  F (36.4  C)   TempSrc: Oral   SpO2: 97%   Weight: 170 lb 8 oz (77.3 kg)       Wt Readings from Last 8 Encounters:   10/04/17 170 lb 8 oz (77.3 kg)   09/27/17 170 lb 1.6 oz (77.2 kg)   09/20/17 170 lb 8 oz (77.3 kg)   09/13/17 169 lb 11.2 oz (77 kg)   08/31/17 169 lb 6.4 oz (76.8 kg)   08/29/17 169 lb (76.7 kg)   08/24/17 169 lb 12.8 oz (77 kg)   08/18/17 169 lb 11.2 oz (77 kg)       General: Alert and oriented, in no acute distress  Yanna has mild Erythema.    Treatment Summary to Date    Aria chart and setup information reviewed    Eugenia Cuellar MD

## 2021-06-13 NOTE — PROGRESS NOTES
Assessment and Plan:Yanna Handy is a 89 y.o. female with a past medical history significant for moderate persistent asthma who presents to clinic today for follow up.  She is still struggling with dyspnea and I believe most of this is difficult to control asthma.  She improves with use of her inhalers and prednisone, but is not getting good day to day control.  She could be a candidate for a second inhaled steroid, but she would rather try a low dose of daily prednisone which should be safe for her.   The long term side effects of low dose prednisone will not likely come to fruition in this near 89 year old patient, and the benefits could be improved stamina and reversal of her deconditioning that is certainly playing a role in how she feels.    1) Moderate persistent asthma - continue Breo, Incruse and restart flonase.  Will give her prednisone at 10mg a day for 2 weeks then down to 5mg a day thereafter.  If this does not help her we can add a second inhaled steroid.    2) Dyspnea - most likely due to uncontrolled asthma.  Encouraged her to be proactive and try the combivent MDI before she goes for a walk rather than wait until she is short of breath  3) Post nasal drip - she has significant symptoms of this, I suspect the prednisone helped this too.  Restart the flonase to see if this helps  4) Pending a cardiac stress test as well  5) RTC in 3 months          CC: asthma    HPI: Ms. Handy is a 89 year old female with a history of asthma who returns for follow up.  She has been struggling with dyspnea all year and has been on multiple courses of antibiotics and prednisone to good effect.  Every time she comes off of the prednisone her dyspnea returns.  She used to be able to walk multiple blocks but now she is quite short of breath just going across a parking lot.  She finds that the her combivent MDI helps relieve this quite a bit.  She sometimes hears herself wheeze.  Her PCC is referring her for a cardiac  workup as well.    ROS:  Review of Systems - History obtained from the patient  General ROS: negative  Psychological ROS: negative  ENT ROS: negative  Allergy and Immunology ROS: negative  Endocrine ROS: negative  Respiratory ROS: positive for - cough, shortness of breath, sputum changes, tachypnea and wheezing  negative for - hemoptysis, orthopnea or pleuritic pain  Cardiovascular ROS: no chest pain or palpitations  Gastrointestinal ROS: no abdominal pain, change in bowel habits, or black or bloody stools  Genito-Urinary ROS: no dysuria, trouble voiding, or hematuria  Musculoskeletal ROS: negative  Neurological ROS: no TIA or stroke symptoms  Dermatological ROS: negative      Current Meds:  Current Outpatient Medications   Medication Sig     acetaminophen (TYLENOL) 650 MG CR tablet Take 650 mg by mouth every 8 (eight) hours as needed for pain.     anastrozole (ARIMIDEX) 1 mg tablet TAKE ONE TABLET BY MOUTH DAILY     ascorbic acid (VITAMIN C) 1000 MG tablet Take 1,000 mg by mouth every morning.      aspirin 81 MG EC tablet Take 81 mg by mouth bedtime.     calcium citrate-vitamin D (CITRACAL+D) 315-200 mg-unit per tablet Take 1 tablet by mouth bedtime.     ciprofloxacin HCl (CIPRO) 250 MG tablet Take 0.5 tablets (125 mg total) by mouth daily.     cyanocobalamin (VITAMIN B-12) 50 mcg tablet Take 50 mcg by mouth every morning.      DOCOSAHEXANOIC ACID/EPA (FISH OIL ORAL) Take 2 g by mouth daily.     fluticasone furoate-vilanteroL (BREO ELLIPTA) 200-25 mcg/dose DsDv inhaler Inhale 1 puff daily. Rinse mouth with water, gargle,spit after each use     furosemide (LASIX) 20 MG tablet TAKE 1 TABLET BY MOUTH EVERY DAY     gabapentin (NEURONTIN) 100 MG capsule Take 300 mg by mouth 3 (three) times a day. Follow Gabapentin Dosing chart given (Patient taking differently: Take 200 mg by mouth 3 (three) times a day. 300mg AM ,300 mg PM. 600 mg HS)     ipratropium-albuterol (DUO-NEB) 0.5-2.5 mg/3 mL nebulizer Take 3 mL by  nebulization 2 (two) times a day as needed.     LACTOBACILLUS ACIDOPHILUS (ACIDOPHILUS ORAL) Take 1 tablet by mouth every morning.      losartan (COZAAR) 50 MG tablet Take 1 tablet (50 mg total) by mouth 2 (two) times a day.     magnesium 250 mg Tab Take 250 mg by mouth every morning.     metoprolol tartrate (LOPRESSOR) 100 MG tablet Take 1 tablet (100 mg total) by mouth 2 (two) times a day.     nitroglycerin (NITROSTAT) 0.4 MG SL tablet Place 1 tablet (0.4 mg total) under the tongue every 5 (five) minutes as needed for chest pain.     OXYGEN-AIR DELIVERY SYSTEMS [facility] Use 2 L As Directed. 2L at bedtime  [employer]     rosuvastatin (CRESTOR) 10 MG tablet Take 1 tablet (10 mg total) by mouth at bedtime. Take with a 5 mg pill for a total of 15 mg nightly (Patient taking differently: Take 10 mg by mouth at bedtime. Take with a 5 mg pill for a total of 10 mg nightly)     umeclidinium (INCRUSE ELLIPTA) 62.5 mcg/actuation DsDv inhaler Inhale 1 puff daily.       Labs:  No results found for this or any previous visit (from the past 72 hour(s)).    I have personally reviewed all pertinent imaging studies and PFT results unless otherwise noted.    Imaging studies:  Xr Chest 1 View Portable    Result Date: 8/13/2020  EXAM: XR CHEST 1 VIEW PORTABLE LOCATION: [facility] DATE/TIME: 8/13/2020 7:53 AM INDICATION: sob and hx of asthma COMPARISON: PA and lateral views of the chest 10/26/2019; high-resolution chest CT 9/4/2019     Unchanged enlargement of the cardiac silhouette particularly the left ventricular heart border. There are atheromatous calcifications in the aortic arch. The vascular pedicle with is not increased. The lungs are symmetrically inflated. There are a few bands of platelike atelectasis in the lower lobes near the diaphragm. No airspace opacities or interstitial thickening. No pleural fluid or pneumothorax is present.        Physical Exam:  /80   Pulse 66   Resp 12   Ht 5' (1.524 m)   Wt 163 lb  (73.9 kg)   LMP 07/11/1971   SpO2 96%   BMI 31.83 kg/m    General - Well nourished  Ears/Mouth -  Deferred given mask use during pandemic  Neck - no cervical lymphadenopathy  Lungs - Coarse low pitched expiratory wheezing throughout R>L  CVS - regular rhythm with no murmurs, rubs or gallups  Abdomen - soft, NT, ND, NABS  Ext - no cyanosis, clubbing or edema  Skin - no rash  Psychology - alert and oriented, answers appropriate        Electronically signed by:    Piter Bruno MD PhD  [facility] Pulmonary and Critical Care Medicine

## 2021-06-13 NOTE — PROGRESS NOTES
I met with Yanna today to present her SCP. I explained the contents of the plan, treatment summary and resources. She expressed understanding of the contents. We talked about fatigue and how activity can treat fatigue and that I have included an article on fatigue in her care plan. I asked if she could review the plan as I would like to call her on follow up to get some feedback on what she thought of the plan. Leeanna

## 2021-06-13 NOTE — PROGRESS NOTES
Pt here for their final radiation treatment. DC instructions given verbally and in writing, pt verbalized their understanding. Encouraged pt to make 4-6 week f/u apt on their way out today. She has some redness near axilla, but no pain, not peeling.

## 2021-06-13 NOTE — PATIENT INSTRUCTIONS - HE
Increase amlodipine to 5mg in the morning ( that will be 2 pills that you have at home).    Virtual visit in 2 weeks for BP check.    You should see Lung doctor and Cardiologist again in February.    Prolia 12th today.  Prolia 13th in 6 months with me.    DXA in 10/2021  .   Phone number to schedule [phone].    Continue the same calcium and vit D supplements.

## 2021-06-13 NOTE — PROGRESS NOTES
[facility] Hematology and Oncology Progress Note    Patient: Yanna Handy  MRN: [id number]  Date of Service: 10/19/2017        Reason for Visit    Chief Complaint   Patient presents with     HE Cancer     Malignant neoplasm of upper-outer quadrant of left female breast       Assessment and Plan    T2 N1 M0, stage IIb left breast cancer status post lumpectomy August 2, 2017  Osteopenia       Patient has completed radiation therapy and tolerated it fairly well.  Bone density is reviewed and it shows osteopenia.    She should be able to tolerate anastrozole.  I will have her start 1 mg p.o. daily.  She will continue with calcium and vitamin D.  I reviewed potential side effects of the anastrozole including hot flashes and joint symptoms and the risk for worsening osteopenia and fractures.  Will plan to repeat bone density in 1 year.  She may need the addition of a bisphosphonate.  Discussed that we would consider anastrozole for at least 5 years and possibly longer.    Discussed typical follow-up for breast cancer.    Plan: Start anastrozole 1 mg p.o. daily  Continue calcium with vitamin D supplementation  Follow-up in 3 months    Measurable disease: None postoperatively    Current therapy: Anastrozole 1 mg p.o. daily started October 20, 2017    Treatment history: Lumpectomy in August 2017  Adjuvant radiation to 40-56 cGy in 16 fractions completed October 5, 2017        ECOG Performance   ECOG Performance Status: 1    Distress Assessment  Distress Assessment Score: No distress    Pain           Problem List    1. Malignant neoplasm of upper-outer quadrant of left female breast  anastrozole (ARIMIDEX) 1 mg tablet        CC: Wilbur Hannah MD    ______________________________________________________________________________    History of Present Illness    Ms. Yanna Handy is here for follow-up.  I saw HER-2 months ago.  She has completed adjuvant radiation which she tolerated fairly well.  She did have a bone  density just recently.  She is otherwise doing well.  ECOG status is 0.    Pain Status  Currently in Pain: No/denies    Review of Systems    Constitutional  Constitutional (WDL): Exceptions to WDL  Fatigue: Fatigue relieved by rest (Improving. )  Neurosensory  Neurosensory (WDL): All neurosensory elements are within defined limits  Cardiovascular  Cardiovascular (WDL): Exceptions to WDL (Wears compression stockings and take lasix. )  Pulmonary  Respiratory (WDL): Exceptions to WDL  Dyspnea: Shortness of breath with moderate exertion (Not any worse. )  Gastrointestinal  Gastrointestinal (WDL): All gastrointestinal elements are within defined limits  Genitourinary  Genitourinary (WDL): All genitourinary elements are within defined limits  Integumentary  Integumentary (WDL): All integumentary elements are within defined limits  Patient Coping  Patient Coping: Accepting  Distress Assessment  Distress Assessment Score: No distress  Accompanied by  Accompanied by: Family Member    Past History  Past Medical History:   Diagnosis Date     Anemia 8/21/2015     Asthma      Breast cancer 07/25/2017    left     CAD (coronary artery disease) 9/15/2014     Chronic bronchitis      Chronic Sinusitis     Created by Conversion  Replacement Utility updated for latest IMO load     Emphysema     Created by Conversion      Fx ankle 8/2000     Fx wrist 12/2006    Right wrist      GI bleed 1/12/2015     Hypercholesterolemia     Created by Conversion      Hypertension      Insomnia 1/13/2016     Osteoarthritis     Created by Conversion  Replacement Utility updated for latest IMO load     Osteoporosis Senile     Created by Conversion      Squamous Cell Carcinoma Of The Skin     Created by Conversion          Past Surgical History:   Procedure Laterality Date     APPENDECTOMY  1971     BREAST SURGERY  08/02/2017    left lumpectomy, Dr. Pope     COLONOSCOPY N/A 1/15/2015    Procedure: COLONOSCOPY with biopsy of polyp;  Surgeon: Joel Pa  "MD Abdon;  Location: [facility];  Service:      CORONARY STENT PLACEMENT      \"I had 2 stent placements\"     EYE SURGERY      Cataracts     HYSTERECTOMY  1971     JOINT REPLACEMENT  1/2003    Right TKA     KNEE ARTHROSCOPY Left 10/2004     OOPHORECTOMY  1971    One removed     CA REMOVAL OF TONSILS,<11 Y/O      Description: Tonsillectomy;  Recorded: 02/12/2009;  Comments: 1941     CA REVISE SECONDARY VARICOSITY      Description: Varicose Vein Ligation;  Recorded: 02/12/2009;  Comments: 1988     CA TOTAL ABDOM HYSTERECTOMY      Description: Hysterectomy;  Recorded: 02/12/2009;  Comments: 1971     SINUS SURGERY      \"I had 3 sinus surgeries.\"       Physical Exam    Recent Vitals 10/19/2017   Height -   Weight 170 lbs 2 oz   BSA (m2) -   /70   Pulse 60   Temp 98.5   Temp src 1   SpO2 98   Some recent data might be hidden       GENERAL: Alert and oriented. Seated comfortably. In no distress.    HEAD: Atraumatic and normocephalic.  Has a full head of hair.    EYES: KENRICK, EOMI.  No pallor.  No icterus.    Oral cavity: no mucosal lesion or tonsillar enlargement.    NECK: supple. JVP normal.  No thyroid enlargement.    LYMPH NODES: No palpable, cervical, axillary or inguinal lymphadenopathy.    CHEST: clear to auscultation bilaterally.  Resonant to percussion throughout bilaterally.  Symmetrical breath movements bilaterally.    CVS: S1 and S2 are heard. Regular rate and rhythm.  No murmur or gallop or rub heard.    ABDOMEN: Soft. Not tender. Not distended.  No palpable hepatomegaly or splenomegaly.  No other mass palpable.  Bowel sounds heard.    EXTREMITIES: Warm.  No peripheral edema.    SKIN: no rash, or bruising or purpura.        Lab Results    No results found for this or any previous visit (from the past 168 hour(s)).    Imaging    Dxa Bone Density Scan    Result Date: 10/2/2017  9/27/2017 RE: Yanna Handy YOB: 1931 Dear Wilbur Hannah, Patient Profile: 86 y.o. female, postmenopausal, is " here for the follow up bone density test. History of fractures - Yes;  Wrist (bilateral). Family history of osteoporosis - None.  Family history of hip fracture: None. Smoking history - No. Osteoporosis treatment past -  Yes;  Bisphosphonates and Forteo. Osteoporosis treatment current - No.  Chronic medical problems - Breast cancer, Chronic low back problems and Radiation treatment. High risk medications -  None. Assessment: 1. The spine bone density L1-L4(L2,L3) with T-score -0.7, stable compared to 2015. 2. Femoral bone densities show left femoral neck T- score -1.0 and right femoral neck T-score -1.3 and significant decline of 6.3% on the left hip and 3.9% on the right hip compared to 2015. 3. Trabecular bone score indicates good trabecular bone architecture. 86 y.o. female with LOW BONE DENSITY (OSTEOPENIA) and HIGH fracture risk with major osteoporotic fracture risk 16.1% and hip fracture risk 4.0%. Recommendations: Appropriate evaluation and treatment recommended with follow up bone density scan in 1 year. Bone densitometry was performed on your patient using our Epic Playground densitometer. The results are summarized and a copy of the actual scans are included for your review. In conformity with the International Society of Clinical Densitometry's most recent position statement for DXA interpretation (2015), the diagnosis will be made on the lowest measured T-score of the lumbar spine, femoral neck, total proximal femur or 33% radius. Note the change in terminology for diagnostic classification from OSTEOPENIA to LOW BONE MASS. All trending for sequential exams will be done using multiple vertebrae or the total proximal femur. Fracture risk is based on the WHO Fracture Risk Assessment Tool (FRAX). If additional information is needed or if you would like to discuss the results, please do not hesitate to call me. Thank you for referring this patient to [facility]. We are happy to be of  service in support of you and your practice. If you have any questions or suggestions to improve our service, please call me at [phone]. Sincerely, Wilbur Hannah M.D. [facility]. [facility], [facility]         Signed by: Mindy Rodas MD

## 2021-06-13 NOTE — PROGRESS NOTES
Pt ambulatory to radiation clinic for follow up, accompanied by . VSS, denies pain. Survivorship care plan given. Further recommendations and orders per provider.

## 2021-06-13 NOTE — PATIENT INSTRUCTIONS - HE
1) I think your breathing issues are your asthma, we just haven't gotten it under control yet  2) I'm going to give you a low dose of prednisone to take, 10mg for 2 weeks then 5 mg.  You can try to come off the 5mg if you are felling good, but if you'd rather stay on it then do that, its a safe dose  3) Try using the albuterol BEFORE you go for a walk, this may help you exercise.  Keep moving and try to get some exercise this will help your endurance.  4) I'll see you back in a couple of months  5) Stay on flonase, breo and incruse

## 2021-06-13 NOTE — PROGRESS NOTES
RADIATION ONCOLOGY WEEKLY TREATMENT VISIT NOTE      Assessment / Impression       1. Malignant neoplasm of upper-outer quadrant of left female breast       No matching staging information was found for the patient.   Tolerating radiation therapy well.  All questions and concerns addressed.      Plan:     Continue radiation treatment as prescribed.  Radiation: Site: left breast  Stereotactic Radiosurgery: No  Stereotactic Radiosurgery: No  Concurrent Therapy: No  Today's Dose: 1596  Total Dose for Breast: 4256  Today's Fraction/Total Fraction Breast: 6/16        Subjective:      HPI: Yanna Handy is a 86 y.o. female with    1. Malignant neoplasm of upper-outer quadrant of left female breast       C/o mild fatigue. Improved after rest.  The following portions of the patient's history were reviewed and updated as appropriate: allergies, current medications, past family history, past medical history, past social history, past surgical history and problem list.      Objective:     Exam:     Vitals:    09/20/17 1059   BP: 155/67   Pulse: 60   SpO2: 95%   Weight: 170 lb 8 oz (77.3 kg)       Wt Readings from Last 8 Encounters:   09/20/17 170 lb 8 oz (77.3 kg)   09/13/17 169 lb 11.2 oz (77 kg)   08/31/17 169 lb 6.4 oz (76.8 kg)   08/29/17 169 lb (76.7 kg)   08/24/17 169 lb 12.8 oz (77 kg)   08/18/17 169 lb 11.2 oz (77 kg)   07/27/17 170 lb (77.1 kg)   07/25/17 168 lb (76.2 kg)       General: Alert and oriented, in no acute distress      Treatment Summary to Date    Aria chart and setup information reviewed    Abner Joseph MD

## 2021-06-13 NOTE — PROGRESS NOTES
We received PA request for pt's Spiriva.  Dr. Amin said that she is ok with switching pt to the Arnuity Ellipta 100mcg.  I called and informed pt of this change.  I asked pt. to let us know if they have any side effects or if it is not working.  She verbalizes understanding and agrees to this plan.

## 2021-06-13 NOTE — PROGRESS NOTES
"Assessment/Plan:        1. Osteoporosis Senile     2. Malignant neoplasm of upper-outer quadrant of left breast in female, estrogen receptor positive (H)     3. Coronary artery disease involving native coronary artery of native heart without angina pectoris     4. Hypertension     5. Moderate persistent asthma without complication       See below.    This note has been dictated using voice recognition software. Any grammatical or context distortions are unintentional and inherent to the software.      Return in about 2 weeks (around 1/5/2021) for Recheck.    Patient Instructions   Increase amlodipine to 5mg in the morning ( that will be 2 pills that you have at home).    Virtual visit in 2 weeks for BP check.    You should see Lung doctor and Cardiologist again in February.    Prolia 12th today.  Prolia 13th in 6 months with me.    DXA in 10/2021  .   Phone number to schedule [phone].    Continue the same calcium and vit D supplements.            Subjective:    Yanna Handy is a 89 y.o. female  here for    Chief Complaint   Patient presents with     Osteoporosis Follow Up     Osteo F/U     Follow up chronic medical problems:  1. Osteoporosis - due for Prolia today.  2. Asthma - saw Lung clinic on 11/16/20 and started on prednisone, completed 10 mg daily for 2 weeks, now on 5 mg daily until f/u in Feb. Using her inhalers. Thinks prednisone helped with shortness of breath. Occassionally has wheezing. Using Delsym for cough.  3. She saw cardiologist on 12/15/20 and I reviewed his note:  \"1. Coronary artery disease.  Yanna has known coronary artery disease.  Specifically, Yanna underwent coronary angiography on 15 September 2014 and confirmed the presence of significant LAD disease. She had successful drug-eluting stent placement to the proximal, as well as mid LAD.      Regadenoson nuclear perfusion study 23 November 2020 was favorable and revealed no evidence of infarct or ischemia (see Cardiac " "Diagnostics section below).     This is stable.  Patient denies any recurrent angina type chest pain.     2.  Aortic stenosis.  This was felt mild to moderate in degree on most recent echocardiogram 23 November 2020.  This is commensurate with exam.  More advanced aortic stenosis is not suggested clinically.  I would, however, like to personally review her echocardiogram.     3.  Shortness of breath.  I suspect that this is multifactorial.  Specifically, agree with her pulmonologist that there may be a pulmonary component.  Impaired diastolic filling likely also a contributor even in the absence of appearing volume neutral.  I did review her echocardiogram report and measurements and findings on there are suggestive of impaired diastolic filling.  I discussed the nature of this with the patient as well as son.     4.  Hypertension. Blood pressure is somewhat elevated in the office this afternoon and she has had some high readings at home as well.  Plan:    Initiate amlodipine 2.5mg in addition to current medical regimen particularly in light of suspected impaired diastolic filling (all the more reason to be somewhat more aggressive with her blood pressure management)     5. Dyslipidemia. Lipid profile 18 June 2020 revealed LDL 83 mg/dL and HDL 62 mg/dL. \"    4. HTN - amlodipine 2.5 mg was added a week ago. -160 at home. We will increase amlodipine to 5 mg am. No edema. telephone visit for BP check in 2 weeks.      Social History     Socioeconomic History     Marital status:      Spouse name: Not on file     Number of children: Not on file     Years of education: Not on file     Highest education level: Not on file   Occupational History     Not on file   Social Needs     Financial resource strain: Not on file     Food insecurity     Worry: Not on file     Inability: Not on file     Transportation needs     Medical: Not on file     Non-medical: Not on file   Tobacco Use     Smoking status: Never Smoker "     Smokeless tobacco: Never Used   Substance and Sexual Activity     Alcohol use: Yes     Comment: Wine occassionally     Drug use: No     Sexual activity: Never   Lifestyle     Physical activity     Days per week: Not on file     Minutes per session: Not on file     Stress: Not on file   Relationships     Social connections     Talks on phone: Not on file     Gets together: Not on file     Attends Sikhism service: Not on file     Active member of club or organization: Not on file     Attends meetings of clubs or organizations: Not on file     Relationship status: Not on file     Intimate partner violence     Fear of current or ex partner: Not on file     Emotionally abused: Not on file     Physically abused: Not on file     Forced sexual activity: Not on file   Other Topics Concern     Not on file   Social History Narrative     Not on file       Family History   Problem Relation Age of Onset     Heart attack Father      Breast cancer Maternal Aunt 55     Lung cancer Brother 65     Colon cancer Maternal Grandmother 90     Lung cancer Maternal Grandfather 65     Brain cancer Son 25     Rectal cancer Brother 64     Review of Systems:     A 12 point comprehensive review of systems was negative except as noted in HPI.            Objective:    Physical Exam   /78   Pulse 66   Wt 165 lb 8 oz (75.1 kg)   LMP 07/11/1971   SpO2 95%   BMI 32.32 kg/m      Constitutional: oriented to person, place, and time, appears well-nourished. No distress.   HENT:   Head: Normocephalic.   Mouth/Throat: Oropharynx is clear and moist.   Eyes: Conjunctivae are normal.   Neck: Normal range of motion. Neck supple.   Cardiovascular: Normal rate, regular rhythm and normal heart sounds.    Pulmonary/Chest: Effort normal and breath sounds normal.  Abdominal: Soft.  Musculoskeletal: Normal range of motion.   Neurological: alert and oriented to person, place, and time.  Psychiatric:  normal mood and affect.    Patient Active Problem  List   Diagnosis     Chronic Sinusitis     Hypercholesterolemia     Moderate persistent asthma     Hypertension     Osteoporosis Senile     Osteoarthritis     CAD (coronary artery disease)     Anemia     Insomnia     Wrist fracture     Malignant neoplasm of upper-outer quadrant of left female breast (H)     Aromatase inhibitor use       Current Outpatient Medications on File Prior to Visit   Medication Sig Dispense Refill     acetaminophen (TYLENOL) 650 MG CR tablet Take 650 mg by mouth every 8 (eight) hours as needed for pain.       amLODIPine (NORVASC) 2.5 MG tablet Take 1 tablet (2.5 mg total) by mouth daily. 90 tablet 3     anastrozole (ARIMIDEX) 1 mg tablet TAKE ONE TABLET BY MOUTH DAILY 90 tablet 0     ascorbic acid (VITAMIN C) 1000 MG tablet Take 1,000 mg by mouth every morning.        aspirin 81 MG EC tablet Take 81 mg by mouth bedtime.       calcium citrate-vitamin D (CITRACAL+D) 315-200 mg-unit per tablet Take 1 tablet by mouth bedtime.       ciprofloxacin HCl (CIPRO) 250 MG tablet Take 0.5 tablets (125 mg total) by mouth daily. 45 tablet 0     cyanocobalamin (VITAMIN B-12) 50 mcg tablet Take 50 mcg by mouth every morning.        DOCOSAHEXANOIC ACID/EPA (FISH OIL ORAL) Take 2 g by mouth daily.       fluticasone furoate-vilanteroL (BREO ELLIPTA) 200-25 mcg/dose DsDv inhaler Inhale 1 puff daily. Rinse mouth with water, gargle,spit after each use 180 each 3     furosemide (LASIX) 20 MG tablet TAKE 1 TABLET BY MOUTH EVERY DAY 90 tablet 3     gabapentin (NEURONTIN) 100 MG capsule Taking 1 capsule morning, 1 capsule at noon and 3 capsules at bedtime. 150 capsule 3     ipratropium (ATROVENT HFA) 17 mcg/actuation inhaler Inhale 2 puffs as needed for wheezing.       ipratropium-albuteroL (COMBIVENT RESPIMAT)  mcg/actuation Mist inhaler Inhale 1 puff 4 (four) times a day as needed.       ipratropium-albuterol (DUO-NEB) 0.5-2.5 mg/3 mL nebulizer Take 3 mL by nebulization 2 (two) times a day as needed. 1080 mL 3      LACTOBACILLUS ACIDOPHILUS (ACIDOPHILUS ORAL) Take 1 tablet by mouth every morning.        losartan (COZAAR) 50 MG tablet Take 1 tablet (50 mg total) by mouth 2 (two) times a day. 180 tablet 3     magnesium 250 mg Tab Take 250 mg by mouth every morning.       metoprolol tartrate (LOPRESSOR) 100 MG tablet Take 1 tablet (100 mg total) by mouth 2 (two) times a day. 180 tablet 3     nitroglycerin (NITROSTAT) 0.4 MG SL tablet Place 1 tablet (0.4 mg total) under the tongue every 5 (five) minutes as needed for chest pain. 25 tablet 3     OXYGEN-AIR DELIVERY SYSTEMS [facility] Use 2 L As Directed. 2L at bedtime  [employer]       predniSONE (DELTASONE) 5 MG tablet Take 10 mg by mouth daily for 14 days, THEN 5 mg daily. 88 tablet 4     rosuvastatin (CRESTOR) 10 MG tablet Take 1 tablet (10 mg total) by mouth at bedtime. Take with a 5 mg pill for a total of 15 mg nightly (Patient taking differently: Take 10 mg by mouth at bedtime. Take with a 5 mg pill for a total of 10 mg nightly) 90 tablet 3     umeclidinium (INCRUSE ELLIPTA) 62.5 mcg/actuation DsDv inhaler Inhale 1 puff daily. 3 each 3     Current Facility-Administered Medications on File Prior to Visit   Medication Dose Route Frequency Provider Last Rate Last Admin     [START ON 12/31/2020] denosumab 60 mg (PROLIA 60 mg/ml)  60 mg Subcutaneous Q6 Months Wilbur Hannah MD   60 mg at 12/22/20 1016               Wilbur Hannah  12/22/2020

## 2021-06-13 NOTE — PROGRESS NOTES
RADIATION ONCOLOGY WEEKLY TREATMENT VISIT NOTE      Assessment / Impression       1. Malignant neoplasm of upper-outer quadrant of left female breast         Tolerating radiation therapy well.  All questions and concerns addressed.      Plan:     Continue radiation treatment as prescribed.  Radiation: Site: left breast  Stereotactic Radiosurgery: No  Stereotactic Radiosurgery: No  Concurrent Therapy: No  Today's Dose: 2926  Total Dose for Breast: 4256  Today's Fraction/Total Fraction Breast: 11/16        Subjective:      HPI: Yanna Handy is a 86 y.o. female with    1. Malignant neoplasm of upper-outer quadrant of left female breast         The following portions of the patient's history were reviewed and updated as appropriate: allergies, current medications, past family history, past medical history, past social history, past surgical history and problem list.      Objective:     Exam:     Vitals:    09/27/17 1038   BP: 165/72   Pulse: 61   Temp: 98.2  F (36.8  C)   TempSrc: Oral   SpO2: 96%   Weight: 170 lb 1.6 oz (77.2 kg)       Wt Readings from Last 8 Encounters:   09/27/17 170 lb 1.6 oz (77.2 kg)   09/20/17 170 lb 8 oz (77.3 kg)   09/13/17 169 lb 11.2 oz (77 kg)   08/31/17 169 lb 6.4 oz (76.8 kg)   08/29/17 169 lb (76.7 kg)   08/24/17 169 lb 12.8 oz (77 kg)   08/18/17 169 lb 11.2 oz (77 kg)   07/27/17 170 lb (77.1 kg)       General: Alert and oriented, in no acute distress  Yanna has moderate Erythema.    Treatment Summary to Date    Aria chart and setup information reviewed    Eugenia Cuellar MD

## 2021-06-13 NOTE — TELEPHONE ENCOUNTER
I contacted patient, she already cancelled appointment.  She prefers to wait until her results are back to connect with Dr. Hannah. She states she is feeling much better.  Kateryna GOLDSTEIN CMA

## 2021-06-14 NOTE — PROGRESS NOTES
PULMONARY PROGRESS NOTE      HPI:  Yanna Handy is a 86 y.o. female followed in the Pulmonary clinic for reactive airways disease. She  history of chronic rhinosinusitis with asthma seen previously at the [facility] and uses intranasal antibiotics.   She was switched Breo last year and continues to be compliant with this; she was initially noted to have very good control on that as she required her rescue inhaler more frequently we had initiated her on Spiriva.  Unfortunately this latter inhaler was costing her $250 out of pocket and because of miscommunication she was switched to an inhaled steroid in addition to the Breo she was already using.  She states that she continues to feel slightly short of breath and has been coughing.  She denies fevers, chills, night sweats or weight loss.  Her ACT today is 5+1+2+5+4 = 17  Of note, she has completed her radiation therapy for her breast cancer and is on anastrozole presently.      Current Outpatient Prescriptions on File Prior to Visit   Medication Sig Dispense Refill     amoxicillin (AMOXIL) 500 MG tablet Two before Dental procedure       anastrozole (ARIMIDEX) 1 mg tablet Take 1 tablet (1 mg total) by mouth daily. 90 tablet 3     ascorbic acid (VITAMIN C) 1000 MG tablet Take 1,000 mg by mouth every morning.        aspirin 81 MG EC tablet Take 81 mg by mouth bedtime.       calcium citrate-vitamin D (CITRACAL+D) 315-200 mg-unit per tablet Take 1 tablet by mouth bedtime.       cholecalciferol, vitamin D3, 400 unit Tab Take 800 Units by mouth every morning.        ciprofloxacin HCl (CIPRO) 250 MG tablet TAKE ONE-HALF TABLET BY MOUTH EVERY  tablet 0     cyanocobalamin (VITAMIN B-12) 50 mcg tablet Take 50 mcg by mouth every morning.        DOCOSAHEXANOIC ACID/EPA (FISH OIL ORAL) Take 2 g by mouth daily.       docusate sodium (COLACE) 100 MG capsule Take 100 mg by mouth bedtime.       fluticasone-vilanterol (BREO ELLIPTA) 200-25 mcg/dose DsDv Inhale 1 puff  daily. 1 each 11     furosemide (LASIX) 20 MG tablet TAKE ONE TABLET EVERY DAY 90 tablet 3     hydrocortisone 1 % ointment        ipratropium-albuterol (COMBIVENT RESPIMAT)  mcg/actuation Mist inhaler Inhale 1 puff 4 (four) times a day. 1 Inhaler 5     ipratropium-albuterol (DUO-NEB) 0.5-2.5 mg/mL nebulizer INHALE 1 VIAL VIA NEBULIZER EVERY 6 HOURS 1080 mL 3     LACTOBACILLUS ACIDOPHILUS (ACIDOPHILUS ORAL) Take 1 tablet by mouth every morning.        magnesium 250 mg Tab Take 250 mg by mouth every morning.       metoprolol tartrate (LOPRESSOR) 100 MG tablet TAKE ONE TABLET BY MOUTH TWICE A DAY (Patient taking differently: 50mg BID) 360 tablet 1     multivitamin (MULTIVITAMIN) per tablet Take 1 tablet by mouth every morning.        nitroglycerin (NITROSTAT) 0.4 MG SL tablet Place 1 tablet (0.4 mg total) under the tongue every 5 (five) minutes as needed for chest pain. 25 tablet 3     nortriptyline (PAMELOR) 50 MG capsule TAKE ONE CAPSULE BY MOUTH EVERY DAY AT BEDTIME 90 capsule 0     omeprazole (PRILOSEC) 20 MG capsule Take 20 mg by mouth every morning.        rosuvastatin (CRESTOR) 5 MG tablet Take 3 tablets (15 mg total) by mouth at bedtime. 270 tablet 3     [DISCONTINUED] fluticasone furoate 100 mcg/actuation DsDv Inhale 1 puff daily. 30 each 11     No current facility-administered medications on file prior to visit.      Allergies   Allergen Reactions     Alendronate Nausea And Vomiting     Metoclopramide Hcl Anxiety     Rofecoxib Diarrhea and Nausea Only     Risedronate      Sulfa (Sulfonamide Antibiotics) Anaphylaxis           EXAM  /76  Pulse 60  Resp 20  Wt 170 lb 3.2 oz (77.2 kg)  LMP 07/11/1971  SpO2 93% Comment: RA  BMI 32.69 kg/m2    Physical Exam   Constitutional: She is oriented to person, place, and time. She appears well-developed and well-nourished. No distress.   HENT:   Head: Normocephalic and atraumatic.   Nose: Nose normal.   Eyes: Conjunctivae and EOM are normal. Pupils are  equal, round, and reactive to light. No scleral icterus.   Neck: Neck supple. No tracheal deviation present.   Pulmonary/Chest: Effort normal. No stridor. She has no wheezes.   Musculoskeletal: Normal range of motion. She exhibits no edema.   Neurological: She is alert and oriented to person, place, and time. She has normal reflexes.   Skin: Skin is warm and dry. She is not diaphoretic. No pallor.   Psychiatric: She has a normal mood and affect.   Vitals reviewed.      PFTS 2/23/15 (unchanged from prior visit):  FEV1/FVC is 69% and is normal.   FEV1 is 1.26 L (87%) predicted and is normal.   FVC is 1.83 L (92%) predicted and normal.   There was no improvement in spirometry after a single inhaled dose of bronchodilator.   TLC is 3.52 L (79%) predicted and is normal.   RV is 1.17 L (51%) predicted and is reduced.   DLCO is 45% predicted and is reduced when it is corrected for hemoglobin.   Impression: Full Pulmonary Function Test is abnormal.   Spirometry and flow volume loop are within normal limits   Spirometry is not consistent with reversibility.   There is no hyperinflation.   There is no air-trapping.   Diffusion capacity when corrected for hemoglobin is moderately reduced.    SIX MINUTE WALK TEST / HOME O2 EVALUATION  6/9/15  (unchanged from prior visit):  SpO2 at rest on RA 95%   SpO2 after walking 6 minutes on RA 95%   Distance covered 672 feet   Recovery phase, SpO2 after 1 minute rest on RA was 93%   Impression:   No desaturation with activities.   No need for O2 supplementation.      CXR 5/5/16  (unchanged from prior visit):  Mild atelectasis or fibrosis in the lung bases. Upper lungs are clear. Mild cardiomegaly with normal pulmonary vascularity. Scoliosis of the thoracolumbar  spine.      IMPRESSION:  85-year-old female with a history of moderate persistent asthma, coronary artery disease status post stenting and some fibrotic changes noted on imaging studies presents to clinic today with concern for a  follow-up visit for her asthma and was unfortunately unable to afford her Spiriva Respimat.  For the present we will recommend:    1. Asthma moderate persistent, with preserved spirometry.      Continue fluticasone/Vilanterol 1 puff daily, she knows to gargle after using this.    Switch her from Spiriva to Incuse Ellipta asked to call us if she is unable to afford this because if not we will switch her to inhaled cajoled ipratropium nebs as these are completely covered by Medicare.    I have explained to the patient that she should only use the Combivent for rescue.  2. Overnight hypoxia: Noted on overnight oximetry. Is continuing to use supplemental oxygen overnight.  3. CAD: Has undergone placement of stents to the proximal and mid LAD.  Recent echocardiogram continues to be stable.  4. Isolated diffusion defect: Likely secondary to fibrosis noted on imaging studies.  No intervention presently.  5. Chronic sinusitis: Has discontinued her tobramycin nebs per advice from the [facility]. I have advised that she start using sinus washes daily.  6. Follow-up: 4 months.    Lisa Amin  Pulmonary and Critical Care  [id number]

## 2021-06-14 NOTE — TELEPHONE ENCOUNTER
Refill Approved    Rx renewed per Medication Renewal Policy. Medication was last renewed on 25/20, last OV 1/19/21.    Shannan Dempsey, [facility] Triage/Med Refill 1/30/2021     Requested Prescriptions   Pending Prescriptions Disp Refills     rosuvastatin (CRESTOR) 10 MG tablet [Pharmacy Med Name: Rosuvastatin Calcium Oral Tablet 10 MG] 90 tablet 0     Sig: Take 1 tablet (10 mg total) by mouth at bedtime. Take with a 5 mg pill for a total of 15 mg nightly       Statins Refill Protocol (Hmg CoA Reductase Inhibitors) Passed - 1/30/2021  2:00 AM        Passed - PCP or prescribing provider visit in past 12 months      Last office visit with prescriber/PCP: Visit date not found OR same dept: 12/22/2020 Wilbur Hannah MD OR same specialty: 12/22/2020 Wilbur Hannah MD  Last physical: Visit date not found Last MTM visit: Visit date not found   Next visit within 3 mo: Visit date not found  Next physical within 3 mo: Visit date not found  Prescriber OR PCP: Kirk Chung MD  Last diagnosis associated with med order: 1. Dyslipidemia  - rosuvastatin (CRESTOR) 10 MG tablet [Pharmacy Med Name: Rosuvastatin Calcium Oral Tablet 10 MG]; Take 1 tablet (10 mg total) by mouth at bedtime. Take with a 5 mg pill for a total of 15 mg nightly  Dispense: 90 tablet; Refill: 0    If protocol passes may refill for 12 months if within 3 months of last provider visit (or a total of 15 months).

## 2021-06-14 NOTE — TELEPHONE ENCOUNTER
I tried calling Lara back on her Cell pohone this time and same result,  She picked up and disconnected the call.

## 2021-06-14 NOTE — PROGRESS NOTES
Assessment/Plan:        1. Frequent falls  Ambulatory referral to PT/OT    HM2(CBC w/o Differential)    Comprehensive Metabolic Panel    Vitamin D, Total (25-Hydroxy)    Lipid Profile   2. Hypertension  HM2(CBC w/o Differential)    Comprehensive Metabolic Panel    Vitamin D, Total (25-Hydroxy)    Lipid Profile   3. Moderate persistent asthma without complication  HM2(CBC w/o Differential)    Comprehensive Metabolic Panel    Vitamin D, Total (25-Hydroxy)    Lipid Profile   4. CAD (coronary artery disease)  HM2(CBC w/o Differential)    Comprehensive Metabolic Panel    Vitamin D, Total (25-Hydroxy)    Lipid Profile   5. Dyslipidemia  HM2(CBC w/o Differential)    Comprehensive Metabolic Panel    Vitamin D, Total (25-Hydroxy)    Lipid Profile   6. Malignant neoplasm of upper-outer quadrant of left female breast     7. Aromatase inhibitor use     8. Osteoporosis Senile       1. HTN, well managed, CMP today.  2. Asthma, she has Rx for prednisone and levaquin to have at home and start if any respiratory infection. She will use O2 at night as Lung spec. recommended.  3. We will check fasting lipids today, she will stay on Crestor.  4. Insomnia, she will continue nortriptyline   5. Osteoporosis, new wrist fracture -  DXA showed decline in bone density and now on Arimidex - we will restart Prolia.  6. Breast cancer - done with radiation, on Arimidex  7. She had 2 falls in the last few months - will will do PT for balance training.    This note has been dictated using voice recognition software. Any grammatical or context distortions are unintentional and inherent to the software.      Return in about 6 months (around 6/20/2018) for Recheck.    Patient Instructions   Prolia 6th today.  Prolia 7th in 6 months.  DXA in 1 year .   Phone number to schedule [phone].  Please avoid any extensive dental work as implants and teeth extractions for the next 1-2 months.  Daily calcium need is 5297-6512 mg a day from the diet and  supplements.  Calcium citrate is easier to digest.  Vitamin D 2000 IU daily recommended.          Subjective:    Yanna Handy is a 86 y.o. female  here for    Chief Complaint   Patient presents with     Hypertension     Patient is here today for follow-up of her chronic medical problems. I did not see her since June.   Blood pressure well managed. Chronic asthma is well managed with Breo  and occasional use of Combivent and albuterol. She saw Lung specialist on 11/10/17 - I reviewed her note:    Continue fluticasone/Vilanterol 1 puff daily, she knows to gargle after using this.    Switch her from Spiriva to Incuse Ellipta asked to call us if she is unable to afford this because if not we will switch her to inhaled cajoled ipratropium nebs as these are completely covered by Medicare.    I have explained to the patient that she should only use the Combivent for rescue.    Recurrent UTI is very well managed with the Cipro prophylactic dose.     She does have a coronary artery disease , on Crestor 15 mg daily. She denies any anginal type of pain. No SOB, wheezing. She is on lasix 20 mg daily. She saw Cardiologist in August 2017 and I reviewed his note:  1. Coronary artery disease.  Yanna has known coronary artery disease.  Specifically, Yanna underwent coronary angiography on 15 September 2014 and confirmed the presence of significant LAD disease. She had successful drug-eluting stent placement to the proximal, as well as mid LAD.      Regadenoson nuclear perfusion study 10 October 2016 was favorable and revealed no evidence of infarct or ischemia (see Cardiac Diagnostics section below).     This is stable.  Patient denies any recurrent angina type chest pain.        2. Hypertension. Blood pressure is reasonable in the office today at 136/62 mmHg and plan to continue the current therapy.     3. Dyslipidemia. Yanna states that she recently had her rosuvastatin increased to 15 mg daily.  I will defer  continued monitoring and treatment to her discretion.     ECHO 7/28/17:    Left ventricle ejection fraction appears lower limits of normal. Left ventricular ejection fraction estimated 50-55%.    Diastolic relaxation abnormality suggested    Mild left ventricular hypertrophy    Aortic sclerosis without Doppler evidence for significant aortic stenosis. Mild aortic insufficiency    Mild enlargement of the aortic root.    Small pericardial separation/effusion    When compared to the previous study dated 8/20/2014, no significant change from previous.      For insomnia,  She does take 50 mg of nortriptyline at the bedtime. Temazepam did work for her but now has to pay 300 $ per month. Trazodone did not work. Rozerem did not work. She did take Ambien and that made her very drowsy in the morning.    She had wrist fracture few months ago, falling from the standing height, healing well. DXA scan done 9/27/17:  1. The spine bone density L1-L4(L2,L3) with T-score -0.7, stable compared to 2015.  2. Femoral bone densities show left femoral neck T- score -1.0 and right femoral neck T-score -1.3 and significant decline of 6.3% on the left hip and 3.9% on the right hip compared to 2015.  3. Trabecular bone score indicates good trabecular bone architecture.   86 y.o. female with LOW BONE DENSITY (OSTEOPENIA) and HIGH fracture risk with major osteoporotic fracture risk 16.1% and hip fracture risk 4.0%.    She was diagnosed with breast cancer and had lumpectomy in August 2017.  She had 16 radiation treatments.  Oncologist started her on Arimidex and I did review his note from the last visit October 19, 2017.    Social History     Social History     Marital status:      Spouse name: N/A     Number of children: N/A     Years of education: N/A     Occupational History     Not on file.     Social History Main Topics     Smoking status: Never Smoker     Smokeless tobacco: Never Used     Alcohol use Yes      Comment: Wine  occassionally     Drug use: No     Sexual activity: No     Other Topics Concern     Not on file     Social History Narrative       Family History   Problem Relation Age of Onset     Heart attack Father      Breast cancer Maternal Aunt 55     Lung cancer Brother 65     Colon cancer Maternal Grandmother 90     Lung cancer Maternal Grandfather 65     Brain cancer Son 25     Rectal cancer Brother 64     Review of Systems:     A 12 point comprehensive review of systems was negative except as noted in HPI.            Objective:    Physical Exam   /74  Pulse 62  Wt 170 lb (77.1 kg)  LMP 07/11/1971  BMI 32.65 kg/m2    Constitutional: oriented to person, place, and time, appears well-nourished. No distress.   HENT:   Head: Normocephalic.   Mouth/Throat: Oropharynx is clear and moist.   Eyes: Conjunctivae are normal. Pupils are equal, round, and reactive to light.   Neck: Normal range of motion. Neck supple.   Cardiovascular: Normal rate, regular rhythm and normal heart sounds.    Pulmonary/Chest: Effort normal and breath sounds normal.  Abdominal: Soft. Bowel sounds are normal.   Musculoskeletal: Normal range of motion.   Neurological: alert and oriented to person, place, and time.  Psychiatric:  normal mood and affect.    Patient Active Problem List   Diagnosis     Chronic Sinusitis     Hypercholesterolemia     Moderate persistent asthma     Hypertension     Osteoporosis Senile     Osteoarthritis     CAD (coronary artery disease)     GI bleed     Anemia     Insomnia     Wrist fracture     Malignant neoplasm of upper-outer quadrant of left female breast     Aromatase inhibitor use       Current Outpatient Prescriptions on File Prior to Visit   Medication Sig Dispense Refill     amoxicillin (AMOXIL) 500 MG tablet Two before Dental procedure       anastrozole (ARIMIDEX) 1 mg tablet Take 1 tablet (1 mg total) by mouth daily. 90 tablet 3     ascorbic acid (VITAMIN C) 1000 MG tablet Take 1,000 mg by mouth every morning.         aspirin 81 MG EC tablet Take 81 mg by mouth bedtime.       calcium citrate-vitamin D (CITRACAL+D) 315-200 mg-unit per tablet Take 1 tablet by mouth bedtime.       cholecalciferol, vitamin D3, 400 unit Tab Take 800 Units by mouth every morning.        ciprofloxacin HCl (CIPRO) 250 MG tablet TAKE ONE-HALF TABLET BY MOUTH EVERY  tablet 0     cyanocobalamin (VITAMIN B-12) 50 mcg tablet Take 50 mcg by mouth every morning.        DOCOSAHEXANOIC ACID/EPA (FISH OIL ORAL) Take 2 g by mouth daily.       docusate sodium (COLACE) 100 MG capsule Take 100 mg by mouth bedtime.       fluticasone-vilanterol (BREO ELLIPTA) 200-25 mcg/dose DsDv Inhale 1 puff daily. 1 each 11     furosemide (LASIX) 20 MG tablet TAKE ONE TABLET EVERY DAY 90 tablet 3     hydrocortisone 1 % ointment        ipratropium-albuterol (COMBIVENT RESPIMAT)  mcg/actuation Mist inhaler Inhale 1 puff 4 (four) times a day. 1 Inhaler 5     ipratropium-albuterol (DUO-NEB) 0.5-2.5 mg/mL nebulizer INHALE 1 VIAL VIA NEBULIZER EVERY 6 HOURS 1080 mL 3     LACTOBACILLUS ACIDOPHILUS (ACIDOPHILUS ORAL) Take 1 tablet by mouth every morning.        magnesium 250 mg Tab Take 250 mg by mouth every morning.       metoprolol tartrate (LOPRESSOR) 100 MG tablet TAKE ONE TABLET BY MOUTH TWICE A DAY (Patient taking differently: 50mg BID) 360 tablet 1     multivitamin (MULTIVITAMIN) per tablet Take 1 tablet by mouth every morning.        nitroglycerin (NITROSTAT) 0.4 MG SL tablet Place 1 tablet (0.4 mg total) under the tongue every 5 (five) minutes as needed for chest pain. 25 tablet 3     nortriptyline (PAMELOR) 50 MG capsule TAKE ONE CAPSULE BY MOUTH EVERY DAY AT BEDTIME 90 capsule 0     omeprazole (PRILOSEC) 20 MG capsule Take 20 mg by mouth every morning.        rosuvastatin (CRESTOR) 5 MG tablet Take 3 tablets (15 mg total) by mouth at bedtime. 270 tablet 3     umeclidinium (INCRUSE ELLIPTA) 62.5 mcg/actuation DsDv inhaler Inhale 1 puff daily. 1 each 11     No  current facility-administered medications on file prior to visit.                Wilbur Hannah  12/20/2017

## 2021-06-14 NOTE — PROGRESS NOTES
Yanna Handy is a 89 y.o. female who is being evaluated via a billable telephone visit.      What phone number would you like to be contacted at? [phone]  How would you like to obtain your AVS? AVS Preference: Rodolfo.  Assessment & Plan     Hypertension  Lara is feeling well. She denies headache, dizziness, chest pain, edema. BP is much better now and she reports SBP in 110s-130s. We will not make any change in her medications today.    Moderate persistent asthma without complication  Cough and expectoration of yellow sputum subsided after course of Levaquin that we did on 1/5/21. She has appt with Lung specialist in mid February. She will continue prednisone per their recommendation.      Wilbur Hannah MD  [facility]      Phone call duration: 12 minutes

## 2021-06-14 NOTE — TELEPHONE ENCOUNTER
I tried calling Vickie to get her started for her Teephone Visit with ,  And it sounded like she picked up the telephone and then hung it right back up. I will try back.

## 2021-06-14 NOTE — PROGRESS NOTES
Yanna Handy is a 89 y.o. female who is being evaluated via a billable telephone visit.      What phone number would you like to be contacted at? [phone]  How would you like to obtain your AVS? AVS Preference: Mail a copy.       Assessment & Plan     Hypertension  I saw her on 12/22/20 and we increased amlodipine from 2.5 mg to 5 mg. She was taking that dose for 5-6 days and noticed ankle swelling, so she stopped it. Currently taking in the morning amlodipine, furosemide, losartan and metoprolol, and at night losartan and metoprolol.  She reports BP numbers from home - AM SBP in 115-125 range, but her evening numbers can go up to 150-160. She thinks her BP machine read SBP 20 higher and DBP 12 higher than ours. It was checked in the past.  We decided to move amlodipine 2.5 mg to noon time. The rest of the meds will stay the same.    Moderate persistent asthma without complication  Lake saw Pulmologist in November. Started on prednisone, currently on 5 mg daily. She noticed episodes of wheezing, which get better after she coughs up some green yellow tick sputum. We will do course of Levaquin and will continue prednisone. Shortness of breath is at her baseline.   - levoFLOXacin (LEVAQUIN) 500 MG tablet; Take 1 tablet (500 mg total) by mouth daily for 7 days.    Recurrent UTI  She will not take Cipro daily while taking levaquin for a week.[id number]}     F/u telephone visit in 2 weeks.      Wilbur Hannah MD  [facility]          Phone call duration: 15 minutes

## 2021-06-14 NOTE — TELEPHONE ENCOUNTER
Refill Approved    Rx renewed per Medication Renewal Policy. Medication was last renewed on 2/17/2020.    Caterina Whitaker, [facility] [place] Connection Triage/Med Refill 1/8/2021     Requested Prescriptions   Pending Prescriptions Disp Refills     furosemide (LASIX) 20 MG tablet [Pharmacy Med Name: Furosemide Oral Tablet 20 MG] 90 tablet 0     Sig: TAKE 1 TABLET BY MOUTH EVERY DAY       Diuretics/Combination Diuretics Refill Protocol  Passed - 1/8/2021 12:39 PM        Passed - Visit with PCP or prescribing provider visit in past 12 months     Last office visit with prescriber/PCP: 12/22/2020 Wilbur Hannah MD OR same dept: 12/22/2020 Wilbur Hannah MD OR same specialty: 12/22/2020 Wilbur Hannah MD  Last physical: 6/18/2020 Last MTM visit: Visit date not found   Next visit within 3 mo: Visit date not found  Next physical within 3 mo: Visit date not found  Prescriber OR PCP: Wilbur Hannah MD  Last diagnosis associated with med order: 1. CAD (coronary artery disease)  - furosemide (LASIX) 20 MG tablet [Pharmacy Med Name: Furosemide Oral Tablet 20 MG]; TAKE 1 TABLET BY MOUTH EVERY DAY  Dispense: 90 tablet; Refill: 0    If protocol passes may refill for 12 months if within 3 months of last provider visit (or a total of 15 months).             Passed - Serum Potassium in past 12 months      Lab Results   Component Value Date    Potassium 4.9 08/13/2020             Passed - Serum Sodium in past 12 months      Lab Results   Component Value Date    Sodium 139 08/13/2020             Passed - Blood pressure on file in past 12 months     BP Readings from Last 1 Encounters:   12/22/20 159/78             Passed - Serum Creatinine in past 12 months      Creatinine   Date Value Ref Range Status   08/13/2020 0.81 0.60 - 1.10 mg/dL Final

## 2021-06-14 NOTE — TELEPHONE ENCOUNTER
RN cannot approve Refill Request    RN can NOT refill this medication med is not covered by policy/route to provider. Last office visit: 12/22/2020 Wilbur Hannah MD Last Physical: 6/18/2020 Last MTM visit: Visit date not found Last visit same specialty: 12/22/2020 Wilbur Hannah MD.  Next visit within 3 mo: Visit date not found  Next physical within 3 mo: Visit date not found      Ana Hutson, [facility] Triage/Med Refill 2/1/2021    Requested Prescriptions   Pending Prescriptions Disp Refills     ciprofloxacin HCl (CIPRO) 250 MG tablet [Pharmacy Med Name: Ciprofloxacin HCl Oral Tablet 250 MG] 45 tablet 0     Sig: Take 1/2 tablet (125 mg total) by mouth daily.       There is no refill protocol information for this order

## 2021-06-15 NOTE — PROGRESS NOTES
Assessment and Plan:Yanna Handy is a 89 y.o. female with a past medical history significant for severe persistent asthma who presents to clinic today for follow up.  Despite Breo and Incruse she is still wheezing and dyspneic with exertion.  I don' t think this is deconditioning as she has a bronchodilator response and feels better on prednisone.  I will add arnuity to her Breo instead of giving her chronic prednisone as she is high risk for fracture given hormone therapy for her breast cancer and instability requiring a cane.  She can have a backup dose of prednisone for use a week or two at a time, but we should continue to try to avoid daily use .    1) Severe persistent asthma - Breo 200/25 daily, Arnuity 100 daily, Incruse daily.  Can use combivent as a rescue( she likes it more) but if needing it more than twice a day I would like her to use albuterol MDI for the in between as she may get too much anti-cholinergic effect between the incruse and ipratropium.  Will also give her 60 tabs of 5mg prednisone to have as a backup.  Encourage her to try her rescue inhaler before she exerts herself.  2) COVID risk - she has received her first dose and is due for her second next week.  Discussed the frequency of side effects with the second dose.    3) RTC in 6 months          CCx: asthma    HPI:   Ms. Milton returns for follow up of her asthma.  She feels her asthma is not really that great since coming off of the low dose prednisone.  She is using combivent 1-2 times a day as a rescue and it helps quite a bit.  She remains on breo and incruse, but still gets dyspneic with walking.  Sometimes she wheezes as well, but combivent helps this too.  She is working hard to keep moving and not resign herself to sit still.  She got her first COVID vaccine.    ROS:  Review of Systems - History obtained from the patient  General ROS: negative  Psychological ROS: negative  ENT ROS: negative  Allergy and Immunology ROS:  negative  Endocrine ROS: negative  Respiratory ROS: positive for - cough, shortness of breath and wheezing  negative for - hemoptysis, orthopnea or pleuritic pain  Cardiovascular ROS: no chest pain or palpitations  Gastrointestinal ROS: no abdominal pain, change in bowel habits, or black or bloody stools  Genito-Urinary ROS: no dysuria, trouble voiding, or hematuria  Musculoskeletal ROS: negative  Neurological ROS: no TIA or stroke symptoms  Dermatological ROS: negative      Current Meds:  Current Outpatient Medications   Medication Sig     acetaminophen (TYLENOL) 650 MG CR tablet Take 650 mg by mouth every 8 (eight) hours as needed for pain.     anastrozole (ARIMIDEX) 1 mg tablet TAKE ONE TABLET BY MOUTH ONCE DAILY      ascorbic acid (VITAMIN C) 1000 MG tablet Take 1,000 mg by mouth every morning.      aspirin 81 MG EC tablet Take 81 mg by mouth bedtime.     calcium citrate-vitamin D (CITRACAL+D) 315-200 mg-unit per tablet Take 1 tablet by mouth bedtime.     ciprofloxacin HCl (CIPRO) 250 MG tablet Take 1/2 tablet (125 mg total) by mouth daily.     cyanocobalamin (VITAMIN B-12) 50 mcg tablet Take 50 mcg by mouth every morning.      DOCOSAHEXANOIC ACID/EPA (FISH OIL ORAL) Take 2 g by mouth daily.     fluticasone furoate-vilanteroL (BREO ELLIPTA) 200-25 mcg/dose DsDv inhaler Inhale 1 puff daily. Rinse mouth with water, gargle,spit after each use     furosemide (LASIX) 20 MG tablet TAKE 1 TABLET BY MOUTH EVERY DAY     gabapentin (NEURONTIN) 100 MG capsule Taking 1 capsule morning, 1 capsule at noon and 3 capsules at bedtime.     ipratropium (ATROVENT HFA) 17 mcg/actuation inhaler Inhale 2 puffs as needed for wheezing.     ipratropium-albuteroL (COMBIVENT RESPIMAT)  mcg/actuation Mist inhaler Inhale 1 puff 4 (four) times a day as needed.     ipratropium-albuterol (DUO-NEB) 0.5-2.5 mg/3 mL nebulizer Take 3 mL by nebulization 2 (two) times a day as needed.     LACTOBACILLUS ACIDOPHILUS (ACIDOPHILUS ORAL) Take 1  tablet by mouth every morning.      losartan (COZAAR) 50 MG tablet Take 1 tablet (50 mg total) by mouth 2 (two) times a day.     magnesium 250 mg Tab Take 250 mg by mouth every morning.     metoprolol tartrate (LOPRESSOR) 100 MG tablet Take 1 tablet (100 mg total) by mouth 2 (two) times a day.     nitroglycerin (NITROSTAT) 0.4 MG SL tablet Place 1 tablet (0.4 mg total) under the tongue every 5 (five) minutes as needed for chest pain.     OXYGEN-AIR DELIVERY SYSTEMS [facility] Use 2 L As Directed. 2L at bedtime  [facility]     rosuvastatin (CRESTOR) 10 MG tablet Take 1 tablet (10 mg total) by mouth at bedtime. Take with a 5 mg pill for a total of 15 mg nightly     umeclidinium (INCRUSE ELLIPTA) 62.5 mcg/actuation DsDv inhaler Inhale 1 puff daily.     fluticasone furoate (ARNUITY ELLIPTA) 100 mcg/actuation inhaler Inhale 1 puff daily.     predniSONE (DELTASONE) 5 MG tablet Take 5 mg by mouth daily for 60 doses.       Labs:  No results found for this or any previous visit (from the past 72 hour(s)).    I have personally reviewed all pertinent imaging studies and PFT results unless otherwise noted.    Imaging studies:  Echo Complete    Result Date: 11/23/2020    1.Left ventricle ejection fraction is normal. The calculated left ventricular ejection fraction is 66%.   2.Normal right ventricular size and systolic function.   3.The ascending aorta is mildly dilated.   4.The aortic valve is tricuspid. There is thickening of the aortic valve. The aortic valve is sclerotic. Mild to moderate aortic stenosis. Mild aortic regurgitation.   5.When compared to the previous study dated 7/2/2018, left ventricular ejection fraction appears less robust on current study, aortic stenosis visually appears worse although measured hemodynamics underestimate this, gradient was 7 and is now 6 mmHg, velocity was 1.3 and is now 1.2 m/s. Suspect current study underestimates severity of aortic stenosis which visually appears to be no worse than moderate.       Nm Mpi With Lexiscan    Result Date: 11/23/2020     The nuclear stress test is negative for inducible myocardial ischemia or infarction.    The left ventricular ejection fraction at stress is 64%.    A prior study was conducted on 7/2/2018.  This study has no change when compared with the prior study.         Physical Exam:  /76   Pulse 63   Wt 166 lb 11.2 oz (75.6 kg)   LMP 07/11/1971   SpO2 96% Comment: RA  BMI 32.56 kg/m    General - Well nourished  Ears/Mouth -  Deferred given mask use during pandemic  Neck - no cervical lymphadenopathy  Lungs - Low pitched basilar wheezes  CVS - regular rhythm with no murmurs, rubs or gallups  Abdomen - soft, NT, ND, NABS  Ext - no cyanosis, clubbing or edema  Skin - no rash  Psychology - alert and oriented, answers appropriate        Electronically signed by:    Piter Bruno MD PhD  [facility] Pulmonary and Critical Care Medicine

## 2021-06-15 NOTE — PROGRESS NOTES
[facility] Daily Progress     Patient Name: Yanna Handy  Date: 1/24/2018  Visit #: 4/12  Referral Diagnosis: Frequent falls  Referring provider: Wilbur Hannah MD  Visit Diagnosis:     ICD-10-CM    1. Impaired functional mobility, balance, gait, and endurance Z74.09    2. Frequent falls R29.6        Assessment:     Patient is a 86 y.o. female that presents with signs and symptoms consistent with unsteady balance and gait secondary to core stability and reactive balance. Patient demonstrates impairments including narrow base of support gait with decreased марина and increased sway leading to impaired functional mobility. Patient's functional limitations include stair and curb ambulation, walking for long periods of time and walking in crowded spaces.     Today patient reports she feels that she is improving, she can get up and down from the toilet without using her hands, as well as in [employer] she doesn't have to grab onto the pew in front of her. Today was spent on neuro re-ed, balance and gait training to increase strength, endurance and ambulation.     HEP/POC compliance is  good .  Patient demonstrates understanding/independence with home program.  Patient is appropriate to continue with skilled physical therapy intervention, as indicated by initial plan of care.    Goal Status:  Pt. will be independent with home exercise program in : 4 weeks  Pt. will show improved balance for safer : ambulation;for dressing/grooming;for household ambulation;for community ambulation;independently;in 6 weeks  Pt. will be able to walk : 10 minutes;on even surfaces;with less difficulty;for household mobility;for community mobility;in 6 weeks  Pt. will improve gait speed : for decreased risk of falls;for improved household ambulation;for improved community ambulation;in 6 weeks  Patient will ascend / descend: stairs;step;curb;with railing;independently;with less difficulty;in 6 weeks  Patient will increase : Lai  "score;for improved quality of function;for improved quality of life;in 6 weeks      Plan / Patient Education:     Continue with initial plan of care.  Progress with home program as tolerated.    Plan for next visit: balance and core strengthening, ladder/hurdles, AIREX, step ups/downs - retest balance measures     Subjective:     Patient reports things are getting better, she is working on sit to stands and SLS, which seems to be getting better.    Functional limitations are described as occurring with:   ascending and descending stairs or curbs  balance  performing routine daily activities  walking for about 15 minutes    Objective:     Patient Outcome Measures :    Lower Extremity Functional Scale (_/80): 53   Scores range from 0-80, where a score of 80 represents maximum function. The minimal clinically important difference is a positive change of 9 points.  Lai Score: 49  Balance scores range from 0-56, where a score of 41-56 ='s a low fall risk (independent); 21-40 ='s a medium fall risk (ambulatory with assistance); 0-20 ='s a high fall risk (wheelchair).  Other Test Score: 23/30 FGA    Treatment Today       Patient Education: Patient was educated on continuing plan of care, progress and review of current HEP. Patient educated on importance of consistency with exercise and therapy, as well as activity modification in order to see change and improvements. Patient demonstrated and verbalized understanding.       Exercises:  Exercise #1: Tandem and SLS - hold 10-60 sec  Comment #1: sit to stand - 10 reps x 2  Exercise #2: hip abduction/extension in standing and heel-toe raises - 10-20 reps  Comment #2: step ups to 6\" - 10 reps  each leg with varying hand assistance   Exercise #3: toe taps to 6\" step - 20 reps   Comment #3: Airex balance and dynamic balance exercises - Narrow MANSOOR, tandem, SLS   Exercise #4: supine bridge - 10 reps x 2  Comment #4: SLR - 10 reps x 2      TREATMENT MINUTES COMMENTS   Evaluation   " "  Self-care/ Home management     Manual therapy     Neuromuscular Re-education  Airex balance, see above; added with head turns    Therapeutic Activity     Therapeutic Exercises 25 See above flowsheet; Recumbent bike 5 minutes   Step ups and toe taps to 6\" step 10 reps each side  Added SLR and bridge to HEP   Gait training 5 Normal gait pattern 85ft x 2 SBA with verbal cuing   Modality__________________                Total 30    Blank areas are intentional and mean the treatment did not include these items.       Domi Gutierrez, PT  1/24/2018  "

## 2021-06-15 NOTE — PROGRESS NOTES
Patient seen in clinic today for follow-up of breast cancer.    Judith Napoles RN, Oncology Clinic   [facility]

## 2021-06-15 NOTE — PROGRESS NOTES
[facility] Hematology and Oncology Progress Note    Patient: Yanna Handy  MRN: [id number]  Date of Service:         Reason for Visit    Chief Complaint   Patient presents with     HE Cancer     Malignant neoplasm of upper-outer quadrant of left breast       Assessment and Plan    T2 N1 M0, stage IIb left breast cancer status post lumpectomy August 2, 2017  Osteopenia    Patient is doing well with no evidence of recurrence of breast cancer.  She is tolerating anastrozole well.  Will continue this.  We will see her again in 3 months for follow-up.    She is taking calcium with vitamin D supplementation.  She is also started Prolia for the osteopenia.    Plan: Continue anastrozole  Continue calcium with vitamin D  Continue Prolia  Follow-up in 3 months           Measurable disease: None postoperatively    Current therapy: Anastrozole 1 mg p.o. daily started October 20, 2017    Treatment history: Lumpectomy in August 2017  Adjuvant radiation to 40-56 cGy in 16 fractions completed October 5, 2017        ECOG Performance   ECOG Performance Status: 1    Distress Assessment  Distress Assessment Score: No distress    Pain           Problem List    1. Malignant neoplasm of upper-outer quadrant of left female breast          CC: Wilbur Hannah MD    ______________________________________________________________________________    History of Present Illness    Ms. Yanna Handy is here for follow-up.  She was seen 3 months ago.  Has started and is tolerating anastrozole well.  No side effects.  No headaches or dizziness.  No change with breathing.  No new bone or abdominal pain.  She did get started on Prolia for the osteopenia in December.  ECOG status is 1.  No other problems.    Pain Status  Currently in Pain: No/denies (No pain right now. Occassional sciatica flare up. )    Review of Systems    Constitutional  Constitutional (WDL): Exceptions to WDL  Fatigue: Fatigue relieved by rest  Neurosensory  Neurosensory  "(WDL): All neurosensory elements are within defined limits  Cardiovascular  Cardiovascular (WDL): All cardiovascular elements are within defined limits  Pulmonary  Respiratory (WDL): Exceptions to WDL (Hx: asthma)  Dyspnea: Shortness of breath with moderate exertion  Gastrointestinal  Gastrointestinal (WDL): All gastrointestinal elements are within defined limits  Genitourinary  Genitourinary (WDL): All genitourinary elements are within defined limits  Integumentary  Integumentary (WDL): All integumentary elements are within defined limits  Patient Coping  Patient Coping: Accepting  Distress Assessment  Distress Assessment Score: No distress  Accompanied by  Accompanied by: Family Member    Past History  Past Medical History:   Diagnosis Date     Anemia 8/21/2015     Asthma      Breast cancer 07/25/2017    left     CAD (coronary artery disease) 9/15/2014     Chronic bronchitis      Chronic Sinusitis     Created by Conversion  Replacement Utility updated for latest IMO load     Emphysema     Created by Conversion      Fx ankle 8/2000     Fx wrist 12/2006    Right wrist      GI bleed 1/12/2015     Hypercholesterolemia     Created by Conversion      Hypertension      Insomnia 1/13/2016     Osteoarthritis     Created by Conversion  Replacement Utility updated for latest IMO load     Osteoporosis Senile     Created by Conversion      Squamous Cell Carcinoma Of The Skin     Created by Conversion          Past Surgical History:   Procedure Laterality Date     APPENDECTOMY  1971     BREAST SURGERY  08/02/2017    left lumpectomy, Dr. Pope     COLONOSCOPY N/A 1/15/2015    Procedure: COLONOSCOPY with biopsy of polyp;  Surgeon: Joel Coppola MD;  Location: [facility];  Service:      CORONARY STENT PLACEMENT      \"I had 2 stent placements\"     EYE SURGERY      Cataracts     HYSTERECTOMY  1971     JOINT REPLACEMENT  1/2003    Right TKA     KNEE ARTHROSCOPY Left 10/2004     OOPHORECTOMY  1971    One removed     VA " "REMOVAL OF TONSILS,<11 Y/O      Description: Tonsillectomy;  Recorded: 02/12/2009;  Comments: 1941     CO REVISE SECONDARY VARICOSITY      Description: Varicose Vein Ligation;  Recorded: 02/12/2009;  Comments: 1988     CO TOTAL ABDOM HYSTERECTOMY      Description: Hysterectomy;  Recorded: 02/12/2009;  Comments: 1971     SINUS SURGERY      \"I had 3 sinus surgeries.\"       Physical Exam    Recent Vitals 1/18/2018   Weight 170 lbs   /74   Pulse 60   Temp 98.3   Temp src 1   SpO2 96   Some recent data might be hidden       GENERAL: Alert and oriented. Seated comfortably. In no distress.    HEAD: Atraumatic and normocephalic.  Has a full head of hair.    EYES: KENRICK, EOMI.  No pallor.  No icterus.    Oral cavity: no mucosal lesion or tonsillar enlargement.    NECK: supple. JVP normal.  No thyroid enlargement.    LYMPH NODES: No palpable, cervical, axillary or inguinal lymphadenopathy.    CHEST: clear to auscultation bilaterally.  Resonant to percussion throughout bilaterally.  Symmetrical breath movements bilaterally.    CVS: S1 and S2 are heard. Regular rate and rhythm.  No murmur or gallop or rub heard.    ABDOMEN: Soft. Not tender. Not distended.  No palpable hepatomegaly or splenomegaly.  No other mass palpable.  Bowel sounds heard.    EXTREMITIES: Warm.  No peripheral edema.    SKIN: no rash, or bruising or purpura.        Lab Results    No results found for this or any previous visit (from the past 168 hour(s)).    Imaging    No results found.      Signed by: Mindy Rodas MD  "

## 2021-06-15 NOTE — PROGRESS NOTES
[facility] Hematology and Oncology Progress Note    Patient: Yanna Handy  MRN: [id number]  Date of Service:         Reason for Visit    Chief Complaint   Patient presents with     HE Cancer     Malignant neoplasm of upper-outer quadrant of left breast       Assessment and Plan    T2 N1 M0, stage IIb left breast cancer status post lumpectomy August 2, 2017  Osteopenia    No evidence of recurrent breast cancer.  Tolerating anastrozole well.  We will continue the same.  We will do mammogram in July and see her back after that for the next visit.    She continues calcium and vitamin D and Prolia through her primary for osteopenia.    Plan: As above    Measurable disease: None postoperatively    Current therapy: Anastrozole 1 mg p.o. daily started October 20, 2017    Treatment history: Lumpectomy in August 2017  Adjuvant radiation to 40-56 cGy in 16 fractions completed October 5, 2017        ECOG Performance   ECOG Performance Status: 1    Distress Assessment  Distress Assessment Score: No distress    Pain           Problem List    1. Malignant neoplasm of upper-outer quadrant of left breast in female, estrogen receptor positive (H)  Mammo Screening Bilateral   2. Encounter for screening mammogram for malignant neoplasm of breast   Mammo Screening Bilateral        CC: Wilbur Hannah MD    ______________________________________________________________________________    History of Present Illness    Ms. Yanna Handy is here for follow-up.  Seen 6 months ago.  3-1/2 years out from her surgery.  Tolerating anastrozole fine.  Continues calcium and vitamin D and Prolia for osteopenia.  Does have shortness of breath and dyspnea with exertion related to asthma and does follow with both pulmonary and cardiology.  No headaches.  No breast changes.  No new abdominal or bone pain.  ECOG status is 1.        Pain Status  Currently in Pain: Yes    Review of Systems    Constitutional  Constitutional (WDL): Exceptions to  WDL  Fatigue: Fatigue relieved by rest  Neurosensory  Neurosensory (WDL): Exceptions to WDL  Ataxia: Asymptomatic, clinical or diagnostic observations only, intervention not indicated  Peripheral Sensory Neuropathy: Asymptomatic, loss of deep tendon reflexes or paresthesia(Tingling to Lt arm)  Cardiovascular  Cardiovascular (WDL): Exceptions to WDL  Palpitations: Definition: A disorder characterized by inflammation of the muscle tissue of the heart.  Pulmonary  Respiratory (WDL): Exceptions to WDL  Dyspnea: Shortness of breath with minimal exertion, limiting instrumental ADL  Gastrointestinal  Gastrointestinal (WDL): Exceptions to WDL  Dysphagia: Symptomatic, able to eat regular diet(Chronic)  Genitourinary  Genitourinary (WDL): All genitourinary elements are within defined limits  Integumentary  Integumentary (WDL): All integumentary elements are within defined limits  Patient Coping  Patient Coping: Accepting  Distress Assessment  Distress Assessment Score: No distress  Accompanied by  Accompanied by: Alone    Past History  Past Medical History:   Diagnosis Date     Anemia 8/21/2015     Asthma      Breast cancer (H) 07/25/2017    left     CAD (coronary artery disease) 9/15/2014     Chronic bronchitis (H)      Chronic Sinusitis     Created by Conversion  Replacement Utility updated for latest IMO load     Emphysema     Created by Conversion      Fx ankle 8/2000     Fx wrist 12/2006    Right wrist      GI bleed 1/12/2015     Hx of radiation therapy 2017    left     Hypercholesterolemia     Created by Conversion      Hypertension      Insomnia 1/13/2016     Osteoarthritis     Created by Conversion  Replacement Utility updated for latest IMO load     Osteoporosis Senile     Created by Conversion      Squamous Cell Carcinoma Of The Skin     Created by Conversion          Past Surgical History:   Procedure Laterality Date     APPENDECTOMY  1971     BREAST LUMPECTOMY Left 08/02/2017    Dr. Pope     CATARACT EXTRACTION    "    COLONOSCOPY N/A 01/15/2015    COLONOSCOPY with biopsy of polyp;  Surgeon: Joel Coppola MD;  Location: [facility];  Service:     CORONARY STENT PLACEMENT  09/15/2014    2     HYSTERECTOMY  1971     KNEE ARTHROSCOPY Left 10/2004     OOPHORECTOMY  1971    unilateral     AL REMOVAL OF TONSILS,<13 Y/O  1941     AL REVISE SECONDARY VARICOSITY  1988    Varicose Vein Ligation     SINUS SURGERY      \"I had 3 sinus surgeries.\"     TOTAL KNEE ARTHROPLASTY Right 01/2003       Physical Exam    Recent Vitals 3/2/2021   Height -   Weight 164 lbs 10 oz   BSA (m2) 1.78 m2   /79   Pulse 66   Temp 98.3   Temp src 1   SpO2 97   Some recent data might be hidden       GENERAL: Alert and oriented. Seated comfortably. In no distress.    HEAD: Atraumatic and normocephalic.  Has a full head of hair.    EYES: KENRICK, EOMI.  No pallor.  No icterus.    Oral cavity: no mucosal lesion or tonsillar enlargement.    NECK: supple. JVP normal.  No thyroid enlargement.    LYMPH NODES: No palpable, cervical, axillary or inguinal lymphadenopathy.    CHEST: clear to auscultation bilaterally.  Resonant to percussion throughout bilaterally.  Symmetrical breath movements bilaterally.    CVS: S1 and S2 are heard. Regular rate and rhythm.  No murmur or gallop or rub heard.    Breast exam: Right side is benign.  Well-healed lumpectomy negative scars on the left.  No masses, no nipple inversion, no axillary adenopathy.      ABDOMEN: Soft. Not tender. Not distended.  No palpable hepatomegaly or splenomegaly.  No other mass palpable.  Bowel sounds heard.    EXTREMITIES: Warm.  No peripheral edema.    SKIN: no rash, or bruising or purpura.        Lab Results    No results found for this or any previous visit (from the past 168 hour(s)).    Imaging    No results found.      Signed by: Mindy Rodas MD  "

## 2021-06-15 NOTE — PATIENT INSTRUCTIONS - HE
1) I will add arnuity to your regimen.  This will get you an extra dose of inhaled steroid that will hopefully mean you don't need the chronic prednisone.  2) You don't need to take the atrovent inhaler anymore  3) The combivent can be your rescue medicine, but if you need it more than twice a day, I would prefer you use the albuterol for the extra doses and not the combivent  4) I'll see you back in 6 months  5) Keep moving

## 2021-06-15 NOTE — PROGRESS NOTES
[facility] Daily Progress     Patient Name: Yanna Handy  Date: 1/26/2018  Visit #: 4/12  Referral Diagnosis: Frequent falls  Referring provider: Wilbur Hannah MD  Visit Diagnosis:     ICD-10-CM    1. Impaired functional mobility, balance, gait, and endurance Z74.09    2. Frequent falls R29.6        Assessment:     Patient is a 86 y.o. female that presents with signs and symptoms consistent with unsteady balance and gait secondary to core stability and reactive balance. Patient demonstrates impairments including narrow base of support gait with decreased марина and increased sway leading to impaired functional mobility. Patient's functional limitations include stair and curb ambulation, walking for long periods of time and walking in crowded spaces.     Today patient reports she feels that she is improving, she can get up and down from the toilet without using her hands, as well as in [place] she doesn't have to grab onto the pew in front of her. Today was spent on neuro re-ed, balance and gait training to increase strength, endurance and ambulation. Patient may only need the next two appointments before trialing independence, as she is seeing progress and change. Patient was able to do 10 sit to stands in 30 seconds with no hands. Patient scored 29/30 on FGA, where initial evaluation was 23/30.     HEP/POC compliance is  good .  Patient demonstrates understanding/independence with home program.  Patient is appropriate to continue with skilled physical therapy intervention, as indicated by initial plan of care.    Goal Status:  Pt. will be independent with home exercise program in : 4 weeks  Pt. will show improved balance for safer : ambulation;for dressing/grooming;for household ambulation;for community ambulation;independently;in 6 weeks  Pt. will be able to walk : 10 minutes;on even surfaces;with less difficulty;for household mobility;for community mobility;in 6 weeks  Pt. will improve gait  speed : for decreased risk of falls;for improved household ambulation;for improved community ambulation;in 6 weeks  Patient will ascend / descend: stairs;step;curb;with railing;independently;with less difficulty;in 6 weeks  Patient will increase : Lai score;for improved quality of function;for improved quality of life;in 6 weeks      Plan / Patient Education:     Continue with initial plan of care.  Progress with home program as tolerated.    Plan for next visit: balance and core strengthening, ladder/hurdles, AIREX, step ups/downs - retest balance measures     Subjective:     Patient reports things are getting better, she likes the new laying down exercises. She was able to do housework yesterday with no difficulty. Her biggest difficulty is standing on one leg, but she understands that may not ever change.     Functional limitations are described as occurring with:   ascending and descending stairs or curbs  balance  performing routine daily activities  walking for about 15 minutes    Objective:     Patient Outcome Measures :    Lower Extremity Functional Scale (_/80): 53   Scores range from 0-80, where a score of 80 represents maximum function. The minimal clinically important difference is a positive change of 9 points.  Lai Score: 49  Balance scores range from 0-56, where a score of 41-56 ='s a low fall risk (independent); 21-40 ='s a medium fall risk (ambulatory with assistance); 0-20 ='s a high fall risk (wheelchair).  Other Test Score: 29/30 FGA ( originally 23) - a little unsteady with head turns during gait     Treatment Today       Patient Education: Patient was educated on continuing plan of care, progress and review of current HEP. Patient educated on importance of consistency with exercise and therapy, as well as activity modification in order to see change and improvements. Patient demonstrated and verbalized understanding.       Exercises:  Exercise #1: Tandem and SLS - hold 10-60 sec  Comment #1:  "sit to stand - 10 reps x 2  Exercise #2: hip abduction/extension in standing and heel-toe raises - 10-20 reps  Comment #2: step ups to 6\" - 10 reps  each leg with varying hand assistance   Exercise #3: toe taps to 6\" step - 20 reps   Comment #3: Airex balance and dynamic balance exercises - Narrow MANSOOR, tandem, SLS   Exercise #4: supine bridge - 10 reps x 2  Comment #4: SLR - 10 reps x 2      TREATMENT MINUTES COMMENTS   Evaluation     Self-care/ Home management     Manual therapy     Neuromuscular Re-education  Airex balance, see above; added with head turns    Therapeutic Activity     Therapeutic Exercises 25 See above flowsheet; Nustep 5 minute  Step ups and toe taps to 8\" step 10 reps each side   Gait training 5 Normal gait pattern 85ft x 2 SBA with verbal cuing  FGA testing   Modality__________________                Total 30    Blank areas are intentional and mean the treatment did not include these items.       Domi Gutierrez, PT  1/26/2018  "

## 2021-06-15 NOTE — PROGRESS NOTES
[facility] Daily Progress     Patient Name: Yanna Handy  Date: 1/31/2018  Visit #: 5/12  Referral Diagnosis: Frequent falls  Referring provider: Wilbur Hannah MD  Visit Diagnosis:     ICD-10-CM    1. Impaired functional mobility, balance, gait, and endurance Z74.09    2. Frequent falls R29.6        Assessment:     Patient is a 86 y.o. female that presents with signs and symptoms consistent with unsteady balance and gait secondary to core stability and reactive balance. Patient demonstrates impairments including narrow base of support gait with decreased марина and increased sway leading to impaired functional mobility. Patient's functional limitations include stair and curb ambulation, walking for long periods of time and walking in crowded spaces.     Today patient reports she feels that she is improving, she can get up and down from the toilet without using her hands, as well as in [place] she doesn't have to grab onto the pew in front of her. Today was spent on neuro re-ed, balance and gait training to increase strength, endurance and ambulation. Patient will trial independence and return in 2 weeks, as she is seeing progress and change. Patient was able to do 10 sit to stands in 30 seconds with no hands. Patient scored 29/30 on FGA, where initial evaluation was 23/30.     HEP/POC compliance is  good .  Patient demonstrates understanding/independence with home program.  Patient is appropriate to continue with skilled physical therapy intervention, as indicated by initial plan of care.    Goal Status:  Pt. will be independent with home exercise program in : 4 weeks  Pt. will show improved balance for safer : ambulation;for dressing/grooming;for household ambulation;for community ambulation;independently;in 6 weeks  Pt. will be able to walk : 10 minutes;on even surfaces;with less difficulty;for household mobility;for community mobility;in 6 weeks  Pt. will improve gait speed : for decreased  risk of falls;for improved household ambulation;for improved community ambulation;in 6 weeks  Patient will ascend / descend: stairs;step;curb;with railing;independently;with less difficulty;in 6 weeks  Patient will increase : Lai score;for improved quality of function;for improved quality of life;in 6 weeks      Plan / Patient Education:     Continue with initial plan of care.  Progress with home program as tolerated.    Plan for next visit: balance and core strengthening, ladder/hurdles, AIREX, step ups/downs - retest balance measures     Subjective:     Patient was able to put on her pajama pants recently without having to sit down, she is proud of that. Her biggest difficulty is standing on one leg, but she understands that may not ever change.     Functional limitations are described as occurring with:   ascending and descending stairs or curbs  balance  performing routine daily activities  walking for about 15 minutes    Objective:     Patient Outcome Measures :    Lower Extremity Functional Scale (_/80): 53   Scores range from 0-80, where a score of 80 represents maximum function. The minimal clinically important difference is a positive change of 9 points.  Lai Score: 49  Balance scores range from 0-56, where a score of 41-56 ='s a low fall risk (independent); 21-40 ='s a medium fall risk (ambulatory with assistance); 0-20 ='s a high fall risk (wheelchair).  Other Test Score: 29/30 FGA ( originally 23) - a little unsteady with head turns during gait     Treatment Today       Patient Education: Patient was educated on continuing plan of care, progress and review of current HEP. Patient educated on importance of consistency with exercise and therapy, as well as activity modification in order to see change and improvements. Patient demonstrated and verbalized understanding.       Exercises:  Exercise #1: Tandem and SLS - hold 10-60 sec  Comment #1: sit to stand - 10 reps x 2  Exercise #2: hip  "abduction/extension in standing and heel-toe raises - 10-20 reps  Comment #2: step ups to 6\" - 10 reps  each leg with varying hand assistance   Exercise #3: toe taps to 6\" step - 20 reps   Comment #3: Airex balance and dynamic balance exercises - Narrow MANSOOR, tandem, SLS   Exercise #4: supine bridge - 10 reps x 2  Comment #4: SLR - 10 reps x 2      TREATMENT MINUTES COMMENTS   Evaluation     Self-care/ Home management     Manual therapy     Neuromuscular Re-education 5 Airex balance, see above; added with head turns    Therapeutic Activity     Therapeutic Exercises 25 See above flowsheet; Nustep 5 minute  Step ups and toe taps to 8\" step 10 reps each side  Toe taps - 20 reps   Yoga ball exercises- marching, adduction, abduction   Gait training  Normal gait pattern 85ft x 2 SBA with verbal cuing   Modality__________________                Total 30    Blank areas are intentional and mean the treatment did not include these items.       Domi Gutierrez, PT  1/31/2018  "

## 2021-06-15 NOTE — PROGRESS NOTES
[facility]   Balance Initial Evaluation    Patient Name: Yanna Handy  Date of evaluation: 1/11/2018  Referral Diagnosis: Frequent falls  Referring provider: Wilbur Hannah MD  Visit Diagnosis:     ICD-10-CM    1. Impaired functional mobility, balance, gait, and endurance Z74.09    2. Frequent falls R29.6        Assessment:      Patient is a 86 y.o. female that presents with signs and symptoms consistent with unsteady balance and gait secondary to core stability and reactive balance. Patient demonstrates impairments including narrow base of support gait with decreased марина and increased sway leading to impaired functional mobility. Patient's functional limitations include stair and curb ambulation, walking for long periods of time and walking in crowded spaces. Today patient responded well to balance training and gait training.  Patient educated on and demonstrated understanding of nature of impairment, plan of care, patient role and HEP. Patient compliant with PT and prognosis is good. Patient would benefit from skilled PT to progress and improve above impairments.    The POC is dynamic and will be modified on an ongoing basis.  Patient will return to clinic if symptoms persist.  Barriers to achieving goals as noted in the assessment section may affect outcome.  Prognosis to achieve goals is  fair   Pt. is appropriate for skilled PT intervention as outlined in the Plan of Care (POC).  Pt. is a good candidate for skilled PT services to improve pain levels and function.  Based on falls assessment, patient is at an increased risk for falling. Plan of care will address this risk with lower extremity strengthening and balance training.    Goals:  Pt. will be independent with home exercise program in : 4 weeks  Pt. will show improved balance for safer : ambulation;for dressing/grooming;for household ambulation;for community ambulation;independently;in 6 weeks  Pt. will be able to walk : 10  minutes;on even surfaces;with less difficulty;for household mobility;for community mobility;in 6 weeks  Pt. will improve gait speed : for decreased risk of falls;for improved household ambulation;for improved community ambulation;in 6 weeks  Patient will ascend / descend: stairs;step;curb;with railing;independently;with less difficulty;in 6 weeks  Patient will increase : Lai score;for improved quality of function;for improved quality of life;in 6 weeks    Patient's expectations/goals are realistic.    Barriers to Learning or Achieving Goals:  No Barriers.  Chronicity of the problem.       Plan / Patient Instructions:      Plan of Care:   Communication with: Referral Source;Patient Caregiver  Patient Related Instruction: Nature of Condition;Treatment plan and rationale;Self Care instruction;Basis of treatment;Body mechanics;Posture;Next steps;Expected outcome  Times per Week: 1-2  Number of Weeks: 6  Number of Visits: 12  Therapeutic Exercise: ROM;Stretching;Strengthening  Neuromuscular Reeducation: kinesio tape;posture;core;balance/proprioception  Manual Therapy: soft tissue mobilization;joint mobilization;myofascial release;muscle energy  Gait Training: as indicated    Plan for next visit: review HEP, balance and core strengthening, ladder/hurdles, AIREX, step ups/downs     Subjective:       Social information:   Living Situation:single family home and stairs  chair lift    History of Present Illness:    Yanna is a 86 y.o. female who presents to therapy today with complaints of frequent falls and needing to work on balance. Date of onset/duration of symptoms is all last year. Onset was gradual. Symptoms are intermittent and not improving. Patient reports she has sciatica that sometimes bothers her if she stands for long periods of time. She denies history of similar symptoms. She describes their previous level of function as not limited. They have a chair lift to go to the basement, she thinks she walks really  funny and drags her feet because she is so afraid of falling - she doesn't want to get embarrassed and she has gotten hurt before. She tries to wear good comfortable shoes that aren't clunky. Patient hasn't use any AD. She is out of breath all the time and that affects her walking. She feels more     Functional limitations are described as occurring with:   ascending and descending stairs or curbs  balance  performing routine daily activities  walking for about 15 minutes       Objective:      Note: Items left blank indicates the item was not performed or not indicated at the time of the evaluation.    Patient Outcome Measures :    Lower Extremity Functional Scale (_/80): 53   Scores range from 0-80, where a score of 80 represents maximum function. The minimal clinically important difference is a positive change of 9 points.  Saez Score: 49  Balance scores range from 0-56, where a score of 41-56 ='s a low fall risk (independent); 21-40 ='s a medium fall risk (ambulatory with assistance); 0-20 ='s a high fall risk (wheelchair).  Other Test Score: 23/30 FGA    Balance Examination  1. Impaired functional mobility, balance, gait, and endurance     2. Frequent falls       Posture Observation:      General sitting posture is  normal.  General standing posture is fair.  Assistive Device:  None  Gait Observation:  Narrow base of support with shuffling feet and midfoot strike, slower cadance    LE Strength: WFL and no pain reprodution    LE ROM: WFL and no pain reproduction    Balance Assessment:    Saez:  Saez Score: 49 /56  FGA:  23 / 30    Exercises:  Exercise #1: Tandem and SLS - hold 10-60 sec  Comment #1: sit to stand - 10 reps x 2  Exercise #2: hip abduction/extension in standing and heel-toe raises - 10-20 reps      Treatment Today     TREATMENT MINUTES COMMENTS   Evaluation 20    Self-care/ Home management     Manual therapy     Neuromuscular Re-education 10 SAEZ   Therapeutic Activity     Therapeutic Exercises      Gait training 10 FGA   Modality__________________                Total 40    Blank areas are intentional and mean the treatment did not include these items.       PT Evaluation Code: (Please list factors)  Patient History/Comorbidities: HTN, CAD, hypercholesterolemia, OA, osteoporosis, anemia   Examination: impaired balance with frequent falls  Clinical Presentation: uncomplicated  Clinical Decision Making: low    Patient History/  Comorbidities Examination  (body structures and functions, activity limitations, and/or participation restrictions) Clinical Presentation Clinical Decision Making (Complexity)   No documented Comorbidities or personal factors 1-2 Elements Stable and/or uncomplicated Low   1-2 documented comorbidities or personal factor 3 Elements Evolving clinical presentation with changing characteristics Moderate   3-4 documented comorbidities or personal factors 4 or more Unstable and unpredictable High              Domi Gutierrez, PT  1/11/2018  11:36 AM

## 2021-06-16 PROBLEM — Z87.19 HISTORY OF ESOPHAGEAL STRICTURE: Status: ACTIVE | Noted: 2021-01-01

## 2021-06-16 PROBLEM — K76.0 HEPATIC STEATOSIS: Chronic | Status: ACTIVE | Noted: 2021-01-01

## 2021-06-16 PROBLEM — I35.0 MODERATE AORTIC STENOSIS: Chronic | Status: ACTIVE | Noted: 2018-07-02

## 2021-06-16 PROBLEM — Z79.811 AROMATASE INHIBITOR USE: Chronic | Status: ACTIVE | Noted: 2017-12-20

## 2021-06-16 PROBLEM — N39.0 RECURRENT UTI: Chronic | Status: ACTIVE | Noted: 2021-01-05

## 2021-06-16 PROBLEM — I50.30 (HFPEF) HEART FAILURE WITH PRESERVED EJECTION FRACTION (H): Status: ACTIVE | Noted: 2020-08-13

## 2021-06-16 PROBLEM — C50.412 MALIGNANT NEOPLASM OF UPPER-OUTER QUADRANT OF LEFT FEMALE BREAST (H): Chronic | Status: ACTIVE | Noted: 2017-08-18

## 2021-06-16 PROBLEM — K44.9 HIATAL HERNIA: Chronic | Status: ACTIVE | Noted: 2021-01-01

## 2021-06-16 NOTE — TELEPHONE ENCOUNTER
Pt c/o dizziness and low BP.    Pt saw Dr Segura in [facility] 4/5/21, isosorbide 30 mg was initiated.  Dizziness and low BP started 4/8/21.  Weight is down to 160# and ankle swelling is decreased.  BP this morning was 100/64, yesterday SBP was 100.    Pt is asking if ok to decrease dose of isosorbide - take half tablet daily.    Dr Segura - do you have any recommendations?  -[facility]

## 2021-06-16 NOTE — PROGRESS NOTES
Hospital Follow-up Visit:    Assessment/Plan:     1. Hospital discharge follow-up  #Acute on chronic hypoxemic respiratory failure  #Left lower lobe pneumonia/pseudomonal bronchitis  #Possible acute asthma exacerbation  At admission, patient was empirically started on Rocephin and azithromycin for possible community-acquired pneumonia. However, sputum culture grew Pseudomonas and patient was switched to levofloxacin. Sensitivity showed that Pseudomonas is resistant to levofloxacin. However, patient clinically improved despite not being on optimal antibiotic regimen. Spoke to infectious disease specialist over the phone, decided to start her on cefepime for 5 days. Patient likely has Pseudomonal bronchitis PICC line established. Patient will finish cefepime course as an outpatient. Last day of treatment is 3/28/2021.  For acute asthma exacerbation, patient was treated with systemic steroid along with bronchodilator. Patient will take prednisone for additional 5 days.  At baseline, patient intermittently required supplemental oxygen. At discharge, patient was satting well on 2 L.    Sats 99% on 2l O2. Probably does not need oxygen 24/7, but makes her feel more comfortable and has better air flow.completed antibiotics and steroids. Denies any new cough or wheezing.    2. Chronic congestive heart failure, unspecified heart failure type (H)  Saw Dr Segura recently and was on increased furosemide dose 40 gm daily for few days, but made her nauseous. Leg swelling and breathing improved. On 4/10/21 did not take any furosemide and since 4/11 on 20 mg daily. Doing very well with no fluid overload and nausea completely subsided.    3. Coronary artery disease involving native coronary artery of native heart without angina pectoris  Dr Segura started Imdur on 4/5/21. She still had few episodes of chest pain when took NGL and pain resolved. She will see cardiologist in Monday. CT angiogram planned.    4. Moderate persistent  asthma without complication  Stable, on Breo Elipta inhaler.       Subjective:     Yanna Handy is a 89 y.o. female who presents for a hospital discharge follow up.      Hospital/Nursing Home/ Rehab Facility: [facility]  Date of Admission: 3/18/21  Date of Discharge:3/25/21  Reason(s) for Admission:shortness of breath            Do you have any problems taking your medication regularly?  None       Have you had any changes in your medication since discharge? None       Have you had any difficulty following your discharge or treatment plan?  No    Summary of hospitalization:  Hospital discharge summary reviewed  Diagnostic Tests/Treatments reviewed.  Follow up needed: None  Other Healthcare Providers Involved in Patient's Care: Patient Care Team:  Wilbur Hannah MD as PCP - General  Mindy Rodas MD as Physician (Hematology and Oncology)  Lashonda Taylor RN as Oncology Nurse Navigator (Hematology and Oncology)  Wilbur Hannah MD as Assigned PCP  Ananya Schaefer MD as Assigned Heart and Vascular Provider  Piter Bruno MD as Assigned Pulmonology Provider  Mindy Rodas MD as Assigned Cancer Care Provider      Update since discharge: {improved       Post Discharge Medication Reconciliation: discharge medications reconciled, continue medications without change  Plan of care communicated with: patient and family    Objective:     Vitals:    04/15/21 1657   BP: 176/79   Pulse: 73   SpO2: 99%   LMP: 07/11/1971         Physical Exam:  Constitutional:  oriented to person, place, and time, appears well-nourished. No distress.   HENT:   Head: Normocephalic.    Eyes: Conjunctivae are normal.   Neck: Normal range of motion. Neck supple.   Cardiovascular: Normal rate, regular rhythm and normal heart sounds.    Pulmonary/Chest: Effort normal and breath sounds normal.   Abdominal: Soft.  Musculoskeletal: Normal range of motion. No edema.  Neurological: alert  and oriented to person, place, and time. Skin: Skin is warm.   Psychiatric: normal mood and affect.        Coding guidelines for this visit:  Type of Medical   Decision Making Face-to-Face Visit       within 7 Days of discharge Face-to-Face Visit        within 14 days of discharge   Moderate Complexity [id number]   High Complexity [id number]       Electronically signed by Wilbur Hannah MD 04/16/21 2:12 PM

## 2021-06-16 NOTE — TELEPHONE ENCOUNTER
----- Message -----  From: Dariusz Segura MD  Sent: 4/9/2021  11:02 AM CDT  To: BRAXTON Roldan,    I think the furosemide may be the culprit so I would say to hold it for one day then reduce the dose to 20 mg po daily. If that does not help in 3-4 days she could reduce the isosorbide dose then.    [facility]    === Recommendations discussed with pt.  Pt verbalized understanding and had no questions.  Pt will decrease furosemide to 20 mg daily after holding it 4/10/21.  -nathalie

## 2021-06-16 NOTE — TELEPHONE ENCOUNTER
----- Message from Jacqueline Maurer RN sent at 4/14/2021  8:44 AM CDT -----  Regarding: FW: [facility] PATIENT    ----- Message -----  From: Diane Flaherty [facility]  Sent: 4/14/2021   8:17 AM CDT  To: Jacqueline Maurer RN  Subject: [facility] PATIENT                                      General phone call:    Caller: PATIENT    Primary cardiologist: SONIA    Detailed reason for call: PATIENT CALLING TO REPORT THAT isosorbide mononitrate (IMDUR) 30 MG 24 hr tablet IS MAKING HER HAVE CHEST PAINS AND NAUSEA.    Best phone number: [phone]    Best time to contact: ANY    Ok to leave a detailed message? YES    Device? NO    Additional Info:

## 2021-06-16 NOTE — TELEPHONE ENCOUNTER
"I called and spoke with pt. It's been over 1 year since we last saw her. Per our clinic protocol we would like pt to be seen at least once a year for med renewal. She would need a F/U in person or we can do a virtual visit. Pt stated she is home bound now. She was recently in the hospital for heart problems and she is currently on oxygen 24/7. She can't go anywhere. Plus she said, \"I'm 89 years old I can't do a video visit because I don't know how. Not tech savvy.\"    Gabapentin really helps with her leg pain. Her lower back has been worsening so she was thinking about coming in for a injection since gabapentin does not help with her lower back. At this time she can't come in and declining video visit. Wondering if Kathrine can still refill her gabapentin. Please advise.   "

## 2021-06-16 NOTE — PROGRESS NOTES
[facility] Discharge Summary  Patient Name: Yanna Handy  Date: 3/27/2018  Referral Diagnosis: frequent falls  Referring provider: Wilbur Hannah MD  Visit Diagnosis:   1. Impaired functional mobility, balance, gait, and endurance     2. Frequent falls         Goals:  Pt. will be independent with home exercise program in : 4 weeks  Pt. will show improved balance for safer : ambulation;for dressing/grooming;for household ambulation;for community ambulation;independently;in 6 weeks  Pt. will be able to walk : 10 minutes;on even surfaces;with less difficulty;for household mobility;for community mobility;in 6 weeks  Pt. will improve gait speed : for decreased risk of falls;for improved household ambulation;for improved community ambulation;in 6 weeks  Patient will ascend / descend: stairs;step;curb;with railing;independently;with less difficulty;in 6 weeks  Patient will increase : Lai score;for improved quality of function;for improved quality of life;in 6 weeks    Patient was seen for 6 visits for physical therapy of frequent falls from 1/11/18 to 2/14/18 with no follow up appointments.   The patient reports feeling better and did not wish to schedule further therapy at this time.  No further therapy is required at this time.    Therapy will be discontinued at this time.  The patient will need a new referral to resume physical therapy treatment. Please see below for patient's current status.    Thank you for your referral.  Domi Gutierrez, PT, DPT  3/27/2018   10:14 AM      [facility] Daily Progress     Patient Name: Yanna Handy  Date: 2/14/2018  Visit #: 6/12  Referral Diagnosis: Frequent falls  Referring provider: Wilbur Hannah MD  Visit Diagnosis:     ICD-10-CM    1. Impaired functional mobility, balance, gait, and endurance Z74.09    2. Frequent falls R29.6        Assessment:     Patient is a 86 y.o. female that presents with signs and symptoms consistent with unsteady  balance and gait secondary to core stability and reactive balance. Patient demonstrates impairments including narrow base of support gait with decreased марина and increased sway leading to impaired functional mobility. Patient's functional limitations include stair and curb ambulation, walking for long periods of time and walking in crowded spaces.     Today patient reports she feels that she is improving, she can get up and down from the toilet without using her hands, as well as in [place] she doesn't have to grab onto the pew in front of her. Today was spent on neuro re-ed, balance and gait training to increase strength, endurance and ambulation. Patient was able to do 10 sit to stands in 30 seconds with no hands. Patient scored 29/30 on FGA, where initial evaluation was 23/30. Patient will trial independence and return to clinic if needed.    HEP/POC compliance is  good .  Patient demonstrates understanding/independence with home program.  Patient is appropriate to continue with skilled physical therapy intervention, as indicated by initial plan of care.    Goal Status:  Pt. will be independent with home exercise program in : 4 weeks  Pt. will show improved balance for safer : ambulation;for dressing/grooming;for household ambulation;for community ambulation;independently;in 6 weeks  Pt. will be able to walk : 10 minutes;on even surfaces;with less difficulty;for household mobility;for community mobility;in 6 weeks  Pt. will improve gait speed : for decreased risk of falls;for improved household ambulation;for improved community ambulation;in 6 weeks  Patient will ascend / descend: stairs;step;curb;with railing;independently;with less difficulty;in 6 weeks  Patient will increase : Lai score;for improved quality of function;for improved quality of life;in 6 weeks      Plan / Patient Education:     Continue with initial plan of care.  Progress with home program as tolerated.    Plan for next visit: balance and  "core strengthening, ladder/hurdles, AIREX, step ups/downs - retest balance measures     Subjective:     Patient feels that she is doing well, she feels that up and down from the chair is doing good. Her biggest difficulty is standing on one leg, but she understands that may not ever change.     Functional limitations are described as occurring with:   ascending and descending stairs or curbs  balance  performing routine daily activities  walking for about 15 minutes    Objective:     Patient Outcome Measures :    Lower Extremity Functional Scale (_/80): 53   Scores range from 0-80, where a score of 80 represents maximum function. The minimal clinically important difference is a positive change of 9 points.  Lai Score: 49  Balance scores range from 0-56, where a score of 41-56 ='s a low fall risk (independent); 21-40 ='s a medium fall risk (ambulatory with assistance); 0-20 ='s a high fall risk (wheelchair).  Other Test Score: 29/30 FGA ( originally 23) - a little unsteady with head turns during gait     Treatment Today       Patient Education: Patient was educated on continuing plan of care, progress and review of current HEP. Patient educated on importance of consistency with exercise and therapy, as well as activity modification in order to see change and improvements. Patient demonstrated and verbalized understanding.       Exercises:  Exercise #1: Tandem and SLS - hold 10-60 sec  Comment #1: sit to stand - 10 reps x 2  Exercise #2: hip abduction/extension in standing and heel-toe raises - 10-20 reps  Comment #2: step ups to 6\" - 10 reps  each leg with varying hand assistance   Exercise #3: toe taps to 6\" step - 20 reps   Comment #3: Airex balance and dynamic balance exercises - Narrow MANSOOR, tandem, SLS   Exercise #4: supine bridge - 10 reps x 2  Comment #4: SLR - 10 reps x 2      TREATMENT MINUTES COMMENTS   Evaluation     Self-care/ Home management     Manual therapy     Neuromuscular Re-education 5 Airex " balance, see above; added with head turns    Therapeutic Activity     Therapeutic Exercises 23 See above flowsheet; Nustep 5 minutes  Resistance 5  Jojo training    Gait training  Normal gait pattern 85ft x 2 SBA with verbal cuing   Modality__________________                Total 28    Blank areas are intentional and mean the treatment did not include these items.       Domi Gutierrez, PT  2/14/2018

## 2021-06-16 NOTE — TELEPHONE ENCOUNTER
----- Message from Marty Guerra sent at 4/9/2021  8:50 AM CDT -----  Regarding: [facility] PT - SYMPTOMS FROM MEDICATION  General phone call:    Caller: Lara Ventura     Primary cardiologist: [facility]     Detailed reason for call: Pt states that she was prescribed a new medication, Isosorbide and has been feeling faint, dizzy and has had low bp. Pt is requesting for a call back if she can cut the medication in half. Please advise.     New or active symptoms? Yes     Best phone number: [phone]    Best time to contact: Anytime     Ok to leave a detailed message? Yes     Device? No     Additional Info:

## 2021-06-16 NOTE — TELEPHONE ENCOUNTER
Appointment switched to 4/15 with Dr. Hannah.  Cheryl Ryan [facility] ............... 3:36 PM, 04/14/21

## 2021-06-16 NOTE — PATIENT INSTRUCTIONS - HE
Lara,    It was a pleasure to see you today at [facility].    Please take isosorbide mononitrate 30 mg by mouth daily to prevent angina.    Please use the nitro tabs if your symptoms return; call 911 if you don't get relief from 3 tabs in 15 minutes.    My nurse or I will call you with the newly ordered test results.    You will see Dr. Schaefer in two weeks.     Please call us if you have any questions or concerns about your heart.      Colton Segura M.D.  [facility]

## 2021-06-16 NOTE — TELEPHONE ENCOUNTER
New Appointment Needed  What is the reason for the visit:    Inpatient/ED Follow Up Appt Request  At what hospital or facility were you seen?: [facility]  What is the reason you were seen?: Pneumonia  What date were you admitted?: date: 3/18/2021  What date were you discharged?: date: 3/28/21  What was the recommended timeframe for your follow up appointment?: Dr. Segura was wanting her to see Dr. Hannah prior to his appointment 4/19/21  Provider Preference: PCP only  How soon do you need to be seen?: This week  Waitlist offered?: No  Okay to leave a detailed message:  Yes, patient is currently scheduled 4/20/2021 @ 5:05

## 2021-06-16 NOTE — TELEPHONE ENCOUNTER
Pharmacy sent refill request for gabapentin   --Med last Rx 12/9/20 #150, 3 refill   --Last OV 2/24/2020   --Future appt: none  --Please advise

## 2021-06-16 NOTE — PROGRESS NOTES
Pulmonary Clinic Follow-up Visit    89F with a history of severe persistent asthma, followed by Dr. Bruno in pulmonary clinic, as well as aortic stenosis presents for oxygen certification after recent hospitalization. She is quite dyspneic today. She has significant peripheral edema. Oxygen level is normal. I am concerned that she is exhibiting worsening congestive heart failure symptoms related to aortic valve stenosis, possibly exacerbated by recent pseudomonal bronchitis/pneumonia. I recommended hospitalization for IV diuretics and cardiology evaluation, however she adamantly declines. Discussed that it could be difficult to manage her diuretics as an outpatient, but she wants to try. We will increase her diuretic dose and repeat labs in one week. I will notify Dr. Schaefer of her current situation. She was supposed to follow up with him 3 months from her last visit (December 2020) but this has not been done.  Does not actually need O2 based on today's limited evaluation, but she feels better with O2 on so she will continue this at home.     Recommendations:  - increase Lasix to 40mg daily  - advised her to weigh herself daily, elevate legs above the level of the heart  - advised her to schedule follow up with Dr. Schaefer ASA. I will send him an inMVious Xoticss[employer] message  - check BNP, BMP in 1 week.   - continue oxygen supplemental for goal SpO2 92% or greater  - strict ER precautions discussed: syncope/dizziness, worsening chest pain or shortness of breath, and worsening peripheral edema.     Follow up with Dr. Bruno in 4 months, as scheduled.   All questions answered    Ag Gamboa MD (Avi)  [facility]  Pager [phone]  Clinic [phone]      CCx: oxygen certification visit    HPI: Interim history: Lara was last seen by Dr. Bruno on 2/15/21. Since that time, she was amdited 3/18 - 3/25 at [facility] for pneumonia and respiratory failure. She  was discharged on oxygen. She returns to clinic today for oxygen certification visit.   Of note sputum grew out pseudomonas. She had a PICC line placed and was discharged on IV cefepime. She also received nebs and steroids for presumed asthma exacerbation with improvement in her symptoms.    Today, she reports she is improving since her hospitalization. Her shortness of breath has improved a bit. The cough is gone. However, she continues to have significant dyspnea with minimal exertion.   She is very winded today after her walking oxygen test.   She reported a few episodes of chest pressure a few nights ago associated with palpitations. This has occurred 1-2 times in the last few weeks.  She has significant worsening of peripheral edema.                                                ROS:  A 12-system review was obtained and was negative with the exception of the symptoms endorsed in the history of present illness.    PMH:  Past Medical History:   Diagnosis Date     Anemia 8/21/2015     Asthma      Breast cancer (H) 07/25/2017    left     CAD (coronary artery disease) 9/15/2014     Chronic bronchitis (H)      Chronic Sinusitis     Created by Conversion  Replacement Utility updated for latest IMO load     Emphysema     Created by Conversion      Fx ankle 8/2000     Fx wrist 12/2006    Right wrist      GI bleed 1/12/2015     Hx of radiation therapy 2017    left     Hypercholesterolemia     Created by Conversion      Hypertension      Insomnia 1/13/2016     Osteoarthritis     Created by Conversion  Replacement Utility updated for latest IMO load     Osteoporosis Senile     Created by Conversion      Squamous Cell Carcinoma Of The Skin     Created by Conversion        PSH:  Past Surgical History:   Procedure Laterality Date     APPENDECTOMY  1971     BREAST LUMPECTOMY Left 08/02/2017    Dr. Pope     CATARACT EXTRACTION       COLONOSCOPY N/A 01/15/2015    COLONOSCOPY with biopsy of polyp;  Surgeon: Joel Coppola,  "MD;  Location: [facility];  Service:     CORONARY STENT PLACEMENT  09/15/2014    2     HYSTERECTOMY  1971     KNEE ARTHROSCOPY Left 10/2004     OOPHORECTOMY  1971    unilateral     VT REMOVAL OF TONSILS,<11 Y/O  1941     VT REVISE SECONDARY VARICOSITY  1988    Varicose Vein Ligation     SINUS SURGERY      \"I had 3 sinus surgeries.\"     TOTAL KNEE ARTHROPLASTY Right 01/2003       Allergies:  Allergies   Allergen Reactions     Alendronate Nausea And Vomiting     Metoclopramide Hcl Anxiety     Rofecoxib Diarrhea and Nausea Only     Doxycycline Nausea And Vomiting     Lisinopril Cough     Risedronate      Sulfa (Sulfonamide Antibiotics) Anaphylaxis       Family HX:  Family History   Problem Relation Age of Onset     Heart attack Father      Breast cancer Maternal Aunt 55     Lung cancer Brother 65     Colon cancer Maternal Grandmother 90     Lung cancer Maternal Grandfather 65     Brain cancer Son 25     Rectal cancer Brother 64       Social Hx:  Social History     Socioeconomic History     Marital status:      Spouse name: Not on file     Number of children: Not on file     Years of education: Not on file     Highest education level: Not on file   Occupational History     Not on file   Social Needs     Financial resource strain: Not on file     Food insecurity     Worry: Not on file     Inability: Not on file     Transportation needs     Medical: Not on file     Non-medical: Not on file   Tobacco Use     Smoking status: Never Smoker     Smokeless tobacco: Never Used   Substance and Sexual Activity     Alcohol use: Yes     Comment: Wine occassionally     Drug use: No     Sexual activity: Not on file   Lifestyle     Physical activity     Days per week: Not on file     Minutes per session: Not on file     Stress: Not on file   Relationships     Social connections     Talks on phone: Not on file     Gets together: Not on file     Attends Taoist service: Not on file     Active member of club or organization: " Not on file     Attends meetings of clubs or organizations: Not on file     Relationship status: Not on file     Intimate partner violence     Fear of current or ex partner: Not on file     Emotionally abused: Not on file     Physically abused: Not on file     Forced sexual activity: Not on file   Other Topics Concern     Not on file   Social History Narrative     Not on file       Current Meds:  Current Outpatient Medications   Medication Sig Dispense Refill     acetaminophen (TYLENOL) 650 MG CR tablet Take 650 mg by mouth every 8 (eight) hours as needed for pain.       albuterol (PROAIR HFA;PROVENTIL HFA;VENTOLIN HFA) 90 mcg/actuation inhaler Inhale 2 puffs every 6 (six) hours as needed for wheezing. 1 Inhaler 11     anastrozole (ARIMIDEX) 1 mg tablet TAKE ONE TABLET BY MOUTH ONCE DAILY  90 tablet 0     ascorbic acid (VITAMIN C) 1000 MG tablet Take 1,000 mg by mouth every morning.        aspirin 81 MG EC tablet Take 81 mg by mouth bedtime.       calcium citrate-vitamin D (CITRACAL+D) 315-200 mg-unit per tablet Take 1 tablet by mouth bedtime.       ciprofloxacin HCl (CIPRO) 250 MG tablet Take 1/2 tablet (125 mg total) by mouth daily. 45 tablet 0     cyanocobalamin (VITAMIN B-12) 50 mcg tablet Take 50 mcg by mouth every morning.        fluticasone furoate-vilanteroL (BREO ELLIPTA) 200-25 mcg/dose DsDv inhaler Inhale 1 puff daily. Rinse mouth with water, gargle,spit after each use 180 each 3     furosemide (LASIX) 20 MG tablet Take 2 tablets (40 mg total) by mouth daily. 90 tablet 2     gabapentin (NEURONTIN) 100 MG capsule Taking 1 capsule morning, 1 capsule at noon and 3 capsules at bedtime. 150 capsule 3     ipratropium-albuteroL (COMBIVENT RESPIMAT)  mcg/actuation Mist inhaler Inhale 1 puff 4 (four) times a day as needed. 3 Inhaler 3     ipratropium-albuterol (DUO-NEB) 0.5-2.5 mg/3 mL nebulizer Take 3 mL by nebulization 2 (two) times a day as needed. 1080 mL 3     LACTOBACILLUS ACIDOPHILUS (ACIDOPHILUS  ORAL) Take 1 tablet by mouth every morning.        magnesium 250 mg Tab Take 250 mg by mouth every morning.       metoprolol tartrate (LOPRESSOR) 100 MG tablet Take 1 tablet (100 mg total) by mouth 2 (two) times a day. 180 tablet 3     nitroglycerin (NITROSTAT) 0.4 MG SL tablet Place 1 tablet (0.4 mg total) under the tongue every 5 (five) minutes as needed for chest pain. 25 tablet 3     OXYGEN-AIR DELIVERY SYSTEMS [facility] Use 2 L As Directed. 2L at bedtime  [employer]       rosuvastatin (CRESTOR) 10 MG tablet Take 1 tablet (10 mg total) by mouth at bedtime. Take with a 5 mg pill for a total of 15 mg nightly 90 tablet 0     umeclidinium (INCRUSE ELLIPTA) 62.5 mcg/actuation DsDv inhaler Inhale 1 puff daily. 3 each 3     fluticasone furoate (ARNUITY ELLIPTA) 100 mcg/actuation inhaler Inhale 1 puff daily. 30 each 6     Current Facility-Administered Medications   Medication Dose Route Frequency Provider Last Rate Last Admin     denosumab 60 mg (PROLIA 60 mg/ml)  60 mg Subcutaneous Q6 Months Wilbur Hannah MD   60 mg at 12/22/20 1016       Physical Exam:  /80   Pulse 72   Ht 5' (1.524 m)   Wt 163 lb (73.9 kg)   LMP 07/11/1971   SpO2 97%   Breastfeeding No   BMI 31.83 kg/m    Gen: awake, alert, oriented, no distress  HEENT: nasal turbinates are unremarkable, no oropharyngeal lesions, no cervical or supraclavicular lymphadenopathy  CV: RRR, no M/G/R  Resp: very minimal bibasilar crackles. No wheezing or rhonchi.   Abd: soft, nontender, no palpable organomegaly  Skin: no apparent rashes  Ext: 2-3+ edema in both legs, worse at the ankles.   Neuro: alert, nonfocal    Labs:  Reviewed   Aug 2020      Imaging studies:  Echo Nov 2020    1.Left ventricle ejection fraction is normal. The calculated left ventricular ejection fraction is 66%.    2.Normal right ventricular size and systolic function.    3.The ascending aorta is mildly dilated.    4.The aortic valve is tricuspid. There is thickening of the aortic valve.  The aortic valve is sclerotic. Mild to moderate aortic stenosis. Mild aortic regurgitation.    5.When compared to the previous study dated 7/2/2018, left ventricular ejection fraction appears less robust on current study, aortic stenosis visually appears worse although measured hemodynamics underestimate this, gradient was 7 and is now 6 mmHg, velocity was 1.3 and is now 1.2 m/s. Suspect current study underestimates severity of aortic stenosis which visually appears to be no worse than moderate.    CT chest Sept 2019  IMPRESSION:   CONCLUSION:   1.  Bilateral lower lobe atelectasis and/or fibrosis has increased from the prior study.  2.  Scattered pulmonary nodules are likely infectious or inflammatory in nature and have not significantly changed from the prior study.  3.  There is debris in the airways. There are a few areas of mucous plugging.  4.  Cardiomegaly.  5.  Atherosclerotic disease including coronary artery calcification.     Pulmonary Function Testing  Feb 2015  FEV1/FVC is 69% and is normal.  FEV1 is 1.26 L (87%)  predicted and is normal.  FVC is 1.83 L (92%) predicted and normal.  There was no improvement in spirometry after a single inhaled dose of bronchodilator.  TLC is 3.52 L (79%) predicted and is normal.  RV is 1.17 L (51%) predicted and is reduced.  DLCO is 45% predicted and is reduced when it is corrected for hemoglobin.     Impression:  Full Pulmonary Function Test is abnormal.    Spirometry and flow volume loop are within normal limits  Spirometry is not consistent with reversibility.  There is no hyperinflation.  There is no air-trapping.  Diffusion capacity when corrected for hemoglobin is moderately reduced.

## 2021-06-16 NOTE — PROGRESS NOTES
Oxygen saturation walk test    Patient oxygen saturation on RA at rest is 97%.  Oxygen saturation while ambulating 75ft on RA is 94%.    Pt could not complete test as she was struggling to breathe despite O2 percentage.     DME Provider: Rey    Patient is ambulatory within his/her home.

## 2021-06-16 NOTE — TELEPHONE ENCOUNTER
I called to inform pt Kathrine sent a refill x 2 months. Informed pt if she is doing well with her current gabapentin dosage then she can also ask her PCP to take over refilling it in the future. Otherwise, she will need a F/U prior to any other refills per clinic protocol. Pt stated, she will probably ask her PCP for future refill on gabapentin since she is doing well with it. Also, she is still dealing with her heart and lung so once that is stable she is probably going to schedule a F/U to discuss about getting another injection for her low back. Gabapentin does help with her leg pain but only a little bit for her lower back.

## 2021-06-16 NOTE — PATIENT INSTRUCTIONS - HE
Lara,    It was a pleasure to see you today at [facility].    Please reduce the isosorbide to one half tablet or 15 mg by mouth daily.    Please use the nitro tabs if your symptoms return; call 911 if you don't get relief from 3 tabs in 15 minutes.    My nurse or I will call you with the CT coronary angiogram results.     Please call us if you have any questions or concerns about your heart.      Colton Segura M.D.  [facility]

## 2021-06-16 NOTE — TELEPHONE ENCOUNTER
"Return call to patient who stated she started Imdur @ 1630 on 4-5-21 and chest pressure \"was ok for 2-3 days\" but seems to have worsened over past few days.    Patient confirmed she had decreased Lasix to 20mg daily (refer to 4-9-21 phone note) for sx of dizziness/low BP and denied worsening dyspnea, JOHN but has noted nausea for past 2 days.    Noted patient sched to see Dr. Segura on 4-19-21 instead of primary card Dr. Schaefer - offered f/u with Dr. Schaefer on 4-22-21 instead and patient declined.    Informed patient that update would be forwarded to Dr. Segura for recommendations - understanding verbalized.    Please review update from patient - any new orders prior to 4-19-21 f/u?  mg  "

## 2021-06-17 NOTE — TELEPHONE ENCOUNTER
Telephone Encounter by Vivi Zuniga RN at 4/14/2021 11:51 AM     Author: Vivi Zuniga RN Service: -- Author Type: Registered Nurse    Filed: 4/14/2021 11:52 AM Encounter Date: 4/14/2021 Status: Signed    : Vivi Zuniga RN (Registered Nurse)       Msg rec'd 4-14-21 @ 1140:  Dariusz Segura MD Gorshe, Maureen, BRAXTON; Wilbur Hannah MD Maja,     I just spoke with Lara by telephone.  Her chest pressure symptoms have resolved with starting the isosorbide I prescribed for her in our [facility] but she is now experiencing some sharp chest pains and occasional nausea.  She has been a patient of my colleague, Dr. Jet Schaefer, but plans to follow-up with me next Monday, April 19.  She had a recent echocardiogram at [facility] during her hospitalization there for pneumonia and it confirmed her known mild aortic stenosis with normal ejection fraction. She had a pharmacologic nuclear stress test late last year and that was normal.  My advice to her was to stay on her current medications and to try to see you this week to determine if there are any noncardiac problems to be addressed.  If there are not, I told her I would recommend that she undergo either invasive coronary angiography at [facility], or outpatient CT coronary angiography using the flash scanner at [facility].  Unfortunately, due to the closure of [facility] the flash scanner at [facility] is the only rapid CT scanner in our system to do CT angiography at this time. We did review how to use SL nitroglycerin and when to call 911.     I welcome any other advice or directions that you can provide for this very nice lady.     Best regards,     [place]

## 2021-06-17 NOTE — PROGRESS NOTES
PULMONARY PROGRESS NOTE      HPI:  Yanna Handy is a 86 y.o. female followed in the Pulmonary clinic for reactive airways disease. She  history of chronic rhinosinusitis with asthma seen previously at the [facility] and uses intranasal antibiotics.   She was switched Breo and Incruse and continues to be compliant with these.  She describes that she has had worsening shortness of breath and a productive yellow cough that developed in the first week of bleeding for which she was initiated on Levaquin.  She denies any chest tightness presently and states that she has been using her Combivent nebs 2-3 times a week presently.  She does note that the  Cold tends to trigger her shortness of breath.      Current Outpatient Prescriptions on File Prior to Visit   Medication Sig Dispense Refill     anastrozole (ARIMIDEX) 1 mg tablet Take 1 tablet (1 mg total) by mouth daily. 90 tablet 3     ascorbic acid (VITAMIN C) 1000 MG tablet Take 1,000 mg by mouth every morning.        aspirin 81 MG EC tablet Take 81 mg by mouth bedtime.       calcium citrate-vitamin D (CITRACAL+D) 315-200 mg-unit per tablet Take 1 tablet by mouth bedtime.       ciprofloxacin HCl (CIPRO) 250 MG tablet TAKE ONE-HALF TABLET BY MOUTH EVERY  tablet 0     cyanocobalamin (VITAMIN B-12) 50 mcg tablet Take 50 mcg by mouth every morning.        DOCOSAHEXANOIC ACID/EPA (FISH OIL ORAL) Take 2 g by mouth daily.       docusate sodium (COLACE) 100 MG capsule Take 100 mg by mouth bedtime.       fluticasone-vilanterol (BREO ELLIPTA) 200-25 mcg/dose DsDv Inhale 1 puff daily. 1 each 11     furosemide (LASIX) 20 MG tablet TAKE ONE TABLET EVERY DAY 90 tablet 3     hydrocortisone 1 % ointment        ipratropium-albuterol (COMBIVENT RESPIMAT)  mcg/actuation Mist inhaler Inhale 1 puff 4 (four) times a day. 1 Inhaler 5     ipratropium-albuterol (DUO-NEB) 0.5-2.5 mg/mL nebulizer INHALE 1 VIAL VIA NEBULIZER EVERY 6 HOURS 1080 mL 3     LACTOBACILLUS ACIDOPHILUS  (ACIDOPHILUS ORAL) Take 1 tablet by mouth every morning.        magnesium 250 mg Tab Take 250 mg by mouth every morning.       metoprolol tartrate (LOPRESSOR) 100 MG tablet TAKE ONE TABLET BY MOUTH TWICE A DAY (Patient taking differently: 50mg BID) 360 tablet 1     nitroglycerin (NITROSTAT) 0.4 MG SL tablet Place 1 tablet (0.4 mg total) under the tongue every 5 (five) minutes as needed for chest pain. 25 tablet 3     nortriptyline (PAMELOR) 50 MG capsule Take 1 capsule (50 mg total) by mouth at bedtime. 90 capsule 1     omeprazole (PRILOSEC) 20 MG capsule Take 20 mg by mouth every morning.        rosuvastatin (CRESTOR) 5 MG tablet Take 3 tablets (15 mg total) by mouth at bedtime. 270 tablet 3     umeclidinium (INCRUSE ELLIPTA) 62.5 mcg/actuation DsDv inhaler Inhale 1 puff daily. 1 each 11     No current facility-administered medications on file prior to visit.      Allergies   Allergen Reactions     Alendronate Nausea And Vomiting     Metoclopramide Hcl Anxiety     Rofecoxib Diarrhea and Nausea Only     Risedronate      Sulfa (Sulfonamide Antibiotics) Anaphylaxis           EXAM  /70  Pulse 64  Resp 20  Wt 171 lb 11.2 oz (77.9 kg)  LMP 07/11/1971  SpO2 96% Comment: RA  BMI 32.98 kg/m2    Physical Exam   Constitutional: She is oriented to person, place, and time. She appears well-developed and well-nourished. No distress.   HENT:   Head: Normocephalic and atraumatic.   Nose: Nose normal.   Eyes: Conjunctivae and EOM are normal. Pupils are equal, round, and reactive to light. No scleral icterus.   Neck: Neck supple. No tracheal deviation present.   Pulmonary/Chest: Effort normal. No stridor. She has no wheezes.   Musculoskeletal: Normal range of motion. She exhibits no edema.   Neurological: She is alert and oriented to person, place, and time. She has normal reflexes.   Skin: Skin is warm and dry. She is not diaphoretic. No pallor.   Psychiatric: She has a normal mood and affect.   Vitals  reviewed.      PFTS 2/23/15 (unchanged from prior visit):  FEV1/FVC is 69% and is normal.   FEV1 is 1.26 L (87%) predicted and is normal.   FVC is 1.83 L (92%) predicted and normal.   There was no improvement in spirometry after a single inhaled dose of bronchodilator.   TLC is 3.52 L (79%) predicted and is normal.   RV is 1.17 L (51%) predicted and is reduced.   DLCO is 45% predicted and is reduced when it is corrected for hemoglobin.   Impression: Full Pulmonary Function Test is abnormal.   Spirometry and flow volume loop are within normal limits   Spirometry is not consistent with reversibility.   There is no hyperinflation.   There is no air-trapping.   Diffusion capacity when corrected for hemoglobin is moderately reduced.    SIX MINUTE WALK TEST / HOME O2 EVALUATION  6/9/15  (unchanged from prior visit):  SpO2 at rest on RA 95%   SpO2 after walking 6 minutes on RA 95%   Distance covered 672 feet   Recovery phase, SpO2 after 1 minute rest on RA was 93%     Impression:   No desaturation with activities.   No need for O2 supplementation.      CXR 5/5/16  (unchanged from prior visit):  Mild atelectasis or fibrosis in the lung bases. Upper lungs are clear. Mild cardiomegaly with normal pulmonary vascularity. Scoliosis of the thoracolumbar  spine.      IMPRESSION:  85-year-old female with a history of moderate persistent asthma, coronary artery disease status post stenting and some fibrotic changes noted on imaging studies presents to clinic today with concern for a follow-up visit for her asthma and was unfortunately unable to afford her Spiriva Respimat.  For the present we will recommend:    1. Asthma moderate persistent, with preserved spirometry.      Continue fluticasone/Vilanterol 1 puff daily, she knows to gargle after using this.    Continue Incuse Ellipta.    I have explained to the patient that she should only use the Combivent for rescue.  2. Overnight hypoxia: Noted on overnight oximetry. Is continuing  to use supplemental oxygen overnight. Reminded her to call [facility] for humidity for her oxygen.  3. CAD: Has undergone placement of stents to the proximal and mid LAD.  Recent echocardiogram continues to be stable.  4. Isolated diffusion defect: Likely secondary to fibrosis noted on imaging studies.  No intervention presently.  5. Chronic sinusitis: Has discontinued her tobramycin nebs per advice from the [facility]. I have advised that she start using sinus washes daily.  6. Follow-up: 4 months.    Lisa Amin  Pulmonary and Critical Care  [id number]

## 2021-06-17 NOTE — TELEPHONE ENCOUNTER
Refill Approved    Rx renewed per Medication Renewal Policy. Medication was last renewed on 1/30/21, last OV 4/15/21.    Shannan Dempsey, Care Connection Triage/Med Refill 5/1/2021     Requested Prescriptions   Pending Prescriptions Disp Refills     rosuvastatin (CRESTOR) 10 MG tablet [Pharmacy Med Name: Rosuvastatin Calcium Oral Tablet 10 MG] 90 tablet 0     Sig: Take 1 tablet (10 mg total) by mouth at bedtime. Take with a 5 mg pill for a total of 15 mg nightly       Statins Refill Protocol (Hmg CoA Reductase Inhibitors) Passed - 4/30/2021  2:00 AM        Passed - PCP or prescribing provider visit in past 12 months      Last office visit with prescriber/PCP: Visit date not found OR same dept: 4/15/2021 Wilbur Hannah MD OR same specialty: 4/15/2021 Wilbur Hannah MD  Last physical: Visit date not found Last MTM visit: Visit date not found   Next visit within 3 mo: Visit date not found  Next physical within 3 mo: Visit date not found  Prescriber OR PCP: Kirk Chung MD  Last diagnosis associated with med order: 1. Dyslipidemia  - rosuvastatin (CRESTOR) 10 MG tablet [Pharmacy Med Name: Rosuvastatin Calcium Oral Tablet 10 MG]; Take 1 tablet (10 mg total) by mouth at bedtime. Take with a 5 mg pill for a total of 15 mg nightly  Dispense: 90 tablet; Refill: 0    If protocol passes may refill for 12 months if within 3 months of last provider visit (or a total of 15 months).

## 2021-06-17 NOTE — TELEPHONE ENCOUNTER
RN cannot approve Refill Request    RN can NOT refill this medication med is not covered by policy/route to provider. Last office visit: 4/15/2021 Wilbur Hannah MD Last Physical: 6/18/2020 Last MTM visit: Visit date not found Last visit same specialty: 4/15/2021 Wilbur Hannah MD.  Next visit within 3 mo: Visit date not found  Next physical within 3 mo: Visit date not found      Zakia Wilkerson, [facility] Connection Triage/Med Refill 4/30/2021    Requested Prescriptions   Pending Prescriptions Disp Refills     ciprofloxacin HCl (CIPRO) 250 MG tablet [Pharmacy Med Name: Ciprofloxacin HCl Oral Tablet 250 MG] 45 tablet 0     Sig: Take 1/2 tablet (125 mg total) by mouth daily.       There is no refill protocol information for this order

## 2021-06-17 NOTE — PROGRESS NOTES
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 Breast cancer, stage IIb: pt had lumpectomy in August 2017. Then had radiation. Did fine with them. Started anastrozole in October 2017. She is tolerating very well. She will continue the plan. She will return in 4 months with MD visit. She is due for mammogram in July. No clinical evidence of recurrence.     2. Osteopenia: being managed by Dr. Hannah. She gets prolia every 6 months. Continue calcium and vitamin d. Continue to monitor closely while on anastrozole.       ECOG Performance   ECOG Performance Status: 1     Distress Assessment  Distress Assessment Score: No distress    Pain  Currently in Pain: No/denies      Problem List    1. Malignant neoplasm of upper-outer quadrant of left breast in female, estrogen receptor positive          ______________________________________________________________________________    History of Present Illness    Measurable disease: None postoperatively     Current therapy: Anastrozole 1 mg p.o. daily started October 20, 2017     Treatment history: Lumpectomy in August 2017  Adjuvant radiation to 40-56 cGy in 16 fractions completed October 5, 2017    Interim History:   Pt is here today for a 3 month follow up visit. She is doing quite well and really has no complaints. Denies any issues with anastrozole.     Pain Status  Currently in Pain: No/denies    Review of Systems    Constitutional  Constitutional (WDL): All constitutional  elements are within defined limits  Neurosensory  Neurosensory (WDL): All neurosensory elements are within defined limits  Eye   Eye Disorder (WDL): All eye disorder elements are within defined limits  Ear  Ear Disorder (WDL): All ear disorder elements are within defined limits  Cardiovascular  Cardiovascular (WDL): Exceptions to WDL  Edema: Yes  Pulmonary     Gastrointestinal  Gastrointestinal (WDL): All gastrointestinal elements are within defined limits  Genitourinary  Genitourinary (WDL): All genitourinary elements are within defined limits  Lymphatic  Lymph (WDL): All lymph disorder elements are within defined limits  Musculoskeletal and Connective Tissue  Musculoskeletal and Connetive Tissue Disorders (WDL): All Musculoskeletal and Connetive Tissue Disorder elements are within defined limits  Integumentary  Integumentary (WDL): All integumentary elements are within defined limits  Patient Coping  Patient Coping: Accepting  Distress Assessment  Distress Assessment Score: No distress  Accompanied by  Accompanied by: Family Member  Oral Chemo Adherence       Past History  Past Medical History:   Diagnosis Date     Anemia 8/21/2015     Asthma      Breast cancer 07/25/2017    left     CAD (coronary artery disease) 9/15/2014     Chronic bronchitis      Chronic Sinusitis     Created by Conversion  Replacement Utility updated for latest IMO load     Emphysema     Created by Conversion      Fx ankle 8/2000     Fx wrist 12/2006    Right wrist      GI bleed 1/12/2015     Hypercholesterolemia     Created by Conversion      Hypertension      Insomnia 1/13/2016     Osteoarthritis     Created by Conversion  Replacement Utility updated for latest IMO load     Osteoporosis Senile     Created by Conversion      Squamous Cell Carcinoma Of The Skin     Created by Conversion        PHYSICAL EXAM:  /58  Pulse (!) 58  Temp 97.7  F (36.5  C) (Oral)   Wt 173 lb (78.5 kg)  LMP 07/11/1971  SpO2 98%  BMI 33.23 kg/m2  GENERAL:  no acute distress. Cooperative in conversation. Here with   HEENT: pupils are equal, round and reactive. Oromucosa is clean and intact. No ulcerations or mucositis noted. No bleeding noted.  RESP: lungs are clear bilaterally per auscultation. Regular respiratory rate. No wheezes or rhonchi.  CV: Regular, rate and rhythm. No murmurs.  ABD: soft, nontender. Positive bowel sounds. No organomegaly.   MUSCULOSKELETAL: No lower extremity swelling.   NEURO: non focal. Alert and oriented x3.   PSYCH: within normal limits. No depression or anxiety.  SKIN: warm dry intact   LYMPH: no cervical, supraclavicular or axillary lymphadenopathy  BREAST: Bilateral exam done.  Lumpectomy incision is well-healed.  Very slight scarring.  No abnormal lesions or rashes noted.  No evidence for local recurrence bilaterally.          Lab Results    No results found for this or any previous visit (from the past 168 hour(s)).    Imaging    No results found.      Signed by: Reyna Haro, CNP

## 2021-06-18 NOTE — PATIENT INSTRUCTIONS - HE
Patient Instructions by Wilbur Hannah MD at 6/18/2020  9:40 AM     Author: Wilbur Hannah MD Service: -- Author Type: Physician    Filed: 6/18/2020 10:03 AM Encounter Date: 6/18/2020 Status: Addendum    : Wilbur Hannah MD (Physician)    Related Notes: Original Note by Wilbur Hannah MD (Physician) filed at 6/18/2020 10:03 AM       Increase LOSARTAN to 50 mg twice a day.    Telephone visit in 2 weeks - please check BP at home daily.    Prolia 11th today.  Prolia 12th in 6 months with me.    DXA in 10/2021 .   Phone number to schedule [phone].    Daily calcium need is 8181-4295 mg a day from the diet and supplements.  Calcium citrate is easier to digest.  Vitamin D 2000 IU daily recommended.      Patient Education   Understanding [employer] MyPlate  The USDA (US Department of Agriculture) has guidelines to help you make healthy food choices. These are called MyPlate. MyPlate shows the food groups that make up healthy meals using the image of a place setting. Before you eat, think about the healthiest choices for what to put onto your plate or into your cup or bowl. To learn more about building a healthy plate, visit www.choosemyplate.gov.       The Food Groups    Fruits: Any fruit or 100% fruit juice counts as part of the Fruit Group. Fruits may be fresh, canned, frozen, or dried, and may be whole, cut-up, or pureed. Make half your plate fruits and vegetables.    Vegetables: Any vegetable or 100% vegetable juice counts as a member of the Vegetable Group. Vegetables may be fresh, frozen, canned, or dried. They can be served raw or cooked and may be whole, cut-up, or mashed. Make half your plate fruits and vegetables.     Grains: All foods made from grains are part of the Grains Group. These include wheat, rice, oats, cornmeal, and barley such as bread, pasta, oatmeal, cereal, tortillas, and grits. Grains should be no more than a quarter of your plate. At least half of your grains should be whole  grains.    Protein: This group includes meat, poultry, seafood, beans and peas, eggs, processed soy products (like tofu), nuts (including nut butters), and seeds. Make protein choices no more than a quarter of your plate. Meat and poultry choices should be lean or low fat.    Dairy: All fluid milk products and foods made from milk that contain calcium, like yogurt and cheese are part of the Dairy Group. (Foods that have little calcium, such as cream, butter, and cream cheese, are not part of the group.) Most dairy choices should be low-fat or fat-free.    Oils: These are fats that are liquid at room temperature. They include canola, corn, olive, soybean, and sunflower oil. Foods that are mainly oil include mayonnaise, certain salad dressings, and soft margarines. You should have only 5 to 7 teaspoons of oils a day. You probably already get this much from the food you eat.  Use [employer] to Help Build Your Meals  The SuperTracker can help you plan and track your meals and activity. You can look up individual foods to see or compare their nutritional value. You can get guidelines for what and how much you should eat. You can compare your food choices. And you can assess personal physical activities and see ways you can improve. Go to www.[employer].gov/supertracker/.    5921-6727 [employer]. [address] [place] [address] 27795. All rights reserved. This information is not intended as a substitute for professional medical care. Always follow your healthcare professional's instructions.           Patient Education   Urinary Incontinence, Female (Adult)  Urinary incontinence means loss of control of the bladder. This problem affects many women, especially as they get older. If you have incontinence, you may be embarrassed to ask for help. But know that this problem can be treated.  Types of Incontinence  There are different types of incontinence. Two of the main types are described here. You  can have more than one type.    Stress incontinence. With this type, urine leaks when pressure (stress) is put on the bladder. This may happen when you cough, sneeze, or laugh. Stress incontinence most often occurs because the pelvic floor muscles that support the bladder and urethra are weak. This can happen after pregnancy and vaginal childbirth or a hysterectomy. It can also be due to excess body weight or hormone changes.    Urge incontinence (also called overactive bladder). With this type, a sudden urge to urinate is felt often. This may happen even though there may not be much urine in the bladder. The need to urinate often during the night is common. Urge incontinence most often occurs because of bladder spasms. This may be due to bladder irritation or infection. Damage to bladder nerves or pelvic muscles, constipation, and certain medicines can also lead to urge incontinence.  Treatment of urinary incontinence depends on the cause. Further evaluation is needed to find the type you have. This will likely include an exam and certain tests. Based on the results, you and your healthcare provider can then plan treatment. Until a diagnosis is made, the home care tips below can help relieve symptoms.  Home care    Do pelvic floor muscle exercises, if they are prescribed. The pelvic floor muscles help support the bladder and urethra. Many women find that their symptoms improve when doing special exercises that strengthen these muscles. To do the exercises contract the muscles you would use to stop your stream of urine, but do this when youre not urinating. Hold for 10 seconds, then relax. Repeat 10 to 20 times in a row, at least 3 times a day. Your provider may give you other instructions for how to do the exercises and how often.    Keep a bladder diary. This helps track how often and how much you urinate over a set period of time. Bring this diary with you to your next visit with the provider. The information can  help your provider learn more about your bladder problem.    Lose weight, if advised to by your provider. Excess weight puts pressure on the bladder. Your provider can help you create a weight-loss plan thats right for you. This may include exercising more and making certain diet changes.    Don't consume foods and drinks that may irritate the bladder. These can include alcohol and caffeinated drinks.    Quit smoking. Smoking and other tobacco use can lead to chronic cough that strains the pelvic floor muscles. Smoking may also damage the bladder and urethra. Talk with your provider about treatments or methods you can use to quit smoking.    If drinking large amounts of fluid causes you to have symptoms, you may be advised to limit your fluid intake. You may also be advised to drink most of your fluids during the day and to limit fluids at night.    If youre worried about urine leakage or accidents, you may wear absorbent pads to catch urine. Change the pads often. This helps reduce discomfort. It may also reduce the risk of skin or bladder infections.  Follow-up care  Follow up with your healthcare provider, or as directed. It may take some to find the right treatment for your problem. Your treatment plan may include special therapies or medicines. Certain procedures or surgery may also be options. Be sure to discuss any questions you have with your provider.  When to seek medical advice  Call the healthcare provider right away if any of these occur:    Fever of 100.4 F (38 C) or higher, or as directed by your provider    Bladder pain or fullness    Abdominal swelling    Nausea or vomiting    Back pain    Weakness, dizziness or fainting  Date Last Reviewed: 10/1/2017    0318-5278 [employer]. [address] [place] [address], [place] [address]. All rights reserved. This information is not intended as a substitute for professional medical care. Always follow your healthcare professional's instructions.        Advance Directive  Patients advance directive was discussed and I am comfortable with the patients wishes.  Patient Education   Personalized Prevention Plan  You are due for the preventive services outlined below.  Your care team is available to assist you in scheduling these services.  If you have already completed any of these items, please share that information with your care team to update in your medical record.  Health Maintenance   Topic Date Due   ? ASTHMA ACTION PLAN  04/[birth year]31   ? ZOSTER VACCINES (2 of 3) 07/20/2012   ? MEDICARE ANNUAL WELLNESS VISIT  12/14/2019   ? TD 18+ HE  10/04/2020   ? FALL RISK ASSESSMENT  08/12/2020   ? ASTHMA CONTROL TEST  04/22/2021   ? DXA SCAN  10/08/2021   ? ADVANCE CARE PLANNING  12/14/2023   ? PNEUMOCOCCAL IMMUNIZATION 65+ LOW/MEDIUM RISK  Completed   ? INFLUENZA VACCINE RULE BASED  Completed

## 2021-06-18 NOTE — PROGRESS NOTES
Assessment:       Dysuria - initial concern for UTI and pyelonephritis given urinary symptoms along with back pain, but UA was negative for UTI. Patient's dysuria has improved completely now. Her cloudy urine complaint pertains to the first urination in the morning and then clears up as the day goes on. This may be normal urination patterns and was discussed with the patient. She was instructed to watch her symptoms and discuss with her PCP in 4 days if any symptoms are persisting. She will continue to take her ciprofloxacin for prophylaxis.       Plan:       Provided reassurance.  Follow-up with PCP in 4 days for re-evaluation of symptoms and possible further workup of dysuria.  Discussed signs of worsening symptoms and when to be seen immediately for re-evaluation if needed.    Patient Instructions   You were seen today for bladder irritation. There were no signs of a urinary tract infection on the urine test today. Keep your appointmetn on Friday to re-evaluate symptoms at that time.    Reasons to return for re-evaluation:  - Fever of 100.4 ir higher  - Worsening nuasea or vomiting  - Worsening back pain  - Unable to urinate        Subjective:       Yanna Handy is a 87 y.o. female who complains of dysuria. She has had symptoms for 4 days. Patient also complains of back pain and cloudy urine. The dysuria has improved sine onset. Patient denies fever, nausea, vomiting, hematuria, and urinary frequency. Patient has history of recurrent urinary tract infections and is currently on ciprofloxacin daily for UTI prophylaxis. She has an appointment to see her PCP in 4 days to review previous labs.    The following portions of the patient's history were reviewed and updated as appropriate: allergies, current medications and problem list.    Review of Systems  Pertinent items are noted in HPI.    Allergies  Allergies   Allergen Reactions     Alendronate Nausea And Vomiting     Metoclopramide Hcl Anxiety      Rofecoxib Diarrhea and Nausea Only     Risedronate      Sulfa (Sulfonamide Antibiotics) Anaphylaxis          Objective:       /84  Pulse 65  Temp 98  F (36.7  C) (Oral)   Resp 18  Wt 172 lb (78 kg)  LMP 07/11/1971  SpO2 95%  BMI 33.04 kg/m2  General appearance: alert, appears stated age, cooperative, no distress and non-toxic  Head: Normocephalic, without obvious abnormality, atraumatic  Back: no CVA tenderness  Lungs: clear to auscultation bilaterally and no rhonchi, rales, or wheezing  Heart: regular rate and rhythm, S1, S2 normal, no murmur, click, rub or gallop  Abdomen: patient notes pressure over the suprapubic region, soft, non-tender; bowel sounds normal; no masses,  no organomegaly  Skin: Skin color, texture, turgor normal. No rashes or lesions    Laboratory:     Recent Results (from the past 24 hour(s))   Urinalysis-UC if Indicated   Result Value Ref Range    Color, UA Yellow Colorless, Yellow, Straw, Light Yellow    Clarity, UA Clear Clear    Glucose, UA Negative Negative    Bilirubin, UA Negative Negative    Ketones, UA Negative Negative    Specific Gravity, UA 1.015 1.005 - 1.030    Blood, UA Negative Negative    pH, UA 7.0 5.0 - 8.0    Protein, UA Negative Negative mg/dL    Urobilinogen, UA 0.2 E.U./dL 0.2 E.U./dL, 1.0 E.U./dL    Nitrite, UA Negative Negative    Leukocytes, UA Moderate (!) Negative    Bacteria, UA None Seen None Seen hpf    RBC, UA 0-2 None Seen, 0-2 hpf    WBC, UA 0-5 None Seen, 0-5 hpf    Squam Epithel, UA 0-5 None Seen, 0-5 lpf    Trans Epithel, UA 0-5 (!) None Seen lpf     I personally reviewed and discussed the findings with the patient

## 2021-06-18 NOTE — PROGRESS NOTES
Name: Yanna Handy  Age: 87 y.o.  Gender: female  : 1931  Date of Encounter: 2018      HPI:  aYnna Handy is a 87 y.o.  female with history of asthma and recurrent pneumonia who presents to the clinic with concerns of respiratory illness and worsening asthma.  Patient reports symptoms started 4 days earlier with head congestion, runny nose, sore throat and cough.  Yesterday and today has noticed increased chest tightness, worsening shortness of breath and cough.  Cough was initially dry and now is more productive of clear to yellow phlegm.  She has started to increase the use of her Combivent inhaler.  Denies fever, chills, face pain, face pressure, ear pain, nausea, vomiting skin rash.  Usually with asthma exacerbations she starts a steroid burst and an antibiotic that she has on hand from her primary care physician but this time she did not have any on hand to start.    Current Medication:   Medications reviewed and updated.    Current Outpatient Prescriptions:      anastrozole (ARIMIDEX) 1 mg tablet, Take 1 tablet (1 mg total) by mouth daily., Disp: 90 tablet, Rfl: 3     ascorbic acid (VITAMIN C) 1000 MG tablet, Take 1,000 mg by mouth every morning. , Disp: , Rfl:      aspirin 81 MG EC tablet, Take 81 mg by mouth bedtime., Disp: , Rfl:      calcium citrate-vitamin D (CITRACAL+D) 315-200 mg-unit per tablet, Take 1 tablet by mouth bedtime., Disp: , Rfl:      ciprofloxacin HCl (CIPRO) 250 MG tablet, TAKE ONE-HALF TABLET BY MOUTH EVERY DAY, Disp: 225 tablet, Rfl: 0     cyanocobalamin (VITAMIN B-12) 50 mcg tablet, Take 50 mcg by mouth every morning. , Disp: , Rfl:      DOCOSAHEXANOIC ACID/EPA (FISH OIL ORAL), Take 2 g by mouth daily., Disp: , Rfl:      docusate sodium (COLACE) 100 MG capsule, Take 100 mg by mouth bedtime., Disp: , Rfl:      fluticasone-vilanterol (BREO ELLIPTA) 200-25 mcg/dose DsDv, Inhale 1 puff daily., Disp: 1 each, Rfl: 11     furosemide (LASIX) 20 MG tablet, TAKE ONE TABLET  EVERY DAY, Disp: 90 tablet, Rfl: 3     ipratropium-albuterol (COMBIVENT RESPIMAT)  mcg/actuation Mist inhaler, Inhale 1 puff 4 (four) times a day., Disp: 1 Inhaler, Rfl: 5     ipratropium-albuterol (DUO-NEB) 0.5-2.5 mg/mL nebulizer, INHALE 1 VIAL VIA NEBULIZER EVERY 6 HOURS, Disp: 1080 mL, Rfl: 3     LACTOBACILLUS ACIDOPHILUS (ACIDOPHILUS ORAL), Take 1 tablet by mouth every morning. , Disp: , Rfl:      magnesium 250 mg Tab, Take 250 mg by mouth every morning., Disp: , Rfl:      metoprolol tartrate (LOPRESSOR) 100 MG tablet, TAKE ONE TABLET BY MOUTH TWICE A DAY (Patient taking differently: 50mg BID), Disp: 360 tablet, Rfl: 1     nitroglycerin (NITROSTAT) 0.4 MG SL tablet, Place 1 tablet (0.4 mg total) under the tongue every 5 (five) minutes as needed for chest pain., Disp: 25 tablet, Rfl: 3     nortriptyline (PAMELOR) 50 MG capsule, Take 1 capsule (50 mg total) by mouth at bedtime., Disp: 90 capsule, Rfl: 1     rosuvastatin (CRESTOR) 5 MG tablet, Take 3 tablets (15 mg total) by mouth at bedtime., Disp: 270 tablet, Rfl: 3     umeclidinium (INCRUSE ELLIPTA) 62.5 mcg/actuation DsDv inhaler, Inhale 1 puff daily., Disp: 1 each, Rfl: 11    Past Med / Surg History:  Past Medical History:   Diagnosis Date     Anemia 8/21/2015     Asthma      Breast cancer 07/25/2017    left     CAD (coronary artery disease) 9/15/2014     Chronic bronchitis      Chronic Sinusitis     Created by Conversion  Replacement Utility updated for latest IMO load     Emphysema     Created by Conversion      Fx ankle 8/2000     Fx wrist 12/2006    Right wrist      GI bleed 1/12/2015     Hypercholesterolemia     Created by Conversion      Hypertension      Insomnia 1/13/2016     Osteoarthritis     Created by Conversion  Replacement Utility updated for latest IMO load     Osteoporosis Senile     Created by Conversion      Squamous Cell Carcinoma Of The Skin     Created by Conversion      Past Surgical History:   Procedure Laterality Date      "APPENDECTOMY  1971     BREAST SURGERY  08/02/2017    left lumpectomy, Dr. Pope     COLONOSCOPY N/A 1/15/2015    Procedure: COLONOSCOPY with biopsy of polyp;  Surgeon: Joel Coppola MD;  Location: [facility];  Service:      CORONARY STENT PLACEMENT      \"I had 2 stent placements\"     EYE SURGERY      Cataracts     HYSTERECTOMY  1971     JOINT REPLACEMENT  1/2003    Right TKA     KNEE ARTHROSCOPY Left 10/2004     OOPHORECTOMY  1971    One removed     [place] REMOVAL OF TONSILS,<11 Y/O      Description: Tonsillectomy;  Recorded: 02/12/2009;  Comments: 1941     [place] REVISE SECONDARY VARICOSITY      Description: Varicose Vein Ligation;  Recorded: 02/12/2009;  Comments: 1988     [place] TOTAL ABDOM HYSTERECTOMY      Description: Hysterectomy;  Recorded: 02/12/2009;  Comments: 1971     SINUS SURGERY      \"I had 3 sinus surgeries.\"       Fam / Soc History:  Family History   Problem Relation Age of Onset     Heart attack Father      Breast cancer Maternal Aunt 55     Lung cancer Brother 65     Colon cancer Maternal Grandmother 90     Lung cancer Maternal Grandfather 65     Brain cancer Son 25     Rectal cancer Brother 64     Social History     Social History     Marital status:      Spouse name: N/A     Number of children: N/A     Years of education: N/A     Occupational History     Not on file.     Social History Main Topics     Smoking status: Never Smoker     Smokeless tobacco: Never Used     Alcohol use Yes      Comment: Wine occassionally     Drug use: No     Sexual activity: No     Other Topics Concern     Not on file     Social History Narrative       ROS:  14 point review of systems unremarkable except as mentioned in HPI    Objective:  Vitals: /89  Pulse 81  Temp 98  F (36.7  C) (Oral)   Resp 15  Wt 145 lb (65.8 kg)  LMP 07/11/1971  SpO2 95%  Breastfeeding? No  BMI 27.85 kg/m2    Gen: Alert, awake, well appearing  HEENT: NC, AT,   Ears:  Ear canals clear.  TMs normal appearing.  Eyes:  EOMI.  " Pupils equally round and reactive to light. Conjunctivae clear.  Sclera non-icteric.  Nose:  Nasal mucosa pink, septum midline.  No sinus tenderness  Mouth:  MMM. Oropharynx clear. No tonsillar exudate. Teeth, gum, tongue and lips clear.  Neck:  Supple, FROM, No lymphandenpathy, No TM  Heart: Regular rate and rhythm; normal S1 and S2; no murmurs, gallops, or rubs.  Peripheral Vessels: Normal pulses and perfusion.  Lungs: Unlabored respirations; symmetric chest expansion; diffuse wheezing throughout with prolonged expiratory phase.  No crackles.  Mental Status: Alert, oriented, in no distress. Mood and affect appropriate.    Pertinent results / imaging:  none    Assessment: 1.  Lower respiratory infection 2.  Asthma exacerbation    Plan: Advised increased fluids and rest.  Prescribed prednisone 40 mg once daily the next 5 days and Levaquin 500 mg once daily for 7 days.  Advised increased use of her Combivent to every 6 hours until reevaluated.  Recommended follow-up with PCP in 5-7 days for recheck.  Reviewed expected course, duration of symptoms and reasons to return sooner for reevaluation.  Patient voiced understanding and was in agreement and plan      Juanita Kebede MD  5/27/2018

## 2021-06-18 NOTE — PROGRESS NOTES
Assessment/Plan:        1. Osteoporosis Senile  HM2(CBC w/o Differential)    Lipid Profile    Comprehensive Metabolic Panel    denosumab 60 mg (PROLIA 60 mg/ml)   2. Essential hypertension with goal blood pressure less than 140/90  metoprolol tartrate (LOPRESSOR) 100 MG tablet    HM2(CBC w/o Differential)    Lipid Profile    Comprehensive Metabolic Panel   3. Hypertension  HM2(CBC w/o Differential)    Lipid Profile    Comprehensive Metabolic Panel   4. Moderate persistent asthma without complication  HM2(CBC w/o Differential)    Lipid Profile    Comprehensive Metabolic Panel   5. Dyspnea on exertion  Echo Complete    NM Pharmacologic Stress Test    HM2(CBC w/o Differential)    Lipid Profile    Comprehensive Metabolic Panel    XR Chest 2 Views   6. Hypercholesterolemia  HM2(CBC w/o Differential)    Lipid Profile    Comprehensive Metabolic Panel   7. Malignant neoplasm of upper-outer quadrant of left breast in female, estrogen receptor positive (H)  HM2(CBC w/o Differential)    Lipid Profile    Comprehensive Metabolic Panel   8. Leg edema      1.  Asthma exacerbation, resolved, she will continue her regular inhaler treatment.  She is not taking prednisone or antibiotic anymore.    2.  Dyspnea on exertion and increased leg swelling, she will increase furosemide for a few days and she will schedule echo and stress test and follow-up with cardiology.    3.  Prolia treatment provided for osteoporosis today.    4.  Nortriptyline will be weaned off and she will stop it in a week.    This note has been dictated using voice recognition software. Any grammatical or context distortions are unintentional and inherent to the software.      Return in about 6 months (around 12/14/2018) for Recheck, Annual physical.    Patient Instructions   You should see Dr Linares in July ( [facility]).    Take for 2 pills of furosemide 3-4 days, then back to 1 daily.    Nortriptyline - take 5 mg daily for a week and then stop.    You should  schedule stress test and ECHO. Do not take metoprolol before the stress test.    Follow up with Stephen in 2 weeks.      Prolia 7th today.  Prolia 8th in 6 months with your physical.    DXA in 9/2019 .   Phone number to schedule [phone].    Daily calcium need is 2398-0262 mg a day from the diet and supplements.  Calcium citrate is easier to digest.  Vitamin D 2000 IU daily recommended.          Subjective:    Yanna Handy is a 87 y.o. female  here for    Chief Complaint   Patient presents with     Follow-up     went to [facility] for wheezing and breathing issues on 5/29/18 - rx'd prednisone and Levaquin and was told to follow up with PMD     Osteoporosis Follow Up     Prolia     Medication Problem     discuss Nortriptyline     Multiple issues;  1. She had asthma exacerbation and was seen in the walk-in clinic on May 27.  She was treated with antibiotic and prednisone and her symptoms improved.  She is using her inhalers regularly.  She is also seeing a lung specialist every 6 months and she is due for the next visit.  She does notice a little bit more dyspnea on exertion, not so much at rest.  She does have a history of coronary artery disease and drug-eluting stent placement.  Last visit a cardiology was seen in August 2017 with last echo done in July 2017 and stress test in 2014.  She denies any chest pain.  Her legs are more swollen over the last month even with wearing compression stockings daily.  She denies any persistent shortness of breath or increased weight gain.    2.  She has osteoporosis and she is due for Prolia injection today. She has h/o breast cancer and is on Arimidex now. She has history of multiple fractures.  3.  She was concerned about nortriptyline that she takes for very long time, for decades for the neuralgia in the right face area from the chronic sinusitis.        Social History     Social History     Marital status:      Spouse name: N/A     Number of children: N/A     Years of  education: N/A     Occupational History     Not on file.     Social History Main Topics     Smoking status: Never Smoker     Smokeless tobacco: Never Used     Alcohol use Yes      Comment: Wine occassionally     Drug use: No     Sexual activity: No     Other Topics Concern     Not on file     Social History Narrative       Family History   Problem Relation Age of Onset     Heart attack Father      Breast cancer Maternal Aunt 55     Lung cancer Brother 65     Colon cancer Maternal Grandmother 90     Lung cancer Maternal Grandfather 65     Brain cancer Son 25     Rectal cancer Brother 64     Review of Systems:     A 12 point comprehensive review of systems was negative except as noted in HPI.            Objective:    Physical Exam   /62  Pulse 73  Resp 16  Wt 176 lb 6.4 oz (80 kg)  LMP 07/11/1971  SpO2 94%  BMI 33.88 kg/m2    Constitutional: oriented to person, place, and time, appears well-nourished. No distress.   HENT:   Head: Normocephalic.   Mouth/Throat: Oropharynx is clear and moist.   Eyes: Conjunctivae are normal.   Neck: Normal range of motion. Neck supple.   Cardiovascular: Normal rate, regular rhythm and normal heart sounds.    Pulmonary/Chest: Effort normal and breath sounds normal.Prolonged expirium and some end expiratory wheezing.  Abdominal: Soft. Bowel sounds are normal.   Musculoskeletal: Normal range of motion.   Neurological: alert and oriented to person, place, and time.  Psychiatric:  normal mood and affect.    Patient Active Problem List   Diagnosis     Chronic Sinusitis     Hypercholesterolemia     Moderate persistent asthma     Hypertension     Osteoporosis Senile     Osteoarthritis     CAD (coronary artery disease)     GI bleed     Anemia     Insomnia     Wrist fracture     Malignant neoplasm of upper-outer quadrant of left female breast (H)     Aromatase inhibitor use       Current Outpatient Prescriptions on File Prior to Visit   Medication Sig Dispense Refill     anastrozole  (ARIMIDEX) 1 mg tablet Take 1 tablet (1 mg total) by mouth daily. 90 tablet 3     ascorbic acid (VITAMIN C) 1000 MG tablet Take 1,000 mg by mouth every morning.        aspirin 81 MG EC tablet Take 81 mg by mouth bedtime.       calcium citrate-vitamin D (CITRACAL+D) 315-200 mg-unit per tablet Take 1 tablet by mouth bedtime.       ciprofloxacin HCl (CIPRO) 250 MG tablet Take 0.5 tablets (125 mg total) by mouth daily. 90 tablet 0     cyanocobalamin (VITAMIN B-12) 50 mcg tablet Take 50 mcg by mouth every morning.        DOCOSAHEXANOIC ACID/EPA (FISH OIL ORAL) Take 2 g by mouth daily.       docusate sodium (COLACE) 100 MG capsule Take 100 mg by mouth bedtime.       fluticasone-vilanterol (BREO ELLIPTA) 200-25 mcg/dose DsDv Inhale 1 puff daily. 1 each 11     furosemide (LASIX) 20 MG tablet TAKE ONE TABLET EVERY DAY 90 tablet 3     ipratropium-albuterol (COMBIVENT RESPIMAT)  mcg/actuation Mist inhaler Inhale 1 puff 4 (four) times a day. 1 Inhaler 5     ipratropium-albuterol (DUO-NEB) 0.5-2.5 mg/mL nebulizer INHALE 1 VIAL VIA NEBULIZER EVERY 6 HOURS 1080 mL 3     LACTOBACILLUS ACIDOPHILUS (ACIDOPHILUS ORAL) Take 1 tablet by mouth every morning.        magnesium 250 mg Tab Take 250 mg by mouth every morning.       nitroglycerin (NITROSTAT) 0.4 MG SL tablet Place 1 tablet (0.4 mg total) under the tongue every 5 (five) minutes as needed for chest pain. 25 tablet 3     nortriptyline (PAMELOR) 50 MG capsule Take 1 capsule (50 mg total) by mouth at bedtime. 90 capsule 1     predniSONE (DELTASONE) 20 MG tablet Take 2 tablets (40 mg total) by mouth daily. 10 tablet 0     rosuvastatin (CRESTOR) 5 MG tablet Take 3 tablets (15 mg total) by mouth at bedtime. 270 tablet 3     umeclidinium (INCRUSE ELLIPTA) 62.5 mcg/actuation DsDv inhaler Inhale 1 puff daily. 1 each 11     No current facility-administered medications on file prior to visit.                Wilbur Hannah  6/14/2018

## 2021-06-19 NOTE — PROGRESS NOTES
Clinic Note    Assessment:     Assessment and Plan:  1. Moderate persistent asthma without complication  She is doing much better on her inhalers. She has an appointment with pulmonology scheduled in the near future. She is not having any wheezing. She continues to have some dyspnea on exertion. She has a stress test and echocardiogram scheduled next week.     2. Urinary tract infection  Her urine grew out coagulase negative staphylococcus. She continues to have some light abdominal discomfort and reports having some cloudy urine. Prescription for Macrobid given today.        Patient Instructions   I sent a prescription for Macrobid and your pharmacy.  Take this twice daily for 7 days.  If your symptoms persist, come back to see me in the clinic.    Make sure to keep your appointment for the stress test and echocardiogram.  Your weight is stable today.  Your vital signs look good.  You are not having any chest pain.  Shortness of breath is stable.  Continue to use your inhalers as prescribed.        No Follow-up on file.         Subjective:      Yanna Handy is a 87 y.o. female who comes to the clinic for follow up of her wheezing. She was also recently seen in the walk-in-clinic for dysuria.     She saw her PCP Dr. Hannah two weeks ago. At that time she had just finished up a course of prednisone and Levaquin for an asthma exacerbation. She was feeling much better. She was told to increase her lasix dosage temporarily due to some continued dyspnea on exertion Her weight has been stable since that appointment. She denies chest pain. She is taking her medications as prescribed. Continues to have some moderate edema in her lower extremities. She knows that she should wear her compression stockings. In general she is feeling quite well.     She was just seen by urgent care for cloudy urine. She also tells me that she has some lower abdominal discomfort. No fevers. No nausea or vomiting. No flank pain. She is  taking prophylactic Cipro for chronic recurrent UTIs. Her urine culture has since resulted with coagulase negative strep.      The following portions of the patient's history were reviewed and updated as appropriate: Allergies, Medications, Problem List, Prior Note.     Review of Systems:    Review is otherwise negative except for what is mentioned above.     Social Hx:    History   Smoking Status     Never Smoker   Smokeless Tobacco     Never Used         Objective:     Vitals:    06/29/18 1048   BP: 138/66   Pulse: (!) 57   SpO2: 96%   Weight: 172 lb (78 kg)       Exam:    General: No apparent distress. Calm. Alert and Oriented X3. Pt behavior is appropriate.  Chest/Lungs: Normal chest wall, clear to auscultation, normal respiratory effort and rate.   Heart/Pulses: Regular rate and rhythm, strong and equal radial pulses, no murmurs. Capillary refill <2 seconds. No edema.   Abdomen: Soft, no palpable masses. No hepatosplenomegaly, no tenderness with palpation noted. Bowel sounds active in all quadrants. No increased tympany.   Genitalia: Not examined.   Musculoskeletal: No CVA tenderness with palpation. Good ROM with extremities.   Neurologic: Interactive, alert, no focal findings, CNs intact.   Skin: Warm, dry. Normal hair pattern. Free of lesions. Normal skin turgor.       Patient Active Problem List   Diagnosis     Chronic Sinusitis     Hypercholesterolemia     Moderate persistent asthma     Hypertension     Osteoporosis Senile     Osteoarthritis     CAD (coronary artery disease)     GI bleed     Anemia     Insomnia     Wrist fracture     Malignant neoplasm of upper-outer quadrant of left female breast (H)     Aromatase inhibitor use     Current Outpatient Prescriptions   Medication Sig Dispense Refill     anastrozole (ARIMIDEX) 1 mg tablet Take 1 tablet (1 mg total) by mouth daily. 90 tablet 3     ascorbic acid (VITAMIN C) 1000 MG tablet Take 1,000 mg by mouth every morning.        aspirin 81 MG EC tablet Take  81 mg by mouth bedtime.       calcium citrate-vitamin D (CITRACAL+D) 315-200 mg-unit per tablet Take 1 tablet by mouth bedtime.       ciprofloxacin HCl (CIPRO) 250 MG tablet Take 0.5 tablets (125 mg total) by mouth daily. 90 tablet 0     cyanocobalamin (VITAMIN B-12) 50 mcg tablet Take 50 mcg by mouth every morning.        DOCOSAHEXANOIC ACID/EPA (FISH OIL ORAL) Take 2 g by mouth daily.       docusate sodium (COLACE) 100 MG capsule Take 100 mg by mouth bedtime.       fluticasone-vilanterol (BREO ELLIPTA) 200-25 mcg/dose DsDv Inhale 1 puff daily. 1 each 11     furosemide (LASIX) 20 MG tablet TAKE ONE TABLET EVERY DAY 90 tablet 3     ipratropium-albuterol (COMBIVENT RESPIMAT)  mcg/actuation Mist inhaler Inhale 1 puff 4 (four) times a day. 1 Inhaler 5     ipratropium-albuterol (DUO-NEB) 0.5-2.5 mg/mL nebulizer INHALE 1 VIAL VIA NEBULIZER EVERY 6 HOURS 1080 mL 3     LACTOBACILLUS ACIDOPHILUS (ACIDOPHILUS ORAL) Take 1 tablet by mouth every morning.        magnesium 250 mg Tab Take 250 mg by mouth every morning.       metoprolol tartrate (LOPRESSOR) 100 MG tablet 50mg  tablet 1     nitroglycerin (NITROSTAT) 0.4 MG SL tablet Place 1 tablet (0.4 mg total) under the tongue every 5 (five) minutes as needed for chest pain. 25 tablet 3     nortriptyline (PAMELOR) 50 MG capsule Take 1 capsule (50 mg total) by mouth at bedtime. 90 capsule 1     predniSONE (DELTASONE) 20 MG tablet Take 2 tablets (40 mg total) by mouth daily. 10 tablet 0     rosuvastatin (CRESTOR) 5 MG tablet Take 3 tablets (15 mg total) by mouth at bedtime. 270 tablet 3     umeclidinium (INCRUSE ELLIPTA) 62.5 mcg/actuation DsDv inhaler Inhale 1 puff daily. 1 each 11     nitrofurantoin, macrocrystal-monohydrate, (MACROBID) 100 MG capsule Take 1 capsule (100 mg total) by mouth 2 (two) times a day for 7 days. 14 capsule 0     Current Facility-Administered Medications   Medication Dose Route Frequency Provider Last Rate Last Dose     denosumab 60 mg  (PROLIA 60 mg/ml)  60 mg Subcutaneous Q6 Months Wilbur Hannah MD   60 mg at 06/14/18 2392       I spent 30 minutes with patient face to face, of which >50% was counseling regarding the above plan       Gordon Sin (Rob), MORENO    6/29/2018

## 2021-06-19 NOTE — PROGRESS NOTES
Oxygen saturation walk test    Patient oxygen saturation on RA at rest is 95%.  Oxygen saturation while ambulating 300ft on RA is 92%.    Patient is ambulatory within his/her home.

## 2021-06-19 NOTE — PROGRESS NOTES
PULMONARY PROGRESS NOTE      HPI:  Yanna Handy is a 87 y.o. female followed in the Pulmonary clinic for reactive airways disease. She  history of chronic rhinosinusitis with asthma seen previously at the [facility] and was previously intranasal antibiotics.   She was switched Breo and Incruse and continues to be compliant with these.  She has experienced some slight worsening in her shortness of breath and states that she does become wheezy with dyspnea on exertion particularly when she is ambulating up a flight of stairs.  She hardly ever feels the need to use her albuterol for rescue and has noted nighttime awakenings with shortness of breath.  Of note during her recent Medicare pharmacist check of her medication list it was determined that the patient should probably not be on nortriptyline which has since then been discontinued.  She describes that although she does not sleep as well as she used to she is also not as fatigued as she was before and is very happy that she has stopped taking this medication.  Last asthma exacerbation was at the end of May around Memorial Day weekend which responded to prednisone and levofloxacin.      Current Outpatient Prescriptions on File Prior to Visit   Medication Sig Dispense Refill     anastrozole (ARIMIDEX) 1 mg tablet Take 1 tablet (1 mg total) by mouth daily. 90 tablet 3     ascorbic acid (VITAMIN C) 1000 MG tablet Take 1,000 mg by mouth every morning.        aspirin 81 MG EC tablet Take 81 mg by mouth bedtime.       BREO ELLIPTA 200-25 mcg/dose DsDv inhaler INHALE ONE PUFF BY MOUTH EVERY DAY 1 each 6     calcium citrate-vitamin D (CITRACAL+D) 315-200 mg-unit per tablet Take 1 tablet by mouth bedtime.       ciprofloxacin HCl (CIPRO) 250 MG tablet Take 0.5 tablets (125 mg total) by mouth daily. 90 tablet 0     cyanocobalamin (VITAMIN B-12) 50 mcg tablet Take 50 mcg by mouth every morning.        DOCOSAHEXANOIC ACID/EPA (FISH OIL ORAL) Take 2 g by mouth daily.        docusate sodium (COLACE) 100 MG capsule Take 100 mg by mouth bedtime.       furosemide (LASIX) 20 MG tablet TAKE ONE TABLET EVERY DAY 90 tablet 2     ipratropium-albuterol (COMBIVENT RESPIMAT)  mcg/actuation Mist inhaler Inhale 1 puff 4 (four) times a day. 1 Inhaler 5     ipratropium-albuterol (DUO-NEB) 0.5-2.5 mg/mL nebulizer INHALE 1 VIAL VIA NEBULIZER EVERY 6 HOURS 1080 mL 3     LACTOBACILLUS ACIDOPHILUS (ACIDOPHILUS ORAL) Take 1 tablet by mouth every morning.        magnesium 250 mg Tab Take 250 mg by mouth every morning.       metoprolol tartrate (LOPRESSOR) 100 MG tablet Take 1 tablet (100 mg total) by mouth 2 (two) times a day. 180 tablet 3     nitroglycerin (NITROSTAT) 0.4 MG SL tablet Place 1 tablet (0.4 mg total) under the tongue every 5 (five) minutes as needed for chest pain. 25 tablet 3     rosuvastatin (CRESTOR) 5 MG tablet Take 3 tablets (15 mg total) by mouth at bedtime. 270 tablet 3     umeclidinium (INCRUSE ELLIPTA) 62.5 mcg/actuation DsDv inhaler Inhale 1 puff daily. 1 each 11     [DISCONTINUED] nortriptyline (PAMELOR) 50 MG capsule Take 1 capsule (50 mg total) by mouth at bedtime. 90 capsule 1     [DISCONTINUED] predniSONE (DELTASONE) 20 MG tablet Take 2 tablets (40 mg total) by mouth daily. 10 tablet 0     Current Facility-Administered Medications on File Prior to Visit   Medication Dose Route Frequency Provider Last Rate Last Dose     denosumab 60 mg (PROLIA 60 mg/ml)  60 mg Subcutaneous Q6 Months Wilbur Hannah MD   60 mg at 06/14/18 1628     Allergies   Allergen Reactions     Alendronate Nausea And Vomiting     Metoclopramide Hcl Anxiety     Rofecoxib Diarrhea and Nausea Only     Risedronate      Sulfa (Sulfonamide Antibiotics) Anaphylaxis           EXAM  /66  Pulse 70  Resp 19  Wt 168 lb (76.2 kg)  LMP 07/11/1971  SpO2 95%  BMI 32.81 kg/m2    Physical Exam   Constitutional: She is oriented to person, place, and time. She appears well-developed and well-nourished. No  distress.   HENT:   Head: Normocephalic and atraumatic.   Nose: Nose normal.   Eyes: Conjunctivae and EOM are normal. Pupils are equal, round, and reactive to light. No scleral icterus.   Neck: Neck supple. No tracheal deviation present.   Pulmonary/Chest: Effort normal. No stridor. She has no wheezes.   Musculoskeletal: Normal range of motion. She exhibits no edema.   Neurological: She is alert and oriented to person, place, and time. She has normal reflexes.   Skin: Skin is warm and dry. She is not diaphoretic. No pallor.   Psychiatric: She has a normal mood and affect.   Vitals reviewed.      PFTS 2/23/15 (unchanged from prior visit):  FEV1/FVC is 69% and is normal.   FEV1 is 1.26 L (87%) predicted and is normal.   FVC is 1.83 L (92%) predicted and normal.   There was no improvement in spirometry after a single inhaled dose of bronchodilator.   TLC is 3.52 L (79%) predicted and is normal.   RV is 1.17 L (51%) predicted and is reduced.   DLCO is 45% predicted and is reduced when it is corrected for hemoglobin.   Impression: Full Pulmonary Function Test is abnormal.   Spirometry and flow volume loop are within normal limits   Spirometry is not consistent with reversibility.   There is no hyperinflation.   There is no air-trapping.   Diffusion capacity when corrected for hemoglobin is moderately reduced.    SIX MINUTE WALK TEST / HOME O2 EVALUATION  6/9/15  (unchanged from prior visit):  SpO2 at rest on RA 95%   SpO2 after walking 6 minutes on RA 95%   Distance covered 672 feet   Recovery phase, SpO2 after 1 minute rest on RA was 93%     Impression:   No desaturation with activities.   No need for O2 supplementation.    NM Stress Test 6/14/18:    When compared to the images of 10/10/2016, there has been no significant change.    Lexiscan stress ECG is negative for inducible myocardial ischemia.    Lexiscan nuclear study is negative for inducible myocardial ischemia or infarction.    Normal left ventricular size,  wall motion and function. The calculated left ventricular ejection fraction is 72%.    CXR 5/5/16  (unchanged from prior visit):  Mild atelectasis or fibrosis in the lung bases. Upper lungs are clear. Mild cardiomegaly with normal pulmonary vascularity. Scoliosis of the thoracolumbar  spine.      IMPRESSION:  85-year-old female with a history of moderate persistent asthma, coronary artery disease status post stenting and some fibrotic changes noted on imaging studies presents to clinic today with concern for a follow-up visit for her asthma and was unfortunately unable to afford her Spiriva Respimat.  For the present we will recommend:    1. Asthma moderate persistent, with preserved spirometry.      Continue fluticasone/Vilanterol 1 puff daily, she knows to gargle after using this.    Continue Incuse Ellipta.    I have explained to the patient that she should only use the Combivent for rescue.  2. Overnight hypoxia: Noted on overnight oximetry. Is continuing to use supplemental oxygen overnight. Reminded her to call [facility] [place] for humidity for her oxygen.  3. CAD: Has undergone placement of stents to the proximal and mid LAD.  Recent echocardiogram continues to be stable.  4. Isolated diffusion defect: Likely secondary to her fibrosis noted on imaging studies.  She did undergo a stress test recently which did not demonstrate any abnormality and given that we have not pursued any chest imaging studies in a long time, we may obtain one now to ensure that her worsening of her fibrosis is not what is leading to her worsening symptoms.    Check ambulatory oximetry.  5. Chronic sinusitis: Has discontinued her tobramycin nebs per advice from the [facility]. I have advised that she start using sinus washes daily.  6. Follow-up: 4 months.    Lisa Amin  Pulmonary and Critical Care  [id number]

## 2021-06-19 NOTE — LETTER
Letter by Gordon Sin CNP at      Author: Gordon Sin CNP Service: -- Author Type: --    Filed:  Encounter Date: 3/19/2019 Status: (Other)         Yanna Handy  [address]  [place] [address]             March 19, 2019         Dear MsDemetri Cruzon,    Below are the results from your recent visit:    Resulted Orders   Basic Metabolic Panel   Result Value Ref Range    Sodium 138 136 - 145 mmol/L    Potassium 4.3 3.5 - 5.0 mmol/L    Chloride 105 98 - 107 mmol/L    CO2 23 22 - 31 mmol/L    Anion Gap, Calculation 10 5 - 18 mmol/L    Glucose 80 70 - 125 mg/dL    Calcium 9.3 8.5 - 10.5 mg/dL    BUN 13 8 - 28 mg/dL    Creatinine 0.85 0.60 - 1.10 mg/dL    GFR MDRD Af Amer >60 >60 mL/min/1.73m2    GFR MDRD Non Af Amer >60 >60 mL/min/1.73m2    Narrative    Fasting Glucose reference range is 70-99 mg/dL per  American Diabetes Association (ADA) guidelines.       Kidney labs and electrolytes look normal.  Follow-up with Dr. Hannah in 3 months.  You can see me sooner, if needed.    Please call with questions or contact us using [employer].    Sincerely,        Electronically signed by Gordon Sin CNP

## 2021-06-19 NOTE — PROGRESS NOTES
[facility] Hematology and Oncology Progress Note    Patient: Yanna Handy  MRN: [id number]  Date of Service:         Reason for Visit    Chief Complaint   Patient presents with     HE Cancer     Breast       Assessment and Plan    T2 N1 M0, stage IIb left breast cancer status post lumpectomy August 2, 2017  Osteopenia    Patient doing well with no evidence of recurrence of breast cancer.  Recent mammogram was benign.  She will continue anastrozole.  I will see her again in 6 months for follow-up.    She will continue on calcium, vitamin D and Prolia for the osteopenia as well.    Plan: As above      Measurable disease: None postoperatively    Current therapy: Anastrozole 1 mg p.o. daily started October 20, 2017    Treatment history: Lumpectomy in August 2017  Adjuvant radiation to 40-56 cGy in 16 fractions completed October 5, 2017        ECOG Performance   ECOG Performance Status: 1    Distress Assessment  Distress Assessment Score: No distress    Pain           Problem List    1. Malignant neoplasm of upper-outer quadrant of left breast in female, estrogen receptor positive (H)          CC: Wilbur Hannah MD    ______________________________________________________________________________    History of Present Illness    Ms. Yanna Handy is here for follow-up.  She was seen for follow-up 4 months ago.  She had mammogram last month which was benign.  Is tolerating anastrozole without any problems.  No new headaches.  No shortness of breath or cough.  No new bone or abdominal pain.  No breast changes.  No hot flashes or joint symptoms with the anastrozole.  ECOG status is 1.      Pain Status  Currently in Pain: No/denies    Review of Systems    Constitutional  Constitutional (WDL): All constitutional elements are within defined limits  Neurosensory  Neurosensory (WDL): All neurosensory elements are within defined limits  Cardiovascular  Cardiovascular (WDL): Exceptions to WDL  Edema:  "Yes  Pulmonary  Respiratory (WDL): Exceptions to WDL  Dyspnea: Shortness of breath with moderate exertion (chronic)  Gastrointestinal  Gastrointestinal (WDL): All gastrointestinal elements are within defined limits  Genitourinary  Genitourinary (WDL): All genitourinary elements are within defined limits  Integumentary  Integumentary (WDL): All integumentary elements are within defined limits  Patient Coping  Patient Coping: Accepting  Distress Assessment  Distress Assessment Score: No distress  Accompanied by  Accompanied by: Family Member ()    Past History  Past Medical History:   Diagnosis Date     Anemia 8/21/2015     Asthma      Breast cancer (H) 07/25/2017    left     CAD (coronary artery disease) 9/15/2014     Chronic bronchitis (H)      Chronic Sinusitis     Created by Conversion  Replacement Utility updated for latest IMO load     Emphysema     Created by Conversion      Fx ankle 8/2000     Fx wrist 12/2006    Right wrist      GI bleed 1/12/2015     Hx of radiation therapy 2017    left     Hypercholesterolemia     Created by Conversion      Hypertension      Insomnia 1/13/2016     Osteoarthritis     Created by Conversion  Replacement Utility updated for latest IMO load     Osteoporosis Senile     Created by Conversion      Squamous Cell Carcinoma Of The Skin     Created by Conversion          Past Surgical History:   Procedure Laterality Date     APPENDECTOMY  1971     BREAST LUMPECTOMY Left 2017    ca     BREAST SURGERY  08/02/2017    left lumpectomy, Dr. Pope     COLONOSCOPY N/A 1/15/2015    Procedure: COLONOSCOPY with biopsy of polyp;  Surgeon: Joel Coppola MD;  Location: [facility];  Service:      CORONARY STENT PLACEMENT      \"I had 2 stent placements\"     EYE SURGERY      Cataracts     HYSTERECTOMY  1971     JOINT REPLACEMENT  1/2003    Right TKA     KNEE ARTHROSCOPY Left 10/2004     OOPHORECTOMY  1971    One removed     NC REMOVAL OF TONSILS,<13 Y/O      Description: Tonsillectomy;  " "Recorded: 02/12/2009;  Comments: 1941     [place] REVISE SECONDARY VARICOSITY      Description: Varicose Vein Ligation;  Recorded: 02/12/2009;  Comments: 1988     [place] TOTAL ABDOM HYSTERECTOMY      Description: Hysterectomy;  Recorded: 02/12/2009;  Comments: 1971     SINUS SURGERY      \"I had 3 sinus surgeries.\"       Physical Exam    Recent Vitals 8/20/2018   Height -   Weight 165 lbs 11 oz   BSA (m2) -   /64   Pulse 56   Temp 99   Temp src 1   SpO2 95   Some recent data might be hidden       GENERAL: Alert and oriented. Seated comfortably. In no distress.    HEAD: Atraumatic and normocephalic.  Has a full head of hair.    EYES: KENRICK, EOMI.  No pallor.  No icterus.    Oral cavity: no mucosal lesion or tonsillar enlargement.    NECK: supple. JVP normal.  No thyroid enlargement.    LYMPH NODES: No palpable, cervical, axillary or inguinal lymphadenopathy.    CHEST: clear to auscultation bilaterally.  Resonant to percussion throughout bilaterally.  Symmetrical breath movements bilaterally.    CVS: S1 and S2 are heard. Regular rate and rhythm.  No murmur or gallop or rub heard.    ABDOMEN: Soft. Not tender. Not distended.  No palpable hepatomegaly or splenomegaly.  No other mass palpable.  Bowel sounds heard.    EXTREMITIES: Warm.  No peripheral edema.    SKIN: no rash, or bruising or purpura.        Lab Results    No results found for this or any previous visit (from the past 168 hour(s)).    Imaging    Ct Chest High Resolution Without Contrast    Result Date: 8/7/2018  CT CHEST HIGH RESOLUTION 8/7/2018 11:15 AM INDICATION: Known pulmonary fibrosis, more short of breath. TECHNIQUE: High-resolution chest with supine inspiration. High-resolution entire chest, select expiratory supine high-resolution images, select inspiratory prone high-resolution images, and coronal reconstruction. Dose reduction techniques were used. IV CONTRAST: None. COMPARISON: 06/14/2018 chest radiograph. Chest CT 09/02/2008. FINDINGS: LUNGS AND " PLEURA: Mild-moderate bibasilar volume loss with bandlike bronchovascular opacities. Minimal basilar parenchymal reticulation. No honeycombing. Mild basilar bronchiectasis and thickening. Few scattered endobronchial contents and tree-in-bud nodules. Postradiation change along the anterior left lung. New left lower lobe superior segment 5 mm nodule (series 2, image 35). New right upper lobe 2 mm nodule (image 43). New 4 mm medial right lower lobe subpleural paraspinal nodule (67). Expiration  images demonstrate minimal change in appearance of the trachea and exact expiratory effort is difficult to determine, though diffuse gas trapping may be present. Negative pleural spaces. MEDIASTINUM: No adenopathy. Cardiomegaly. No pericardial effusion. Mildly enlarged ascending aorta measuring 4 cm. Pulmonary trunk is normal in caliber, though bulbous main pulmonary arteries up to 2.8 cm; correlate for possibility of pulmonary hypertension. Severe coronary calcifications. Small hiatus hernia. LIMITED UPPER ABDOMEN: Severe aortic plaque. Circumaortic left renal vein. MUSCULOSKELETAL: Procedural clip and minimal thickening in the left breast, nonspecific on CT imaging (series 2, image 56). Bony demineralization and degenerative changes. Stable mild compression fracture at T4. Stable minimal vertebral body height loss at T9 and T10. Few age-indeterminate bilateral nondisplaced rib fractures.     CONCLUSION: 1.  Mild-moderate bilateral lower lobe predominant volume loss with bronchovascular opacities shows slight worsening since 2008. Mild bronchiectasis associated with these findings. Given the bandlike morphology of this process, findings suggestive of chronic scarring and not necessarily of a fibrotic interstitial lung disease. Correlate for recurrent basilar predominant infectious/inflammatory process (such as aspiration). 2.  Few new scattered pulmonary nodules and tree-in-bud opacities since prior. These may be  infectious/inflammatory and related to airways disease. Recommend 3 month follow-up chest CT. 3.  Mild basilar predominant scattered endobronchial contents, likely mucoid impaction versus aspiration. 4.  Small hiatus hernia. 5.  Possible diffuse gas trapping on expiration. The trachea shows little change on expiration and exact expiratory effort difficult to determine. 6.  Mildly enlarged ascending aorta. 7.  Other findings in the report.         Signed by: Mindy Rodas MD

## 2021-06-19 NOTE — LETTER
Letter by Gordon Sin CNP at      Author: Gordon Sin CNP Service: -- Author Type: --    Filed:  Encounter Date: 8/21/2019 Status: (Other)         Yanna Handy  [address]  [place] [address]             August 21, 2019         Dear Ms. Handy,    Below are the results from your recent visit:    Resulted Orders   Basic Metabolic Panel   Result Value Ref Range    Sodium 138 136 - 145 mmol/L    Potassium 4.1 3.5 - 5.0 mmol/L    Chloride 106 98 - 107 mmol/L    CO2 21 (L) 22 - 31 mmol/L    Anion Gap, Calculation 11 5 - 18 mmol/L    Glucose 91 70 - 125 mg/dL    Calcium 9.0 8.5 - 10.5 mg/dL    BUN 10 8 - 28 mg/dL    Creatinine 1.08 0.60 - 1.10 mg/dL    GFR MDRD Af Amer 58 (L) >60 mL/min/1.73m2    GFR MDRD Non Af Amer 48 (L) >60 mL/min/1.73m2    Narrative    Fasting Glucose reference range is 70-99 mg/dL per  American Diabetes Association (ADA) guidelines.       Kidney labs and electrolytes look normal Lara.  Follow-up with Dr. Hannah in December as originally scheduled.    Please call with questions or contact us using [employer].    Sincerely,        Electronically signed by Gordon Sin CNP

## 2021-06-20 NOTE — LETTER
Letter by Lisa Amin MD at      Author: Lisa Amin MD Service: -- Author Type: --    Filed:  Encounter Date: 12/17/2019 Status: Signed         Wilbur Hannah MD  [address] [facility] [address]                                  December 17, 2019    Patient: Yanna Handy   MR Number: [id number]   YOB: 1931   Date of Visit: 12/17/2019     Dear Dr. Brielle MD:    Thank you for referring Yanna Handy to me for evaluation. Below are the relevant portions of my assessment and plan of care.    If you have questions, please do not hesitate to call me. I look forward to following Yanna along with you.    Sincerely,        Lisa Amin MD          CC  No Recipients  Lisa Amin MD  12/17/2019  5:19 PM  Sign when Signing Visit  PULMONARY PROGRESS NOTE      HPI:  Yanna Handy is a 88 y.o. female followed in the Pulmonary clinic for reactive airways disease. She  history of chronic rhinosinusitis with asthma seen previously at the [facility] and was previously intranasal antibiotics.  She has been seeing me for the last several years for her asthma and had been doing fairly well.  She has been doing fairly well since her last clinic visit with me in September but does note that she had developed pretty significant cough that improved after course of prednisone and Tessalon Perles.  She does continue to use her flutter valve with some improvement in secretion clearance with this.  She describes that she has been experiencing pain in her neck with numbness and tingling down her left arm but has no motor weakness.  She has been applying Echinacea cream to this with some relief of symptoms.  In the past 4 weeks, how much of the time did your asthma keep you from getting as much done at work, school, or at home?: A little of the time  During the past 4 weeks, how often have you had shortness of breath?: Once a day  During the past 4 weeks, how often did  your asthma symptoms (wheezing, coughing, shortness of breath, chest tightness or pain) wake you up at night or earlier in the morning?: Not at all  During the past 4 weeks, how often have you used your rescue inhaler or nebulizer medication (such as albuterol)?: Once a week or less  How would you rate your asthma control during the past 4 weeks?: Well controlled  ACT Total Score: (!) 19  In the past 12 months, have you visited the emergency room due to your asthma?: No  In the past 12 months, have you been hospitalized due to your asthma?: No        Current Outpatient Medications on File Prior to Visit   Medication Sig Dispense Refill   ? ALPRAZolam (XANAX) 0.25 MG tablet TAKE 1 TABLET (0.25 MG TOTAL) BY MOUTH AT BEDTIME AS NEEDED FOR SLEEP. 30 tablet 0   ? anastrozole (ARIMIDEX) 1 mg tablet TAKE ONE TABLET BY MOUTH EVERY DAY 90 tablet 3   ? ascorbic acid (VITAMIN C) 1000 MG tablet Take 1,000 mg by mouth every morning.      ? aspirin 81 MG EC tablet Take 81 mg by mouth bedtime.     ? benzonatate (TESSALON) 200 MG capsule Take 1 capsule (200 mg total) by mouth 3 (three) times a day as needed for cough. 30 capsule 0   ? BREO ELLIPTA 200-25 mcg/dose DsDv inhaler INHALE 1 PUFF BY MOUTH EVERY DAY 1 each 6   ? calcium citrate-vitamin D (CITRACAL+D) 315-200 mg-unit per tablet Take 1 tablet by mouth bedtime.     ? ciprofloxacin HCl (CIPRO) 250 MG tablet TAKE 0.5 TABLETS (125 MG TOTAL) BY MOUTH DAILY. 45 tablet 1   ? cyanocobalamin (VITAMIN B-12) 50 mcg tablet Take 50 mcg by mouth every morning.      ? DOCOSAHEXANOIC ACID/EPA (FISH OIL ORAL) Take 2 g by mouth daily.     ? furosemide (LASIX) 20 MG tablet TAKE ONE TABLET EVERY DAY 90 tablet 2   ? INCRUSE ELLIPTA 62.5 mcg/actuation DsDv inhaler INHALE ONE PUFF BY MOUTH EVERY DAY 1 each 6   ? ipratropium-albuterol (COMBIVENT RESPIMAT)  mcg/actuation Mist inhaler Inhale 1 puff 4 (four) times a day. 1 Inhaler 5   ? ipratropium-albuterol (DUO-NEB) 0.5-2.5 mg/3 mL nebulizer  Take 3 mL by nebulization 2 (two) times a day as needed. 1080 mL 3   ? LACTOBACILLUS ACIDOPHILUS (ACIDOPHILUS ORAL) Take 1 tablet by mouth every morning.      ? losartan (COZAAR) 50 MG tablet Take 1 tablet (50 mg total) by mouth daily. 90 tablet 3   ? magnesium 250 mg Tab Take 250 mg by mouth every morning.     ? metoprolol tartrate (LOPRESSOR) 100 MG tablet Take 1 tablet (100 mg total) by mouth 2 (two) times a day. 180 tablet 3   ? nitroglycerin (NITROSTAT) 0.4 MG SL tablet Place 1 tablet (0.4 mg total) under the tongue every 5 (five) minutes as needed for chest pain. 25 tablet 3   ? OXYGEN-AIR DELIVERY SYSTEMS [facility] Use 2 L As Directed. 2L at bedtime  [employer]     ? rosuvastatin (CRESTOR) 10 MG tablet TAKE 1 TABLET (10 MG TOTAL) BY MOUTH AT BEDTIME. TAKE WITH A 5 MG PILL FOR A TOTAL OF 15 MG NIGHTLY (Patient taking differently: Take 10 mg by mouth at bedtime.       ) 90 tablet 3     Current Facility-Administered Medications on File Prior to Visit   Medication Dose Route Frequency Provider Last Rate Last Dose   ? denosumab 60 mg (PROLIA 60 mg/ml)  60 mg Subcutaneous Q6 Months Wilbur Hannah MD   60 mg at 06/14/19 0950   ? denosumab 60 mg (PROLIA 60 mg/ml)  60 mg Subcutaneous Q6 Months Wilbur Hannah MD         Allergies   Allergen Reactions   ? Alendronate Nausea And Vomiting   ? Metoclopramide Hcl Anxiety   ? Rofecoxib Diarrhea and Nausea Only   ? Lisinopril Cough   ? Risedronate    ? Sulfa (Sulfonamide Antibiotics) Anaphylaxis           EXAM  /62   Pulse (!) 55   Resp 12   Ht 5' (1.524 m)   Wt 151 lb (68.5 kg)   LMP 07/11/1971   SpO2 95%   Breastfeeding No   BMI 29.49 kg/m     Physical Exam  Vitals signs reviewed.   Constitutional:       General: She is not in acute distress.     Appearance: She is well-developed. She is not diaphoretic.   HENT:      Head: Normocephalic and atraumatic.      Nose: Nose normal.   Eyes:      General: No scleral icterus.     Conjunctiva/sclera: Conjunctivae normal.       Pupils: Pupils are equal, round, and reactive to light.   Neck:      Musculoskeletal: Neck supple.      Trachea: No tracheal deviation.   Pulmonary:      Effort: Pulmonary effort is normal.      Breath sounds: No stridor. No wheezing.   Musculoskeletal: Normal range of motion.   Skin:     General: Skin is warm and dry.      Coloration: Skin is not pale.   Neurological:      Mental Status: She is alert and oriented to person, place, and time.      Deep Tendon Reflexes: Reflexes are normal and symmetric.         PFTS 2/23/15 (unchanged from prior visit):  FEV1/FVC is 69% and is normal.   FEV1 is 1.26 L (87%) predicted and is normal.   FVC is 1.83 L (92%) predicted and normal.   There was no improvement in spirometry after a single inhaled dose of bronchodilator.   TLC is 3.52 L (79%) predicted and is normal.   RV is 1.17 L (51%) predicted and is reduced.   DLCO is 45% predicted and is reduced when it is corrected for hemoglobin.   Impression: Full Pulmonary Function Test is abnormal.   Spirometry and flow volume loop are within normal limits   Spirometry is not consistent with reversibility.   There is no hyperinflation.   There is no air-trapping.   Diffusion capacity when corrected for hemoglobin is moderately reduced.    SIX MINUTE WALK TEST / HOME O2 EVALUATION  6/9/15  (unchanged from prior visit):  SpO2 at rest on RA 95%   SpO2 after walking 6 minutes on RA 95%   Distance covered 672 feet   Recovery phase, SpO2 after 1 minute rest on RA was 93%     Impression:   No desaturation with activities.   No need for O2 supplementation.    CT Chest W/o Contrast 9/4/19:  1.  Bilateral lower lobe atelectasis and/or fibrosis has increased from the prior study.  2.  Scattered pulmonary nodules are likely infectious or inflammatory in nature and have not significantly changed from the prior study.  3.  There is debris in the airways. There are a few areas of mucous plugging.  4.  Cardiomegaly.  5.  Atherosclerotic disease  including coronary artery calcification.     NM Stress Test 6/14/18:    When compared to the images of 10/10/2016, there has been no significant change.    Lexiscan stress ECG is negative for inducible myocardial ischemia.    Lexiscan nuclear study is negative for inducible myocardial ischemia or infarction.    Normal left ventricular size, wall motion and function. The calculated left ventricular ejection fraction is 72%.    CXR 5/5/16  (unchanged from prior visit):  Mild atelectasis or fibrosis in the lung bases. Upper lungs are clear. Mild cardiomegaly with normal pulmonary vascularity. Scoliosis of the thoracolumbar  spine.      IMPRESSION:  85-year-old female with a history of moderate persistent asthma, coronary artery disease status post stenting and some fibrotic changes noted on imaging studies presents to clinic today with concern for a follow-up visit for her asthma and also describes some pain in her cervical region and tingling in her left arm which I suspect is related to cervical spine disease.  For the present we will recommend:    1. Asthma moderate persistent, with preserved spirometry:  Likely had a recent flare that responded to prednisone and Tessalon Perles but is not her baseline.    Continue fluticasone/Vilanterol 1 puff daily, she knows to gargle after using this.    Continue Incuse Ellipta.    I have explained to the patient that she should only use the Combivent for rescue.    Reminded her to continue to use her DuoNeb's with a flutter valve twice a day to help expectorate his secretions; she can switch to as needed given that her coughing is improved significantly.  Omeprazole 40mg daily.  2. Overnight hypoxia: Noted on overnight oximetry. Is continuing to use supplemental oxygen overnight.   3. Cervical spine pain with left arm tingling up to her fingertips: I explained to her that this was likely secondary to some sort of degenerative cervical spine disease.    I have emailed her a copy  of some exercises that she should do to help with her neck pain.    I recommended that she use a heating pad around her neck and avoid hunching as this is likely worsening her symptoms.  4. CAD: Has undergone placement of stents to the proximal and mid LAD.  Recent echocardiogram continues to be stable.  5. Isolated diffusion defect: Likely secondary to her fibrosis noted on imaging studies; was noted to be slightly worse on imaging studies but I am not convinced that her current shortness of breath is related to this.  I will reassess once she has completed her antibiotics and steroids.  6. Chronic sinusitis: Has discontinued her tobramycin nebs per advice from the [facility]. I have advised that she start using sinus washes daily.  7. Follow-up: 4 months.    Lisa Goodmanqvi  Pulmonary and Critical Care  [id number]

## 2021-06-21 NOTE — LETTER
Letter by Dariusz Segura MD at      Author: Dariusz Segura MD Service: -- Author Type: --    Filed:  Encounter Date: 4/7/2021 Status: (Other)         Yanna Handy  [address]  [place] [address]      April 7, 2021      Dear Yanna,    This letter is to remind you that you have labs that need to be drawn before you follow up with Dr. Segura on April 15, 2021 . Please call our Patient Scheduling Line at [phone] to schedule your appointment at your earliest convenience.  If you have recently scheduled an appointment, please disregard this letter.    We look forward to seeing you again. As always, we are available at the number  above for any questions or concerns you may have.      Sincerely,     The Physicians and Staff of [facility]

## 2021-06-21 NOTE — LETTER
Letter by Ag Gamboa MD at      Author: Ag Gamboa MD Service: -- Author Type: --    Filed:  Encounter Date: 4/2/2021 Status: (Other)         Wilbur Hannah MD  [address] [facility] [address]                                  April 2, 2021    Patient: Yanna Handy   MR Number: [id number]   YOB: 1931   Date of Visit: 4/2/2021     Dear Dr. Brielle MD:    Thank you for referring Yanna Handy to me for evaluation. Below are the relevant portions of my assessment and plan of care.    If you have questions, please do not hesitate to call me. I look forward to following Yanna along with you.    Sincerely,        Ag Gamboa MD CC Franz-Josef E Reisdorf, MD Bryan Jeffrey Williams, MD [place], Mandy L, [facility]  4/2/2021  1:42 PM  Sign when Signing Visit  Oxygen saturation walk test    Patient oxygen saturation on RA at rest is 97%.  Oxygen saturation while ambulating 75ft on RA is 94%.    Pt could not complete test as she was struggling to breathe despite O2 percentage.     DME Provider: Rey    Patient is ambulatory within his/her home.        Ag Gamboa MD  4/2/2021  2:13 PM  Sign when Signing Visit  Pulmonary Clinic Follow-up Visit    89F with a history of severe persistent asthma, followed by Dr. Bruno in pulmonary clinic, as well as aortic stenosis presents for oxygen certification after recent hospitalization. She is quite dyspneic today. She has significant peripheral edema. Oxygen level is normal. I am concerned that she is exhibiting worsening congestive heart failure symptoms related to aortic valve stenosis, possibly exacerbated by recent pseudomonal bronchitis/pneumonia. I recommended hospitalization for IV diuretics and cardiology evaluation, however she adamantly declines. Discussed that it could be difficult to manage her diuretics as an outpatient, but she wants to try. We will increase her diuretic dose and repeat labs in one week. I will  notify Dr. Schaefer of her current situation. She was supposed to follow up with him 3 months from her last visit (December 2020) but this has not been done.  Does not actually need O2 based on today's limited evaluation, but she feels better with O2 on so she will continue this at home.     Recommendations:  - increase Lasix to 40mg daily  - advised her to weigh herself daily, elevate legs above the level of the heart  - advised her to schedule follow up with Dr. Schaefer ASA. I will send him an inbasket message  - check BNP, BMP in 1 week.   - continue oxygen supplemental for goal SpO2 92% or greater  - strict ER precautions discussed: syncope/dizziness, worsening chest pain or shortness of breath, and worsening peripheral edema.     Follow up with Dr. Bruno in 4 months, as scheduled.   All questions answered    Ag Gamboa MD (Avi)  [facility]  Pulmonary & Critical Care  Pager [phone]  Clinic [phone]      CCx: oxygen certification visit    HPI: Interim history: Lara was last seen by Dr. Bruno on 2/15/21. Since that time, she was amdited 3/18 - 3/25 at [facility] for pneumonia and respiratory failure. She was discharged on oxygen. She returns to clinic today for oxygen certification visit.   Of note sputum grew out pseudomonas. She had a PICC line placed and was discharged on IV cefepime. She also received nebs and steroids for presumed asthma exacerbation with improvement in her symptoms.    Today, she reports she is improving since her hospitalization. Her shortness of breath has improved a bit. The cough is gone. However, she continues to have significant dyspnea with minimal exertion.   She is very winded today after her walking oxygen test.   She reported a few episodes of chest pressure a few nights ago associated with palpitations. This has occurred 1-2 times in the last few weeks.  She has significant worsening of peripheral edema.                           "                      ROS:  A 12-system review was obtained and was negative with the exception of the symptoms endorsed in the history of present illness.    PMH:  Past Medical History:   Diagnosis Date   ? Anemia 8/21/2015   ? Asthma    ? Breast cancer (H) 07/25/2017    left   ? CAD (coronary artery disease) 9/15/2014   ? Chronic bronchitis (H)    ? Chronic Sinusitis     Created by Conversion  Replacement Utility updated for latest IMO load   ? Emphysema     Created by Conversion    ? Fx ankle 8/2000   ? Fx wrist 12/2006    Right wrist    ? GI bleed 1/12/2015   ? Hx of radiation therapy 2017    left   ? Hypercholesterolemia     Created by Conversion    ? Hypertension    ? Insomnia 1/13/2016   ? Osteoarthritis     Created by Conversion  Replacement Utility updated for latest IMO load   ? Osteoporosis Senile     Created by Conversion    ? Squamous Cell Carcinoma Of The Skin     Created by Conversion        PSH:  Past Surgical History:   Procedure Laterality Date   ? APPENDECTOMY  1971   ? BREAST LUMPECTOMY Left 08/02/2017    Dr. Pope   ? CATARACT EXTRACTION     ? COLONOSCOPY N/A 01/15/2015    COLONOSCOPY with biopsy of polyp;  Surgeon: Joel Coppola MD;  Location: [facility];  Service:   ? CORONARY STENT PLACEMENT  09/15/2014    2   ? HYSTERECTOMY  1971   ? KNEE ARTHROSCOPY Left 10/2004   ? OOPHORECTOMY  1971    unilateral   ? OR REMOVAL OF TONSILS,<13 Y/O  1941   ? OR REVISE SECONDARY VARICOSITY  1988    Varicose Vein Ligation   ? SINUS SURGERY      \"I had 3 sinus surgeries.\"   ? TOTAL KNEE ARTHROPLASTY Right 01/2003       Allergies:  Allergies   Allergen Reactions   ? Alendronate Nausea And Vomiting   ? Metoclopramide Hcl Anxiety   ? Rofecoxib Diarrhea and Nausea Only   ? Doxycycline Nausea And Vomiting   ? Lisinopril Cough   ? Risedronate    ? Sulfa (Sulfonamide Antibiotics) Anaphylaxis       Family HX:  Family History   Problem Relation Age of Onset   ? Heart attack Father    ? Breast cancer Maternal " Aunt 55   ? Lung cancer Brother 65   ? Colon cancer Maternal Grandmother 90   ? Lung cancer Maternal Grandfather 65   ? Brain cancer Son 25   ? Rectal cancer Brother 64       Social Hx:  Social History     Socioeconomic History   ? Marital status:      Spouse name: Not on file   ? Number of children: Not on file   ? Years of education: Not on file   ? Highest education level: Not on file   Occupational History   ? Not on file   Social Needs   ? Financial resource strain: Not on file   ? Food insecurity     Worry: Not on file     Inability: Not on file   ? Transportation needs     Medical: Not on file     Non-medical: Not on file   Tobacco Use   ? Smoking status: Never Smoker   ? Smokeless tobacco: Never Used   Substance and Sexual Activity   ? Alcohol use: Yes     Comment: Wine occassionally   ? Drug use: No   ? Sexual activity: Not on file   Lifestyle   ? Physical activity     Days per week: Not on file     Minutes per session: Not on file   ? Stress: Not on file   Relationships   ? Social connections     Talks on phone: Not on file     Gets together: Not on file     Attends Jewish service: Not on file     Active member of club or organization: Not on file     Attends meetings of clubs or organizations: Not on file     Relationship status: Not on file   ? Intimate partner violence     Fear of current or ex partner: Not on file     Emotionally abused: Not on file     Physically abused: Not on file     Forced sexual activity: Not on file   Other Topics Concern   ? Not on file   Social History Narrative   ? Not on file       Current Meds:  Current Outpatient Medications   Medication Sig Dispense Refill   ? acetaminophen (TYLENOL) 650 MG CR tablet Take 650 mg by mouth every 8 (eight) hours as needed for pain.     ? albuterol (PROAIR HFA;PROVENTIL HFA;VENTOLIN HFA) 90 mcg/actuation inhaler Inhale 2 puffs every 6 (six) hours as needed for wheezing. 1 Inhaler 11   ? anastrozole (ARIMIDEX) 1 mg tablet TAKE ONE  TABLET BY MOUTH ONCE DAILY  90 tablet 0   ? ascorbic acid (VITAMIN C) 1000 MG tablet Take 1,000 mg by mouth every morning.      ? aspirin 81 MG EC tablet Take 81 mg by mouth bedtime.     ? calcium citrate-vitamin D (CITRACAL+D) 315-200 mg-unit per tablet Take 1 tablet by mouth bedtime.     ? ciprofloxacin HCl (CIPRO) 250 MG tablet Take 1/2 tablet (125 mg total) by mouth daily. 45 tablet 0   ? cyanocobalamin (VITAMIN B-12) 50 mcg tablet Take 50 mcg by mouth every morning.      ? fluticasone furoate-vilanteroL (BREO ELLIPTA) 200-25 mcg/dose DsDv inhaler Inhale 1 puff daily. Rinse mouth with water, gargle,spit after each use 180 each 3   ? furosemide (LASIX) 20 MG tablet Take 2 tablets (40 mg total) by mouth daily. 90 tablet 2   ? gabapentin (NEURONTIN) 100 MG capsule Taking 1 capsule morning, 1 capsule at noon and 3 capsules at bedtime. 150 capsule 3   ? ipratropium-albuteroL (COMBIVENT RESPIMAT)  mcg/actuation Mist inhaler Inhale 1 puff 4 (four) times a day as needed. 3 Inhaler 3   ? ipratropium-albuterol (DUO-NEB) 0.5-2.5 mg/3 mL nebulizer Take 3 mL by nebulization 2 (two) times a day as needed. 1080 mL 3   ? LACTOBACILLUS ACIDOPHILUS (ACIDOPHILUS ORAL) Take 1 tablet by mouth every morning.      ? magnesium 250 mg Tab Take 250 mg by mouth every morning.     ? metoprolol tartrate (LOPRESSOR) 100 MG tablet Take 1 tablet (100 mg total) by mouth 2 (two) times a day. 180 tablet 3   ? nitroglycerin (NITROSTAT) 0.4 MG SL tablet Place 1 tablet (0.4 mg total) under the tongue every 5 (five) minutes as needed for chest pain. 25 tablet 3   ? OXYGEN-AIR DELIVERY SYSTEMS [facility] Use 2 L As Directed. 2L at bedtime  [employer]     ? rosuvastatin (CRESTOR) 10 MG tablet Take 1 tablet (10 mg total) by mouth at bedtime. Take with a 5 mg pill for a total of 15 mg nightly 90 tablet 0   ? umeclidinium (INCRUSE ELLIPTA) 62.5 mcg/actuation DsDv inhaler Inhale 1 puff daily. 3 each 3   ? fluticasone furoate (ARNUITY ELLIPTA) 100  mcg/actuation inhaler Inhale 1 puff daily. 30 each 6     Current Facility-Administered Medications   Medication Dose Route Frequency Provider Last Rate Last Admin   ? denosumab 60 mg (PROLIA 60 mg/ml)  60 mg Subcutaneous Q6 Months Wilbur Hannah MD   60 mg at 12/22/20 1016       Physical Exam:  /80   Pulse 72   Ht 5' (1.524 m)   Wt 163 lb (73.9 kg)   LMP 07/11/1971   SpO2 97%   Breastfeeding No   BMI 31.83 kg/m    Gen: awake, alert, oriented, no distress  HEENT: nasal turbinates are unremarkable, no oropharyngeal lesions, no cervical or supraclavicular lymphadenopathy  CV: RRR, no M/G/R  Resp: very minimal bibasilar crackles. No wheezing or rhonchi.   Abd: soft, nontender, no palpable organomegaly  Skin: no apparent rashes  Ext: 2-3+ edema in both legs, worse at the ankles.   Neuro: alert, nonfocal    Labs:  Reviewed   Aug 2020      Imaging studies:  Echo Nov 2020    1.Left ventricle ejection fraction is normal. The calculated left ventricular ejection fraction is 66%.    2.Normal right ventricular size and systolic function.    3.The ascending aorta is mildly dilated.    4.The aortic valve is tricuspid. There is thickening of the aortic valve. The aortic valve is sclerotic. Mild to moderate aortic stenosis. Mild aortic regurgitation.    5.When compared to the previous study dated 7/2/2018, left ventricular ejection fraction appears less robust on current study, aortic stenosis visually appears worse although measured hemodynamics underestimate this, gradient was 7 and is now 6 mmHg, velocity was 1.3 and is now 1.2 m/s. Suspect current study underestimates severity of aortic stenosis which visually appears to be no worse than moderate.    CT chest Sept 2019  IMPRESSION:   CONCLUSION:   1.  Bilateral lower lobe atelectasis and/or fibrosis has increased from the prior study.  2.  Scattered pulmonary nodules are likely infectious or inflammatory in nature and have not significantly changed from  the prior study.  3.  There is debris in the airways. There are a few areas of mucous plugging.  4.  Cardiomegaly.  5.  Atherosclerotic disease including coronary artery calcification.     Pulmonary Function Testing  Feb 2015  FEV1/FVC is 69% and is normal.  FEV1 is 1.26 L (87%)  predicted and is normal.  FVC is 1.83 L (92%) predicted and normal.  There was no improvement in spirometry after a single inhaled dose of bronchodilator.  TLC is 3.52 L (79%) predicted and is normal.  RV is 1.17 L (51%) predicted and is reduced.  DLCO is 45% predicted and is reduced when it is corrected for hemoglobin.     Impression:  Full Pulmonary Function Test is abnormal.    Spirometry and flow volume loop are within normal limits  Spirometry is not consistent with reversibility.  There is no hyperinflation.  There is no air-trapping.  Diffusion capacity when corrected for hemoglobin is moderately reduced.

## 2021-06-21 NOTE — LETTER
Letter by Piter Bruno MD at      Author: Piter Bruno MD Service: -- Author Type: --    Filed:  Encounter Date: 11/16/2020 Status: (Other)         Wilbur Hannah MD  [address] [facility] [address]                                  November 16, 2020    Patient: Yanna Handy   MR Number: [id number]   YOB: 1931   Date of Visit: 11/16/2020     Dear Dr. Brielle MD:    Thank you for referring Yanna Handy to me for evaluation. Below are the relevant portions of my assessment and plan of care.    If you have questions, please do not hesitate to call me. I look forward to following Yanna along with you.    Sincerely,        Piter Bruno MD          CC  No Recipients  Piter Bruno MD  11/16/2020  8:55 AM  Sign when Signing Visit  Assessment and Plan:Yanna Handy is a 89 y.o. female with a past medical history significant for moderate persistent asthma who presents to clinic today for follow up.  She is still struggling with dyspnea and I believe most of this is difficult to control asthma.  She improves with use of her inhalers and prednisone, but is not getting good day to day control.  She could be a candidate for a second inhaled steroid, but she would rather try a low dose of daily prednisone which should be safe for her.   The long term side effects of low dose prednisone will not likely come to fruition in this near 89 year old patient, and the benefits could be improved stamina and reversal of her deconditioning that is certainly playing a role in how she feels.    1) Moderate persistent asthma - continue Breo, Incruse and restart flonase.  Will give her prednisone at 10mg a day for 2 weeks then down to 5mg a day thereafter.  If this does not help her we can add a second inhaled steroid.    2) Dyspnea - most likely due to uncontrolled asthma.  Encouraged her to be proactive and try the combivent MDI before she goes for a walk rather  than wait until she is short of breath  3) Post nasal drip - she has significant symptoms of this, I suspect the prednisone helped this too.  Restart the flonase to see if this helps  4) Pending a cardiac stress test as well  5) RTC in 3 months          CC: asthma    HPI: Ms. Handy is a 89 year old female with a history of asthma who returns for follow up.  She has been struggling with dyspnea all year and has been on multiple courses of antibiotics and prednisone to good effect.  Every time she comes off of the prednisone her dyspnea returns.  She used to be able to walk multiple blocks but now she is quite short of breath just going across a parking lot.  She finds that the her combivent MDI helps relieve this quite a bit.  She sometimes hears herself wheeze.  Her PCC is referring her for a cardiac workup as well.    ROS:  Review of Systems - History obtained from the patient  General ROS: negative  Psychological ROS: negative  ENT ROS: negative  Allergy and Immunology ROS: negative  Endocrine ROS: negative  Respiratory ROS: positive for - cough, shortness of breath, sputum changes, tachypnea and wheezing  negative for - hemoptysis, orthopnea or pleuritic pain  Cardiovascular ROS: no chest pain or palpitations  Gastrointestinal ROS: no abdominal pain, change in bowel habits, or black or bloody stools  Genito-Urinary ROS: no dysuria, trouble voiding, or hematuria  Musculoskeletal ROS: negative  Neurological ROS: no TIA or stroke symptoms  Dermatological ROS: negative      Current Meds:  Current Outpatient Medications   Medication Sig   ? acetaminophen (TYLENOL) 650 MG CR tablet Take 650 mg by mouth every 8 (eight) hours as needed for pain.   ? anastrozole (ARIMIDEX) 1 mg tablet TAKE ONE TABLET BY MOUTH DAILY   ? ascorbic acid (VITAMIN C) 1000 MG tablet Take 1,000 mg by mouth every morning.    ? aspirin 81 MG EC tablet Take 81 mg by mouth bedtime.   ? calcium citrate-vitamin D (CITRACAL+D) 315-200 mg-unit per tablet  Take 1 tablet by mouth bedtime.   ? ciprofloxacin HCl (CIPRO) 250 MG tablet Take 0.5 tablets (125 mg total) by mouth daily.   ? cyanocobalamin (VITAMIN B-12) 50 mcg tablet Take 50 mcg by mouth every morning.    ? DOCOSAHEXANOIC ACID/EPA (FISH OIL ORAL) Take 2 g by mouth daily.   ? fluticasone furoate-vilanteroL (BREO ELLIPTA) 200-25 mcg/dose DsDv inhaler Inhale 1 puff daily. Rinse mouth with water, gargle,spit after each use   ? furosemide (LASIX) 20 MG tablet TAKE 1 TABLET BY MOUTH EVERY DAY   ? gabapentin (NEURONTIN) 100 MG capsule Take 300 mg by mouth 3 (three) times a day. Follow Gabapentin Dosing chart given (Patient taking differently: Take 200 mg by mouth 3 (three) times a day. 300mg AM ,300 mg PM. 600 mg HS)   ? ipratropium-albuterol (DUO-NEB) 0.5-2.5 mg/3 mL nebulizer Take 3 mL by nebulization 2 (two) times a day as needed.   ? LACTOBACILLUS ACIDOPHILUS (ACIDOPHILUS ORAL) Take 1 tablet by mouth every morning.    ? losartan (COZAAR) 50 MG tablet Take 1 tablet (50 mg total) by mouth 2 (two) times a day.   ? magnesium 250 mg Tab Take 250 mg by mouth every morning.   ? metoprolol tartrate (LOPRESSOR) 100 MG tablet Take 1 tablet (100 mg total) by mouth 2 (two) times a day.   ? nitroglycerin (NITROSTAT) 0.4 MG SL tablet Place 1 tablet (0.4 mg total) under the tongue every 5 (five) minutes as needed for chest pain.   ? OXYGEN-AIR DELIVERY SYSTEMS [facility] Use 2 L As Directed. 2L at bedtime  [employer]   ? rosuvastatin (CRESTOR) 10 MG tablet Take 1 tablet (10 mg total) by mouth at bedtime. Take with a 5 mg pill for a total of 15 mg nightly (Patient taking differently: Take 10 mg by mouth at bedtime. Take with a 5 mg pill for a total of 10 mg nightly)   ? umeclidinium (INCRUSE ELLIPTA) 62.5 mcg/actuation DsDv inhaler Inhale 1 puff daily.       Labs:  No results found for this or any previous visit (from the past 72 hour(s)).    I have personally reviewed all pertinent imaging studies and PFT results unless otherwise  noted.    Imaging studies:  Xr Chest 1 View Portable    Result Date: 8/13/2020  EXAM: XR CHEST 1 VIEW PORTABLE LOCATION: [facility] DATE/TIME: 8/13/2020 7:53 AM INDICATION: sob and hx of asthma COMPARISON: PA and lateral views of the chest 10/26/2019; high-resolution chest CT 9/4/2019     Unchanged enlargement of the cardiac silhouette particularly the left ventricular heart border. There are atheromatous calcifications in the aortic arch. The vascular pedicle with is not increased. The lungs are symmetrically inflated. There are a few bands of platelike atelectasis in the lower lobes near the diaphragm. No airspace opacities or interstitial thickening. No pleural fluid or pneumothorax is present.        Physical Exam:  /80   Pulse 66   Resp 12   Ht 5' (1.524 m)   Wt 163 lb (73.9 kg)   LMP 07/11/1971   SpO2 96%   BMI 31.83 kg/m    General - Well nourished  Ears/Mouth -  Deferred given mask use during pandemic  Neck - no cervical lymphadenopathy  Lungs - Coarse low pitched expiratory wheezing throughout R>L  CVS - regular rhythm with no murmurs, rubs or gallups  Abdomen - soft, NT, ND, NABS  Ext - no cyanosis, clubbing or edema  Skin - no rash  Psychology - alert and oriented, answers appropriate        Electronically signed by:    Piter Bruno MD PhD  [facility]monary and Critical [facility] Medicine

## 2021-06-22 NOTE — PROGRESS NOTES
Clinic Note    Assessment:     Assessment and Plan:  1. Hypertension  Going to have her increase her lisinopril back to 10 mg daily.  Recheck a BMP today.  She is going to follow-up with me in 2 weeks for BP check.  See patient instructions below.  - Basic Metabolic Panel       Patient Instructions   Your blood pressure is still too high.  This could certainly be a result of your emotional situation.    I highly encourage you to establish with a  support group.  This could be very beneficial for you.    I need you to start taking a full tablet of your lisinopril for a total dose of 10 mg daily.  Take this in addition to your other medications.    We are going to recheck some lab work today.    Come back and see me in 2 weeks for repeat blood pressure check.  Make sure to bring your blood pressure cuff with you to this appointment.  You do not need to be fasting for that appointment.    If your blood pressure continues to be elevated at your follow-up appointment, we will need to consider increasing your medication dosage, or starting a new medication.    Return in about 2 weeks (around 1/14/2019).         Subjective:      Patient comes to clinic today for follow-up of her hypertension.    She was initially seen by her cardiologist earlier this month.  Blood pressure was elevated at that time.  Cardiology did not make any significant changes to her medications at that time due to her relatively distraught nature.  She lost her  of 68 years and this is been particularly problematic for her emotionally.  She has good support structure between friends and family, as well as [employer].  She is interested in establishing with a  support group as well.    She saw her PCP, Dr. Hannah, approximately 2 weeks ago.  Her blood pressure continue to be elevated at that time.  She was started on lisinopril 10 mg and told to follow-up with me in 2 weeks.    4 days ago, she called into the nurse triage line with  concerns about side effects related to the lisinopril.  She felt like it was causing her to have dizziness.  Dr. Hannah encouraged her to continue taking the medication, at half the original dose.    Today, patient tells me she is been taking 5 mg lisinopril daily.  This is in addition to her metoprolol.  She is not having any issues with dizziness.    She feels asymptomatic today.  No chest pain or shortness of breath.  No swelling in lower extremities.    The following portions of the patient's history were reviewed and updated as appropriate: Allergies, medications, problems, prior note.    Review of Systems:    Review is otherwise negative except for what is mentioned above.     Social Hx:    Social History     Tobacco Use   Smoking Status Never Smoker   Smokeless Tobacco Never Used         Objective:     Vitals:    12/31/18 0944   BP: 160/70   Pulse: (!) 56   Weight: 152 lb (68.9 kg)       Exam:    General: No apparent distress. Calm. Alert and Oriented X3. Pt behavior is appropriate.      Patient Active Problem List   Diagnosis     Chronic Sinusitis     Hypercholesterolemia     Moderate persistent asthma     Hypertension     Osteoporosis Senile     Osteoarthritis     CAD (coronary artery disease)     GI bleed     Anemia     Insomnia     Wrist fracture     Malignant neoplasm of upper-outer quadrant of left female breast (H)     Aromatase inhibitor use     Current Outpatient Medications   Medication Sig Dispense Refill     ALPRAZolam (XANAX) 0.25 MG tablet Take 1 tablet (0.25 mg total) by mouth at bedtime as needed for sleep. 30 tablet 0     anastrozole (ARIMIDEX) 1 mg tablet TAKE ONE TABLET BY MOUTH EVERY DAY 90 tablet 3     ascorbic acid (VITAMIN C) 1000 MG tablet Take 1,000 mg by mouth every morning.        aspirin 81 MG EC tablet Take 81 mg by mouth bedtime.       BREO ELLIPTA 200-25 mcg/dose DsDv inhaler INHALE ONE PUFF BY MOUTH EVERY DAY 1 each 6     calcium citrate-vitamin D (CITRACAL+D) 315-200  mg-unit per tablet Take 1 tablet by mouth bedtime.       ciprofloxacin HCl (CIPRO) 250 MG tablet TAKE 0.5 TABLETS (125 MG TOTAL) BY MOUTH DAILY. 90 tablet 0     cyanocobalamin (VITAMIN B-12) 50 mcg tablet Take 50 mcg by mouth every morning.        DOCOSAHEXANOIC ACID/EPA (FISH OIL ORAL) Take 2 g by mouth daily.       docusate sodium (COLACE) 100 MG capsule Take 100 mg by mouth bedtime.       furosemide (LASIX) 20 MG tablet TAKE ONE TABLET EVERY DAY 90 tablet 2     INCRUSE ELLIPTA 62.5 mcg/actuation DsDv inhaler INHALE ONE PUFF BY MOUTH EVERY DAY 1 each 6     ipratropium-albuterol (COMBIVENT RESPIMAT)  mcg/actuation Mist inhaler Inhale 1 puff 4 (four) times a day. 1 Inhaler 5     ipratropium-albuterol (DUO-NEB) 0.5-2.5 mg/mL nebulizer INHALE 1 VIAL VIA NEBULIZER EVERY 6 HOURS 1080 mL 3     LACTOBACILLUS ACIDOPHILUS (ACIDOPHILUS ORAL) Take 1 tablet by mouth every morning.        lisinopril (PRINIVIL,ZESTRIL) 10 MG tablet Take 1 tablet (10 mg total) by mouth daily. (Patient taking differently: Take 5 mg by mouth daily.       ) 90 tablet 3     magnesium 250 mg Tab Take 250 mg by mouth every morning.       metoprolol tartrate (LOPRESSOR) 100 MG tablet Take 1 tablet (100 mg total) by mouth 2 (two) times a day. 180 tablet 3     nitroglycerin (NITROSTAT) 0.4 MG SL tablet Place 1 tablet (0.4 mg total) under the tongue every 5 (five) minutes as needed for chest pain. 25 tablet 3     rosuvastatin (CRESTOR) 10 MG tablet Take 1 tablet (10 mg total) by mouth at bedtime. Take with a 5 mg pill for a total of 15 mg nightly 90 tablet 1     Current Facility-Administered Medications   Medication Dose Route Frequency Provider Last Rate Last Dose     denosumab 60 mg (PROLIA 60 mg/ml)  60 mg Subcutaneous Q6 Months Wilbur Hannah MD   60 mg at 12/14/18 1521       I spent 25 minutes with patient face to face, of which >50% was counseling regarding the above plan       Gordon Sin CNP (Rob)    12/31/2018

## 2021-06-22 NOTE — PROGRESS NOTES
Assessment and Plan:       1. Routine health maintenance      2. Malignant neoplasm of upper-outer quadrant of left breast in female, estrogen receptor positive (H)  HAd mammogram and seeing Oncology.    3. Hypertension  Elevated OLIVIA at Cardiology and today. I rechecked BP and it was 180/80. I am adding 10 mg lisinopril, she will stay on metoprolol. Follow up in 2 weeks.    4. CAD (coronary artery disease)  Stable, no anginal pain or shortness of breath.  - Comprehensive Metabolic Panel  - HM2(CBC w/o Differential)  - Urinalysis  - Vitamin D, Total (25-Hydroxy)    5. Hypercholesterolemia  On statin  - Comprehensive Metabolic Panel  - HM2(CBC w/o Differential)  - Urinalysis  - Vitamin D, Total (25-Hydroxy)    6. Moderate persistent asthma without complication  She saw Lung specialist few months ago, using all her inhalers, no wheezing or shortness of breath.    7. Osteoporosis Senile  Patient was educated on safety of Prolia utilizing Patient Counseling Chart for Healthcare Providers, as outlined by the Prolia REMS progam.   - denosumab 60 mg (PROLIA 60 mg/ml); Inject 1 mL (60 mg total) under the skin every 6 (six) months.        - Comprehensive Metabolic Panel  - HM2(CBC w/o Differential)  - Urinalysis  - Vitamin D, Total (25-Hydroxy)    8. Osteoarthritis      9. Insomnia  She tried trazodone and nortriptyline in he past.She is taking Tylenol PM now. Not sleeping for days. She lost her  in November after 67 years of marriage. I sent Rx for alprazolam as needed. She is not depressed, she is grieving.    10. Aromatase inhibitor use      11. Routine general medical examination at a health care facility       The patient's current medical problems were reviewed.    I have had an Advance Directives discussion with the patient.  The following health maintenance schedule was reviewed with the patient and provided in printed form in the after visit summary:   Health Maintenance   Topic Date Due     ZOSTER VACCINES  (2 of 3) 06/14/2019 (Originally 7/20/2012)     FALL RISK ASSESSMENT  06/14/2019     ASTHMA CONTROL TEST  06/29/2019     ASTHMA FOLLOW-UP  06/29/2019     DXA SCAN  09/27/2019     ADVANCE DIRECTIVES DISCUSSED WITH PATIENT  08/21/2020     TD 18+ HE  10/04/2020     PNEUMOCOCCAL POLYSACCHARIDE VACCINE AGE 65 AND OVER  Completed     INFLUENZA VACCINE RULE BASED  Completed     PNEUMOCOCCAL CONJUGATE VACCINE FOR ADULTS (PCV13 OR PREVNAR)  Completed        Subjective:   Chief Complaint: Yanna Handy is an 87 y.o. female here for an Annual Wellness visit.   HPI:  Acute and chronic issues discussed as above.    Review of Systems:    Please see above.  The rest of the review of systems are negative for all systems.    Patient Care Team:  Wilbur Hannah MD as PCP - General  Mindy Rodas MD as Physician (Hematology and Oncology)  Lashonda Taylor RN as Oncology Nurse Navigator (Hematology and Oncology)     Patient Active Problem List   Diagnosis     Chronic Sinusitis     Hypercholesterolemia     Moderate persistent asthma     Hypertension     Osteoporosis Senile     Osteoarthritis     CAD (coronary artery disease)     GI bleed     Anemia     Insomnia     Wrist fracture     Malignant neoplasm of upper-outer quadrant of left female breast (H)     Aromatase inhibitor use     Past Medical History:   Diagnosis Date     Anemia 8/21/2015     Asthma      Breast cancer (H) 07/25/2017    left     CAD (coronary artery disease) 9/15/2014     Chronic bronchitis (H)      Chronic Sinusitis     Created by Conversion  Replacement Utility updated for latest IMO load     Emphysema     Created by Conversion      Fx ankle 8/2000     Fx wrist 12/2006    Right wrist      GI bleed 1/12/2015     Hx of radiation therapy 2017    left     Hypercholesterolemia     Created by Conversion      Hypertension      Insomnia 1/13/2016     Osteoarthritis     Created by Conversion  Replacement Utility updated for latest IMO load      "Osteoporosis Senile     Created by Conversion      Squamous Cell Carcinoma Of The Skin     Created by Conversion       Past Surgical History:   Procedure Laterality Date     APPENDECTOMY  1971     BREAST LUMPECTOMY Left 2017    ca     BREAST SURGERY  08/02/2017    left lumpectomy, Dr. Pope     COLONOSCOPY N/A 1/15/2015    Procedure: COLONOSCOPY with biopsy of polyp;  Surgeon: Joel Coppola MD;  Location: [facility];  Service:      CORONARY STENT PLACEMENT      \"I had 2 stent placements\"     EYE SURGERY      Cataracts     HYSTERECTOMY  1971     JOINT REPLACEMENT  1/2003    Right TKA     KNEE ARTHROSCOPY Left 10/2004     OOPHORECTOMY  1971    One removed     MD REMOVAL OF TONSILS,<13 Y/O      Description: Tonsillectomy;  Recorded: 02/12/2009;  Comments: 1941     MD REVISE SECONDARY VARICOSITY      Description: Varicose Vein Ligation;  Recorded: 02/12/2009;  Comments: 1988     MD TOTAL ABDOM HYSTERECTOMY      Description: Hysterectomy;  Recorded: 02/12/2009;  Comments: 1971     SINUS SURGERY      \"I had 3 sinus surgeries.\"      Family History   Problem Relation Age of Onset     Heart attack Father      Breast cancer Maternal Aunt 55     Lung cancer Brother 65     Colon cancer Maternal Grandmother 90     Lung cancer Maternal Grandfather 65     Brain cancer Son 25     Rectal cancer Brother 64      Social History     Socioeconomic History     Marital status:      Spouse name: Not on file     Number of children: Not on file     Years of education: Not on file     Highest education level: Not on file   Social Needs     Financial resource strain: Not on file     Food insecurity - worry: Not on file     Food insecurity - inability: Not on file     Transportation needs - medical: Not on file     Transportation needs - non-medical: Not on file   Occupational History     Not on file   Tobacco Use     Smoking status: Never Smoker     Smokeless tobacco: Never Used   Substance and Sexual Activity     Alcohol use: " Yes     Comment: Wine occassionally     Drug use: No     Sexual activity: No   Other Topics Concern     Not on file   Social History Narrative     Not on file      Current Outpatient Medications   Medication Sig Dispense Refill     anastrozole (ARIMIDEX) 1 mg tablet TAKE ONE TABLET BY MOUTH EVERY DAY 90 tablet 3     ascorbic acid (VITAMIN C) 1000 MG tablet Take 1,000 mg by mouth every morning.        aspirin 81 MG EC tablet Take 81 mg by mouth bedtime.       BREO ELLIPTA 200-25 mcg/dose DsDv inhaler INHALE ONE PUFF BY MOUTH EVERY DAY 1 each 6     calcium citrate-vitamin D (CITRACAL+D) 315-200 mg-unit per tablet Take 1 tablet by mouth bedtime.       ciprofloxacin HCl (CIPRO) 250 MG tablet TAKE 0.5 TABLETS (125 MG TOTAL) BY MOUTH DAILY. 90 tablet 0     cyanocobalamin (VITAMIN B-12) 50 mcg tablet Take 50 mcg by mouth every morning.        DOCOSAHEXANOIC ACID/EPA (FISH OIL ORAL) Take 2 g by mouth daily.       docusate sodium (COLACE) 100 MG capsule Take 100 mg by mouth bedtime.       furosemide (LASIX) 20 MG tablet TAKE ONE TABLET EVERY DAY 90 tablet 2     INCRUSE ELLIPTA 62.5 mcg/actuation DsDv inhaler INHALE ONE PUFF BY MOUTH EVERY DAY 1 each 6     ipratropium-albuterol (COMBIVENT RESPIMAT)  mcg/actuation Mist inhaler Inhale 1 puff 4 (four) times a day. 1 Inhaler 5     ipratropium-albuterol (DUO-NEB) 0.5-2.5 mg/mL nebulizer INHALE 1 VIAL VIA NEBULIZER EVERY 6 HOURS 1080 mL 3     LACTOBACILLUS ACIDOPHILUS (ACIDOPHILUS ORAL) Take 1 tablet by mouth every morning.        magnesium 250 mg Tab Take 250 mg by mouth every morning.       metoprolol tartrate (LOPRESSOR) 100 MG tablet Take 1 tablet (100 mg total) by mouth 2 (two) times a day. 180 tablet 3     nitroglycerin (NITROSTAT) 0.4 MG SL tablet Place 1 tablet (0.4 mg total) under the tongue every 5 (five) minutes as needed for chest pain. 25 tablet 3     rosuvastatin (CRESTOR) 10 MG tablet Take 1 tablet (10 mg total) by mouth at bedtime. Take with a 5 mg pill for a  total of 15 mg nightly 90 tablet 1     lisinopril (PRINIVIL,ZESTRIL) 10 MG tablet Take 1 tablet (10 mg total) by mouth daily. 90 tablet 3     Current Facility-Administered Medications   Medication Dose Route Frequency Provider Last Rate Last Dose     denosumab 60 mg (PROLIA 60 mg/ml)  60 mg Subcutaneous Q6 Months Wilbur Hannah MD          Objective:   Vital Signs:   Visit Vitals  BP (!) 182/80 (Patient Site: Right Arm, Patient Position: Sitting, Cuff Size: Adult Large)   Pulse (!) 16   Ht 5' (1.524 m)   Wt 152 lb 14.4 oz (69.4 kg)   LMP 07/11/1971   BMI 29.86 kg/m         VisionScreening:  No exam data present     PHYSICAL EXAM  General Appearance: Alert, cooperative, no distress, appears stated age.  Head: Normocephalic, without obvious abnormality, atraumatic  Eyes: PERRL, conjunctiva/corneas clear, EOM's intact  Ears: Normal TM's and external ear canals, both ears  Nose: Nares normal, septum midline,mucosa normal, no drainage  Throat: Lips, mucosa, and tongue normal; teeth and gums normal  Neck: Supple, symmetrical, trachea midline, no adenopathy;  thyroid: not enlarged, symmetric, no tenderness/mass/nodules; no carotid bruit or JVD  Back: Symmetric, no curvature, ROM normal, no CVA tenderness.  Lungs: Clear to auscultation bilaterally, respirations unlabored.  Breasts: No breast masses, tenderness, asymmetry, or nipple discharge.  Heart: Regular rate and rhythm, S1 and S2 normal, no murmur, rub, or gallop.  Abdomen: Soft, non-tender, bowel sounds active all four quadrants,  no masses, no organomegaly.  Pelvic:declined  Extremities: Extremities normal, atraumatic, no cyanosis or edema.  Skin: Skin color, texture, turgor normal, no rashes or lesions.  Lymph nodes: Cervical, supraclavicular, and axillary nodes normal.  Neurologic: No focal neurological findings.      Assessment Results 12/14/2018   Activities of Daily Living No help needed   Instrumental Activities of Daily Living No help needed   Mini Cog Total  Score 3   Some recent data might be hidden     A Mini-Cog score of 0-2 suggests the possibility of dementia, score of 3-5 suggests no dementia    Identified Health Risks:     The patient was counseled and encouraged to consider modifying their diet and eating habits. She was provided with information on recommended healthy diet options.  Information on urinary incontinence and treatment options given to patient.  Patient's advanced directive was discussed and I am comfortable with the patient's wishes.

## 2021-06-23 NOTE — PATIENT INSTRUCTIONS - HE
Your blood pressure is 150/60 today. In general, we like it to be less than 140/90.    We will not make any big changes to your blood pressure medications today.  Keep taking the lisinopril as you have.    Encourage you to re-enroll in the [employer] program.    Consider volunteering.    Call your friends to set up visits.    We will have you come back to see me in 1 month for blood pressure check.  If your blood pressure continues to be mildly elevated, we may need to increase your lisinopril dose at that time.

## 2021-06-23 NOTE — PROGRESS NOTES
Clinic Note    Assessment:     Assessment and Plan:  1. Hypertension  She is back on lisinopril 10 mg.  She is also on metoprolol tartrate 100 mg twice daily.  Her blood pressure is 150/60.  She is still grieving her  of 68 years.  We talked about ways in which she could help work herself through this process.  She is going to follow-up with me in 1 month for BP check.  See patient instructions below.       Patient Instructions   Your blood pressure is 150/60 today. In general, we like it to be less than 140/90.    We will not make any big changes to your blood pressure medications today.  Keep taking the lisinopril as you have.    Encourage you to re-enroll in the [employer] program.    Consider volunteering.    Call your friends to set up visits.    We will have you come back to see me in 1 month for blood pressure check.  If your blood pressure continues to be mildly elevated, we may need to increase your lisinopril dose at that time.    Return in about 1 month (around 2/14/2019).         Subjective:      Patient comes to clinic today for follow-up of her hypertension.    I last saw the patient approximately 2 weeks ago.  At that time, her blood pressure was 160/70.  She was started on lisinopril by her PCP at her annual wellness visit 2 weeks prior to that appointment.  She decided that she would cut the dose back from 10 mg to 5 mg.  She thought she might be having some dizziness as a result of the lisinopril    We decided that we would increase her lisinopril back to 10 mg daily and have her schedule a 2-week follow-up appointment with me.  Today, she states that she is tolerating the lisinopril well.  No dizziness.    Her blood pressure is 150/60.    She is still grieving her  of 68 years.  She has not been sleeping well-may be for hours per night or so.    She otherwise denies any chest pain or shortness of breath.  No headache or dizziness.  No swelling in lower extremities.    The  following portions of the patient's history were reviewed and updated as appropriate: Allergies, medications, problems, prior note.    Review of Systems:    Review is otherwise negative except for what is mentioned above.     Social Hx:    Social History     Tobacco Use   Smoking Status Never Smoker   Smokeless Tobacco Never Used         Objective:     Vitals:    01/14/19 0946   BP: 150/60   Pulse: (!) 53   Weight: 152 lb (68.9 kg)       Exam:    General: No apparent distress. Calm. Alert and Oriented X3. Pt behavior is appropriate.  Chest/Lungs: Normal chest wall, clear to auscultation, normal respiratory effort and rate.   Heart/Pulses: Regular rate and rhythm, strong and equal radial pulses, no murmurs. Capillary refill <2 seconds. No edema.   Neurologic: Interactive, alert, no focal findings, CNs intact.   Skin: Warm, dry. Normal hair pattern. Free of lesions. Normal skin turgor.       Patient Active Problem List   Diagnosis     Chronic Sinusitis     Hypercholesterolemia     Moderate persistent asthma     Hypertension     Osteoporosis Senile     Osteoarthritis     CAD (coronary artery disease)     GI bleed     Anemia     Insomnia     Wrist fracture     Malignant neoplasm of upper-outer quadrant of left female breast (H)     Aromatase inhibitor use     Current Outpatient Medications   Medication Sig Dispense Refill     ALPRAZolam (XANAX) 0.25 MG tablet Take 1 tablet (0.25 mg total) by mouth at bedtime as needed for sleep. 30 tablet 0     anastrozole (ARIMIDEX) 1 mg tablet TAKE ONE TABLET BY MOUTH EVERY DAY 90 tablet 3     ascorbic acid (VITAMIN C) 1000 MG tablet Take 1,000 mg by mouth every morning.        aspirin 81 MG EC tablet Take 81 mg by mouth bedtime.       BREO ELLIPTA 200-25 mcg/dose DsDv inhaler INHALE ONE PUFF BY MOUTH EVERY DAY 1 each 6     calcium citrate-vitamin D (CITRACAL+D) 315-200 mg-unit per tablet Take 1 tablet by mouth bedtime.       ciprofloxacin HCl (CIPRO) 250 MG tablet TAKE 0.5 TABLETS  (125 MG TOTAL) BY MOUTH DAILY. 90 tablet 0     cyanocobalamin (VITAMIN B-12) 50 mcg tablet Take 50 mcg by mouth every morning.        DOCOSAHEXANOIC ACID/EPA (FISH OIL ORAL) Take 2 g by mouth daily.       docusate sodium (COLACE) 100 MG capsule Take 100 mg by mouth bedtime.       furosemide (LASIX) 20 MG tablet TAKE ONE TABLET EVERY DAY 90 tablet 2     INCRUSE ELLIPTA 62.5 mcg/actuation DsDv inhaler INHALE ONE PUFF BY MOUTH EVERY DAY 1 each 6     ipratropium-albuterol (COMBIVENT RESPIMAT)  mcg/actuation Mist inhaler Inhale 1 puff 4 (four) times a day. 1 Inhaler 5     ipratropium-albuterol (DUO-NEB) 0.5-2.5 mg/mL nebulizer INHALE 1 VIAL VIA NEBULIZER EVERY 6 HOURS 1080 mL 3     LACTOBACILLUS ACIDOPHILUS (ACIDOPHILUS ORAL) Take 1 tablet by mouth every morning.        lisinopril (PRINIVIL,ZESTRIL) 10 MG tablet Take 1 tablet (10 mg total) by mouth daily. 90 tablet 3     magnesium 250 mg Tab Take 250 mg by mouth every morning.       metoprolol tartrate (LOPRESSOR) 100 MG tablet Take 1 tablet (100 mg total) by mouth 2 (two) times a day. 180 tablet 3     nitroglycerin (NITROSTAT) 0.4 MG SL tablet Place 1 tablet (0.4 mg total) under the tongue every 5 (five) minutes as needed for chest pain. 25 tablet 3     rosuvastatin (CRESTOR) 10 MG tablet Take 1 tablet (10 mg total) by mouth at bedtime. Take with a 5 mg pill for a total of 15 mg nightly 90 tablet 1     Current Facility-Administered Medications   Medication Dose Route Frequency Provider Last Rate Last Dose     denosumab 60 mg (PROLIA 60 mg/ml)  60 mg Subcutaneous Q6 Months Wilbur Hannah MD   60 mg at 12/14/18 1521       I spent 25 minutes with patient face to face, of which >50% was counseling regarding the above plan       Gordon Sin (Rob), [place]    1/14/2019

## 2021-06-23 NOTE — TELEPHONE ENCOUNTER
Refill Approved    Rx renewed per Medication Renewal Policy. Medication was last renewed on 8/9/18.    Xochitl Keen, [facility] Connection Triage/Med Refill 2/7/2019     Requested Prescriptions   Pending Prescriptions Disp Refills     rosuvastatin (CRESTOR) 10 MG tablet [Pharmacy Med Name: ROSUVASTATIN CALCIUM 10 MG TAB] 90 tablet 1     Sig: TAKE 1 TABLET (10 MG TOTAL) BY MOUTH AT BEDTIME. TAKE WITH A 5 MG PILL FOR A TOTAL OF 15 MG NIGHTLY    Statins Refill Protocol (Hmg CoA Reductase Inhibitors) Passed - 2/6/2019  8:51 AM       Passed - PCP or prescribing provider visit in past 12 months     Last office visit with prescriber/PCP: Visit date not found OR same dept: 1/14/2019 Gordon Sin CNP OR same specialty: 1/14/2019 Gordon iSn CNP  Last physical: Visit date not found Last MTM visit: Visit date not found   Next visit within 3 mo: Visit date not found  Next physical within 3 mo: Visit date not found  Prescriber OR PCP: Kirk Chung MD  Last diagnosis associated with med order: 1. Dyslipidemia  - rosuvastatin (CRESTOR) 10 MG tablet [Pharmacy Med Name: ROSUVASTATIN CALCIUM 10 MG TAB]; Take 1 tablet (10 mg total) by mouth at bedtime. Take with a 5 mg pill for a total of 15 mg nightly  Dispense: 90 tablet; Refill: 1    If protocol passes may refill for 12 months if within 3 months of last provider visit (or a total of 15 months).

## 2021-06-24 NOTE — PROGRESS NOTES
PULMONARY PROGRESS NOTE      HPI:  Yanna Handy is a 87 y.o. female followed in the Pulmonary clinic for reactive airways disease. She  history of chronic rhinosinusitis with asthma seen previously at the [facility] and was previously intranasal antibiotics.  She has been seeing me for the last several years for her asthma and has been doing fairly well.  She describes that she continues to be compliant with both her Breo and her Incruse Ellipta inhalers and that she only uses her Combivent occasionally for shortness of breath.  She has been experiencing a cough that has been productive of yellow/white sputum but denies fevers or chills.  She was seen at the oncology clinic yesterday and was told that she was wheezing on exam however, does not heard any wheezing herself.  She has also been noted to have fairly elevated blood pressures and stated that at her visit yesterday her blood pressure was in the high 180s systolic range.  She is noted to be normotensive today.  Her ACT today is 5+3+2+4+3 = 17      Current Outpatient Medications on File Prior to Visit   Medication Sig Dispense Refill     ALPRAZolam (XANAX) 0.25 MG tablet Take 1 tablet (0.25 mg total) by mouth at bedtime as needed for sleep. 30 tablet 0     anastrozole (ARIMIDEX) 1 mg tablet TAKE ONE TABLET BY MOUTH EVERY DAY 90 tablet 3     ascorbic acid (VITAMIN C) 1000 MG tablet Take 1,000 mg by mouth every morning.        aspirin 81 MG EC tablet Take 81 mg by mouth bedtime.       BREO ELLIPTA 200-25 mcg/dose DsDv inhaler INHALE ONE PUFF BY MOUTH EVERY DAY 1 each 6     calcium citrate-vitamin D (CITRACAL+D) 315-200 mg-unit per tablet Take 1 tablet by mouth bedtime.       ciprofloxacin HCl (CIPRO) 250 MG tablet TAKE 0.5 TABLETS (125 MG TOTAL) BY MOUTH DAILY. 90 tablet 0     cyanocobalamin (VITAMIN B-12) 50 mcg tablet Take 50 mcg by mouth every morning.        DOCOSAHEXANOIC ACID/EPA (FISH OIL ORAL) Take 2 g by mouth daily.       furosemide (LASIX) 20 MG  tablet TAKE ONE TABLET EVERY DAY 90 tablet 2     INCRUSE ELLIPTA 62.5 mcg/actuation DsDv inhaler INHALE ONE PUFF BY MOUTH EVERY DAY 1 each 6     ipratropium-albuterol (COMBIVENT RESPIMAT)  mcg/actuation Mist inhaler Inhale 1 puff 4 (four) times a day. 1 Inhaler 5     ipratropium-albuterol (DUO-NEB) 0.5-2.5 mg/mL nebulizer INHALE 1 VIAL VIA NEBULIZER EVERY 6 HOURS 1080 mL 3     LACTOBACILLUS ACIDOPHILUS (ACIDOPHILUS ORAL) Take 1 tablet by mouth every morning.        lisinopril (PRINIVIL,ZESTRIL) 10 MG tablet Take 1 tablet (10 mg total) by mouth daily. (Patient taking differently: Take 15 mg by mouth daily.       ) 90 tablet 3     magnesium 250 mg Tab Take 250 mg by mouth every morning.       metoprolol tartrate (LOPRESSOR) 100 MG tablet Take 1 tablet (100 mg total) by mouth 2 (two) times a day. 180 tablet 3     nitroglycerin (NITROSTAT) 0.4 MG SL tablet Place 1 tablet (0.4 mg total) under the tongue every 5 (five) minutes as needed for chest pain. 25 tablet 3     rosuvastatin (CRESTOR) 10 MG tablet TAKE 1 TABLET (10 MG TOTAL) BY MOUTH AT BEDTIME. TAKE WITH A 5 MG PILL FOR A TOTAL OF 15 MG NIGHTLY 90 tablet 3     Current Facility-Administered Medications on File Prior to Visit   Medication Dose Route Frequency Provider Last Rate Last Dose     denosumab 60 mg (PROLIA 60 mg/ml)  60 mg Subcutaneous Q6 Months Wilbur Hannah MD   60 mg at 12/14/18 1521     Allergies   Allergen Reactions     Alendronate Nausea And Vomiting     Metoclopramide Hcl Anxiety     Rofecoxib Diarrhea and Nausea Only     Risedronate      Sulfa (Sulfonamide Antibiotics) Anaphylaxis           EXAM  /64   Pulse (!) 52   Resp 12   Ht 5' (1.524 m)   Wt 152 lb (68.9 kg)   LMP 07/11/1971   SpO2 96%   Breastfeeding? No   BMI 29.69 kg/m      Physical Exam   Constitutional: She is oriented to person, place, and time. She appears well-developed and well-nourished. No distress.   HENT:   Head: Normocephalic and atraumatic.   Nose: Nose  normal.   Eyes: Conjunctivae and EOM are normal. Pupils are equal, round, and reactive to light. No scleral icterus.   Neck: Neck supple. No tracheal deviation present.   Pulmonary/Chest: Effort normal. No stridor. She has no wheezes.   Musculoskeletal: Normal range of motion. She exhibits no edema.   Neurological: She is alert and oriented to person, place, and time. She has normal reflexes.   Skin: Skin is warm and dry. She is not diaphoretic. No pallor.   Psychiatric: She has a normal mood and affect.   Vitals reviewed.      PFTS 2/23/15 (unchanged from prior visit):  FEV1/FVC is 69% and is normal.   FEV1 is 1.26 L (87%) predicted and is normal.   FVC is 1.83 L (92%) predicted and normal.   There was no improvement in spirometry after a single inhaled dose of bronchodilator.   TLC is 3.52 L (79%) predicted and is normal.   RV is 1.17 L (51%) predicted and is reduced.   DLCO is 45% predicted and is reduced when it is corrected for hemoglobin.   Impression: Full Pulmonary Function Test is abnormal.   Spirometry and flow volume loop are within normal limits   Spirometry is not consistent with reversibility.   There is no hyperinflation.   There is no air-trapping.   Diffusion capacity when corrected for hemoglobin is moderately reduced.    SIX MINUTE WALK TEST / HOME O2 EVALUATION  6/9/15  (unchanged from prior visit):  SpO2 at rest on RA 95%   SpO2 after walking 6 minutes on RA 95%   Distance covered 672 feet   Recovery phase, SpO2 after 1 minute rest on RA was 93%     Impression:   No desaturation with activities.   No need for O2 supplementation.    NM Stress Test 6/14/18:    When compared to the images of 10/10/2016, there has been no significant change.    Lexiscan stress ECG is negative for inducible myocardial ischemia.    Lexiscan nuclear study is negative for inducible myocardial ischemia or infarction.    Normal left ventricular size, wall motion and function. The calculated left ventricular ejection  fraction is 72%.    CXR 5/5/16  (unchanged from prior visit):  Mild atelectasis or fibrosis in the lung bases. Upper lungs are clear. Mild cardiomegaly with normal pulmonary vascularity. Scoliosis of the thoracolumbar  spine.      IMPRESSION:  85-year-old female with a history of moderate persistent asthma, coronary artery disease status post stenting and some fibrotic changes noted on imaging studies presents to clinic today with concern for a follow-up visit for her asthma and was unfortunately unable to afford her Spiriva Respimat.  For the present we will recommend:    1. Asthma moderate persistent, with preserved spirometry.  Is describing increasing secretions.    Continue fluticasone/Vilanterol 1 puff daily, she knows to gargle after using this.    Continue Incuse Ellipta.    I have explained to the patient that she should only use the Combivent for rescue.    Instructed her to use her DuoNeb's with a flutter valve twice a day to help expectorate his secretions.  Given that she is not wheezy on exam I am not keen on initiating on steroids.  2. Overnight hypoxia: Noted on overnight oximetry. Is continuing to use supplemental oxygen overnight.   3. CAD: Has undergone placement of stents to the proximal and mid LAD.  Recent echocardiogram continues to be stable.  4. Isolated diffusion defect: Likely secondary to her fibrosis noted on imaging studies.    5. Chronic sinusitis: Has discontinued her tobramycin nebs per advice from the [facility]. I have advised that she start using sinus washes daily.  6. Follow-up: 4 months.    Lisa Amin  Pulmonary and Critical Care  [id number]

## 2021-06-24 NOTE — PATIENT INSTRUCTIONS - HE
1. I would like you to use your Aerobika device with the nebulizer twice a day. This will help bring up your secretions more easily.

## 2021-06-24 NOTE — PROGRESS NOTES
Patient instructed in use of aerobika with her nebulizer tubing.  Patient states good understanding of how to use the device.  Sent home with patient along with new nebulizer tubing. Phone numbers to call for questions sent home with patient as well.

## 2021-06-24 NOTE — PROGRESS NOTES
"Lara is here for follow up and reports her  recently passed away and is still grieving over the loss and is \"stressed\" about it. She is otherwise feeling well. Leeanna  "

## 2021-06-24 NOTE — PROGRESS NOTES
[facility] Hematology and Oncology Progress Note    Patient: Yanna Handy  MRN: [id number]  Date of Service:         Reason for Visit    Chief Complaint   Patient presents with     HE Cancer     Breast Cancer       Assessment and Plan    T2 N1 M0, stage IIb left breast cancer status post lumpectomy August 2, 2017  Osteopenia    Patient continues to do well with no evidence of recurrence of breast cancer.  She will continue anastrozole, calcium with vitamin D and Prolia injections.  We will see her again in 6 months with repeat mammogram.    Plan: As above      Measurable disease: None postoperatively    Current therapy: Anastrozole 1 mg p.o. daily started October 20, 2017    Treatment history: Lumpectomy in August 2017  Adjuvant radiation to 40-56 cGy in 16 fractions completed October 5, 2017        ECOG Performance   ECOG Performance Status: 0    Distress Assessment  Distress Assessment Score: 8(loss of her  recently)    Pain           Problem List    1. Malignant neoplasm of upper-outer quadrant of left breast in female, estrogen receptor positive (H)  Mammo Screening Bilateral   2. Encounter for screening mammogram for malignant neoplasm of breast   Mammo Screening Bilateral        CC: Wilbur Hannah MD    ______________________________________________________________________________    History of Present Illness    Ms. Yanna Handy is here for follow-up.  She was seen 6 months ago.  She is mourning the loss of her  but reports that she has good family and friend support.  Tolerating anastrozole fine.  No headaches or dizziness.  No shortness of breath or cough.  No new bone or abdominal pain.  No breast changes.  ECOG status is 1.      Pain Status  Currently in Pain: No/denies    Review of Systems    Constitutional  Fatigue: None  Fever: None  Chills: None  Weight Gain: None  Weight Loss: None  Neurosensory  Neurosensory (WDL): All neurosensory elements are within defined  limits  Cardiovascular  Cardiovascular (WDL): All cardiovascular elements are within defined limits  Pulmonary  Respiratory (WDL): Exceptions to WDL  Cough: Mild symptoms, nonprescription intervention indicated(prod cough of white to yellowish)  Dyspnea: Shortness of breath with moderate exertion  Hypoxia: None  Gastrointestinal  Anorexia: None  Constipation: None  Diarrhea: None  Dysphagia: None  Esophagitis: None(diet related)  Nausea: None  Pharyngitis: None  Vomiting: None  Dysgeusia: None  Dry Mouth: None  Genitourinary  Genitourinary (WDL): All genitourinary elements are within defined limits  Integumentary  Integumentary (WDL): All integumentary elements are within defined limits  Patient Coping  Patient Coping: Accepting  Distress Assessment  Distress Assessment Score: 8(loss of her  recently)  Accompanied by  Accompanied by: Alone    Past History  Past Medical History:   Diagnosis Date     Anemia 8/21/2015     Asthma      Breast cancer (H) 07/25/2017    left     CAD (coronary artery disease) 9/15/2014     Chronic bronchitis (H)      Chronic Sinusitis     Created by Conversion  Replacement Utility updated for latest IMO load     Emphysema     Created by Conversion      Fx ankle 8/2000     Fx wrist 12/2006    Right wrist      GI bleed 1/12/2015     Hx of radiation therapy 2017    left     Hypercholesterolemia     Created by Conversion      Hypertension      Insomnia 1/13/2016     Osteoarthritis     Created by Conversion  Replacement Utility updated for latest IMO load     Osteoporosis Senile     Created by Conversion      Squamous Cell Carcinoma Of The Skin     Created by Conversion          Past Surgical History:   Procedure Laterality Date     APPENDECTOMY  1971     BREAST LUMPECTOMY Left 2017    ca     BREAST SURGERY  08/02/2017    left lumpectomy, Dr. Pope     COLONOSCOPY N/A 1/15/2015    Procedure: COLONOSCOPY with biopsy of polyp;  Surgeon: Joel Coppola MD;  Location: [facility];   "Service:      CORONARY STENT PLACEMENT      \"I had 2 stent placements\"     EYE SURGERY      Cataracts     HYSTERECTOMY  1971     JOINT REPLACEMENT  1/2003    Right TKA     KNEE ARTHROSCOPY Left 10/2004     OOPHORECTOMY  1971    One removed     WI REMOVAL OF TONSILS,<13 Y/O      Description: Tonsillectomy;  Recorded: 02/12/2009;  Comments: 1941     WI REVISE SECONDARY VARICOSITY      Description: Varicose Vein Ligation;  Recorded: 02/12/2009;  Comments: 1988     WI TOTAL ABDOM HYSTERECTOMY      Description: Hysterectomy;  Recorded: 02/12/2009;  Comments: 1971     SINUS SURGERY      \"I had 3 sinus surgeries.\"       Physical Exam    Recent Vitals 2/25/2019   Height -   Weight 154 lbs 5 oz   BSA (m2) 1.72 m2   /65   Pulse 70   Temp 99.3   Temp src 1   SpO2 95   Some recent data might be hidden       GENERAL: Alert and oriented. Seated comfortably. In no distress.    HEAD: Atraumatic and normocephalic.  Has a full head of hair.    EYES: KENRICK, EOMI.  No pallor.  No icterus.    Oral cavity: no mucosal lesion or tonsillar enlargement.    NECK: supple. JVP normal.  No thyroid enlargement.    LYMPH NODES: No palpable, cervical, axillary or inguinal lymphadenopathy.    CHEST: clear to auscultation bilaterally.  Resonant to percussion throughout bilaterally.  Symmetrical breath movements bilaterally.    CVS: S1 and S2 are heard. Regular rate and rhythm.  No murmur or gallop or rub heard.    : Right side is benign.  On the left well-healed lumpectomy nags her scars.  No masses, no nipple inversion, no axillary adenopathy.    ABDOMEN: Soft. Not tender. Not distended.  No palpable hepatomegaly or splenomegaly.  No other mass palpable.  Bowel sounds heard.    EXTREMITIES: Warm.  No peripheral edema.    SKIN: no rash, or bruising or purpura.        Lab Results    No results found for this or any previous visit (from the past 168 hour(s)).    Imaging    No results found.      Signed by: Mindy Rodas MD  "

## 2021-06-24 NOTE — PATIENT INSTRUCTIONS - HE
Increase lisinopril half tablet to a total dose of 15 mg daily.    Follow-up with me in 1 month.  We will recheck kidney labs and electrolytes at that time.

## 2021-06-24 NOTE — PROGRESS NOTES
Clinic Note    Assessment:     Assessment and Plan:  1. Essential hypertension with goal blood pressure less than 140/90  Still not at goal.  She would like to increase her lisinopril to 15 mg daily.  We will see her back in 1 month for recheck.  Recheck a BMP at that time.       Patient Instructions   Increase lisinopril half tablet to a total dose of 15 mg daily.    Follow-up with me in 1 month.  We will recheck kidney labs and electrolytes at that time.    Return in about 1 month (around 3/18/2019).         Subjective:      Patient comes to clinic today for follow-up of her hypertension.    I last saw the patient 1 month ago.  At that time, her blood pressure was 150/60.  We had her increase her lisinopril to 10 mg daily.  She was to follow-up with me in 1 month.    Blood pressure today is 148/68.  She brings a log of measurements in with her today.  She checks about once per week.  Last measurement was 135/70.    She is still grieving the loss of her .  She has enrolled in a  support group and will start seeing them regularly.    The following portions of the patient's history were reviewed and updated as appropriate: Allergies, medications, problems, prior note.    Review of Systems:    Review is otherwise negative except for what is mentioned above.     Social Hx:    Social History     Tobacco Use   Smoking Status Never Smoker   Smokeless Tobacco Never Used         Objective:     Vitals:    02/18/19 1035   BP: 148/68   Pulse: (!) 58   Weight: 152 lb (68.9 kg)       Exam:    General: No apparent distress. Calm. Alert and Oriented X3. Pt behavior is appropriate.      Patient Active Problem List   Diagnosis     Chronic Sinusitis     Hypercholesterolemia     Moderate persistent asthma     Hypertension     Osteoporosis Senile     Osteoarthritis     CAD (coronary artery disease)     GI bleed     Anemia     Insomnia     Wrist fracture     Malignant neoplasm of upper-outer quadrant of left female breast  (H)     Aromatase inhibitor use     Current Outpatient Medications   Medication Sig Dispense Refill     ALPRAZolam (XANAX) 0.25 MG tablet Take 1 tablet (0.25 mg total) by mouth at bedtime as needed for sleep. 30 tablet 0     anastrozole (ARIMIDEX) 1 mg tablet TAKE ONE TABLET BY MOUTH EVERY DAY 90 tablet 3     ascorbic acid (VITAMIN C) 1000 MG tablet Take 1,000 mg by mouth every morning.        aspirin 81 MG EC tablet Take 81 mg by mouth bedtime.       BREO ELLIPTA 200-25 mcg/dose DsDv inhaler INHALE ONE PUFF BY MOUTH EVERY DAY 1 each 6     calcium citrate-vitamin D (CITRACAL+D) 315-200 mg-unit per tablet Take 1 tablet by mouth bedtime.       ciprofloxacin HCl (CIPRO) 250 MG tablet TAKE 0.5 TABLETS (125 MG TOTAL) BY MOUTH DAILY. 90 tablet 0     cyanocobalamin (VITAMIN B-12) 50 mcg tablet Take 50 mcg by mouth every morning.        DOCOSAHEXANOIC ACID/EPA (FISH OIL ORAL) Take 2 g by mouth daily.       docusate sodium (COLACE) 100 MG capsule Take 100 mg by mouth bedtime.       furosemide (LASIX) 20 MG tablet TAKE ONE TABLET EVERY DAY 90 tablet 2     INCRUSE ELLIPTA 62.5 mcg/actuation DsDv inhaler INHALE ONE PUFF BY MOUTH EVERY DAY 1 each 6     ipratropium-albuterol (COMBIVENT RESPIMAT)  mcg/actuation Mist inhaler Inhale 1 puff 4 (four) times a day. 1 Inhaler 5     ipratropium-albuterol (DUO-NEB) 0.5-2.5 mg/mL nebulizer INHALE 1 VIAL VIA NEBULIZER EVERY 6 HOURS 1080 mL 3     LACTOBACILLUS ACIDOPHILUS (ACIDOPHILUS ORAL) Take 1 tablet by mouth every morning.        lisinopril (PRINIVIL,ZESTRIL) 10 MG tablet Take 1 tablet (10 mg total) by mouth daily. 90 tablet 3     magnesium 250 mg Tab Take 250 mg by mouth every morning.       metoprolol tartrate (LOPRESSOR) 100 MG tablet Take 1 tablet (100 mg total) by mouth 2 (two) times a day. 180 tablet 3     nitroglycerin (NITROSTAT) 0.4 MG SL tablet Place 1 tablet (0.4 mg total) under the tongue every 5 (five) minutes as needed for chest pain. 25 tablet 3     rosuvastatin  (CRESTOR) 10 MG tablet TAKE 1 TABLET (10 MG TOTAL) BY MOUTH AT BEDTIME. TAKE WITH A 5 MG PILL FOR A TOTAL OF 15 MG NIGHTLY 90 tablet 3     Current Facility-Administered Medications   Medication Dose Route Frequency Provider Last Rate Last Dose     denosumab 60 mg (PROLIA 60 mg/ml)  60 mg Subcutaneous Q6 Months Wilbur Hannah MD   60 mg at 12/14/18 1521       I spent 15 minutes with patient face to face, of which >50% was counseling regarding the above plan       Gordno Sin (Rob), MORENO    2/18/2019

## 2021-06-25 NOTE — TELEPHONE ENCOUNTER
Pt would like to schedule CT Abd from yesterdays visit (6/1/21) please order scan so patient can schedule.

## 2021-06-25 NOTE — PATIENT INSTRUCTIONS - HE
Blood pressure looks good today.    Continue with current lisinopril dose.  15 mg daily.    Recheck lab work today.  I will mail results to you.    Follow-up with Dr. Hannah in June for repeat blood pressure check.    Schedule appointment to see me sooner if needed.

## 2021-06-25 NOTE — PROGRESS NOTES
Assessment/Plan:        1. Hiatal hernia  Ambulatory referral to Gastroenterology   2. Left lumbar radiculitis  gabapentin (NEURONTIN) 100 MG capsule   3. Radiculitis of left cervical region  gabapentin (NEURONTIN) 100 MG capsule   4. History of esophageal stricture  Ambulatory referral to Gastroenterology    omeprazole (PRILOSEC OTC) 20 MG tablet   5. Osteoporosis Senile  DXA Bone Density Scan    Vitamin D, Total (25-Hydroxy)   6. Abdominal pain, epigastric  Ambulatory referral to Gastroenterology    omeprazole (PRILOSEC OTC) 20 MG tablet    HM2(CBC w/o Differential)    Comprehensive Metabolic Panel    Lipase    Amylase   7. Nausea  ondansetron (ZOFRAN) 8 MG tablet    Ambulatory referral to Gastroenterology    omeprazole (PRILOSEC OTC) 20 MG tablet    HM2(CBC w/o Differential)    Comprehensive Metabolic Panel    Lipase    Amylase     1. Prolia 13th given today.  2. Epigastric pain - she will see [facility] and if needed EGD, probably will need to be arranged in the hospital setting. She will schedule CT abdomen to rule out pancreatic mass or inflammation, since the pain radiates to the back. Labs will be done today. Hiatal hernia can be the possible explanation of her symptoms. I sent Rx for PPI two times a day and Zofran prn.    This note has been dictated using voice recognition software. Any grammatical or context distortions are unintentional and inherent to the software.      Return in about 4 weeks (around 6/29/2021) for Recheck, multiple issues.    Patient Instructions   Increase omeprazole to twice a day.  I sent RX for ondansetron prn for nausea.  Schedule visit with [facility] and upper Gi endoscopy.    CT abdomen  - [phone]      Prolia 13th today.  Prolia 14th in 6 months with your annual wellness visit..    DXA in 10/2021.   Phone number to schedule [phone].    Daily calcium need is 4287-6273 mg a day from the diet and supplements.  Calcium citrate is easier to digest.  Vitamin D 2000 IU daily  recommended.          Subjective:    Yanna Handy is a 89 y.o. female  here for    Chief Complaint   Patient presents with     GI Problem     GI Issues and Prolia     89 yo female with h/o CAD and recent CT angiogram that showed patent stent and no occlusion, is here today for osteoporosis follow up and feeling of discomfort in epigastric area.  1. Osteoporosis - due for Prolia today.  2. Epigastric pain like a squeeze, daily for few months, radiates to her upper back, and frequent nausea for months. She has hiatal hernia and h/o esophageal strictures and dilatation 20 years ago. Has dysphagia often now. started omeprazole few weeks ago.BMs normal, stool color normal.    Social History     Socioeconomic History     Marital status:      Spouse name:  in 2018     Number of children: 2     Years of education: Not on file     Highest education level: Not on file   Occupational History     Not on file   Social Needs     Financial resource strain: Not on file     Food insecurity     Worry: Not on file     Inability: Not on file     Transportation needs     Medical: Not on file     Non-medical: Not on file   Tobacco Use     Smoking status: Never Smoker     Smokeless tobacco: Never Used   Substance and Sexual Activity     Alcohol use: Yes     Comment: Wine occassionally     Drug use: No     Sexual activity: Not on file   Lifestyle     Physical activity     Days per week: Not on file     Minutes per session: Not on file     Stress: Not on file   Relationships     Social connections     Talks on phone: Not on file     Gets together: Not on file     Attends [employer] service: Not on file     Active member of club or organization: Not on file     Attends meetings of clubs or organizations: Not on file     Relationship status: Not on file     Intimate partner violence     Fear of current or ex partner: Not on file     Emotionally abused: Not on file     Physically abused: Not on file     Forced sexual  "activity: Not on file   Other Topics Concern     Not on file   Social History Narrative    She lives alone in her own home.       Family History   Problem Relation Age of Onset     Nephritis Mother 37         in 1947     Sudden death Father 62        autopsy said \"massive heart attack\", 3 ppd smoker     Breast cancer Maternal Aunt 55     Lung cancer Brother 65     Colon cancer Maternal Grandmother 90     Lung cancer Maternal Grandfather 65     Brain cancer Son 25     Rectal cancer Brother 64     No Medical Problems Son      Review of Systems:     A 12 point comprehensive review of systems was negative except as noted in HPI.            Objective:    Physical Exam   /72   Pulse 60   Wt 164 lb 9.6 oz (74.7 kg)   LMP 1971   SpO2 96%   BMI 32.15 kg/m      Constitutional: oriented to person, place, and time, appears well-nourished. No distress.   HENT:   Head: Normocephalic.   Eyes: Conjunctivae are normal.   Neck: Normal range of motion. Neck supple.   Cardiovascular: Normal rate, regular rhythm and normal heart sounds.    Pulmonary/Chest: Effort normal and breath sounds normal.  Abdominal: Soft. Bowel sounds are normal. Slight tenderness in subepigastric area.  Musculoskeletal: Normal range of motion.   Neurological: alert and oriented to person, place, and time.  Psychiatric:  normal mood and affect.    Patient Active Problem List   Diagnosis     Chronic Sinusitis     Dyslipidemia, goal LDL below 70     Moderate persistent asthma     Essential hypertension     Osteoporosis Senile     Osteoarthritis     Coronary artery disease due to lipid rich plaque     Anemia     Insomnia     Malignant neoplasm of upper-outer quadrant of left female breast (H)     Aromatase inhibitor use     Recurrent UTI     Mild to moderate aortic stenosis     Hiatal hernia     Hepatic steatosis     (HFpEF) heart failure with preserved ejection fraction (H)     History of esophageal stricture       Current Outpatient " Medications on File Prior to Visit   Medication Sig Dispense Refill     acetaminophen (TYLENOL) 650 MG CR tablet Take 650 mg by mouth every 8 (eight) hours as needed for pain.       albuterol (PROAIR HFA;PROVENTIL HFA;VENTOLIN HFA) 90 mcg/actuation inhaler Inhale 2 puffs every 6 (six) hours as needed for wheezing. 1 Inhaler 11     anastrozole (ARIMIDEX) 1 mg tablet TAKE ONE TABLET BY MOUTH ONCE DAILY  90 tablet 1     ascorbic acid (VITAMIN C) 1000 MG tablet Take 1,000 mg by mouth every morning.        aspirin 81 MG EC tablet Take 81 mg by mouth bedtime.       calcium citrate-vitamin D (CITRACAL+D) 315-200 mg-unit per tablet Take 1 tablet by mouth bedtime.       ciprofloxacin HCl (CIPRO) 250 MG tablet Take 1/2 tablet (125 mg total) by mouth daily. 45 tablet 0     cyanocobalamin (VITAMIN B-12) 50 mcg tablet Take 50 mcg by mouth every morning.        fluticasone furoate-vilanteroL (BREO ELLIPTA) 200-25 mcg/dose DsDv inhaler Inhale 1 puff daily. Rinse mouth with water, gargle,spit after each use 180 each 3     furosemide (LASIX) 20 MG tablet Take 1 tablet (20 mg total) by mouth daily. 90 tablet 2     ipratropium-albuteroL (COMBIVENT RESPIMAT)  mcg/actuation Mist inhaler Inhale 1 puff 4 (four) times a day as needed. 3 Inhaler 3     ipratropium-albuteroL (DUO-NEB) 0.5-2.5 mg/3 mL nebulizer Take 3 mL by nebulization 2 (two) times a day as needed. 1080 mL 3     isosorbide mononitrate (IMDUR) 30 MG 24 hr tablet Take 0.5 tablets (15 mg total) by mouth daily. 45 tablet 3     LACTOBACILLUS ACIDOPHILUS (ACIDOPHILUS ORAL) Take 1 tablet by mouth every morning.        magnesium 250 mg Tab Take 250 mg by mouth every morning.       metoprolol tartrate (LOPRESSOR) 100 MG tablet Take 1 tablet (100 mg total) by mouth 2 (two) times a day. 180 tablet 3     nitroglycerin (NITROSTAT) 0.4 MG SL tablet Place 1 tablet (0.4 mg total) under the tongue every 5 (five) minutes as needed for chest pain. 2 Bottle 1     OXYGEN-AIR DELIVERY  SYSTEMS MISC Use 2 L As Directed. 2L at bedtime  [facility]       rosuvastatin (CRESTOR) 10 MG tablet Take 1 tablet (10 mg total) by mouth at bedtime. Take with a 5 mg pill for a total of 15 mg nightly 90 tablet 3     umeclidinium (INCRUSE ELLIPTA) 62.5 mcg/actuation DsDv inhaler Inhale 1 puff daily. 3 each 3     [DISCONTINUED] gabapentin (NEURONTIN) 100 MG capsule Taking 1 capsule by mouth in the morning, 1 capsule by mouth at noon and 3 capsules by mouth at bedtime daily. 150 capsule 1     [DISCONTINUED] omeprazole (PRILOSEC OTC) 20 MG tablet Take 20 mg by mouth daily before breakfast.       Current Facility-Administered Medications on File Prior to Visit   Medication Dose Route Frequency Provider Last Rate Last Admin     [COMPLETED] denosumab 60 mg (PROLIA 60 mg/ml)  60 mg Subcutaneous Q6 Months Wilbur Hannah MD   60 mg at 06/01/21 1123               Wilbur Hannah  6/1/2021

## 2021-06-25 NOTE — TELEPHONE ENCOUNTER
Please help her schedule a telephone visit with me so that we can discuss this in greater detail and prescribe medication, if needed

## 2021-06-25 NOTE — PROGRESS NOTES
Clinic Note    Assessment:     Assessment and Plan:  1. Hypertension  Blood pressure is 136/70 today.  Plan is to hold steady with lisinopril 15 mg daily.  She is feeling well today.  Plan is to have her follow-up in 3 months with PCP.  - Basic Metabolic Panel       Patient Instructions   Blood pressure looks good today.    Continue with current lisinopril dose.  15 mg daily.    Recheck lab work today.  I will mail results to you.    Follow-up with Dr. Hannah in June for repeat blood pressure check.    Schedule appointment to see me sooner if needed.    Return in about 3 months (around 6/18/2019).         Subjective:      Patient comes to clinic today for follow-up of her hypertension.    Last saw the patient approximately 1 month ago.  At that time, her blood pressure was 148/68.  We had her increase her lisinopril to 15 mg daily.    Today, blood pressure is 136/70.    Patient is feeling better in regards to the loss of her .  He passed away a few months ago.  She feels like she is progressing through the grieving process appropriately.  She still has not established with a  support group but feels like she will start going later this spring.  She has good support from her friends and family.    She denies any chest pain or shortness of breath.  She recently saw her oncology and pulmonology physicians.    The following portions of the patient's history were reviewed and updated as appropriate: Allergies, medications, problems, prior note.    Review of Systems:    Review is otherwise negative except for what is mentioned above.     Social Hx:    Social History     Tobacco Use   Smoking Status Never Smoker   Smokeless Tobacco Never Used         Objective:     Vitals:    03/18/19 0952   BP: 136/70   Pulse: (!) 52   Weight: 153 lb (69.4 kg)       Exam:    General: No apparent distress. Calm. Alert and Oriented X3. Pt behavior is appropriate.      Patient Active Problem List   Diagnosis     Chronic  Sinusitis     Hypercholesterolemia     Moderate persistent asthma     Hypertension     Osteoporosis Senile     Osteoarthritis     CAD (coronary artery disease)     GI bleed     Anemia     Insomnia     Wrist fracture     Malignant neoplasm of upper-outer quadrant of left female breast (H)     Aromatase inhibitor use     Current Outpatient Medications   Medication Sig Dispense Refill     ALPRAZolam (XANAX) 0.25 MG tablet Take 1 tablet (0.25 mg total) by mouth at bedtime as needed for sleep. 30 tablet 0     anastrozole (ARIMIDEX) 1 mg tablet TAKE ONE TABLET BY MOUTH EVERY DAY 90 tablet 3     ascorbic acid (VITAMIN C) 1000 MG tablet Take 1,000 mg by mouth every morning.        aspirin 81 MG EC tablet Take 81 mg by mouth bedtime.       BREO ELLIPTA 200-25 mcg/dose DsDv inhaler INHALE ONE PUFF BY MOUTH EVERY DAY 1 each 6     calcium citrate-vitamin D (CITRACAL+D) 315-200 mg-unit per tablet Take 1 tablet by mouth bedtime.       ciprofloxacin HCl (CIPRO) 250 MG tablet TAKE 0.5 TABLETS (125 MG TOTAL) BY MOUTH DAILY. 90 tablet 0     cyanocobalamin (VITAMIN B-12) 50 mcg tablet Take 50 mcg by mouth every morning.        DOCOSAHEXANOIC ACID/EPA (FISH OIL ORAL) Take 2 g by mouth daily.       furosemide (LASIX) 20 MG tablet TAKE ONE TABLET EVERY DAY 90 tablet 2     INCRUSE ELLIPTA 62.5 mcg/actuation DsDv inhaler INHALE ONE PUFF BY MOUTH EVERY DAY 1 each 6     ipratropium-albuterol (COMBIVENT RESPIMAT)  mcg/actuation Mist inhaler Inhale 1 puff 4 (four) times a day. 1 Inhaler 5     ipratropium-albuterol (DUO-NEB) 0.5-2.5 mg/mL nebulizer INHALE 1 VIAL VIA NEBULIZER EVERY 6 HOURS 1080 mL 3     LACTOBACILLUS ACIDOPHILUS (ACIDOPHILUS ORAL) Take 1 tablet by mouth every morning.        lisinopril (PRINIVIL,ZESTRIL) 10 MG tablet Take 1 tablet (10 mg total) by mouth daily. (Patient taking differently: Take 15 mg by mouth daily.       ) 90 tablet 3     magnesium 250 mg Tab Take 250 mg by mouth every morning.       metoprolol tartrate  (LOPRESSOR) 100 MG tablet Take 1 tablet (100 mg total) by mouth 2 (two) times a day. 180 tablet 3     nitroglycerin (NITROSTAT) 0.4 MG SL tablet Place 1 tablet (0.4 mg total) under the tongue every 5 (five) minutes as needed for chest pain. 25 tablet 3     rosuvastatin (CRESTOR) 10 MG tablet TAKE 1 TABLET (10 MG TOTAL) BY MOUTH AT BEDTIME. TAKE WITH A 5 MG PILL FOR A TOTAL OF 15 MG NIGHTLY 90 tablet 3     Current Facility-Administered Medications   Medication Dose Route Frequency Provider Last Rate Last Dose     denosumab 60 mg (PROLIA 60 mg/ml)  60 mg Subcutaneous Q6 Months Wilbur Hannah MD   60 mg at 12/14/18 1521       I spent 15 minutes with patient face to face, of which >50% was counseling regarding the above plan       Gordon Sin (Rob), M[place]    3/18/2019

## 2021-06-25 NOTE — TELEPHONE ENCOUNTER
Reason for Call:  Other prescription     Detailed comments: Patient seen in office 6/8/2021 she has a compression fracture in upper back.  She is requesting a prescription for pain as tylenol is not working for her.    [facility] [phone]      Phone Number Patient can be reached at: Home number on file [phone] (home)    Best Time: any    Can we leave a detailed message on this number?: Yes    Call taken on 6/15/2021 at 10:00 AM by Laura L Goldberg

## 2021-06-26 NOTE — TELEPHONE ENCOUNTER
Should patient give them the rest of the day to reach her?   Cheryl Ryan CMA ............... 1:48 PM, 06/23/21

## 2021-06-26 NOTE — PROGRESS NOTES
Assessment & Plan     Cough  Reassuring vital signs.  Chest x-ray per my personal interpretation reveals no pneumonia.  Radiology confirms this.  My suspicion is that she is dealing with an asthma exacerbation, which has been treated successfully with Levaquin and prednisone in the past  - XR Chest 2 Views  - levoFLOXacin (LEVAQUIN) 500 MG tablet  Dispense: 7 tablet; Refill: 0  - predniSONE (DELTASONE) 20 MG tablet  Dispense: 14 tablet; Refill: 0    Patient Instructions   No evidence of pneumonia today.    I think you are dealing with an underlying exacerbation of your asthma.    In the past, we have treated your asthma exacerbations with Levaquin and prednisone.  We will do the same today.    Continue on your Prilosec.    No other changes in medications today.  Make sure that you continue using your furosemide water pill while on the prednisone.    Go ahead and use your nebulizer treatments 3-4 times daily as needed.  Do this for the next week or so.    Copy of chest x-ray provided today.    If you do not feel like your condition is improving much over the next few days, come back and see me        Review of external notes as documented in note  27 minutes spent on the date of the encounter doing chart review, history and exam, documentation and further activities per the note       BMI:   Estimated body mass index is 31.74 kg/m  as calculated from the following:    Height as of 5/12/21: 5' (1.524 m).    Weight as of this encounter: 162 lb 8 oz (73.7 kg).       Return in about 2 months (around 8/8/2021).    Gordon Sin CNP  [facility]   Yanna Handy is 89 y.o. and presents today for the following health issues   HPI     Patient comes the clinic today with a 3-day cough.  Seems to be similar to a prior pneumonia issue that she had this past spring.  She has not had any fevers but she has felt ill at times.    Her symptoms seem to start after a recent outing  to the [place] to see her 's grave.  It has been quite hot and humid lately.    Cough has been productive.  She has been wheezing.    Review of Systems  Negative      Objective    /74 (Patient Site: Right Arm, Patient Position: Sitting, Cuff Size: Adult Regular)   Pulse 65   Temp 98  F (36.7  C) (Oral)   Wt 162 lb 8 oz (73.7 kg)   LMP 07/11/1971   SpO2 94%   BMI 31.74 kg/m    Body mass index is 31.74 kg/m .  Physical Exam  Expiratory wheezes in bilateral lung fields

## 2021-06-26 NOTE — TELEPHONE ENCOUNTER
Pt called and informed me that she called colon and rectal and she stated that if she was having an issue getting through than you would call and get her in. Please advise and inform Pt

## 2021-06-26 NOTE — PATIENT INSTRUCTIONS - HE
No evidence of pneumonia today.    I think you are dealing with an underlying exacerbation of your asthma.    In the past, we have treated your asthma exacerbations with Levaquin and prednisone.  We will do the same today.    Continue on your Prilosec.    No other changes in medications today.  Make sure that you continue using your furosemide water pill while on the prednisone.    Go ahead and use your nebulizer treatments 3-4 times daily as needed.  Do this for the next week or so.    Copy of chest x-ray provided today.    If you do not feel like your condition is improving much over the next few days, come back and see me

## 2021-06-26 NOTE — PATIENT INSTRUCTIONS - HE
Increase omeprazole to twice a day.  I sent RX for ondansetron prn for nausea.  Schedule visit with [facility] and upper Gi endoscopy.    CT abdomen  - [phone]      Prolia 13th today.  Prolia 14th in 6 months with your annual wellness visit..    DXA in 10/2021.   Phone number to schedule [phone].    Daily calcium need is 0979-2793 mg a day from the diet and supplements.  Calcium citrate is easier to digest.  Vitamin D 2000 IU daily recommended.

## 2021-06-26 NOTE — TELEPHONE ENCOUNTER
----- Message from Wilbur Hannha MD sent at 6/22/2021  5:26 PM CDT -----  Spoke with the patient ans CT scan result reviewed. For possible pneumonia, she is taking Levaqin and prednisone. We discussed abnormality on the sigmoid colon and spleen. She will schedule appointment with Colorectal clinic to review CT scan and their recommendations. I will connect her with Oncology after that.

## 2021-06-26 NOTE — TELEPHONE ENCOUNTER
I called them again. It looks like that they called her around 10 am and left the . I asked them to call geni again now.

## 2021-06-26 NOTE — PROGRESS NOTES
Progress Notes by Ananya Schaefer MD at 12/4/2018  8:30 AM     Author: Ananya Schaefer MD Service: -- Author Type: Physician    Filed: 12/4/2018  8:59 AM Encounter Date: 12/4/2018 Status: Signed    : Ananya Schaefer MD (Physician)                  [email or web address]  [phone]         Thank you Dr. Hannah for asking the [facility] team to participate in the care of your patient, Yanna Handy.     Impression and Plan     1. Coronary artery disease.  Yanna has known coronary artery disease.  Specifically, Yanna underwent coronary angiography on 15 September 2014 and confirmed the presence of significant LAD disease. She had successful drug-eluting stent placement to the proximal, as well as mid LAD.      Regadenoson nuclear perfusion study to July 2018 was favorable and revealed no evidence of infarct or ischemia (see Cardiac Diagnostics section below).     This is stable.  Patient denies any recurrent angina type chest pain.     2.  Aortic stenosis.  This was felt mild in degree on recent echocardiogram 2 July 2018.  This is commensurate with exam.  More advanced aortic stenosis is not suggested clinically.    3.  Hypertension. Blood pressure is somewhat elevated in the is something of an aberration for Yanna.  She indicates that she is still somewhat distraught from the recent loss of her  after 68 years of marriage and also has been sleeping poorly in part as a consequence.  Ultimately, no changes made today though I did advise that she check her blood pressure on her intermittent basis and contact either you or I should she have a tendency toward further higher readings.     Or.. Dyslipidemia.  Lipid profile 18 June 2018 revealed LDL 94 mg/dL and HDL 60 mg/dL.     Plan on follow-up in one year.          History of Present Illness    Once again I would like to thank you again for asking me to participate in the care of your patient,  Yanna Handy.  As you know, but to reiterate for my own records, Yanna Handy is a 87 y.o. female with known coronary artery disease. I initially saw Yanna for evaluation for shortness of breath. As part of her workup, she did undergo nuclear perfusion imaging, which did suggest evidence of ischemia. Patient was somewhat reticent to pursue direct angiography and ultimately CT angiography was performed for further evaluation. This did reveal a proximal LAD stenosis of 70-80% as well as moderate disease in the obtuse marginal.     The patient subsequently underwent coronary angiography on 15 September 2014 and confirmed the presence of significant LAD disease. She had successful drug-eluting stent placement to the proximal, as well as mid LAD.      In follow-up today, Yanna minimizes any recurrent chest pain symptoms.  She states her breathing is comfortable.  She reports no palpitations or lightheadedness.  Unfortunately, Yanna recently lost her  approximately 1 month ago after 68 years of marriage.    Further review of systems is otherwise negative/noncontributory (medical record and 13 point review of systems reviewed as well and pertinent positives noted).         Cardiac Diagnostics      Echocardiogram 2 July 2018:  1. Normal left ventricular size and systolic performance with ejection fraction of 55%.  2. Minimal aortic stenosis.  3. Mild aortic insufficiency.  4. Normal right ventricular size and systolic performance.  5. Normal atrial dimensions.  6. Mild aortic root enlargement.    Echocardiogram 28 July 2017:  1. Normal left ventricular size and systolic performance with ejection fraction of 50-55%.  2. Mild concentric increase in left ventricular wall thickness.  3. Mild aortic insufficiency.  4. Normal right ventricular size and systolic performance.  5. Normal atrial dimensions.  6. Mild aortic root enlargement.      Echocardiogram (personally reviewed) 20 August  "2014:  1. Normal LV size and function with ejection fraction of 55%.   2. Mild aortic insufficiency.  3. Mild, to possibly mild-moderate, mitral insufficiency.   4. Mild left atrial enlargement.     Regadenoson nuclear perfusion study 2 July 2018:  1. No evidence of ischemia or infarction.  2. No ECG evidence of ischemia.  3. Normal left ventricular systolic performance with ejection fraction of 72%.    Regadenoson nuclear perfusion study 10 October 2016:  1. No evidence of infarct or ischemia.  2. Normal left ventricular systolic performance with ejection fraction of 70%.     Nuclear perfusion study 20 August 2014 (pre-coronary angiogram which was performed 15 September 2014):  1. Medium-sized area of ischemia involving the mid-distal inferolateral and apical segments consistent with ischemia.  2. Ejection fraction 70%.     Coronary angiogram performed 15 September 2014:  1. 75% stenosis involving the proximal LAD and a 70% stenosis involving the mid LAD.   2. Both were successfully revascularized with drug-eluting stents.   3. The circumflex had \"minimal\" disease.   4. Right coronary artery was \"normal.\"    12-lead ECG (personally reviewed) 29 August 2017: Sinus rhythm.  Mild nonspecific T-wave change.  Borderline LVH.           Physical Examination       /68 (Patient Site: Right Arm, Patient Position: Sitting, Cuff Size: Adult Regular)   Pulse 64   Resp 16   Ht 5' (1.524 m)   Wt 153 lb (69.4 kg)   LMP 07/11/1971   BMI 29.88 kg/m          Wt Readings from Last 3 Encounters:   12/04/18 153 lb (69.4 kg)   08/20/18 165 lb 11.2 oz (75.2 kg)   08/01/18 168 lb (76.2 kg)       The patient is alert and oriented times three. Sclerae are anicteric. Mucosal membranes are moist. Jugular venous pressure is normal. No significant adenopathy/thyromegally appreciated. Lungs are clear with good expansion. On cardiovascular exam, the patient has a regular S1 and S2.  2/6 systolic murmur heard at right sternal border.  " Abdomen is soft and non-tender. Extremities reveal no clubbing, cyanosis, or edema.       Family History/Social History/Risk Factors   Patient does not smoke.  Family history of hypertension.         Medications  Allergies   Current Outpatient Medications   Medication Sig Dispense Refill   ? anastrozole (ARIMIDEX) 1 mg tablet TAKE ONE TABLET BY MOUTH EVERY DAY 90 tablet 3   ? ascorbic acid (VITAMIN C) 1000 MG tablet Take 1,000 mg by mouth every morning.      ? aspirin 81 MG EC tablet Take 81 mg by mouth bedtime.     ? BREO ELLIPTA 200-25 mcg/dose DsDv inhaler INHALE ONE PUFF BY MOUTH EVERY DAY 1 each 6   ? calcium citrate-vitamin D (CITRACAL+D) 315-200 mg-unit per tablet Take 1 tablet by mouth bedtime.     ? ciprofloxacin HCl (CIPRO) 250 MG tablet TAKE 0.5 TABLETS (125 MG TOTAL) BY MOUTH DAILY. 90 tablet 0   ? cyanocobalamin (VITAMIN B-12) 50 mcg tablet Take 50 mcg by mouth every morning.      ? DOCOSAHEXANOIC ACID/EPA (FISH OIL ORAL) Take 2 g by mouth daily.     ? furosemide (LASIX) 20 MG tablet TAKE ONE TABLET EVERY DAY 90 tablet 2   ? INCRUSE ELLIPTA 62.5 mcg/actuation DsDv inhaler INHALE ONE PUFF BY MOUTH EVERY DAY 1 each 6   ? ipratropium-albuterol (COMBIVENT RESPIMAT)  mcg/actuation Mist inhaler Inhale 1 puff 4 (four) times a day. 1 Inhaler 5   ? ipratropium-albuterol (DUO-NEB) 0.5-2.5 mg/mL nebulizer INHALE 1 VIAL VIA NEBULIZER EVERY 6 HOURS 1080 mL 3   ? LACTOBACILLUS ACIDOPHILUS (ACIDOPHILUS ORAL) Take 1 tablet by mouth every morning.      ? magnesium 250 mg Tab Take 250 mg by mouth every morning.     ? metoprolol tartrate (LOPRESSOR) 100 MG tablet Take 1 tablet (100 mg total) by mouth 2 (two) times a day. 180 tablet 3   ? nitroglycerin (NITROSTAT) 0.4 MG SL tablet Place 1 tablet (0.4 mg total) under the tongue every 5 (five) minutes as needed for chest pain. 25 tablet 3   ? rosuvastatin (CRESTOR) 10 MG tablet Take 1 tablet (10 mg total) by mouth at bedtime. Take with a 5 mg pill for a total of 15 mg  nightly 90 tablet 1   ? docusate sodium (COLACE) 100 MG capsule Take 100 mg by mouth bedtime.       Current Facility-Administered Medications   Medication Dose Route Frequency Provider Last Rate Last Dose   ? denosumab 60 mg (PROLIA 60 mg/ml)  60 mg Subcutaneous Q6 Months Wilbur Hannah MD   60 mg at 06/14/18 1628      Allergies   Allergen Reactions   ? Alendronate Nausea And Vomiting   ? Metoclopramide Hcl Anxiety   ? Rofecoxib Diarrhea and Nausea Only   ? Risedronate    ? Sulfa (Sulfonamide Antibiotics) Anaphylaxis          Lab Results   Lab Results   Component Value Date     06/18/2018    K 4.0 06/18/2018     06/18/2018    CO2 25 06/18/2018    BUN 13 06/18/2018    CREATININE 0.78 06/18/2018    CALCIUM 9.1 06/18/2018     Lab Results   Component Value Date    WBC 7.5 06/18/2018    WBC 6.7 08/21/2015    HGB 12.6 06/18/2018    HCT 37.9 06/18/2018    MCV 93 06/18/2018     06/18/2018     Lab Results   Component Value Date    CHOL 191 06/18/2018    TRIG 183 (H) 06/18/2018    HDL 60 06/18/2018    LDLCALC 94 06/18/2018     Lab Results   Component Value Date    INR 1.09 01/12/2015     Lab Results   Component Value Date    BNP 73 05/05/2016     Lab Results   Component Value Date    CKTOTAL 66 08/30/2016    TROPONINI <0.01 01/14/2015    TROPONINI 0.01 03/28/2012     Lab Results   Component Value Date    TSH 3.4 05/14/2014

## 2021-06-26 NOTE — PROGRESS NOTES
Yanna Handy is a 89 y.o. female who is being evaluated via a billable telephone visit.      What phone number would you like to be contacted at? [phone]  How would you like to obtain your AVS? AVS Preference: [place].    Assessment & Plan     Pathological compression fracture of vertebra, initial encounter (H)  Seen on last week's chest x-ray as a part of a cough work-up.  From a respiratory status, she is feeling much better but says that her thoracic back pain is been quite problematic.  Going to give her a prescription for Tylenol with Codeine to be used for short period while we get her set up with orthopedic consult for consideration of vertebroplasty  - acetaminophen-codeine (TYLENOL #3) 300-30 mg per tablet  Dispense: 30 tablet; Refill: 0  - Ambulatory referral to Orthopedic Surgery    18 minutes spent on the date of the encounter doing chart review, history and exam, documentation and further activities per the note       BMI:   Estimated body mass index is 31.74 kg/m  as calculated from the following:    Height as of 5/12/21: 5' (1.524 m).    Weight as of 6/8/21: 162 lb 8 oz (73.7 kg).       No follow-ups on file.    [facility]   Yanna Handy is 89 y.o. and presents today for the following health issues   HPI   Patient seeks telephone visit consultation today with regard to some midlevel back pain that she has been experiencing for at least the last month or so.    I saw her last week for a cough.  At that time, chest x-ray was clean but showed stable compression fracture in her thoracic spine.    Unclear how long she has been dealing with this thoracic compression fracture.    Her cough is much better after a course of prednisone and Levaquin, however, she continues to have some mid level back pain that has been quite problematic.    Tylenol is not sufficient.  She has been told to stay away from NSAIDs      Review of  Systems  Negative      Objective       Vitals:  No vitals were obtained today due to virtual visit.    Physical Exam  Not done          Phone call duration: 11 minutes

## 2021-06-28 NOTE — PROGRESS NOTES
Progress Notes by Ananya Schaefer MD at 2/5/2020  8:30 AM     Author: Ananya Schaefer MD Service: -- Author Type: Physician    Filed: 2/5/2020  8:33 AM Encounter Date: 2/5/2020 Status: Signed    : Ananya Schaefer MD (Physician)                                       Thank you Dr. Hannah for asking the [facility] team to participate in the care of your patient, Yanna Handy.     Impression and Plan     1. Coronary artery disease.  Yanna has known coronary artery disease.  Specifically, Yanna underwent coronary angiography on 15 September 2014 and confirmed the presence of significant LAD disease. She had successful drug-eluting stent placement to the proximal, as well as mid LAD.      Regadenoson nuclear perfusion study to July 2018 was favorable and revealed no evidence of infarct or ischemia (see Cardiac Diagnostics section below).     This is stable.  Patient denies any recurrent angina type chest pain.     2.  Aortic stenosis.  This was felt mild in degree on recent echocardiogram 2 July 2018.  This is commensurate with exam.  More advanced aortic stenosis is not suggested clinically.     3.  Hypertension. Blood pressure is somewhat elevated in the is something of an aberration for Yanna.  She currently, however, is suffering from some back discomfort.  She does check her blood pressure at home and invariably is having good readings.  She had an epidural injection yesterday and blood pressure was fairly reasonable at that time.  Given the aforementioned, no changes made today though I did advise that she check her blood pressure on her intermittent basis and contact either you or I should she have a tendency toward further higher readings.     4. Dyslipidemia.  Lipid profile 18 June 2018 revealed LDL 94 mg/dL and HDL 60 mg/dL.  She is scheduled for repeat lipid profile in June 2020.     Plan on follow-up in one year.               History of Present  Illness    Once again I would like to thank you again for asking me to participate in the care of your patient, Yanna Handy.  As you know, but to reiterate for my own records, Yanna Handy is a 88 y.o. female with known coronary artery disease. I initially saw Yanna for evaluation for shortness of breath. As part of her workup, she did undergo nuclear perfusion imaging, which did suggest evidence of ischemia. Patient was somewhat reticent to pursue direct angiography and ultimately CT angiography was performed for further evaluation. This did reveal a proximal LAD stenosis of 70-80% as well as moderate disease in the obtuse marginal.     The patient subsequently underwent coronary angiography on 15 September 2014 and confirmed the presence of significant LAD disease. She had successful drug-eluting stent placement to the proximal, as well as mid LAD.      In follow-up today, Yanna minimizes any recurrent chest pain symptoms.  She states her breathing is comfortable.  She reports no palpitations or lightheadedness.      Further review of systems is otherwise negative/noncontributory (medical record and 13 point review of systems reviewed as well and pertinent positives noted).         Cardiac Diagnostics      Echocardiogram 2 July 2018:  1. Normal left ventricular size and systolic performance with ejection fraction of 55%.  2. Minimal aortic stenosis.  3. Mild aortic insufficiency.  4. Normal right ventricular size and systolic performance.  5. Normal atrial dimensions.  6. Mild aortic root enlargement.    Echocardiogram 28 July 2017:  1. Normal left ventricular size and systolic performance with ejection fraction of 50-55%.  2. Mild concentric increase in left ventricular wall thickness.  3. Mild aortic insufficiency.  4. Normal right ventricular size and systolic performance.  5. Normal atrial dimensions.  6. Mild aortic root enlargement.     Echocardiogram (personally reviewed) 20 August  "2014:  1. Normal LV size and function with ejection fraction of 55%.   2. Mild aortic insufficiency.  3. Mild, to possibly mild-moderate, mitral insufficiency.   4. Mild left atrial enlargement.    Regadenoson nuclear perfusion study 2 July 2018:  1. No evidence of ischemia or infarction.  2. No ECG evidence of ischemia.  3. Normal left ventricular systolic performance with ejection fraction of 72%.    Regadenoson nuclear perfusion study 10 October 2016:  1. No evidence of infarct or ischemia.  2. Normal left ventricular systolic performance with ejection fraction of 70%.    Nuclear perfusion study 20 August 2014 (pre-coronary angiogram which was performed 15 September 2014):  1. Medium-sized area of ischemia involving the mid-distal inferolateral and apical segments consistent with ischemia.  2. Ejection fraction 70%.    Coronary angiogram performed 15 September 2014:  1. 75% stenosis involving the proximal LAD and a 70% stenosis involving the mid LAD.   2. Both were successfully revascularized with drug-eluting stents.   3. The circumflex had \"minimal\" disease.   4. Right coronary artery was \"normal.\"    12-lead ECG (personally reviewed) 29 August 2017: Sinus rhythm.  Mild nonspecific T-wave change.  Borderline LVH.            Physical Examination       /60 (Patient Site: Right Arm, Patient Position: Sitting, Cuff Size: Adult Regular)   Pulse 72   Resp 18   Ht 5' (1.524 m)   Wt 153 lb (69.4 kg)   LMP 07/11/1971   BMI 29.88 kg/m          Wt Readings from Last 3 Encounters:   02/05/20 153 lb (69.4 kg)   01/06/20 152 lb 6.4 oz (69.1 kg)   12/18/19 151 lb (68.5 kg)     The patient is alert and oriented times three. Sclerae are anicteric. Mucosal membranes are moist. Jugular venous pressure is normal. No significant adenopathy/thyromegally appreciated. Lungs are clear with good expansion. On cardiovascular exam, the patient has a regular S1 and S2. Abdomen is soft and non-tender. Extremities reveal no " clubbing, cyanosis, or edema.       Family History/Social History/Risk Factors   Patient does not smoke.  Family history reviewed, and family history includes Brain cancer (age of onset: 25) in her son; Breast cancer (age of onset: 55) in her maternal aunt; Colon cancer (age of onset: 90) in her maternal grandmother; Heart attack in her father; Lung cancer (age of onset: 65) in her brother and maternal grandfather; Rectal cancer (age of onset: 64) in her brother.          Medications  Allergies   Current Outpatient Medications   Medication Sig Dispense Refill   ? acetaminophen (TYLENOL) 650 MG CR tablet Take 650 mg by mouth every 8 (eight) hours as needed for pain.     ? ALPRAZolam (XANAX) 0.25 MG tablet Take 1 tablet (0.25 mg total) by mouth at bedtime as needed for sleep. 30 tablet 0   ? anastrozole (ARIMIDEX) 1 mg tablet TAKE ONE TABLET BY MOUTH EVERY DAY 90 tablet 3   ? ascorbic acid (VITAMIN C) 1000 MG tablet Take 1,000 mg by mouth every morning.      ? aspirin 81 MG EC tablet Take 81 mg by mouth bedtime.     ? BREO ELLIPTA 200-25 mcg/dose DsDv inhaler INHALE 1 PUFF BY MOUTH EVERY DAY 1 each 6   ? calcium citrate-vitamin D (CITRACAL+D) 315-200 mg-unit per tablet Take 1 tablet by mouth bedtime.     ? ciprofloxacin HCl (CIPRO) 250 MG tablet Take 0.5 tablets (125 mg total) by mouth daily. 45 tablet 1   ? cyanocobalamin (VITAMIN B-12) 50 mcg tablet Take 50 mcg by mouth every morning.      ? DOCOSAHEXANOIC ACID/EPA (FISH OIL ORAL) Take 2 g by mouth daily.     ? furosemide (LASIX) 20 MG tablet TAKE ONE TABLET EVERY DAY 90 tablet 2   ? gabapentin (NEURONTIN) 100 MG capsule Take 300 mg by mouth 3 (three) times a day. Follow Gabapentin Dosing chart given (Patient taking differently: Take 600 mg by mouth at bedtime. Follow Gabapentin Dosing chart given) 270 capsule 3   ? INCRUSE ELLIPTA 62.5 mcg/actuation DsDv inhaler INHALE ONE PUFF BY MOUTH EVERY DAY 1 each 6   ? ipratropium-albuterol (DUO-NEB) 0.5-2.5 mg/3 mL  nebulizer Take 3 mL by nebulization 2 (two) times a day as needed. 1080 mL 3   ? LACTOBACILLUS ACIDOPHILUS (ACIDOPHILUS ORAL) Take 1 tablet by mouth every morning.      ? losartan (COZAAR) 50 MG tablet Take 1 tablet (50 mg total) by mouth daily. 90 tablet 3   ? magnesium 250 mg Tab Take 250 mg by mouth every morning.     ? metoprolol tartrate (LOPRESSOR) 100 MG tablet Take 1 tablet (100 mg total) by mouth 2 (two) times a day. 180 tablet 3   ? nitroglycerin (NITROSTAT) 0.4 MG SL tablet Place 1 tablet (0.4 mg total) under the tongue every 5 (five) minutes as needed for chest pain. 25 tablet 3   ? OXYGEN-AIR DELIVERY SYSTEMS MISC Use 2 L As Directed. 2L at bedtime  [employer]     ? rosuvastatin (CRESTOR) 10 MG tablet TAKE 1 TABLET (10 MG TOTAL) BY MOUTH AT BEDTIME. TAKE WITH A 5 MG PILL FOR A TOTAL OF 15 MG NIGHTLY 90 tablet 3   ? benzonatate (TESSALON) 200 MG capsule Take 1 capsule (200 mg total) by mouth 3 (three) times a day as needed for cough. 30 capsule 0   ? ipratropium-albuterol (COMBIVENT RESPIMAT)  mcg/actuation Mist inhaler Inhale 1 puff 4 (four) times a day. 1 Inhaler 5   ? nitroglycerin (NITROSTAT) 0.4 MG SL tablet Place 1 tablet (0.4 mg total) under the tongue every 5 (five) minutes as needed for chest pain. 30 tablet prn     Current Facility-Administered Medications   Medication Dose Route Frequency Provider Last Rate Last Dose   ? denosumab 60 mg (PROLIA 60 mg/ml)  60 mg Subcutaneous Q6 Months Wilbur Hannah MD   60 mg at 12/18/19 1343      Allergies   Allergen Reactions   ? Alendronate Nausea And Vomiting   ? Metoclopramide Hcl Anxiety   ? Rofecoxib Diarrhea and Nausea Only   ? Lisinopril Cough   ? Risedronate    ? Sulfa (Sulfonamide Antibiotics) Anaphylaxis          Lab Results   Lab Results   Component Value Date     12/18/2019    K 4.2 12/18/2019     12/18/2019    CO2 23 12/18/2019    BUN 12 12/18/2019    CREATININE 0.82 12/18/2019    CALCIUM 9.7 12/18/2019     Lab Results    Component Value Date    WBC 8.4 12/14/2018    WBC 6.7 08/21/2015    HGB 13.7 12/14/2018    HCT 41.3 12/14/2018    MCV 91 12/14/2018     12/14/2018     Lab Results   Component Value Date    CHOL 191 06/18/2018    TRIG 183 (H) 06/18/2018    HDL 60 06/18/2018    LDLCALC 94 06/18/2018     Lab Results   Component Value Date    INR 1.09 01/12/2015     Lab Results   Component Value Date    BNP 73 05/05/2016     Lab Results   Component Value Date    CKTOTAL 66 08/30/2016    TROPONINI <0.01 01/14/2015    TROPONINI 0.01 03/28/2012     Lab Results   Component Value Date    TSH 3.4 05/14/2014

## 2021-06-30 NOTE — PROGRESS NOTES
Progress Notes by Dariusz Segura MD at 4/5/2021 10:20 AM     Author: Dariusz Segura MD Service: -- Author Type: Physician    Filed: 4/5/2021 10:52 AM Encounter Date: 4/5/2021 Status: Signed    : Dariusz Segura MD (Physician)            [facility] Note    Yanna Handy was advised by Bea Gamboa and Olive to meet with me today at the [facility] to evaluate dyspnea.     Assessment:    1. Dyspnea  BNP(B-type Natriuretic Peptide)   2. Chest pain  isosorbide mononitrate (IMDUR) 30 MG 24 hr tablet   3. Mild to moderate aortic stenosis     4. Essential hypertension  Renal function panel       Plan:    1. We will draw a renal profile and BNP level today and call her with those results.  I am suspicious that her worsened dyspnea is a result of acute on chronic heart failure with preserved ejection fraction; she had a left ventricular end-diastolic pressure of 17 mmHg during left heart catheterization on September 15, 2014.  2. I asked her to stay on the 40 mg dose of furosemide for the time being.  3. I added isosorbide mononitrate 30 mg p.o. daily to her treatment regimen and we also reviewed how to use sublingual nitroglycerin and when to call 911.  4. We discussed further testing with either repeat Lexiscan nuclear stress testing or invasive coronary angiography.  Lara, her son, and I all agreed that we should hold off on further testing for the time being.  5. She will return to see Dr. Schaefer in 2 weeks.    An After Visit Summary was printed and given to the patient.    Primary Cardiologist:  Dr. Jet Schaefer    Current History:    Lara and her son, Mateo, came to visit with me today at the request of Dr. Tra Gamboa.  She recently developed worsening shortness of breath and went to urgent care and was hospitalized at [facility].  She was diagnosed with pneumonia and placed on oral antibiotics and still  "has a few days of those not to take.  She met with Dr. Tra Gamboa last Friday and he noted that she had worsening shortness of breath and lower extremity edema and encouraged her to be admitted to the hospital.  She politely declined that advice and he increased her furosemide to 40 mg p.o. daily.  She states that has improved her breathing and resolved her lower extremity edema.  She keeps her legs up as much as she can and uses a hospital bed at night to elevate her legs.    She suffers from intermittent episodes of nonexertional chest pain.  Sometimes she feels a tightness under both breasts and sometimes it is a sharper pain in the upper substernal area.  The symptoms can last for 60 minutes or more.  She does not report associated symptoms such as nausea, diaphoresis or dyspnea.  She had a Lexiscan nuclear stress test just 4 months ago which was normal.  Her echocardiogram was repeated while at [facility] and demonstrated normal left ventricular systolic performance with mild aortic stenosis\" grade 1 left ventricular diastolic dysfunction\".    She is not experiencing lightheadedness, syncope, anorexia, or orthopnea.    Patient Active Problem List   Diagnosis   ? Chronic Sinusitis   ? Hypercholesterolemia   ? Moderate persistent asthma   ? Essential hypertension   ? Osteoporosis Senile   ? Osteoarthritis   ? CAD (coronary artery disease)   ? Anemia   ? Insomnia   ? Malignant neoplasm of upper-outer quadrant of left female breast (H)   ? Aromatase inhibitor use   ? Recurrent UTI   ? Mild to moderate aortic stenosis       Past Medical History:  Past Medical History:   Diagnosis Date   ? Anemia 08/21/2015   ? Asthma    ? Breast cancer (H) 07/25/2017    left   ? CAD (coronary artery disease) 09/15/2014   ? Chronic bronchitis (H)    ? Chronic Sinusitis    ? Emphysema    ? Essential hypertension    ? Fx ankle 08/2000   ? Fx wrist 12/2006    Right wrist    ? GI bleed 01/12/2015   ? Hx of radiation therapy 2017    " "left breast   ? Insomnia 2016   ? Mild to moderate aortic stenosis 2018   ? Osteoarthritis    ? Osteoporosis Senile    ? Squamous Cell Carcinoma Of The Skin    ? Wrist fracture 2017       Past Surgical History:  Past Surgical History:   Procedure Laterality Date   ? APPENDECTOMY  1971   ? BREAST LUMPECTOMY Left 2017    Dr. Pope   ? CATARACT EXTRACTION     ? COLONOSCOPY N/A 01/15/2015    COLONOSCOPY with biopsy of polyp;  Surgeon: Joel Coppola MD;  Location: [facility];  Service:   ? CORONARY STENT PLACEMENT  09/15/2014    2   ? HYSTERECTOMY  1971   ? KNEE ARTHROSCOPY Left 10/2004   ? OOPHORECTOMY  1971    unilateral   ? TX REMOVAL OF TONSILS,<13 Y/O  89   ? TX REVISE SECONDARY VARICOSITY      Varicose Vein Ligation   ? SINUS SURGERY      \"I had 3 sinus surgeries.\"   ? TOTAL KNEE ARTHROPLASTY Right 2003       Family History:  Family History   Problem Relation Age of Onset   ? Nephritis Mother 37         in 1947   ? Sudden death Father 62        autopsy said \"massive heart attack\", 3 ppd smoker   ? Breast cancer Maternal Aunt 55   ? Lung cancer Brother 65   ? Colon cancer Maternal Grandmother 90   ? Lung cancer Maternal Grandfather 65   ? Brain cancer Son 25   ? Rectal cancer Brother 64   ? No Medical Problems Son        Social History:   reports that she has never smoked. She has never used smokeless tobacco. She reports current alcohol use. She reports that she does not use drugs.  Social History     Socioeconomic History   ? Marital status:      Spouse name:  in    ? Number of children: 2   ? Years of education: Not on file   ? Highest education level: Not on file   Occupational History   ? Not on file   Social Needs   ? Financial resource strain: Not on file   ? Food insecurity     Worry: Not on file     Inability: Not on file   ? Transportation needs     Medical: Not on file     Non-medical: Not on file   Tobacco Use   ? Smoking status: Never Smoker "   ? Smokeless tobacco: Never Used   Substance and Sexual Activity   ? Alcohol use: Yes     Comment: Wine occassionally   ? Drug use: No   ? Sexual activity: Not on file   Lifestyle   ? Physical activity     Days per week: Not on file     Minutes per session: Not on file   ? Stress: Not on file   Relationships   ? Social connections     Talks on phone: Not on file     Gets together: Not on file     Attends Worship service: Not on file     Active member of club or organization: Not on file     Attends meetings of clubs or organizations: Not on file     Relationship status: Not on file   ? Intimate partner violence     Fear of current or ex partner: Not on file     Emotionally abused: Not on file     Physically abused: Not on file     Forced sexual activity: Not on file   Other Topics Concern   ? Not on file   Social History Narrative    She lives alone in her own home.       Medications:  Outpatient Encounter Medications as of 4/5/2021   Medication Sig Dispense Refill   ? acetaminophen (TYLENOL) 650 MG CR tablet Take 650 mg by mouth every 8 (eight) hours as needed for pain.     ? albuterol (PROAIR HFA;PROVENTIL HFA;VENTOLIN HFA) 90 mcg/actuation inhaler Inhale 2 puffs every 6 (six) hours as needed for wheezing. 1 Inhaler 11   ? anastrozole (ARIMIDEX) 1 mg tablet TAKE ONE TABLET BY MOUTH ONCE DAILY  90 tablet 0   ? ascorbic acid (VITAMIN C) 1000 MG tablet Take 1,000 mg by mouth every morning.      ? aspirin 81 MG EC tablet Take 81 mg by mouth bedtime.     ? calcium citrate-vitamin D (CITRACAL+D) 315-200 mg-unit per tablet Take 1 tablet by mouth bedtime.     ? ciprofloxacin HCl (CIPRO) 250 MG tablet Take 1/2 tablet (125 mg total) by mouth daily. 45 tablet 0   ? cyanocobalamin (VITAMIN B-12) 50 mcg tablet Take 50 mcg by mouth every morning.      ? fluticasone furoate-vilanteroL (BREO ELLIPTA) 200-25 mcg/dose DsDv inhaler Inhale 1 puff daily. Rinse mouth with water, gargle,spit after each use 180 each 3   ? furosemide  (LASIX) 20 MG tablet Take 2 tablets (40 mg total) by mouth daily. 90 tablet 2   ? gabapentin (NEURONTIN) 100 MG capsule Taking 1 capsule morning, 1 capsule at noon and 3 capsules at bedtime. 150 capsule 3   ? ipratropium-albuteroL (COMBIVENT RESPIMAT)  mcg/actuation Mist inhaler Inhale 1 puff 4 (four) times a day as needed. 3 Inhaler 3   ? ipratropium-albuteroL (DUO-NEB) 0.5-2.5 mg/3 mL nebulizer Take 3 mL by nebulization 2 (two) times a day as needed. 1080 mL 3   ? LACTOBACILLUS ACIDOPHILUS (ACIDOPHILUS ORAL) Take 1 tablet by mouth every morning.      ? magnesium 250 mg Tab Take 250 mg by mouth every morning.     ? metoprolol tartrate (LOPRESSOR) 100 MG tablet Take 1 tablet (100 mg total) by mouth 2 (two) times a day. 180 tablet 3   ? nitroglycerin (NITROSTAT) 0.4 MG SL tablet Place 1 tablet (0.4 mg total) under the tongue every 5 (five) minutes as needed for chest pain. 25 tablet 3   ? OXYGEN-AIR DELIVERY SYSTEMS [facility] [place] 2 [place] As Directed. 2L at bedtime  [facility]     ? rosuvastatin (CRESTOR) 10 MG tablet Take 1 tablet (10 mg total) by mouth at bedtime. Take with a 5 mg pill for a total of 15 mg nightly 90 tablet 0   ? umeclidinium (INCRUSE ELLIPTA) 62.5 mcg/actuation DsDv inhaler Inhale 1 puff daily. 3 each 3   ? fluticasone furoate (ARNUITY ELLIPTA) 100 mcg/actuation inhaler Inhale 1 puff daily. 30 each 6   ? isosorbide mononitrate (IMDUR) 30 MG 24 hr tablet Take 1 tablet (30 mg total) by mouth daily. 90 tablet 3     Facility-Administered Encounter Medications as of 4/5/2021   Medication Dose Route Frequency Provider Last Rate Last Admin   ? denosumab 60 mg (PROLIA 60 mg/ml)  60 mg Subcutaneous Q6 Months Wilbur Hannah MD   60 mg at 12/22/20 1016       Allergies  Alendronate, Metoclopramide hcl, Rofecoxib, Doxycycline, Lisinopril, Risedronate, and Sulfa (sulfonamide antibiotics)    Review of Systems    General: WNL  Eyes: WNL  Ears/Nose/Throat: WNL  Lungs: Shortness of Breath, Wheezing  Heart: Shortness of  Breath with activity, Chest Pain, Arm Pain, Leg Swelling  Stomach: WNL  Bladder: WNL  Muscle/Joints: WNL  Skin: WNL  Nervous System: WNL  Mental Health: WNL     Blood: WNL    Objective:    Wt Readings from Last 5 Encounters:   04/05/21 163 lb (73.9 kg)   04/02/21 163 lb (73.9 kg)   03/02/21 164 lb 9.6 oz (74.7 kg)   02/15/21 166 lb 11.2 oz (75.6 kg)   12/22/20 165 lb 8 oz (75.1 kg)         Body mass index is 31.83 kg/m .  /70 (Patient Site: Left Arm, Patient Position: Sitting, Cuff Size: Adult Regular)   Pulse 78   Resp 17   Wt 163 lb (73.9 kg)   LMP 07/11/1971   SpO2 97%   BMI 31.83 kg/m       Physical Exam:    General Appearance: Alert and not in distress   HEENT: No scleral icterus; the tongue was not examined as she is wearing a mask due to Covid restrictions   Neck: No cervical bruits, adenopathy, or thyromegaly; jugular venous pressure is difficult to evaluate due to obesity   Chest: The chest is symmetric   Lungs: Respirations are unlabored; the lungs are clear to auscultation   Cardiovasular: Auscultation reveals normal first and second heart sounds with a faint systolic ejection murmur   Extremities: No cyanosis, clubbing or pitting edema   Skin: No xanthelasma   Neurologic: Normal gait and coordination   Psychiatric: Mood and affect are normal       Cardiac testing:    EKG: I personally reviewed the photocopy of the recent EKG done at urgent care; it appears to represent sinus rhythm with frequent premature atrial beats and the patient and her son state that the specialist at [facility] said the same thing and disagreed with the handwritten interpretation of atrial fibrillation from the urgent care clinic    Echocardiogram:   Results for orders placed during the hospital encounter of 11/23/20   Echo Complete [ECH10] 11/23/2020    Narrative   1.Left ventricle ejection fraction is normal. The calculated left   ventricular ejection fraction is 66%.    2.Normal right ventricular size and  systolic function.    3.The ascending aorta is mildly dilated.    4.The aortic valve is tricuspid. There is thickening of the aortic   valve. The aortic valve is sclerotic. Mild to moderate aortic stenosis.   Mild aortic regurgitation.    5.When compared to the previous study dated 7/2/2018, left ventricular   ejection fraction appears less robust on current study, aortic stenosis   visually appears worse although measured hemodynamics underestimate this,   gradient was 7 and is now 6 mmHg, velocity was 1.3 and is now 1.2 m/s.   Suspect current study underestimates severity of aortic stenosis which   visually appears to be no worse than moderate.          Cardiac Catheterization:   Results for orders placed during the hospital encounter of 09/15/14   CV Coronary Angio Possible Percutaneous Coronary Intervention [CATH50] 09/15/2014    Narrative Cardiac Diagnostic + PCI Report     Demographics      Patient Name BHUMIKA QUICK  Referring Physician    SHIRAZ MARTIN MD      MRN #        [id number]          Diagnostic Physician   NELSON MYERS MD      Account #    [id number]           Interventional         NELSON MYERS MD                                   Physician      D.O.B        04/13/1931         Report Status:      Date of      09/15/2014 08:35   Study        AM     Procedure     Procedure Type      Diagnostic procedure:Left Heart Catheterization , Left   Ventriculography, Coronary Angiogram, CORONARY ANGIO POS PCI      PCI procedure:Drug Eluting Coronary Stent , ACT      Conclusions      Procedure Summary   82 yo woman with hx of dyspnea and asthma with ischemia on   stress imaging and severe dx in prox LAD on CTA. Angiography   revealed normal LM, trifurcates in LAD/Ramus and Circ, with   normal Circ/Ramus, 75% prox LAD and mid LAD in two separate   locations. Normal RCA. Systemic BP elevated 163/74 and LVEDP   15mmHg. Deployed and post dilated promus ELINOR to prox and mid   LAD, noted overlap first  diagonal, but DEBBI 3 flow post stent.   No chest pain or pressure during PCI raises concern for silent   ischemia of large territory due to CAD in prox/mid LAD.      Recommendations   asp 81mg if tolerated   clopidogrel 75mg PM (omeprazole in AM)   atorvastatin 40mg at night   Cardiac rehab   follow up with Dr. Ortega and Dr. Schaefer      Signatures      Electronically signed by NELSON MYERS MD   (Interventional Physician) on 09/15/2014 at 09:48 AM     Angiographic Findings      Diagnostic Findings      Cardiac Arteries and Lesion Findings     LMCA: Normal.    LAD:       Lesion on Prox LAD: 75% stenosis. Pre procedure DEBBI III flow was noted.    Bifurcation lesion.       Devices used       - [employer] PROMUS PREMIER ELINOR 3.03r74zq. 1 inflation(s) to a    max pressure of: 12 cheryl.       - [employer] NC Quantum [place] 3.0 x 8mm. 2 inflation(s) to a max    pressure of: 20 cheryl.       Lesion on Mid LAD: 70% stenosis. Pre procedure DEBBI III flow was noted.       Devices used       - [employer] PROMUS PREMIER ELINOR 2.97h59vt. 1 inflation(s) to a    max pressure of: 13 cheryl.       - [employer] NC Quantum [place] 3.0 x 8mm. 1 inflation(s) to a max    pressure of: 18 cheryl.     LCx: Minimal disease.    RCA: Normal.    Ramus: Minimal disease.    Interventional procedure  Cardiac lesions  LAD:       Lesion on Prox LAD: 75% stenosis reduced to 0%. Pre procedure DEBBI III    flow was noted. Post Procedure DEBBI III flow was present. The guidewire    cross was successful.Bifurcation lesion.       Devices used       - [employer] PROM[employer] PREMIER ELINOR 3.53w01pg. 1 inflation(s) to a    max pressure of: 12 cheryl.       - [employer] NC Quantum [place] 3.0 x 8mm. 2 inflation(s) to a max    pressure of: 20 cheryl.       Lesion on Mid LAD: 70% stenosis reduced to 0%. Pre procedure DEBBI III    flow was noted. Post Procedure DEBBI III flow was present. The guidewire    cross was successful.       Devices used        - [employer] PROMUS PREMIER ELINOR 2.43o50nt. 1 inflation(s) to a    max pressure of: 13 cheryl.       - [employer] NC Quantum [place] 3.0 x 8mm. 1 inflation(s) to a max    pressure of: 18 cheryl.     Procedure Data  Procedure Date  Date: 09/15/2014Start: 08:35 AMEnd: 09:30 AM    The procedure was explained in detail to the patient. Risks, complications  and alternative treatments were reviewed. Written consent was obtained.    Diagnostic Cath Status: Elective    Interventional Cath Status: Elective    Indications: Abnormal stress or perfusion study.    Entry Locations    - Percutaneous access was performed through the Right Femoral artery. A 4      Fr sheath was inserted. This was exchanged for a 6 Fr sheath.    Procedure Medications    - Midazolam (Versed) I.V. /per verbal M.D. order 0.5 mg.      - Fentanyl I.V. /per verbal M.D. order 25 mcg.      - Oxygen NC /per verbal M.D. order 2 liters/min.      - Heparin I.V. /per verbal M.D. order 4000 units.      - Eptifebatide I.V.Push (Integrilin) I.V. /per verbal M.D. order 180 .      - Nitroglycerin  mcg.      - Eptifebatide I.V.Push (Integrilin) I.V. /per verbal M.D. order 180 .      - Nitroglycerin  mcg.      - Midazolam (Versed) I.V. /per verbal M.D. order 0.5 mg.      - Fentanyl I.V. /per verbal M.D. order 25 mcg.      - Clopidogrel Bisulfate (Plavix) P.O. 600 mg.    Diagnostic Catheters    - ACordis 4.0 Fr JL 4.0was used for:Left coronary angiography.      - ACordis 4.0 Fr 3DRCwas used for:Right coronary angiography.      - ACordis 4.0 Fr PIGTAIL STRAIGHTwas used for:Left ventriculography.    Contrast Material    - Omnipaque 350 mgl/ml195 ml    Fluoroscopy Time: Diagnostic: 11:18 minutes. Total: 11:18 minutes.    Medical History    Allergies    - Other:(Vioxx, sulfa, actonel, fosamax, reglan).    Admission Data  Admission Date: 09/15/2014 Admission Time: 07:02 AM    Coronary Tree      Dominance: Right     Hemodynamics      Condition: Rest      O2  Consumption: Estimated: 155.26Heart Rate: 63 bpm     Pressures (mmHg)  +-----+--------------------------------------------------------------------+  !Site !Pressure                                                            !  +-----+--------------------------------------------------------------------+  !AO   !163/74 (109)                                                        !  +-----+--------------------------------------------------------------------+  !LV   !157/3 ,17                                                           !  +-----+--------------------------------------------------------------------+  !LV   !153/3 ,15                                                           !  +-----+--------------------------------------------------------------------+    Shunts    Oxygen Values      O2 Capacity 183.6 O2 Consumption 155.26     Discharge Data    Hospital Status: Outpatient          Results for orders placed during the hospital encounter of 07/02/18   NM Pharmacologic Stress Test [PCC371] 07/02/2018    Narrative   When compared to the images of 10/10/2016, there has been no significant   change.    Lexiscan stress ECG is negative for inducible myocardial ischemia.    Lexiscan nuclear study is negative for inducible myocardial ischemia or   infarction.    Normal left ventricular size, wall motion and function. The calculated   left ventricular ejection fraction is 72%.          Imaging:    No results found.    Lab Review:    Lab Results   Component Value Date     08/13/2020    K 4.9 08/13/2020     08/13/2020    CO2 23 08/13/2020    BUN 12 08/13/2020    CREATININE 0.81 08/13/2020    CALCIUM 8.9 08/13/2020     Lab Results   Component Value Date    WBC 7.6 08/13/2020    WBC 6.7 08/21/2015    HGB 12.0 08/13/2020    HCT 37.2 08/13/2020    MCV 93 08/13/2020     08/13/2020     Lab Results   Component Value Date    CHOL 171 06/18/2020    TRIG 131 06/18/2020    HDL 62 06/18/2020     LDL Calculated  (mg/dL)   Date Value   06/18/2020 83   06/18/2018 94   12/20/2017 102     BNP (pg/mL)   Date Value   08/13/2020 310 (H)   05/05/2016 73   05/14/2014 240 (H)           Much or all of the text in this note was generated through the use of the Dragon Dictate voice-to-text software. Errors in spelling or words which seem out of context are unintentional. Sound alike errors, in particular, may have escaped editing.

## 2021-06-30 NOTE — PROGRESS NOTES
"Progress Notes by Dariusz Segura MD at 4/19/2021  7:50 AM     Author: Dariusz Segura MD Service: -- Author Type: Physician    Filed: 4/19/2021  8:25 AM Encounter Date: 4/19/2021 Status: Signed    : Dariusz Segura MD (Physician)             Assessment:    1. Chest pain  CTA Coronary    isosorbide mononitrate (IMDUR) 30 MG 24 hr tablet   2. Coronary artery disease due to lipid rich plaque  CTA Coronary       Plan:    1. She will try isosorbide 15 mg p.o. daily to see if it helps to prevent angina.  Her son asked about the side effect of \"feeling sick all over\" and I confirmed for him that this is an unusual response to this medication.  2. We will call her with the results of the CT coronary angiogram.    An After Visit Summary was printed and given to the patient.    Subjective:    Yanna Handy returned for a planned 2-week follow up visit.  Her son, Mateo, accompanied her to the visit.    She states that taking a 30 mg tablet of isosorbide mononitrate made her feel \"sick all over\".  She did not experience headache.  She did develop lightheadedness which responded to holding furosemide for a day and then reducing the dose back to her usual 20 mg daily dose.  She states her leg swelling has resolved.  She is now using oxygen only at night, which is what she states was originally intended by Dr. Lisa Amin.    She had 2 episodes of chest pressure yesterday.  Each responded to a single nitroglycerin sublingual and 5 minutes.    We discussed the differences between CT and invasive coronary angiography at length this morning and I answered all of her and her son's questions.  She would like to proceed with CT coronary angiography.    Past Medical History:    Patient Active Problem List   Diagnosis   ? Chronic Sinusitis   ? Dyslipidemia, goal LDL below 70   ? Moderate persistent asthma   ? Essential hypertension   ? Osteoporosis Senile   ? Osteoarthritis   ? Coronary artery disease due to lipid rich " "plaque   ? Anemia   ? Insomnia   ? Malignant neoplasm of upper-outer quadrant of left female breast (H)   ? Aromatase inhibitor use   ? Recurrent UTI   ? Mild to moderate aortic stenosis       Past Medical History:   Diagnosis Date   ? Anemia 08/21/2015   ? Asthma    ? Breast cancer (H) 07/25/2017    left   ? CAD (coronary artery disease) 09/15/2014   ? Chronic bronchitis (H)    ? Chronic Sinusitis    ? Coronary artery disease due to lipid rich plaque 9/15/2014   ? Emphysema    ? Essential hypertension    ? Fx ankle 08/2000   ? Fx wrist 12/2006    right   ? GI bleed 01/12/2015   ? Hx of radiation therapy 2017    left breast   ? Insomnia 01/13/2016   ? Mild to moderate aortic stenosis 07/02/2018   ? Osteoarthritis    ? Osteoporosis Senile    ? Squamous Cell Carcinoma Of The Skin    ? Wrist fracture 06/16/2017       Past Surgical History:   Procedure Laterality Date   ? APPENDECTOMY  1971   ? BREAST LUMPECTOMY Left 08/02/2017    Dr. Pope   ? CATARACT EXTRACTION     ? COLONOSCOPY N/A 01/15/2015    COLONOSCOPY with biopsy of polyp;  Surgeon: Joel Coppola MD;  Location: [facility];  Service:   ? CORONARY STENT PLACEMENT  09/15/2014    2   ? HYSTERECTOMY  1971   ? KNEE ARTHROSCOPY Left 10/2004   ? OOPHORECTOMY  1971    unilateral   ? NM REMOVAL OF TONSILS,<13 Y/O  1941   ? NM REVISE SECONDARY VARICOSITY  1988    Varicose Vein Ligation   ? SINUS SURGERY      \"I had 3 sinus surgeries.\"   ? TOTAL KNEE ARTHROPLASTY Right 01/2003        reports that she has never smoked. She has never used smokeless tobacco. She reports current alcohol use. She reports that she does not use drugs.  Social History     Socioeconomic History   ? Marital status:      Spouse name:  in 2018   ? Number of children: 2   ? Years of education: Not on file   ? Highest education level: Not on file   Occupational History   ? Not on file   Social Needs   ? Financial resource strain: Not on file   ? Food insecurity     Worry: Not on " "file     Inability: Not on file   ? Transportation needs     Medical: Not on file     Non-medical: Not on file   Tobacco Use   ? Smoking status: Never Smoker   ? Smokeless tobacco: Never Used   Substance and Sexual Activity   ? Alcohol use: Yes     Comment: Wine occassionally   ? Drug use: No   ? Sexual activity: Not on file   Lifestyle   ? Physical activity     Days per week: Not on file     Minutes per session: Not on file   ? Stress: Not on file   Relationships   ? Social connections     Talks on phone: Not on file     Gets together: Not on file     Attends Voodoo service: Not on file     Active member of club or organization: Not on file     Attends meetings of clubs or organizations: Not on file     Relationship status: Not on file   ? Intimate partner violence     Fear of current or ex partner: Not on file     Emotionally abused: Not on file     Physically abused: Not on file     Forced sexual activity: Not on file   Other Topics Concern   ? Not on file   Social History Narrative    She lives alone in her own home.       Family History   Problem Relation Age of Onset   ? Nephritis Mother 37         in 1947   ? Sudden death Father 62        autopsy said \"massive heart attack\", 3 ppd smoker   ? Breast cancer Maternal Aunt 55   ? Lung cancer Brother 65   ? Colon cancer Maternal Grandmother 90   ? Lung cancer Maternal Grandfather 65   ? Brain cancer Son 25   ? Rectal cancer Brother 64   ? No Medical Problems Son        Outpatient Encounter Medications as of 2021   Medication Sig Dispense Refill   ? acetaminophen (TYLENOL) 650 MG CR tablet Take 650 mg by mouth every 8 (eight) hours as needed for pain.     ? albuterol (PROAIR HFA;PROVENTIL HFA;VENTOLIN HFA) 90 mcg/actuation inhaler Inhale 2 puffs every 6 (six) hours as needed for wheezing. 1 Inhaler 11   ? anastrozole (ARIMIDEX) 1 mg tablet TAKE ONE TABLET BY MOUTH ONCE DAILY  90 tablet 1   ? ascorbic acid (VITAMIN C) 1000 MG tablet Take 1,000 mg by " mouth every morning.      ? aspirin 81 MG EC tablet Take 81 mg by mouth bedtime.     ? calcium citrate-vitamin D (CITRACAL+D) 315-200 mg-unit per tablet Take 1 tablet by mouth bedtime.     ? ciprofloxacin HCl (CIPRO) 250 MG tablet Take 1/2 tablet (125 mg total) by mouth daily. 45 tablet 0   ? cyanocobalamin (VITAMIN B-12) 50 mcg tablet Take 50 mcg by mouth every morning.      ? fluticasone furoate-vilanteroL (BREO ELLIPTA) 200-25 mcg/dose DsDv inhaler Inhale 1 puff daily. Rinse mouth with water, gargle,spit after each use 180 each 3   ? furosemide (LASIX) 20 MG tablet Take 1 tablet (20 mg total) by mouth daily. 90 tablet 2   ? gabapentin (NEURONTIN) 100 MG capsule Taking 1 capsule by mouth in the morning, 1 capsule by mouth at noon and 3 capsules by mouth at bedtime daily. 150 capsule 1   ? ipratropium-albuteroL (COMBIVENT RESPIMAT)  mcg/actuation Mist inhaler Inhale 1 puff 4 (four) times a day as needed. 3 Inhaler 3   ? ipratropium-albuteroL (DUO-NEB) 0.5-2.5 mg/3 mL nebulizer Take 3 mL by nebulization 2 (two) times a day as needed. 1080 mL 3   ? isosorbide mononitrate (IMDUR) 30 MG 24 hr tablet Take 0.5 tablets (15 mg total) by mouth daily. 45 tablet 3   ? LACTOBACILLUS ACIDOPHILUS (ACIDOPHILUS ORAL) Take 1 tablet by mouth every morning.      ? magnesium 250 mg Tab Take 250 mg by mouth every morning.     ? metoprolol tartrate (LOPRESSOR) 100 MG tablet Take 1 tablet (100 mg total) by mouth 2 (two) times a day. 180 tablet 3   ? nitroglycerin (NITROSTAT) 0.4 MG SL tablet Place 1 tablet (0.4 mg total) under the tongue every 5 (five) minutes as needed for chest pain. 2 Bottle 1   ? OXYGEN-AIR DELIVERY SYSTEMS [facility] Use 2 L As Directed. 2L at bedtime  [employer]     ? rosuvastatin (CRESTOR) 10 MG tablet Take 1 tablet (10 mg total) by mouth at bedtime. Take with a 5 mg pill for a total of 15 mg nightly 90 tablet 0   ? umeclidinium (INCRUSE ELLIPTA) 62.5 mcg/actuation DsDv inhaler Inhale 1 puff daily. 3 each 3   ?  [DISCONTINUED] isosorbide mononitrate (IMDUR) 30 MG 24 hr tablet Take 1 tablet (30 mg total) by mouth daily. 90 tablet 3     Facility-Administered Encounter Medications as of 4/19/2021   Medication Dose Route Frequency Provider Last Rate Last Admin   ? denosumab 60 mg (PROLIA 60 mg/ml)  60 mg Subcutaneous Q6 Months Wilbur Hannah MD   60 mg at 12/22/20 1016       Review of Systems:     General: WNL  Eyes: WNL  Ears/Nose/Throat: WNL  Lungs: Shortness of Breath  Heart: Arm Pain, Shortness of Breath with activity  Stomach: WNL  Bladder: WNL  Muscle/Joints: Muscle Pain  Skin: WNL  Nervous System: WNL  Mental Health: WNL     Blood: WNL    Objective:     /60 (Patient Site: Left Arm, Patient Position: Sitting, Cuff Size: Adult Regular)   Pulse 66   Resp 20   Ht 5' (1.524 m)   Wt 165 lb (74.8 kg)   LMP 07/11/1971   BMI 32.22 kg/m    Wt Readings from Last 5 Encounters:   04/19/21 165 lb (74.8 kg)   04/05/21 163 lb (73.9 kg)   04/02/21 163 lb (73.9 kg)   03/02/21 164 lb 9.6 oz (74.7 kg)   02/15/21 166 lb 11.2 oz (75.6 kg)        Physical Exam:    GENERAL APPEARANCE: alert, no apparent distress  EYES: no scleral icterus  NECK: no carotid bruits or adenopathy, jugular venous pressure is difficult to evaluate due to obesity  CHEST: symmetric, the lungs are clear to auscultation  CARDIOVASCULAR: regular rhythm with a grade 1/6 systolic ejection murmur  EXTREMITIES: no cyanosis, clubbing or edema  SKIN: no xanthelasma  NEUROLOGIC: normal gait and coordination  PSYCHIATRIC: mood and affect are normal    Cardiac Testing:    Echocardiogram:   Results for orders placed during the hospital encounter of 11/23/20   Echo Complete [ECH10] 11/23/2020    Narrative   1.Left ventricle ejection fraction is normal. The calculated left   ventricular ejection fraction is 66%.    2.Normal right ventricular size and systolic function.    3.The ascending aorta is mildly dilated.    4.The aortic valve is tricuspid. There is thickening of  the aortic   valve. The aortic valve is sclerotic. Mild to moderate aortic stenosis.   Mild aortic regurgitation.    5.When compared to the previous study dated 7/2/2018, left ventricular   ejection fraction appears less robust on current study, aortic stenosis   visually appears worse although measured hemodynamics underestimate this,   gradient was 7 and is now 6 mmHg, velocity was 1.3 and is now 1.2 m/s.   Suspect current study underestimates severity of aortic stenosis which   visually appears to be no worse than moderate.          Cardiac Catheterization:   Results for orders placed during the hospital encounter of 09/15/14   CV Coronary Angio Possible Percutaneous Coronary Intervention [CATH50] 09/15/2014    Narrative Cardiac Diagnostic + PCI Report     Demographics      Patient Name BHUMIKA QUICK  Referring Physician    SHIRAZ MARTIN MD      MRN #        [id number]          Diagnostic Physician   NELSON MYERS MD      Account #    [id number]           Interventional         NELSON MYERS MD                                   Physician      D.O.B        04/13/1931         Report Status:      Date of      09/15/2014 08:35   Study        AM     Procedure     Procedure Type      Diagnostic procedure:Left Heart Catheterization , Left   Ventriculography, Coronary Angiogram, CORONARY ANGIO POS PCI      PCI procedure:Drug Eluting Coronary Stent , ACT      Conclusions      Procedure Summary   82 yo woman with hx of dyspnea and asthma with ischemia on   stress imaging and severe dx in prox LAD on CTA. Angiography   revealed normal LM, trifurcates in LAD/Ramus and Circ, with   normal Circ/Ramus, 75% prox LAD and mid LAD in two separate   locations. Normal RCA. Systemic BP elevated 163/74 and LVEDP   15mmHg. Deployed and post dilated promus ELINOR to prox and mid   LAD, noted overlap first diagonal, but DEBBI 3 flow post stent.   No chest pain or pressure during PCI raises concern for silent   ischemia of large  territory due to CAD in prox/mid LAD.      Recommendations   asp 81mg if tolerated   clopidogrel 75mg PM (omeprazole in AM)   atorvastatin 40mg at night   Cardiac rehab   follow up with Dr. Ortega and Dr. Schaefer      Signatures      Electronically signed by NELSON MYERS MD   (Interventional Physician) on 09/15/2014 at 09:48 AM     Angiographic Findings      Diagnostic Findings      Cardiac Arteries and Lesion Findings     LMCA: Normal.    LAD:       Lesion on Prox LAD: 75% stenosis. Pre procedure DEBBI III flow was noted.    Bifurcation lesion.       Devices used       - [employer] PROMUS PREMIER ELINOR 3.45m89rz. 1 inflation(s) to a    max pressure of: 12 cheryl.       - [employer] NC Quantum [place] 3.0 x 8mm. 2 inflation(s) to a max    pressure of: 20 cheryl.       Lesion on Mid LAD: 70% stenosis. Pre procedure EDBBI III flow was noted.       Devices used       - [employer] PROMUS PREMIER ELINOR 2.12z29vz. 1 inflation(s) to a    max pressure of: 13 cheryl.       - [employer] NC Quantum Henderson 3.0 x 8mm. 1 inflation(s) to a max    pressure of: 18 cheryl.     LCx: Minimal disease.    RCA: Normal.    Ramus: Minimal disease.    Interventional procedure  Cardiac lesions  LAD:       Lesion on Prox LAD: 75% stenosis reduced to 0%. Pre procedure DEBBI III    flow was noted. Post Procedure DEBBI III flow was present. The guidewire    cross was successful.Bifurcation lesion.       Devices used       - [employer] PROMUS PREMIER ELINOR 3.88g64tt. 1 inflation(s) to a    max pressure of: 12 cheryl.       - [employer] NC Quantum Henderson 3.0 x 8mm. 2 inflation(s) to a max    pressure of: 20 cheryl.       Lesion on Mid LAD: 70% stenosis reduced to 0%. Pre procedure DEBBI III    flow was noted. Post Procedure DEBBI III flow was present. The guidewire    cross was successful.       Devices used       - [employer] PROMUS PREMIER ELINOR 2.38u14sy. 1 inflation(s) to a    max pressure of: 13 cheryl.       - [employer]  NC Quantum [place] 3.0 x 8mm. 1 inflation(s) to a max    pressure of: 18 cheryl.     Procedure Data  Procedure Date  Date: 09/15/2014Start: 08:35 AMEnd: 09:30 AM    The procedure was explained in detail to the patient. Risks, complications  and alternative treatments were reviewed. Written consent was obtained.    Diagnostic Cath Status: Elective    Interventional Cath Status: Elective    Indications: Abnormal stress or perfusion study.    Entry Locations    - Percutaneous access was performed through the Right Femoral artery. A 4      Fr sheath was inserted. This was exchanged for a 6 Fr sheath.    Procedure Medications    - Midazolam (Versed) I.V. /per verbal M.D. order 0.5 mg.      - Fentanyl I.V. /per verbal M.D. order 25 mcg.      - Oxygen NC /per verbal M.D. order 2 liters/min.      - Heparin I.V. /per verbal M.D. order 4000 units.      - Eptifebatide I.V.Push (Integrilin) I.V. /per verbal M.D. order 180 .      - Nitroglycerin  mcg.      - Eptifebatide I.V.Push (Integrilin) I.V. /per verbal M.D. order 180 .      - Nitroglycerin  mcg.      - Midazolam (Versed) I.V. /per verbal M.D. order 0.5 mg.      - Fentanyl I.V. /per verbal M.D. order 25 mcg.      - Clopidogrel Bisulfate (Plavix) P.O. 600 mg.    Diagnostic Catheters    - ACordis 4.0 Fr JL 4.0was used for:Left coronary angiography.      - ACordis 4.0 Fr 3DRCwas used for:Right coronary angiography.      - ACordis 4.0 Fr PIGTAIL STRAIGHTwas used for:Left ventriculography.    Contrast Material    - Omnipaque 350 mgl/ml195 ml    Fluoroscopy Time: Diagnostic: 11:18 minutes. Total: 11:18 minutes.    Medical History    Allergies    - Other:(Vioxx, sulfa, actonel, fosamax, reglan).    Admission Data  Admission Date: 09/15/2014 Admission Time: 07:02 AM    Coronary Tree      Dominance: Right     Hemodynamics      Condition: Rest      O2 Consumption: Estimated: 155.26Heart Rate: 63 bpm     Pressures  (mmHg)  +-----+--------------------------------------------------------------------+  !Site !Pressure                                                            !  +-----+--------------------------------------------------------------------+  !AO   !163/74 (109)                                                        !  +-----+--------------------------------------------------------------------+  !LV   !157/3 ,17                                                           !  +-----+--------------------------------------------------------------------+  !LV   !153/3 ,15                                                           !  +-----+--------------------------------------------------------------------+    Shunts    Oxygen Values      O2 Capacity 183.6 O2 Consumption 155.26     Discharge Data    Hospital Status: Outpatient          Results for orders placed during the hospital encounter of 07/02/18   NM Pharmacologic Stress Test [FID853] 07/02/2018    Narrative   When compared to the images of 10/10/2016, there has been no significant   change.    Lexiscan stress ECG is negative for inducible myocardial ischemia.    Lexiscan nuclear study is negative for inducible myocardial ischemia or   infarction.    Normal left ventricular size, wall motion and function. The calculated   left ventricular ejection fraction is 72%.          Imaging:    No results found.    Lab Results:    Lab Results   Component Value Date    CREATININE 0.82 04/05/2021    BUN 11 04/05/2021     04/05/2021    K 3.8 04/05/2021     04/05/2021    CO2 30 04/05/2021     Lab Results   Component Value Date    CHOL 171 06/18/2020    TRIG 131 06/18/2020    HDL 62 06/18/2020     BNP (pg/mL)   Date Value   04/05/2021 264 (H)   08/13/2020 310 (H)   05/05/2016 73     Creatinine (mg/dL)   Date Value   04/05/2021 0.82   08/13/2020 0.81   06/18/2020 0.84   12/18/2019 0.82     Magnesium (mg/dL)   Date Value   01/13/2016 2.3   02/26/2015 2.1   01/15/2015 2.0      LDL Calculated (mg/dL)   Date Value   06/18/2020 83   06/18/2018 94   12/20/2017 102     Lab Results   Component Value Date    WBC 7.6 08/13/2020    WBC 6.7 08/21/2015    HGB 12.0 08/13/2020    HCT 37.2 08/13/2020    MCV 93 08/13/2020     08/13/2020           Much or all of the text in this note was generated through the use of the Dragon Dictate voice-to-text software. Errors in spelling or words which seem out of context are unintentional. Sound alike errors, in particular, may have escaped editing.

## 2021-06-30 NOTE — PROGRESS NOTES
Progress Notes by Ananya Schaefer MD at 12/15/2020  2:30 PM     Author: Ananya Schaefer MD Service: -- Author Type: Physician    Filed: 12/15/2020  2:49 PM Encounter Date: 12/15/2020 Status: Signed    : Ananya Schaefer MD (Physician)                                       Thank you Dr. Hannah for asking the [facility] team to participate in the care of your patient, Yanna Handy.     Impression and Plan     1. Coronary artery disease.  Yanna has known coronary artery disease.  Specifically, Yanna underwent coronary angiography on 15 September 2014 and confirmed the presence of significant LAD disease. She had successful drug-eluting stent placement to the proximal, as well as mid LAD.      Regadenoson nuclear perfusion study 23 November 2020 was favorable and revealed no evidence of infarct or ischemia (see Cardiac Diagnostics section below).     This is stable.  Patient denies any recurrent angina type chest pain.     2.  Aortic stenosis.  This was felt mild to moderate in degree on most recent echocardiogram 23 November 2020.  This is commensurate with exam.  More advanced aortic stenosis is not suggested clinically.  I would, however, like to personally review her echocardiogram.     3.  Shortness of breath.  I suspect that this is multifactorial.  Specifically, agree with her pulmonologist that there may be a pulmonary component.  Impaired diastolic filling likely also a contributor even in the absence of appearing volume neutral.  I did review her echocardiogram report and measurements and findings on there are suggestive of impaired diastolic filling.  I discussed the nature of this with the patient as well as son.    4.  Hypertension. Blood pressure is somewhat elevated in the office this afternoon and she has had some high readings at home as well.  Plan:    Initiate amlodipine in addition to current medical regimen particularly in light of suspected  impaired diastolic filling (all the more reason to be somewhat more aggressive with her blood pressure management)    5. Dyslipidemia. Lipid profile 18 June 2020 revealed LDL 83 mg/dL and HDL 62 mg/dL.      Plan on follow-up in approximately 3 months primarily to follow-up on her blood pressures as as well as her symptoms.  In addition, as aforementioned plan in the meantime to review her recent echocardiogram personally as well.           History of Present Illness    Once again I would like to thank you again for asking me to participate in the care of your patient, Yanna Handy.  As you know, but to reiterate for my own records, Yanna Handy is a 89 y.o. female with known coronary artery disease. I initially saw Yanna for evaluation for shortness of breath. As part of her workup, she did undergo nuclear perfusion imaging, which did suggest evidence of ischemia. Patient was somewhat reticent to pursue direct angiography and ultimately CT angiography was performed for further evaluation. This did reveal a proximal LAD stenosis of 70-80% as well as moderate disease in the obtuse marginal.     The patient subsequently underwent coronary angiography on 15 September 2014 and confirmed the presence of significant LAD disease. She had successful drug-eluting stent placement to the proximal, as well as mid LAD. Patient underwent nuclear perfusion imaging study 23 November 2020 which was favorable and revealed no evidence of infarct or ischemia.     In follow-up today, Yanna minimizes any recurrent chest pain symptoms.  She does report subjective shortness of breath and diminished exercise tolerance, however.  She was seen by her pulmonologist who thinks that this is in part likely pulmonary related and due to some reactive airway disease.  She reports no shortness of breath at night to suggest PND/orthopnea.  She reports no increasing lower extremity edema.  Denies any fevers, chills, or other  constitutional symptoms.    Further review of systems is otherwise negative/noncontributory (medical record and 13 point review of systems reviewed as well and pertinent positives noted).         Cardiac Diagnostics      Echocardiogram 23 November 2020:  1. Normal left ventricular size and systolic performance with ejection fraction of 65 to 70%.  2. Mild to moderate aortic stenosis.  3. Mild aortic insufficiency.  4. Normal right ventricular size and systolic performance.  5. Normal atrial dimensions.  6. Mild enlargement of proximal ascending aorta.    Echocardiogram 2 July 2018:  1. Normal left ventricular size and systolic performance with ejection fraction of 55%.  2. Minimal aortic stenosis.  3. Mild aortic insufficiency.  4. Normal right ventricular size and systolic performance.  5. Normal atrial dimensions.  6. Mild aortic root enlargement.    Echocardiogram 28 July 2017:  1. Normal left ventricular size and systolic performance with ejection fraction of 50-55%.  2. Mild concentric increase in left ventricular wall thickness.  3. Mild aortic insufficiency.  4. Normal right ventricular size and systolic performance.  5. Normal atrial dimensions.  6. Mild aortic root enlargement.     Echocardiogram (personally reviewed) 20 August 2014:  1. Normal LV size and function with ejection fraction of 55%.   2. Mild aortic insufficiency.  3. Mild, to possibly mild-moderate, mitral insufficiency.   4. Mild left atrial enlargement.    Regadenoson nuclear perfusion study 23 November 2020:  1. No evidence of infarct or ischemia.  2. Normal left ventricular systolic performance with ejection fraction of 64%.    Regadenoson nuclear perfusion study 2 July 2018:  1. No evidence of ischemia or infarction.  2. No ECG evidence of ischemia.  3. Normal left ventricular systolic performance with ejection fraction of 72%.    Regadenoson nuclear perfusion study 10 October 2016:  1. No evidence of infarct or ischemia.  2. Normal left  "ventricular systolic performance with ejection fraction of 70%.    Nuclear perfusion study 20 August 2014 (pre-coronary angiogram which was performed 15 September 2014):  1. Medium-sized area of ischemia involving the mid-distal inferolateral and apical segments consistent with ischemia.  2. Ejection fraction 70%.    Coronary angiogram performed 15 September 2014:  1. 75% stenosis involving the proximal LAD and a 70% stenosis involving the mid LAD.   2. Both were successfully revascularized with drug-eluting stents.   3. The circumflex had \"minimal\" disease.   4. Right coronary artery was \"normal.\"    12-lead ECG (personally reviewed) 29 August 2017: Sinus rhythm.  Mild nonspecific T-wave change.  Borderline LVH.            Physical Examination       /80 (Patient Site: Right Arm, Patient Position: Sitting, Cuff Size: Adult Regular)   Pulse 64   Resp 16   Ht 5' (1.524 m)   Wt 163 lb (73.9 kg)   LMP 07/11/1971   BMI 31.83 kg/m          Wt Readings from Last 3 Encounters:   12/15/20 163 lb (73.9 kg)   11/23/20 157 lb (71.2 kg)   11/23/20 163 lb (73.9 kg)     The patient is alert and oriented times three. Sclerae are anicteric. Mucosal membranes are moist. Jugular venous pressure is normal. No significant adenopathy/thyromegally appreciated. Lungs are minutes, but fairly clear. On cardiovascular exam, the patient has a regular S1 and S2.  Sounds are somewhat distant.  Abdomen is soft and non-tender. Extremities reveal no clubbing, cyanosis, with minimal edema.       Family History/Social History/Risk Factors   Patient does not smoke.  Family history reviewed, and family history includes Brain cancer (age of onset: 25) in her son; Breast cancer (age of onset: 55) in her maternal aunt; Colon cancer (age of onset: 90) in her maternal grandmother; Heart attack in her father; Lung cancer (age of onset: 65) in her brother and maternal grandfather; Rectal cancer (age of onset: 64) in her brother.        Medications "  Allergies   Current Outpatient Medications   Medication Sig Dispense Refill   ? acetaminophen (TYLENOL) 650 MG CR tablet Take 650 mg by mouth every 8 (eight) hours as needed for pain.     ? anastrozole (ARIMIDEX) 1 mg tablet TAKE ONE TABLET BY MOUTH DAILY 90 tablet 0   ? ascorbic acid (VITAMIN C) 1000 MG tablet Take 1,000 mg by mouth every morning.      ? aspirin 81 MG EC tablet Take 81 mg by mouth bedtime.     ? calcium citrate-vitamin D (CITRACAL+D) 315-200 mg-unit per tablet Take 1 tablet by mouth bedtime.     ? ciprofloxacin HCl (CIPRO) 250 MG tablet Take 0.5 tablets (125 mg total) by mouth daily. 45 tablet 0   ? cyanocobalamin (VITAMIN B-12) 50 mcg tablet Take 50 mcg by mouth every morning.      ? DOCOSAHEXANOIC ACID/EPA (FISH OIL ORAL) Take 2 g by mouth daily.     ? fluticasone furoate-vilanteroL (BREO ELLIPTA) 200-25 mcg/dose DsDv inhaler Inhale 1 puff daily. Rinse mouth with water, gargle,spit after each use 180 each 3   ? furosemide (LASIX) 20 MG tablet TAKE 1 TABLET BY MOUTH EVERY DAY 90 tablet 3   ? gabapentin (NEURONTIN) 100 MG capsule Taking 1 capsule morning, 1 capsule at noon and 3 capsules at bedtime. 150 capsule 3   ? ipratropium (ATROVENT HFA) 17 mcg/actuation inhaler Inhale 2 puffs as needed for wheezing.     ? ipratropium-albuteroL (COMBIVENT RESPIMAT)  mcg/actuation Mist inhaler Inhale 1 puff 4 (four) times a day as needed.     ? ipratropium-albuterol (DUO-NEB) 0.5-2.5 mg/3 mL nebulizer Take 3 mL by nebulization 2 (two) times a day as needed. 1080 mL 3   ? LACTOBACILLUS ACIDOPHILUS (ACIDOPHILUS ORAL) Take 1 tablet by mouth every morning.      ? losartan (COZAAR) 50 MG tablet Take 1 tablet (50 mg total) by mouth 2 (two) times a day. 180 tablet 3   ? magnesium 250 mg Tab Take 250 mg by mouth every morning.     ? metoprolol tartrate (LOPRESSOR) 100 MG tablet Take 1 tablet (100 mg total) by mouth 2 (two) times a day. 180 tablet 3   ? nitroglycerin (NITROSTAT) 0.4 MG SL tablet Place 1 tablet  (0.4 mg total) under the tongue every 5 (five) minutes as needed for chest pain. 25 tablet 3   ? OXYGEN-AIR DELIVERY SYSTEMS [facility] Use 2 L As Directed. 2L at bedtime  [facility]     ? predniSONE (DELTASONE) 5 MG tablet Take 10 mg by mouth daily for 14 days, THEN 5 mg daily. 88 tablet 4   ? rosuvastatin (CRESTOR) 10 MG tablet Take 1 tablet (10 mg total) by mouth at bedtime. Take with a 5 mg pill for a total of 15 mg nightly (Patient taking differently: Take 10 mg by mouth at bedtime. Take with a 5 mg pill for a total of 10 mg nightly) 90 tablet 3   ? umeclidinium (INCRUSE ELLIPTA) 62.5 mcg/actuation DsDv inhaler Inhale 1 puff daily. 3 each 3   ? amLODIPine (NORVASC) 2.5 MG tablet Take 1 tablet (2.5 mg total) by mouth daily. 90 tablet 3     No current facility-administered medications for this visit.       Allergies   Allergen Reactions   ? Alendronate Nausea And Vomiting   ? Metoclopramide Hcl Anxiety   ? Rofecoxib Diarrhea and Nausea Only   ? Doxycycline Nausea And Vomiting   ? Lisinopril Cough   ? Risedronate    ? Sulfa (Sulfonamide Antibiotics) Anaphylaxis          Lab Results   Lab Results   Component Value Date     08/13/2020    K 4.9 08/13/2020     08/13/2020    CO2 23 08/13/2020    BUN 12 08/13/2020    CREATININE 0.81 08/13/2020    CALCIUM 8.9 08/13/2020     Lab Results   Component Value Date    WBC 7.6 08/13/2020    WBC 6.7 08/21/2015    HGB 12.0 08/13/2020    HCT 37.2 08/13/2020    MCV 93 08/13/2020     08/13/2020     Lab Results   Component Value Date    CHOL 171 06/18/2020    TRIG 131 06/18/2020    HDL 62 06/18/2020    LDLCALC 83 06/18/2020     Lab Results   Component Value Date    INR 1.09 01/12/2015     Lab Results   Component Value Date     (H) 08/13/2020     Lab Results   Component Value Date    CKTOTAL 66 08/30/2016    TROPONINI 0.01 08/13/2020    TROPONINI <0.01 01/14/2015    TROPONINI 0.01 03/28/2012     Lab Results   Component Value Date    TSH 3.4 05/14/2014

## 2021-07-03 NOTE — ADDENDUM NOTE
Addendum Note by Bloch, Lisa M, CMA at 12/20/2017  4:26 PM     Author: Bloch, Lisa M, CMA Service: -- Author Type: Certified Medical Assistant    Filed: 12/20/2017  4:26 PM Encounter Date: 12/20/2017 Status: Signed    : Bloch, Lisa M, CMA (Certified Medical Assistant)    Addended by: BLOCH, LISA M on: 12/20/2017 04:26 PM        Modules accepted: Orders

## 2021-07-03 NOTE — ADDENDUM NOTE
Addendum Note by Claus Hannah MD at 12/11/2019  9:24 AM     Author: Claus Hannah MD Service: -- Author Type: Physician    Filed: 12/17/2019  9:34 PM Encounter Date: 12/11/2019 Status: Signed    : Claus Hannah MD (Physician)    Addended by: CLAUS HANNAH on: 12/17/2019 09:34 PM        Modules accepted: Orders

## 2021-07-04 NOTE — ADDENDUM NOTE
Addendum Note by Claus Hannah MD at 6/1/2021 10:40 AM     Author: Claus Hannah MD Service: -- Author Type: Physician    Filed: 6/2/2021 12:10 PM Encounter Date: 6/1/2021 Status: Signed    : Claus Hannah MD (Physician)    Addended by: CLAUS HANNAH on: 6/2/2021 12:10 PM        Modules accepted: Orders

## 2021-07-13 NOTE — PATIENT INSTRUCTIONS
1) I'm going to taper your prednisone and give you a course of levaquin as this seems to be effective  2) I will setup a time to test the mucolytic agent to clear the mucous out of your lungs which I hope will prevent pneumonia  3) We will also look into prescribing the nebulized antibiotic to also prevent pneumonia

## 2021-07-13 NOTE — LETTER
7/13/2021       RE: Yanna Handy  [address]  [place] [address]     Dear Colleague,    Thank you for referring your patient, Yanna Handy, to the [facility]. Please see a copy of my visit note below.    Assessment and Plan:Yanna Handy is a 89 year old female with a past medical history significant for recurrent pneumonia from chronic bronchitis who presents to clinic today for follow up.  She has had multiple pneumonias since I last saw her, one was treated with IV antibiotics at home for her pseudomonal infection.  She remains at high risk of hospitalization with this condition.  We can try to treat her current exacerbation better with antibiotics more appropriate for her known infections.  I'd also like to start a more aggressive nebulizer regimen if possible with inhaled mucomyst to clear her secretions, and if possible nebulized tobramycin to suppress her infection.    1) Pneumonia, pseudomonal, right middle lobe- change current antibiotics to levaquin 500mg for 14 days.  Taper off her prednisone with 20mg for 7 days then 10mg for 7 days.  Warned of increased tendonitis with this pair, has tolerated multiple times before.  2) Chronic bronchitis - start mucomyst after a test dose.  Have ordered one time dose for tolerability before full Rx.  If she is tolerating this, but not improved, we can then move to tobramycin for inhaled use.  3) asthma - this may have morphed into chronic bronchitis phenotype.  Continue albuterol, breo and incruse  4) RTC in 3 months          CCx: pneumonia    HPI: Ms. Handy is a 89 year old female with severe asthma, bronchitis who reports for follow up of multiple pneumonias.  She is currently battling an infection now having been to the urgent care a week ago.  She was given prednisone, ceftin, and a zpack.  She felt a little better after a day, but is now back to miserable  with a thick cough, dyspnea and mucous production.  She reports this has happened multiple times since I saw her last, although one time resulted in a CHF admission.  She has had the best luck with levaquin.  She did go home with cefepime in a PICC line from [facility] which also worked for a couple of months before she felt bad again.  She doesn't have fevers, but is coughing all the time.  Her mucous is dark and grayish.    ROS:  Review of Systems - History obtained from the patient  General ROS: negative  Psychological ROS: negative  ENT ROS: negative  Allergy and Immunology ROS: negative  Endocrine ROS: negative  Respiratory ROS: positive for - cough, shortness of breath and sputum changes  negative for - stridor or tachypnea  Cardiovascular ROS: no chest pain or palpitations  Gastrointestinal ROS: no abdominal pain, change in bowel habits, or black or bloody stools  Genito-Urinary ROS: no dysuria, trouble voiding, or hematuria  Musculoskeletal ROS: negative  Neurological ROS: no TIA or stroke symptoms  Dermatological ROS: negative      Current Meds:  Current Outpatient Medications   Medication Sig Dispense Refill     acetaminophen (TYLENOL) 650 MG CR tablet [ACETAMINOPHEN (TYLENOL) 650 MG CR TABLET] Take 650 mg by mouth every 8 (eight) hours as needed for pain.       albuterol (PROAIR HFA;PROVENTIL HFA;VENTOLIN HFA) 90 mcg/actuation inhaler [ALBUTEROL (PROAIR HFA;PROVENTIL HFA;VENTOLIN HFA) 90 MCG/ACTUATION INHALER] Inhale 2 puffs every 6 (six) hours as needed for wheezing. 1 Inhaler 11     anastrozole (ARIMIDEX) 1 mg tablet [ANASTROZOLE (ARIMIDEX) 1 MG TABLET] TAKE ONE TABLET BY MOUTH ONCE DAILY  90 tablet 1     ascorbic acid (VITAMIN C) 1000 MG tablet [ASCORBIC ACID (VITAMIN C) 1000 MG TABLET] Take 1,000 mg by mouth every morning.        aspirin 81 MG EC tablet [ASPIRIN 81 MG EC TABLET] Take 81 mg by mouth bedtime.       calcium citrate-vitamin D (CITRACAL+D) 315-200 mg-unit per tablet [CALCIUM  CITRATE-VITAMIN D (CITRACAL+D) 315-200 MG-UNIT PER TABLET] Take 1 tablet by mouth bedtime.       ciprofloxacin HCl (CIPRO) 250 MG tablet [CIPROFLOXACIN HCL (CIPRO) 250 MG TABLET] Take 1/2 tablet (125 mg total) by mouth daily. 45 tablet 0     cyanocobalamin (VITAMIN B-12) 50 mcg tablet [CYANOCOBALAMIN (VITAMIN B-12) 50 MCG TABLET] Take 50 mcg by mouth every morning.        fluticasone furoate-vilanteroL (BREO ELLIPTA) 200-25 mcg/dose DsDv inhaler [FLUTICASONE FUROATE-VILANTEROL (BREO ELLIPTA) 200-25 MCG/DOSE DSDV INHALER] Inhale 1 puff daily. Rinse mouth with water, gargle,spit after each use 180 each 3     furosemide (LASIX) 20 MG tablet [FUROSEMIDE (LASIX) 20 MG TABLET] Take 1 tablet (20 mg total) by mouth daily. 90 tablet 2     gabapentin (NEURONTIN) 100 MG capsule [GABAPENTIN (NEURONTIN) 100 MG CAPSULE] Taking 1 capsule by mouth in the morning, 1 capsule by mouth at noon and 2 capsules by mouth at bedtime daily. 150 capsule 1     ipratropium-albuteroL (COMBIVENT RESPIMAT)  mcg/actuation Mist inhaler [IPRATROPIUM-ALBUTEROL (COMBIVENT RESPIMAT)  MCG/ACTUATION MIST INHALER] Inhale 1 puff 4 (four) times a day as needed. 3 Inhaler 3     ipratropium-albuteroL (DUO-NEB) 0.5-2.5 mg/3 mL nebulizer [IPRATROPIUM-ALBUTEROL (DUO-NEB) 0.5-2.5 MG/3 ML NEBULIZER] Take 3 mL by nebulization 2 (two) times a day as needed. 1,080 mL 3     LACTOBACILLUS ACIDOPHILUS (ACIDOPHILUS ORAL) [LACTOBACILLUS ACIDOPHILUS (ACIDOPHILUS ORAL)] Take 1 tablet by mouth every morning.        levofloxacin (LEVAQUIN) 500 MG tablet Take 1 tablet (500 mg) by mouth daily for 14 days 14 tablet 0     magnesium 250 mg Tab [MAGNESIUM 250 MG TAB] Take 250 mg by mouth every morning.       metoprolol tartrate (LOPRESSOR) 100 MG tablet [METOPROLOL TARTRATE (LOPRESSOR) 100 MG TABLET] Take 1 tablet (100 mg total) by mouth 2 (two) times a day. 180 tablet 3     nitroglycerin (NITROSTAT) 0.4 MG SL tablet [NITROGLYCERIN (NITROSTAT) 0.4 MG SL TABLET] Place 1  tablet (0.4 mg total) under the tongue every 5 (five) minutes as needed for chest pain. 2 Bottle 1     omeprazole (PRILOSEC OTC) 20 MG tablet [OMEPRAZOLE (PRILOSEC OTC) 20 MG TABLET] Take 1 tablet (20 mg total) by mouth 2 (two) times a day before meals. 60 tablet 3     ondansetron (ZOFRAN) 8 MG tablet [ONDANSETRON (ZOFRAN) 8 MG TABLET] Take 1 tablet (8 mg total) by mouth every 8 (eight) hours as needed for nausea. 30 tablet 0     OXYGEN-AIR DELIVERY SYSTEMS MISC [OXYGEN-AIR DELIVERY SYSTEMS MISC] Use 2 L As Directed. 2L at bedtime  [employer]       predniSONE (DELTASONE) 10 MG tablet Take 2 tablets (20 mg) by mouth daily for 5 days, THEN 1 tablet (10 mg) daily for 5 days. 15 tablet 0     rosuvastatin (CRESTOR) 10 MG tablet [ROSUVASTATIN (CRESTOR) 10 MG TABLET] Take 1 tablet (10 mg total) by mouth at bedtime. Take with a 5 mg pill for a total of 15 mg nightly 90 tablet 3     umeclidinium (INCRUSE ELLIPTA) 62.5 mcg/actuation DsDv inhaler [UMECLIDINIUM (INCRUSE ELLIPTA) 62.5 MCG/ACTUATION DSDV INHALER] Inhale 1 puff daily. 3 each 3       Labs:  @clab@  Lab Results   Component Value Date    WBC 8.1 06/01/2021    HGB 11.9 (L) 06/01/2021    HCT 37.2 06/01/2021     06/01/2021     06/01/2021    POTASSIUM 4.1 06/01/2021    CHLORIDE 103 06/01/2021    CO2 25 06/01/2021    GLC 90 06/01/2021    BUN 8 06/01/2021    CR 0.73 06/01/2021    BILITOTAL 0.6 06/01/2021    AST 16 06/01/2021    ALT <9 06/01/2021    ALKPHOS 62 06/01/2021    PROTTOTAL 6.6 06/01/2021    ALBUMIN 3.8 06/01/2021       I have personally reviewed all pertinent imaging studies and PFT results unless otherwise noted.    Imaging studies:  CT Abdomen Pelvis w Contrast    Result Date: 6/21/2021  EXAM: CT ABDOMEN PELVIS W ORAL W IV CONTRAST LOCATION: [facility] DATE/TIME: 6/21/2021 10:01 AM INDICATION: Nausea/vomiting Abdominal pain, acute, nonlocalized COMPARISON: CT chest 05/12/2021, 09/04/2019 TECHNIQUE: CT scan of the abdomen and  pelvis was performed following injection of IV contrast. Multiplanar reformats were obtained. Dose reduction techniques were used. CONTRAST: Iopamidol (Isovue-370) 100mL FINDINGS: LOWER CHEST: Increased right middle lobar consolidation and patchy airspace opacities as well as a newly visualized 4 mm right middle lobe subpleural nodular opacity image 5. Bilateral lower lobar consolidation is similar to the prior CT. Prominent subcarinal lymph nodes which are likely reactive are similar to the prior exam. HEPATOBILIARY: Multifocal fatty sparing around the gallbladder. Normal gallbladder and bile ducts. PANCREAS: Normal. SPLEEN: Multiple low-attenuation splenic lesions including a dominant lower splenic lesion measuring 2.6 cm. ADRENAL GLANDS: Normal. KIDNEYS/BLADDER: Small bilateral cortical cysts. No follow-up is needed. No hydronephrosis. Normal urinary bladder. BOWEL: Small hiatal hernia. Normal caliber small bowel. Normal caliber small bowel. Nonvisualized appendix. Distal colonic diverticulosis. Irregular proximal colonic wall thickening with mild surrounding edema and prominent lymph nodes. No abscess or free air. No free fluid. LYMPH NODES: Prominent lymph nodes adjacent to the inflamed proximal sigmoid colon, measuring up to 3 mm short axis dimension. Prominent left external iliac chain node measuring 4 mm short axis dimension. VASCULATURE: Moderate to severe atherosclerosis. Severe stenosis at the origin of the SMA secondary to calcified plaque and moderate stenosis at the origin of the celiac artery. The central SMA and SMV are patent. PELVIC ORGANS: Nonvisualized uterus. MUSCULOSKELETAL: Osseous demineralization. Spinal and pelvic degenerative changes.     1.  Abnormal proximal sigmoid colon. Differential considerations include acute uncomplicated diverticulitis versus colonic neoplasm. If the patient's clinical presentation favors diverticulitis, recommend follow-up after resolution/improvement of the  patient's symptoms to exclude an underlying lesion. No abscess or free air. 2.  Multiple indeterminate splenic lesions with the largest measuring 2.6 cm. Splenic hemangiomas is a differential consideration for the larger lesions. However, splenic metastases could have a similar appearance. Recommend follow-up with MR. 3.  New/increased right middle lobar pulmonary opacities suspicious for pneumonic infiltrates. Follow-up is recommended. A phone call report regarding the critical findings on this exam was made to Dr. Lezama at 11:55 AM on 06/21/2021.    Reviewed outside records from [facility], including culture data  Reviewed notes from urgent care and interpreted their Xray findings      Physical Exam:  BP (!) 152/78   Pulse 86   Wt 73 kg (161 lb)   SpO2 95%   BMI 31.44 kg/m    General - Well nourished  Ears/Mouth -  Deferred given mask use during pandemic  Neck - no cervical lymphadenopathy  Lungs - coarse and raspy  CVS - regular rhythm with no murmurs, rubs or gallups  Abdomen - soft, NT, ND, NABS  Ext - no cyanosis, clubbing, non pitting puffy edema in bilateral ankles  Skin - no rash  Psychology - alert and oriented, answers appropriate        Electronically signed by:    Piter Bruno MD PhD  [facility]      Again, thank you for allowing me to participate in the care of your patient.      Sincerely,    Piter Bruno MD

## 2021-07-13 NOTE — PROGRESS NOTES
Assessment and Plan:Yanna Handy is a 89 year old female with a past medical history significant for recurrent pneumonia from chronic bronchitis who presents to clinic today for follow up.  She has had multiple pneumonias since I last saw her, one was treated with IV antibiotics at home for her pseudomonal infection.  She remains at high risk of hospitalization with this condition.  We can try to treat her current exacerbation better with antibiotics more appropriate for her known infections.  I'd also like to start a more aggressive nebulizer regimen if possible with inhaled mucomyst to clear her secretions, and if possible nebulized tobramycin to suppress her infection.    1) Pneumonia, pseudomonal, right middle lobe- change current antibiotics to levaquin 500mg for 14 days.  Taper off her prednisone with 20mg for 7 days then 10mg for 7 days.  Warned of increased tendonitis with this pair, has tolerated multiple times before.  2) Chronic bronchitis - start mucomyst after a test dose.  Have ordered one time dose for tolerability before full Rx.  If she is tolerating this, but not improved, we can then move to tobramycin for inhaled use.  3) asthma - this may have morphed into chronic bronchitis phenotype.  Continue albuterol, breo and incruse  4) RTC in 3 months          CCx: pneumonia    HPI: Ms. Handy is a 89 year old female with severe asthma, bronchitis who reports for follow up of multiple pneumonias.  She is currently battling an infection now having been to the urgent care a week ago.  She was given prednisone, ceftin, and a zpack.  She felt a little better after a day, but is now back to miserable with a thick cough, dyspnea and mucous production.  She reports this has happened multiple times since I saw her last, although one time resulted in a CHF admission.  She has had the best luck with levaquin.  She did go home with cefepime in a PICC line from [facility] which also worked for a couple of  months before she felt bad again.  She doesn't have fevers, but is coughing all the time.  Her mucous is dark and grayish.    ROS:  Review of Systems - History obtained from the patient  General ROS: negative  Psychological ROS: negative  ENT ROS: negative  Allergy and Immunology ROS: negative  Endocrine ROS: negative  Respiratory ROS: positive for - cough, shortness of breath and sputum changes  negative for - stridor or tachypnea  Cardiovascular ROS: no chest pain or palpitations  Gastrointestinal ROS: no abdominal pain, change in bowel habits, or black or bloody stools  Genito-Urinary ROS: no dysuria, trouble voiding, or hematuria  Musculoskeletal ROS: negative  Neurological ROS: no TIA or stroke symptoms  Dermatological ROS: negative      Current Meds:  Current Outpatient Medications   Medication Sig Dispense Refill     acetaminophen (TYLENOL) 650 MG CR tablet [ACETAMINOPHEN (TYLENOL) 650 MG CR TABLET] Take 650 mg by mouth every 8 (eight) hours as needed for pain.       albuterol (PROAIR HFA;PROVENTIL HFA;VENTOLIN HFA) 90 mcg/actuation inhaler [ALBUTEROL (PROAIR HFA;PROVENTIL HFA;VENTOLIN HFA) 90 MCG/ACTUATION INHALER] Inhale 2 puffs every 6 (six) hours as needed for wheezing. 1 Inhaler 11     anastrozole (ARIMIDEX) 1 mg tablet [ANASTROZOLE (ARIMIDEX) 1 MG TABLET] TAKE ONE TABLET BY MOUTH ONCE DAILY  90 tablet 1     ascorbic acid (VITAMIN C) 1000 MG tablet [ASCORBIC ACID (VITAMIN C) 1000 MG TABLET] Take 1,000 mg by mouth every morning.        aspirin 81 MG EC tablet [ASPIRIN 81 MG EC TABLET] Take 81 mg by mouth bedtime.       calcium citrate-vitamin D (CITRACAL+D) 315-200 mg-unit per tablet [CALCIUM CITRATE-VITAMIN D (CITRACAL+D) 315-200 MG-UNIT PER TABLET] Take 1 tablet by mouth bedtime.       ciprofloxacin HCl (CIPRO) 250 MG tablet [CIPROFLOXACIN HCL (CIPRO) 250 MG TABLET] Take 1/2 tablet (125 mg total) by mouth daily. 45 tablet 0     cyanocobalamin (VITAMIN B-12) 50 mcg tablet [CYANOCOBALAMIN (VITAMIN B-12)  50 MCG TABLET] Take 50 mcg by mouth every morning.        fluticasone furoate-vilanteroL (BREO ELLIPTA) 200-25 mcg/dose DsDv inhaler [FLUTICASONE FUROATE-VILANTEROL (BREO ELLIPTA) 200-25 MCG/DOSE DSDV INHALER] Inhale 1 puff daily. Rinse mouth with water, gargle,spit after each use 180 each 3     furosemide (LASIX) 20 MG tablet [FUROSEMIDE (LASIX) 20 MG TABLET] Take 1 tablet (20 mg total) by mouth daily. 90 tablet 2     gabapentin (NEURONTIN) 100 MG capsule [GABAPENTIN (NEURONTIN) 100 MG CAPSULE] Taking 1 capsule by mouth in the morning, 1 capsule by mouth at noon and 2 capsules by mouth at bedtime daily. 150 capsule 1     ipratropium-albuteroL (COMBIVENT RESPIMAT)  mcg/actuation Mist inhaler [IPRATROPIUM-ALBUTEROL (COMBIVENT RESPIMAT)  MCG/ACTUATION MIST INHALER] Inhale 1 puff 4 (four) times a day as needed. 3 Inhaler 3     ipratropium-albuteroL (DUO-NEB) 0.5-2.5 mg/3 mL nebulizer [IPRATROPIUM-ALBUTEROL (DUO-NEB) 0.5-2.5 MG/3 ML NEBULIZER] Take 3 mL by nebulization 2 (two) times a day as needed. 1,080 mL 3     LACTOBACILLUS ACIDOPHILUS (ACIDOPHILUS ORAL) [LACTOBACILLUS ACIDOPHILUS (ACIDOPHILUS ORAL)] Take 1 tablet by mouth every morning.        levofloxacin (LEVAQUIN) 500 MG tablet Take 1 tablet (500 mg) by mouth daily for 14 days 14 tablet 0     magnesium 250 mg Tab [MAGNESIUM 250 MG TAB] Take 250 mg by mouth every morning.       metoprolol tartrate (LOPRESSOR) 100 MG tablet [METOPROLOL TARTRATE (LOPRESSOR) 100 MG TABLET] Take 1 tablet (100 mg total) by mouth 2 (two) times a day. 180 tablet 3     nitroglycerin (NITROSTAT) 0.4 MG SL tablet [NITROGLYCERIN (NITROSTAT) 0.4 MG SL TABLET] Place 1 tablet (0.4 mg total) under the tongue every 5 (five) minutes as needed for chest pain. 2 Bottle 1     omeprazole (PRILOSEC OTC) 20 MG tablet [OMEPRAZOLE (PRILOSEC OTC) 20 MG TABLET] Take 1 tablet (20 mg total) by mouth 2 (two) times a day before meals. 60 tablet 3     ondansetron (ZOFRAN) 8 MG tablet [ONDANSETRON  (ZOFRAN) 8 MG TABLET] Take 1 tablet (8 mg total) by mouth every 8 (eight) hours as needed for nausea. 30 tablet 0     OXYGEN-AIR DELIVERY SYSTEMS MISC [OXYGEN-AIR DELIVERY SYSTEMS MISC] Use 2 L As Directed. 2L at bedtime  [facility]       predniSONE (DELTASONE) 10 MG tablet Take 2 tablets (20 mg) by mouth daily for 5 days, THEN 1 tablet (10 mg) daily for 5 days. 15 tablet 0     rosuvastatin (CRESTOR) 10 MG tablet [ROSUVASTATIN (CRESTOR) 10 MG TABLET] Take 1 tablet (10 mg total) by mouth at bedtime. Take with a 5 mg pill for a total of 15 mg nightly 90 tablet 3     umeclidinium (INCRUSE ELLIPTA) 62.5 mcg/actuation DsDv inhaler [UMECLIDINIUM (INCRUSE ELLIPTA) 62.5 MCG/ACTUATION DSDV INHALER] Inhale 1 puff daily. 3 each 3       Labs:  @clab@  Lab Results   Component Value Date    WBC 8.1 06/01/2021    HGB 11.9 (L) 06/01/2021    HCT 37.2 06/01/2021     06/01/2021     06/01/2021    POTASSIUM 4.1 06/01/2021    CHLORIDE 103 06/01/2021    CO2 25 06/01/2021    GLC 90 06/01/2021    BUN 8 06/01/2021    CR 0.73 06/01/2021    BILITOTAL 0.6 06/01/2021    AST 16 06/01/2021    ALT <9 06/01/2021    ALKPHOS 62 06/01/2021    PROTTOTAL 6.6 06/01/2021    ALBUMIN 3.8 06/01/2021       I have personally reviewed all pertinent imaging studies and PFT results unless otherwise noted.    Imaging studies:  CT Abdomen Pelvis w Contrast    Result Date: 6/21/2021  EXAM: CT ABDOMEN PELVIS W ORAL W IV CONTRAST LOCATION: [facility] DATE/TIME: 6/21/2021 10:01 AM INDICATION: Nausea/vomiting Abdominal pain, acute, nonlocalized COMPARISON: CT chest 05/12/2021, 09/04/2019 TECHNIQUE: CT scan of the abdomen and pelvis was performed following injection of IV contrast. Multiplanar reformats were obtained. Dose reduction techniques were used. CONTRAST: Iopamidol (Isovue-370) 100mL FINDINGS: LOWER CHEST: Increased right middle lobar consolidation and patchy airspace opacities as well as a newly visualized 4 mm right middle  lobe subpleural nodular opacity image 5. Bilateral lower lobar consolidation is similar to the prior CT. Prominent subcarinal lymph nodes which are likely reactive are similar to the prior exam. HEPATOBILIARY: Multifocal fatty sparing around the gallbladder. Normal gallbladder and bile ducts. PANCREAS: Normal. SPLEEN: Multiple low-attenuation splenic lesions including a dominant lower splenic lesion measuring 2.6 cm. ADRENAL GLANDS: Normal. KIDNEYS/BLADDER: Small bilateral cortical cysts. No follow-up is needed. No hydronephrosis. Normal urinary bladder. BOWEL: Small hiatal hernia. Normal caliber small bowel. Normal caliber small bowel. Nonvisualized appendix. Distal colonic diverticulosis. Irregular proximal colonic wall thickening with mild surrounding edema and prominent lymph nodes. No abscess or free air. No free fluid. LYMPH NODES: Prominent lymph nodes adjacent to the inflamed proximal sigmoid colon, measuring up to 3 mm short axis dimension. Prominent left external iliac chain node measuring 4 mm short axis dimension. VASCULATURE: Moderate to severe atherosclerosis. Severe stenosis at the origin of the SMA secondary to calcified plaque and moderate stenosis at the origin of the celiac artery. The central SMA and SMV are patent. PELVIC ORGANS: Nonvisualized uterus. MUSCULOSKELETAL: Osseous demineralization. Spinal and pelvic degenerative changes.     1.  Abnormal proximal sigmoid colon. Differential considerations include acute uncomplicated diverticulitis versus colonic neoplasm. If the patient's clinical presentation favors diverticulitis, recommend follow-up after resolution/improvement of the patient's symptoms to exclude an underlying lesion. No abscess or free air. 2.  Multiple indeterminate splenic lesions with the largest measuring 2.6 cm. Splenic hemangiomas is a differential consideration for the larger lesions. However, splenic metastases could have a similar appearance. Recommend follow-up with  MR. 3.  New/increased right middle lobar pulmonary opacities suspicious for pneumonic infiltrates. Follow-up is recommended. A phone call report regarding the critical findings on this exam was made to Dr. Lezama at 11:55 AM on 06/21/2021.    Reviewed outside records from [facility], including culture data  Reviewed notes from urgent care and interpreted their Xray findings      Physical Exam:  BP (!) 152/78   Pulse 86   Wt 73 kg (161 lb)   SpO2 95%   BMI 31.44 kg/m    General - Well nourished  Ears/Mouth -  Deferred given mask use during pandemic  Neck - no cervical lymphadenopathy  Lungs - coarse and raspy  CVS - regular rhythm with no murmurs, rubs or gallups  Abdomen - soft, NT, ND, NABS  Ext - no cyanosis, clubbing, non pitting puffy edema in bilateral ankles  Skin - no rash  Psychology - alert and oriented, answers appropriate        Electronically signed by:    Piter Bruno MD PhD  [facility] Pulmonary and Critical Care Medicine

## 2021-07-13 NOTE — Clinical Note
I'd like to get Lara on both mucomyst and possibly inhaled tobramycin.  I ordered a clinic administered mucomyst, but dont know how to order the RT visit.  We can start with this, and then move to the issa later.

## 2021-08-01 PROBLEM — R00.0 TACHYCARDIA: Status: ACTIVE | Noted: 2021-01-01

## 2021-08-01 NOTE — ED NOTES
Pt o2 sats dropping so O2 2 liters placeed.  Tolerated well. Pt states takes O2 at home when needed.

## 2021-08-01 NOTE — PROGRESS NOTES
RESPIRATORY CARE NOTE     Patient Name: Yanna Handy  Today's Date: 8/1/2021       Pt to receive duo neb. BS are clear. Pt is on RA, SpO2 is 92%. Post treatment there is increased aeration. Pt also perceives improvement.  RT encouraged deep breathing and coughing techniques .  RT will continue to monitor and assess.

## 2021-08-01 NOTE — PLAN OF CARE
Neurosurgery plan of care:    Plan:  1. Aspen Cervical collar  2. MRI cspine without contrast  3. Will review MRI, decide plan.     ADDENDUM:  Cervical MRI results reviewed. No fracture or ligamentous damage. 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    OK to clear collar (pt already refusing to wear in ED). We will see pt tomorrow if admitted to [facility]. If transferred, we will call to schedule an outpatient follow up appt with NP/PA on Dr Zaragoza clinic day in the neurosurgery clinic within the next wk for further evaluation.      HPI:  Spoke with Dr Olea about Lara, a 89yo F on no anticoagulation with recent pneumonia in July who presented to [facility] ED today with 6wk hx of generalized weakness, left shoulder pain radiating into the brachial aspect of her left arm, with numbness in the same distribution. She has been doing physical therapy without improvement so far, followed by the spine center. Has not acutely worsened either. No recent trauma. Denies weakness of the arm or any additional symptoms. Exam reported as: neurologically intact. CT cervical spine demonstrates an age-indeterminate left C7-T1 superior articular facet fracture which is new compared to cervical MRI from jan 2020. There are also chronic degenerative bone spurs on the left at C5-6 and C6-7 likely causing severe left C6 and left C7 neural foraminal narrowing which is stable compared to cervical MRI from jan 2020      Rosana LEDEZMA-ANNA  [facility]  O. [phone]

## 2021-08-01 NOTE — ED PROVIDER NOTES
EMERGENCY DEPARTMENT ENCOUNTER       ED Course & Medical Decision Making       Final Impression  89-year-old female here with multiple subacute symptoms including pain in the left arm and posterior shoulder which seems to have a radicular component with some paresthesias shooting down the arm, as well as some progressive generalized weakness and nausea in the setting of treatment for recurrent pneumonia.  Its difficult to parse out whether the symptoms that she has are directly related to one another.  Considering wide differential including treatment failure (raising concern for poss post-obstructive PNA), acute excerbation of chornic bronchitis, malignancy including possible cervical spine pathology, rheumatologic conditions such as polymyalgia rheumatica, atypical ACS, electrolyte disturbance, thyroid dysfunction, UTI, musculoskeletal pain, known ascending thoracic dilation becoming more aneurysmal/symptomatic among others.     Performing CT cspine to look for pathologic fx and CT chest w/ contrast to eval for any signs of malignancy. Will perform additional labs workup, EKG, trop    12:15 PM I met with the patient, obtained history, performed an initial exam, and discussed options and plan for diagnostics and treatment here in the ED.  12:51 PM I spoke to  from cardiology. Agrees to no A-fib. Instead, sinus rhythm with frequent PACs and baseline artifact.   1:33 PM Labs w/ just mild to mod elevation of ESR at 39. WBC, Hbg, TSH, lytes, renal function all unremarkable. Lactate and UA pending as are imaging.    4:34 PM  I checked up on patient.   4:45 PM I spoke to Dr. Carlisle from pulmonology. Feels CT findings most consistent with poor bronchial hygiene rather than PNA, so no clear indication to start antibiotics at this time..  If patient is well enough to go home, they will have clinic reach out to her to set up follow-up appointment later this week to make arrangement for bronchial hygiene services.    5:27 PM I checked up on patient. Pain is improving but recurring. Patient states breathing is at baseline. CT showed possible age indeterminate chronic fracture at C7/T1. Will discuss with neurosurgery.    5:35 PM I spoke to PATTI Wayne, on Dr. Hughes's team, from neurosurgery. Recommends performing an MRI while in the ED and placing an aspen collar.   5:41 PM I updated patient. [place] coming from storage room and will be applied ASAP.   5:46 PM I spoke to Dr. Jaquan Bennett from cardiology. ECG still does not appear consistent with A-fib. Suspects sinus rhythm with frequent PACs vs less likely MAT and even less likely new onset AFib. If o/w stable for discharge and could follow up with rapid access clinic for Holter monitor. Pt updated - states similar concerns at [facility] when they thought she had AFIb but ultimately decided it was not Afib.   Low concern for SBI/sepsis. Lactate upper limit of normal and WBC wnl.  PE also felt lower on DDx.   6:59 PM Pt over at MRI. BNP in process. UA not yet obtained but overall low concern for UTI.   7:57 PM  Case d/w NP Rosana from [facility] who reviewed MRI - fx appears chronic so can clear cspine clinically. Pt already removed it herself after discussion regarding risks of potential severe complications if unstable fx present. Pt expressed understanding but stated it was too uncomfortable and made it hard to breathe. HR persistently elevated 110s-130s so plan to bring her into hospital w/ telemetry to expedite cards eval. No cardiac tele nor med-tele beds avail here at this time. Pt states preference would be to go back to [facility] where she has gone in the past. HUC checking for bed there. Otherwise amenable to boarding here if needed. Son and pt both updated. Pt being signed out to oncdiana Mcintosh w/ admission plan pending.     Prior to making a final disposition on this patient the results of patient's tests and other diagnostic studies were discussed with the patient.  All questions were answered. Patient expressed understanding of the plan and was amenable to it.      Medications   HYDROcodone-acetaminophen (NORCO) 5-325 MG per tablet 1 tablet (has no administration in time range)   ondansetron (ZOFRAN) injection 4 mg (has no administration in time range)   ondansetron (ZOFRAN-ODT) ODT tab 4 mg (4 mg Oral Given 8/1/21 1239)   ipratropium - albuterol 0.5 mg/2.5 mg/3 mL (DUONEB) neb solution 3 mL (3 mLs Nebulization Given 8/1/21 1236)   HYDROcodone-acetaminophen (NORCO) 5-325 MG per tablet 1 tablet (1 tablet Oral Given 8/1/21 1239)   iopamidol (ISOVUE-370) solution 100 mL (100 mLs Intravenous Given 8/1/21 1405)   ipratropium - albuterol 0.5 mg/2.5 mg/3 mL (DUONEB) neb solution 3 mL (3 mLs Nebulization Given 8/1/21 1650)   HYDROcodone-acetaminophen (NORCO) 5-325 MG per tablet 1 tablet (1 tablet Oral Given 8/1/21 1734)   ipratropium - albuterol 0.5 mg/2.5 mg/3 mL (DUONEB) neb solution 3 mL (3 mLs Nebulization Given 8/1/21 1809)       Final Impression     1. PAC (premature atrial contraction)    2. Tachycardia    3. Aspiration pneumonitis (H)    4. Chronic bronchitis, unspecified chronic bronchitis type (H)    5. Cervical radiculopathy    6. Generalized muscle weakness    7. Nausea    8. Closed nondisplaced fracture of seventh cervical vertebra, unspecified fracture morphology, initial encounter (H)          Chief Complaint     Chief Complaint   Patient presents with     Shoulder Pain     Fatigue       Shoulder Pain and Fatigue      HPI     Yanna Handy is a 89 year old female with a history of HTN, CAD, HFpEF, asthma, osteoarthritis, sciatica, breast cancer, and anemia, who presents to the ED via walk-in for evaluation of shoulder pain and fatigue.     Patient reports left arm pain, nausea, weakness, and feeling shaky, for the past six weeks. She states that left arm pain starts in left shoulder and radiates down her left arm and is described as a throbbing pain. She reports  associated tingling in left arm. Patient reports that lifting arm worsens pain. Patient was seen at [facility] a month ago and was started on PT for a week, which did not help and left pain is still worsening. Patient also reports neck pain which travels down back of neck/to shoulder blade. Patient denies any preceding injuries. No focal weakness. No skin rashes.     Patient just recently finished a 14-day course of Levaquin for Pseudomonas pneumonia about four days ago, and finished tapering on Prednisone a few days ago. She reports having been previously treated with other abx for PNA recently prior to that. Patient reports chronic shortness of breath, clear and grey productive cough, and bronchitis, to which she gets treatment for.  Feels breathing is at baseline. Denies CP, leg swelling, hemoptysis, fever, rhinorrhea, sore throat, abdominal pain, bowel sx, urinary symptoms or other acute illness.     Patient reports a coronary angiogram in May that was normal. Patient reports breast cancer in 2015, now felt to be in remission. Patient is full vaccinated for COVID-19.     SHx Lives independently.     I, Mayra Mcintyre am serving as a scribe to document services personally performed by Samantha Olea MD, based on my observation and the provider's statements to me. I, Samantha Olea MD attest that Mayra Mcintyre is acting in a scribe capacity, has observed my performance of the services and has documented them in accordance with my direction.    Past Medical History     Patient Active Problem List    Diagnosis Date Noted     Tachycardia 08/01/2021     Priority: Medium     History of esophageal stricture 06/01/2021     Priority: Medium     Hiatal hernia 05/12/2021     Priority: Medium     see CT report         Hepatic steatosis 05/12/2021     Priority: Medium     Chronic Sinusitis      Priority: Medium     Moderate persistent asthma      Priority: Medium     Osteoporosis Senile      Priority: Medium     Osteoarthritis      Priority:  Medium     Dyslipidemia, goal LDL below 70      Priority: Medium     Essential hypertension      Priority: Medium     Recurrent UTI 01/05/2021     Priority: Medium     (HFpEF) heart failure with preserved ejection fraction (H) 08/13/2020     Priority: Medium     Mild to moderate aortic stenosis 07/02/2018     Priority: Medium     Aromatase inhibitor use 12/20/2017     Priority: Medium     Malignant neoplasm of upper-outer quadrant of left female breast (H) 08/18/2017     Priority: Medium     Insomnia 01/13/2016     Priority: Medium     Anemia 08/21/2015     Priority: Medium     Coronary artery disease due to lipid rich plaque 09/15/2014     Priority: Medium     stents patent and no obstruction by CT cor angio on 5/12/2021           Past Medical History:   Diagnosis Date     (HFpEF) heart failure with preserved ejection fraction (H) 08/13/2020     Anemia 08/21/2015     Ascending aorta dilation (H) 05/12/2021     Asthma      Breast cancer (H) 07/25/2017     Chronic bronchitis (H)      Coronary artery disease due to lipid rich plaque 09/15/2014     Essential hypertension      Fx ankle 08/2000     Fx wrist 12/2006     GI bleed 01/12/2015     Hx of radiation therapy 2017     Insomnia 01/13/2016     Malignant neoplasm of skin      Moderate aortic stenosis 07/02/2018     Osteoarthritis      Other emphysema (H)      Senile osteoporosis      Sinusitis, chronic      Wrist fracture 06/16/2017     Past Surgical History:   Procedure Laterality Date     APPENDECTOMY  1971     ARTHROSCOPY KNEE Left 10/2004     CATARACT EXTRACTION       COLONOSCOPY N/A 01/15/2015    COLONOSCOPY with biopsy of polyp;  Surgeon: Joel Coppola MD;  Location: [facility];  Service:     CORONARY STENT PLACEMENT  09/15/2014    2     HC REMOVAL OF TONSILS,<11 Y/O  1941     HYSTERECTOMY  1971     IR VISCERAL ANGIOGRAM  1/12/2015     LUMPECTOMY BREAST Left 08/02/2017    Dr. Pope     OOPHORECTOMY  1971    unilateral     REVISE SECONDARY VARICOSITY   "    Varicose Vein Ligation     SINUS SURGERY      \"I had 3 sinus surgeries.\"     TOTAL KNEE ARTHROPLASTY Right 2003     Family History   Problem Relation Age of Onset     Nephritis Mother 37.00         in 1947     Sudden Death Father 62.00        autopsy said \"massive heart attack\", 3 ppd smoker     Breast Cancer Maternal Aunt 55.00     Lung Cancer Brother 65.00     Colon Cancer Maternal Grandmother 90.00     Lung Cancer Maternal Grandfather 65.00     Brain Cancer Son 25.00     Rectal Cancer Brother 64.00     No Known Problems Son       Social History     Tobacco Use     Smoking status: Never Smoker     Smokeless tobacco: Never Used   Substance Use Topics     Alcohol use: Yes     Comment: Alcoholic Drinks/day: Wine occassionally     Drug use: No       Relevant past medical, surgical, family and social history as documented above, has been reviewed and discussed with patient. No changes or additions, unless otherwise noted in the HPI.    Current Medications     Patient's Medications   New Prescriptions    No medications on file   Previous Medications    ACETAMINOPHEN (TYLENOL) 650 MG CR TABLET    [ACETAMINOPHEN (TYLENOL) 650 MG CR TABLET] Take 650 mg by mouth every 8 (eight) hours as needed for pain.    ALBUTEROL (PROAIR HFA;PROVENTIL HFA;VENTOLIN HFA) 90 MCG/ACTUATION INHALER    [ALBUTEROL (PROAIR HFA;PROVENTIL HFA;VENTOLIN HFA) 90 MCG/ACTUATION INHALER] Inhale 2 puffs every 6 (six) hours as needed for wheezing.    ANASTROZOLE (ARIMIDEX) 1 MG TABLET    [ANASTROZOLE (ARIMIDEX) 1 MG TABLET] TAKE ONE TABLET BY MOUTH ONCE DAILY     ASCORBIC ACID (VITAMIN C) 1000 MG TABLET    [ASCORBIC ACID (VITAMIN C) 1000 MG TABLET] Take 1,000 mg by mouth every morning.     ASPIRIN 81 MG EC TABLET    [ASPIRIN 81 MG EC TABLET] Take 81 mg by mouth bedtime.    CALCIUM CITRATE-VITAMIN D (CITRACAL+D) 315-200 MG-UNIT PER TABLET    [CALCIUM CITRATE-VITAMIN D (CITRACAL+D) 315-200 MG-UNIT PER TABLET] Take 1 tablet by mouth bedtime. "    CIPROFLOXACIN HCL (CIPRO) 250 MG TABLET    [CIPROFLOXACIN HCL (CIPRO) 250 MG TABLET] Take 1/2 tablet (125 mg total) by mouth daily.    CYANOCOBALAMIN (VITAMIN B-12) 50 MCG TABLET    [CYANOCOBALAMIN (VITAMIN B-12) 50 MCG TABLET] Take 50 mcg by mouth every morning.     FLUTICASONE FUROATE-VILANTEROL (BREO ELLIPTA) 200-25 MCG/DOSE DSDV INHALER    [FLUTICASONE FUROATE-VILANTEROL (BREO ELLIPTA) 200-25 MCG/DOSE DSDV INHALER] Inhale 1 puff daily. Rinse mouth with water, gargle,spit after each use    FUROSEMIDE (LASIX) 20 MG TABLET    [FUROSEMIDE (LASIX) 20 MG TABLET] Take 1 tablet (20 mg total) by mouth daily.    GABAPENTIN (NEURONTIN) 100 MG CAPSULE    [GABAPENTIN (NEURONTIN) 100 MG CAPSULE] Taking 1 capsule by mouth in the morning, 1 capsule by mouth at noon and 2 capsules by mouth at bedtime daily.    IPRATROPIUM-ALBUTEROL (COMBIVENT RESPIMAT)  MCG/ACTUATION MIST INHALER    [IPRATROPIUM-ALBUTEROL (COMBIVENT RESPIMAT)  MCG/ACTUATION MIST INHALER] Inhale 1 puff 4 (four) times a day as needed.    IPRATROPIUM-ALBUTEROL (DUO-NEB) 0.5-2.5 MG/3 ML NEBULIZER    [IPRATROPIUM-ALBUTEROL (DUO-NEB) 0.5-2.5 MG/3 ML NEBULIZER] Take 3 mL by nebulization 2 (two) times a day as needed.    LACTOBACILLUS ACIDOPHILUS (ACIDOPHILUS ORAL)    [LACTOBACILLUS ACIDOPHILUS (ACIDOPHILUS ORAL)] Take 1 tablet by mouth every morning.     MAGNESIUM 250 MG TAB    [MAGNESIUM 250 MG TAB] Take 250 mg by mouth every morning.    METOPROLOL TARTRATE (LOPRESSOR) 100 MG TABLET    [METOPROLOL TARTRATE (LOPRESSOR) 100 MG TABLET] Take 1 tablet (100 mg total) by mouth 2 (two) times a day.    NITROGLYCERIN (NITROSTAT) 0.4 MG SL TABLET    [NITROGLYCERIN (NITROSTAT) 0.4 MG SL TABLET] Place 1 tablet (0.4 mg total) under the tongue every 5 (five) minutes as needed for chest pain.    OMEPRAZOLE (PRILOSEC OTC) 20 MG TABLET    [OMEPRAZOLE (PRILOSEC OTC) 20 MG TABLET] Take 1 tablet (20 mg total) by mouth 2 (two) times a day before meals.    ONDANSETRON  (ZOFRAN) 8 MG TABLET    [ONDANSETRON (ZOFRAN) 8 MG TABLET] Take 1 tablet (8 mg total) by mouth every 8 (eight) hours as needed for nausea.    OXYGEN-AIR DELIVERY SYSTEMS MISC    [OXYGEN-AIR DELIVERY SYSTEMS MISC] Use 2 L As Directed. 2L at bedtime  [place]    ROSUVASTATIN (CRESTOR) 10 MG TABLET    [ROSUVASTATIN (CRESTOR) 10 MG TABLET] Take 1 tablet (10 mg total) by mouth at bedtime. Take with a 5 mg pill for a total of 15 mg nightly    UMECLIDINIUM (INCRUSE ELLIPTA) 62.5 MCG/ACTUATION DSDV INHALER    [UMECLIDINIUM (INCRUSE ELLIPTA) 62.5 MCG/ACTUATION DSDV INHALER] Inhale 1 puff daily.   Modified Medications    No medications on file   Discontinued Medications    No medications on file       Allergies     Allergies   Allergen Reactions     Alendronate [Alendronic Acid] Nausea and Vomiting     Isosorbide Unknown     Metoclopramide Hcl [Metoclopramide] Anxiety     Rofecoxib Diarrhea and Nausea     Doxycycline Nausea and Vomiting     Lisinopril Cough     Risedronate Unknown     Sulfa (Sulfonamide Antibiotics) [Sulfa Drugs] Anaphylaxis       Review of Systems     Constitutional: Denies fever,  Eyes: Denies discharge  HENT: Denies sore throat, rhinorrhea, sore throat. Positive for neck pain.  Respiratory:  Positive for cough (chronic). Positive for shortness of breath (chronic, unchanged). No hemoptysis.  Cardiovascular: Denies chest pain, leg swelling  GI: Denies vomiting, abdominal pain. Positive for nausea.   : Denies dysuria  Musculoskeletal: Denies myalgias, redness of swelling of joints. Positive for left arm pain. Positive for left arm weakness.  Skin: Denies rashes  Neurologic: Denies headache, positive for ongoing tingling in left arm. No weakness  Immunologic: Denies immunosuppression    Remainder of systems reviewed, unless noted in HPI all others negative.    Physical Exam     /60   Pulse (!) 122   Temp 99  F (37.2  C) (Oral)   Resp 29   Ht 1.524 m (5')   Wt 72.1 kg (159 lb)   SpO2 95%   BMI  31.05 kg/m    Constitutional: Awake, alert, mentally sharp. Mildly uncomfortable but not toxic.  Head: Normocephalic, atraumatic.  ENT: Mucous membranes moist. Supple neck, no focal midline tenderness appreciated. No step offs or crepitus. Full ROM.   Eyes: Conjunctiva normal. PERRLA, EOMs intact.  Respiratory: Diffuse biphasic wheezing and scattered coarse breath sounds. Normal WOB on initial exam though notes to have inc WOB after transferring from  to cart with some prolonged expiration.   Cardiovascular: Regular rate and rhythm. No M/R/G. 2+ distal pulses throughout. Trace non-pitting edema bilaterally.   GI: Abdomen soft, non-tender, non-diffuse. No guarding or rebound.   Musculoskeletal: No deformity. No focal bony TTP over scapula, T/L spine, L clavicle/shoulder/humerus. Some pain with abduction L shoulder but negative drop test, negative empty can test, and full strength/sensation all major mm groups BUE. No joint erythema, warmth, swelling, or focal tenderness.   Integument: Warm, dry. No rashes over shoulders.  Neurologic: Alert & oriented x 3. Normal speech. Grossly normal motor and sensory function. No focal deficits noted. Cranial nerves II-XII intact. 5/5 strength BUE and BLE, SILT, FNF intact.   Psychiatric: Normal mood and affect. Normal judgment.     Labs     Labs Ordered and Resulted from Time of ED Arrival Up to the Time of Departure from the ED   COMPREHENSIVE METABOLIC PANEL - Abnormal; Notable for the following components:       Result Value    Albumin 3.4 (*)     All other components within normal limits   ERYTHROCYTE SEDIMENTATION RATE AUTO - Abnormal; Notable for the following components:    Erythrocyte Sedimentation Rate 39 (*)     All other components within normal limits   CBC WITH PLATELETS AND DIFFERENTIAL - Abnormal; Notable for the following components:    Absolute Immature Granulocytes 0.1 (*)     All other components within normal limits   LIPASE - Normal   MAGNESIUM - Normal    LACTIC ACID WHOLE BLOOD - Normal   TSH WITH FREE T4 REFLEX - Normal   ROUTINE UA WITH MICROSCOPIC REFLEX TO CULTURE   B-TYPE NATRIURETIC PEPTIDE ([facility] ONLY)   TROPONIN I   PERIPHERAL IV CATHETER   PATIENT CARE ORDER   CALL   CALL   CALL   CALL   APPLY [place] CERVICAL COLLAR   CALL   CALL   CALL   COVID-19 VIRUS (CORONAVIRUS) BY PCR   CBC WITH PLATELETS & DIFFERENTIAL    Narrative:     The following orders were created for panel order CBC with platelets differential.  Procedure                               Abnormality         Status                     ---------                               -----------         ------                     CBC with platelets and d...[[id number]]  Abnormal            Final result                 Please view results for these tests on the individual orders.         EKG          EKG Interpretation:    Impression: EKG is read as showing atrial fibrillation with rate of 92bpm there do appear to be small P waves that are difficult to discern among some baseline artifact.  EKG was reviewed with cardiology Dr. Bennett who agrees represents normal sinus rhythm with frequent PACs rather than new onset A. fib.  There is left axis deviation and some R wave progression indicating anterior infarct, age undetermined.  No acute ST-T wave abnormalities no STEMI.  Compared with prior EKG from 8/13/2020 rate is increased from 55 bpm and sinus rhythm with frequent PACs is replaced sinus bradycardia.  Mild nonspecific ST abnormality has resolved.      Performed at [facility], 01-August-2021, 12:30:15    Rate: 92 BPM  Rhythm: NSR with frequent PACs  Axis: (N/A) - (-34) - (8)  AL: wnl  QRS: 70 ms  QTc: 435 ms  ST changes: as above  Comparison to prior: as above      Impression: Read as A. fib but appears to show normal sinus rhythm with frequent PACs. Overall similar to EKG#1.    Performed at [facility], 01-August-2021, 17:16:52    Rate: 99  BPM  Rhythm: NSR with frequent PACs  Axis: (N/A) - (-41) - (32)  CT: N/A  QRS: 76 ms  QTc: 444 ms  ST changes: as above  Comparison to prior: as above      Impression: Read as atrial fibrillation with RVR rate of 123 bpm, but again there do seem to be some P waves present.  Case discussed with cardiology and felt more likely to represent sinus tach with frequent PACs versus possible MAT, less likely new onset A. fib. C/w earlier EKGs rate inc to 123bpm     Performed at [facility] Room, 01-August-2021, 17:27:34    Rate: 123 BPM  Rhythm: as above  Axis: (N/A) - (-34) - (33)  CT: N/A  QRS: 80 ms  QTc: 458 ms  ST changes: as above  Comparison to prior: as above      Radiology     I have ordered and reviewed the following imaging exams. Upon independent review of the images and radiology reads, I agree with the reads documented below.    Cervical spine MRI w/o contrast   Final Result   CONCLUSION:   1.  Redemonstration of posttraumatic deformity surrounding the left C7-T1 facet joint, which produces moderate left neuroforaminal stenosis. There is no identifiable associated marrow edema to suggest acute to subacute component.   2.  Multilevel degenerative changes of the cervical spine as described in detail above, greatest at C5-C6, where there is moderate spinal canal stenosis, left greater than right, and severe left neuroforaminal stenosis. At C4-C5, there is mild to    moderate spinal canal stenosis and severe right and moderate left neuroforaminal stenosis.   3.     4.  At C6-C7, there is moderate spinal canal stenosis and mild to moderate bilateral neuroforaminal stenosis.         Chest CT w IV contrast only, TRAUMA / DISSECTION   Final Result   IMPRESSION:       1.  Large amount of airway material particularly in the lower lobes and right middle lobe with downstream mixed atelectasis/consolidation. Bronchopulmonary aspiration can produce this pattern particularly given the posterior and dependent  distribution.   2.  Solids 4-5 mm nodule in the right upper lobe. This could relate to #1. Per 2017 Fleischner Society guidelines, no specific imaging follow-up is necessary.   3.  Left atrial and main pulmonary artery enlargement. Suspect pulmonary hypertension.         Cervical spine CT w/o contrast   Final Result   IMPRESSION:   1.  Extensive irregularity noted about the left C7-T1 posterior articular facet. There is lucency through the superior articular facet T1 level consistent with fracture, technically age-indeterminate but could be chronic given sclerotic margins. There is    additional, chronic appearing lucency with subjacent sclerosis involving the left pedicle at the T1 level consistent with chronic fracture. However, if there has been acute trauma, would suggest neck MR to evaluate for marrow edema.      2.  Fluid noted within the inferior aspects of the mastoid air cells bilaterally.      3.  Multilevel cervical degenerative changes detailed above.               Samantha Olea MD  08/01/21 2026       Samantha Olea MD  08/01/21 2056

## 2021-08-01 NOTE — ED NOTES
Agree with triage note.  Pt states has not been having help with therapy to L arm.  Possibly nerve pain  Pt is notably more sob with minor amount of mov't.  Pt reports always SOB.  Take home O2 PRN.  Crackles - intermittent cough.

## 2021-08-01 NOTE — ED TRIAGE NOTES
Ofelia has been having left shoulder pain for 6-7 weeks.  Has seen her doctor and is getting physical therapy. She is also nauseated and having increasing weakness.  Generalized weakness.

## 2021-08-02 NOTE — ED PROVIDER NOTES
"eMERGENCY dEPARTMENT PROGRESS NOTE        ED COURSE AND MEDICAL DECISION MAKING  Patient was signed out to me by Dr. Olea at change of shift (8:40 PM)      Yanna Handy is a 89 year old female who presents for evaluation of left arm and shoulder pain, fatigue.    \"Patient reports left arm pain, nausea, weakness, and feeling shaky, for the past six weeks. She states that left arm pain starts in left shoulder and radiates down her left arm and is described as a throbbing pain. She reports associated tingling in left arm. Patient reports that lifting arm worsens pain. Patient was seen at [facility] a month ago and was started on PT for a week, which did not help and left pain is still worsening. Patient also reports neck pain which travels down back of neck/to shoulder blade. Patient denies any preceding injuries. No focal weakness. No skin rashes.      Patient just recently finished a 14-day course of Levaquin for Pseudomonas pneumonia about four days ago, and finished tapering on Prednisone a few days ago. She reports having been previously treated with other abx for PNA recently prior to that. Patient reports chronic shortness of breath, clear and grey productive cough, and bronchitis, to which she gets treatment for.  Feels breathing is at baseline. Denies CP, leg swelling, hemoptysis, fever, rhinorrhea, sore throat, abdominal pain, bowel sx, urinary symptoms or other acute illness.      Patient reports a coronary angiogram in May that was normal. Patient reports breast cancer in 2015, now felt to be in remission. Patient is full vaccinated for COVID-19. \"    Pending patient admission.    8:52 PM I spoke with Dr. Dawn, hospitalist, regarding patient admission. Accepts admission.  Patient is remained hemodynamically stable.  LAB  Pertinent labs results reviewed   Results for orders placed or performed during the hospital encounter of 08/01/21   Cervical spine CT w/o contrast    Impression    IMPRESSION:  1.  " Extensive irregularity noted about the left C7-T1 posterior articular facet. There is lucency through the superior articular facet T1 level consistent with fracture, technically age-indeterminate but could be chronic given sclerotic margins. There is   additional, chronic appearing lucency with subjacent sclerosis involving the left pedicle at the T1 level consistent with chronic fracture. However, if there has been acute trauma, would suggest neck MR to evaluate for marrow edema.    2.  Fluid noted within the inferior aspects of the mastoid air cells bilaterally.    3.  Multilevel cervical degenerative changes detailed above.     Chest CT w IV contrast only, TRAUMA / DISSECTION    Impression    IMPRESSION:     1.  Large amount of airway material particularly in the lower lobes and right middle lobe with downstream mixed atelectasis/consolidation. Bronchopulmonary aspiration can produce this pattern particularly given the posterior and dependent distribution.  2.  Solids 4-5 mm nodule in the right upper lobe. This could relate to #1. Per 2017 Fleischner Society guidelines, no specific imaging follow-up is necessary.  3.  Left atrial and main pulmonary artery enlargement. Suspect pulmonary hypertension.     Cervical spine MRI w/o contrast    Impression    CONCLUSION:  1.  Redemonstration of posttraumatic deformity surrounding the left C7-T1 facet joint, which produces moderate left neuroforaminal stenosis. There is no identifiable associated marrow edema to suggest acute to subacute component.  2.  Multilevel degenerative changes of the cervical spine as described in detail above, greatest at C5-C6, where there is moderate spinal canal stenosis, left greater than right, and severe left neuroforaminal stenosis. At C4-C5, there is mild to   moderate spinal canal stenosis and severe right and moderate left neuroforaminal stenosis.  3.    4.  At C6-C7, there is moderate spinal canal stenosis and mild to moderate  bilateral neuroforaminal stenosis.     Result Value Ref Range    Lipase 41 0 - 52 U/L   Comprehensive metabolic panel   Result Value Ref Range    Sodium 139 136 - 145 mmol/L    Potassium 3.8 3.5 - 5.0 mmol/L    Chloride 104 98 - 107 mmol/L    Carbon Dioxide (CO2) 24 22 - 31 mmol/L    Anion Gap 11 5 - 18 mmol/L    Urea Nitrogen 11 8 - 28 mg/dL    Creatinine 0.78 0.60 - 1.10 mg/dL    Calcium 9.1 8.5 - 10.5 mg/dL    Glucose 98 70 - 125 mg/dL    Alkaline Phosphatase 53 45 - 120 U/L    AST 15 0 - 40 U/L    ALT <9 0 - 45 U/L    Protein Total 7.1 6.0 - 8.0 g/dL    Albumin 3.4 (L) 3.5 - 5.0 g/dL    Bilirubin Total 0.9 0.0 - 1.0 mg/dL    GFR Estimate 67 >60 mL/min/1.73m2   Result Value Ref Range    Magnesium 2.4 1.8 - 2.6 mg/dL   Erythrocyte sedimentation rate auto   Result Value Ref Range    Erythrocyte Sedimentation Rate 39 (H) 0 - 20 mm/hr   CBC with platelets and differential   Result Value Ref Range    WBC Count 10.8 4.0 - 11.0 10e3/uL    RBC Count 4.38 3.80 - 5.20 10e6/uL    Hemoglobin 12.6 11.7 - 15.7 g/dL    Hematocrit 39.7 35.0 - 47.0 %    MCV 91 78 - 100 fL    MCH 28.8 26.5 - 33.0 pg    MCHC 31.7 31.5 - 36.5 g/dL    RDW 14.2 10.0 - 15.0 %    Platelet Count 280 150 - 450 10e3/uL    % Neutrophils 75 %    % Lymphocytes 16 %    % Monocytes 7 %    % Eosinophils 0 %    % Basophils 1 %    % Immature Granulocytes 1 %    NRBCs per 100 WBC 0 <1 /100    Absolute Neutrophils 8.1 1.6 - 8.3 10e3/uL    Absolute Lymphocytes 1.8 0.8 - 5.3 10e3/uL    Absolute Monocytes 0.8 0.0 - 1.3 10e3/uL    Absolute Eosinophils 0.0 0.0 - 0.7 10e3/uL    Absolute Basophils 0.1 0.0 - 0.2 10e3/uL    Absolute Immature Granulocytes 0.1 (H) <=0.0 10e3/uL    Absolute NRBCs 0.0 10e3/uL   Lactic acid whole blood   Result Value Ref Range    Lactic Acid 1.9 0.7 - 2.0 mmol/L   TSH with free T4 reflex   Result Value Ref Range    TSH 0.76 0.30 - 5.00 uIU/mL   B-Type Natriuretic Peptide ([facility] Only)   Result Value Ref Range     (H) 0 - 167 pg/mL    Troponin I (now)   Result Value Ref Range    Troponin I 0.03 0.00 - 0.29 ng/mL         RADIOLOGY    Pertinent imaging reviewed   Please see official radiology report.  Cervical spine MRI w/o contrast   Final Result   CONCLUSION:   1.  Redemonstration of posttraumatic deformity surrounding the left C7-T1 facet joint, which produces moderate left neuroforaminal stenosis. There is no identifiable associated marrow edema to suggest acute to subacute component.   2.  Multilevel degenerative changes of the cervical spine as described in detail above, greatest at C5-C6, where there is moderate spinal canal stenosis, left greater than right, and severe left neuroforaminal stenosis. At C4-C5, there is mild to    moderate spinal canal stenosis and severe right and moderate left neuroforaminal stenosis.   3.     4.  At C6-C7, there is moderate spinal canal stenosis and mild to moderate bilateral neuroforaminal stenosis.         Chest CT w IV contrast only, TRAUMA / DISSECTION   Final Result   IMPRESSION:       1.  Large amount of airway material particularly in the lower lobes and right middle lobe with downstream mixed atelectasis/consolidation. Bronchopulmonary aspiration can produce this pattern particularly given the posterior and dependent distribution.   2.  Solids 4-5 mm nodule in the right upper lobe. This could relate to #1. Per 2017 Fleischner Society guidelines, no specific imaging follow-up is necessary.   3.  Left atrial and main pulmonary artery enlargement. Suspect pulmonary hypertension.         Cervical spine CT w/o contrast   Final Result   IMPRESSION:   1.  Extensive irregularity noted about the left C7-T1 posterior articular facet. There is lucency through the superior articular facet T1 level consistent with fracture, technically age-indeterminate but could be chronic given sclerotic margins. There is    additional, chronic appearing lucency with subjacent sclerosis involving the left pedicle at the T1  level consistent with chronic fracture. However, if there has been acute trauma, would suggest neck MR to evaluate for marrow edema.      2.  Fluid noted within the inferior aspects of the mastoid air cells bilaterally.      3.  Multilevel cervical degenerative changes detailed above.             FINAL IMPRESSION    1.  Left cervical radiculopathy  2.  COPD with exacerbation          Zhou Mcintosh DO  08/02/21 021

## 2021-08-02 NOTE — TELEPHONE ENCOUNTER
----- Message from Le Carlisle MD sent at 8/1/2021  4:49 PM CDT -----  Lara was in ED Sunday, needs urgent slot sometime this week preferably with Dr. Bruno.  If not, then can have vist with someone else.   Thanks,  Le

## 2021-08-02 NOTE — TELEPHONE ENCOUNTER
----- Message from TAMI Ovalle CNP sent at 8/1/2021  9:36 PM CDT -----  Gigi Goss!  This patient from the ER this weekend needs a follow up appt this week with NP/PA on Zaragoza day for cervical radiculopathy  No new imaging needed  Thanks!

## 2021-08-04 NOTE — PROGRESS NOTES
Neurosurgery plan of care:     Lara is a 89 yo F who presented to [facility] ED on 8/1/21 with left shoulder pain into the left arm with numbness in the same distribution. Reviewed imaging in ER, cleared collar, and recommended pt see us in clinic for follow up/exam. Dr Zaragoza surgeon following pt    Reviewed imaging with patient and family present. She does have left C6 and C7 neural foraminal narrowing. The irregularity of the left C7-T1 posterior articular facet seen on CT is post-traumatic but chronic. She is full strength on exam without any signs of myelopathy. Discussed with Dr Zaragoza who recommends cspine CHRISTIAN for pain control. No surgery indicated at this time. They can follow up with us PRN if questions or concerns arise.       Plan:  1. Cervical spine injection for pain control (if cleared by pulmonologist given recent pneumonia)  2. Continue PT exercises  3. Increase gabapentin dose to 200mg Qam, 100mg Qpm, 300mg QHS  4. Follow up PRN           HPI:  Spoke with Dr Olea about Lara, a 89yo F on no anticoagulation with recent pneumonia in July who presented to [facility] ED today with 6wk hx of generalized weakness, left shoulder pain radiating into the brachial aspect of her left arm, with numbness in the same distribution. She has been doing physical therapy without improvement so far, followed by the spine center. Has not acutely worsened either. No recent trauma. Denies weakness of the arm or any additional symptoms. Exam reported as: neurologically intact. CT cervical spine demonstrates an age-indeterminate left C7-T1 superior articular facet fracture which is new compared to cervical MRI from jan 2020. There are also chronic degenerative bone spurs on the left at C5-6 and C6-7 likely causing severe left C6 and left C7 neural foraminal narrowing which is stable compared to cervical MRI from jan 2020.      Exam:  General: seated comfortably in wheelchair in NAD    Strength is full throughout both upper and  lower extremities , all planes    Sensation of upper and lower extremities is full     Reflexes are hypoactive throughout both upper and lower extremities  Negative spencer, clonus    Full painless ROM neck, all planes    Gait is shuffled but stable    Imaging:  Personally reviewed, also reviewed by Dr Zaragoza  EXAM: MR CERVICAL SPINE W/O CONTRAST  LOCATION: [facility]  DATE/TIME: 8/1/2021 6:31 PM     INDICATION: Left arm and posterior shoulder pain with left or extremity paresthesias and generalized weakness. Age-indeterminate, though chronic appearing fractures of the left T1 pedicle and left superior articular facet.  COMPARISON: CT cervical spine from earlier in the day and MRI cervical spine 01/20/2020.  TECHNIQUE: MRI Cervical Spine without IV contrast.     FINDINGS:   Normal cervical vertebral body heights. There is a chronic compression fracture deformity of the inferior endplate of the T4 vertebral body, approximately 66% height loss anteriorly. There is 3 mm retrolisthesis, though without significant spinal canal   stenosis. 3 mm anterolisthesis of C3 on C4, slight anterolisthesis of C4 on C5, and 2 mm anterolistheses of T1 on T2 and T2 on T3. There is no definite marrow edema within the left C7 or T1 posterior elements or along the left C7-T1 facet joint. No   pathologic bone marrow signal abnormality. Normal appearance of the craniocervical junction and C1-C2. No abnormal cord signal. The visualized intracranial contents are unremarkable. Bilateral mastoid effusions. Grossly normal paraspinal soft tissues.   The vertebral artery flow voids are preserved.     C2-C3: Normal disc height. No herniation. Mild to moderate left facet arthropathy. Moderate left uncovertebral joint hypertrophy. No spinal canal stenosis. No right neural foraminal stenosis. Moderate left neural foraminal stenosis.     C3-C4: Anterolisthesis with advanced loss of disc height. Uncovering of the disc space  posteriorly with broad-based posterior disc bulge. Mild right and moderate left facet arthropathy. Mild right and moderate left uncovertebral joint hypertrophy. Mild   spinal canal stenosis. Mild to moderate right neural foraminal stenosis. Moderate left neural foraminal stenosis.     C4-C5: Advanced loss of disc height. Broad-based circumferential disc osteophyte complex. Mild to moderate arthropathy of the ankylosed left facet joint. Moderate bilateral uncovertebral joint hypertrophy. Mild to moderate spinal canal stenosis. Severe   right neural foraminal stenosis. Moderate left neural foraminal stenosis.       C5-C6: Advanced loss of disc height. Broad-based circumferential disc osteophyte complex, eccentric to the left. Mild facet arthropathy, left greater than right. Severe left uncovertebral joint hypertrophy. Moderate spinal canal stenosis, left greater   than right. No right neural foraminal stenosis. Severe left neural foraminal stenosis.     C6-C7: Advanced loss of disc height. Broad-based circumferential disc osteophyte complex. Mild facet arthropathy. Mild bilateral uncovertebral joint hypertrophy. Moderate spinal canal stenosis. Mild to moderate right neural foraminal stenosis. Mild to   moderate left neural foraminal stenosis.     C7-T1: Mild to moderate loss of disc height. Shallow posterior disc bulge. Posttraumatic deformity centered at the left facet joint, better evaluated on recent CT. Mild left uncovertebral joint hypertrophy. Mild spinal canal stenosis. No right neural   foraminal stenosis. Moderate left neural foraminal stenosis.                                                                         CONCLUSION:  1.  Redemonstration of posttraumatic deformity surrounding the left C7-T1 facet joint, which produces moderate left neuroforaminal stenosis. There is no identifiable associated marrow edema to suggest acute to subacute component.  2.  Multilevel degenerative changes of the cervical  spine as described in detail above, greatest at C5-C6, where there is moderate spinal canal stenosis, left greater than right, and severe left neuroforaminal stenosis. At C4-C5, there is mild to   moderate spinal canal stenosis and severe right and moderate left neuroforaminal stenosis.  3.    4.  At C6-C7, there is moderate spinal canal stenosis and mild to moderate bilateral neuroforaminal stenosis.       EXAM: CT CERVICAL SPINE WITHOUT CONTRAST  LOCATION: [facility]  DATE/TIME: 08/01/2021, 1:55 PM     INDICATION: Cervical radiculopathy, no red flags. Neck pain, chronic, no prior imaging.  COMPARISON: MR cervical spine 01/20/2020.  TECHNIQUE: Routine CT Cervical Spine without IV contrast. Multiplanar reformats. Dose reduction techniques were used.     FINDINGS:  There is moderate dextroconvex cervicothoracic scoliosis. Stable appearing 3 mm anterolisthesis C3 on C4. There is stable 1 mm anterolisthesis T1 on T2. Otherwise, there is anatomic sagittal alignment. Vertebral body height is preserved. There is   extensive right-sided temporomandibular joint degenerative change noted. There is extensive irregularity about the left C7-T1 articular facet, specifically involving the left superior articular facet of T1 where there is lucency involving the superior   articular facet T1 level, see sagittal image 36 of series 7, although this demonstrates sclerotic margins. Lucency is consistent with fracture, technically age-indeterminate. This sclerosis could suggests chronicity. There is a chronic appearing lucency   with subjacent sclerosis involving the left pedicle at the T1 level likely related to old fracture in this location. See image 51 of series 4.1. No other convincing evidence of acute fracture. There is some fluid noted within several right inferior   mastoid air cells. Similar findings involving the left mastoid air cells. No extraspinal abnormality.      Craniovertebral junction and  C1-C2: Normal.     C2-C3: Patent spinal canal and right neural foramen. Normal disc height. Prominent left-sided uncinate spurring results in mild-to-moderate left foraminal narrowing. Extensive bilateral facet arthropathy, more prominent on the left.      C3-C4: Severe loss of disc height. Anterolisthesis creates disc uncovering. There is mild to moderate canal stenosis with moderately severe bilateral foraminal narrowing. Bilateral facet arthropathy.      C4-C5: Severe loss of disc height. Central canal with mild to moderate bilateral foraminal narrowing from uncinate spurring. Facet arthropathy.      C5-C6: Severe loss of disc height. Vacuum disc phenomenon. Anterior and posterior osteophytic ridging. Patent spinal canal and right neural foramen. The left neural foramen is severely narrowed by left-sided uncinate spurring. Facet arthropathy.      C6-C7: Severe loss of disc height. No spinal canal with moderately severe bilateral foraminal narrowing. Mild facet arthropathy.      C7-T1: Moderate loss of disc height. Patent spinal canal and right neural foramen. The left neural foramen is moderately to severely narrowed from left-sided uncinate spurring. Bilateral facet arthropathy.                                                                      IMPRESSION:  1.  Extensive irregularity noted about the left C7-T1 posterior articular facet. There is lucency through the superior articular facet T1 level consistent with fracture, technically age-indeterminate but could be chronic given sclerotic margins. There is   additional, chronic appearing lucency with subjacent sclerosis involving the left pedicle at the T1 level consistent with chronic fracture. However, if there has been acute trauma, would suggest neck MR to evaluate for marrow edema.     2.  Fluid noted within the inferior aspects of the mastoid air cells bilaterally.     3.  Multilevel cervical degenerative changes detailed above.     Rosana Urena  ABRANPANNA  [facility]  O. [phone]

## 2021-08-04 NOTE — LETTER
8/4/2021         RE: Yanna Handy  [address]  [place] [address]        Dear Colleague,    Thank you for referring your patient, Yanna Handy, to the [facility]ERY [facility] formerly [facility]. Please see a copy of my visit note below.    Neurosurgery plan of care:     Lara is a 89 yo F who presented to [facility] ED on 8/1/21 with left shoulder pain into the left arm with numbness in the same distribution. Reviewed imaging in ER, cleared collar, and recommended pt see us in clinic for follow up/exam. Dr Zaragoza surgeon following pt    Reviewed imaging with patient and family present. She does have left C6 and C7 neural foraminal narrowing. The irregularity of the left C7-T1 posterior articular facet seen on CT is post-traumatic but chronic. She is full strength on exam without any signs of myelopathy. Discussed with Dr Zaragoza who recommends cspine CHRISTIAN for pain control. No surgery indicated at this time. They can follow up with us PRN if questions or concerns arise.       Plan:  1. Cervical spine injection for pain control (if cleared by pulmonologist given recent pneumonia)  2. Continue PT exercises  3. Increase gabapentin dose to 200mg Qam, 100mg Qpm, 300mg QHS  4. Follow up PRN           HPI:  Spoke with Dr Olea about Lara, a 89yo F on no anticoagulation with recent pneumonia in July who presented to [facility] ED today with 6wk hx of generalized weakness, left shoulder pain radiating into the brachial aspect of her left arm, with numbness in the same distribution. She has been doing physical therapy without improvement so far, followed by the spine center. Has not acutely worsened either. No recent trauma. Denies weakness of the arm or any additional symptoms. Exam reported as: neurologically intact. CT cervical spine demonstrates an age-indeterminate left C7-T1 superior articular facet fracture which is new compared to cervical MRI from jan 2020. There are also chronic degenerative bone  spurs on the left at C5-6 and C6-7 likely causing severe left C6 and left C7 neural foraminal narrowing which is stable compared to cervical MRI from jan 2020.      Exam:  General: seated comfortably in wheelchair in NAD    Strength is full throughout both upper and lower extremities , all planes    Sensation of upper and lower extremities is full     Reflexes are hypoactive throughout both upper and lower extremities  Negative spencer, clonus    Full painless ROM neck, all planes    Gait is shuffled but stable    Imaging:  Personally reviewed, also reviewed by Dr Zaragoza  EXAM: MR CERVICAL SPINE W/O CONTRAST  LOCATION: [facility]  DATE/TIME: 8/1/2021 6:31 PM     INDICATION: Left arm and posterior shoulder pain with left or extremity paresthesias and generalized weakness. Age-indeterminate, though chronic appearing fractures of the left T1 pedicle and left superior articular facet.  COMPARISON: CT cervical spine from earlier in the day and MRI cervical spine 01/20/2020.  TECHNIQUE: MRI Cervical Spine without IV contrast.     FINDINGS:   Normal cervical vertebral body heights. There is a chronic compression fracture deformity of the inferior endplate of the T4 vertebral body, approximately 66% height loss anteriorly. There is 3 mm retrolisthesis, though without significant spinal canal   stenosis. 3 mm anterolisthesis of C3 on C4, slight anterolisthesis of C4 on C5, and 2 mm anterolistheses of T1 on T2 and T2 on T3. There is no definite marrow edema within the left C7 or T1 posterior elements or along the left C7-T1 facet joint. No   pathologic bone marrow signal abnormality. Normal appearance of the craniocervical junction and C1-C2. No abnormal cord signal. The visualized intracranial contents are unremarkable. Bilateral mastoid effusions. Grossly normal paraspinal soft tissues.   The vertebral artery flow voids are preserved.     C2-C3: Normal disc height. No herniation. Mild to moderate  left facet arthropathy. Moderate left uncovertebral joint hypertrophy. No spinal canal stenosis. No right neural foraminal stenosis. Moderate left neural foraminal stenosis.     C3-C4: Anterolisthesis with advanced loss of disc height. Uncovering of the disc space posteriorly with broad-based posterior disc bulge. Mild right and moderate left facet arthropathy. Mild right and moderate left uncovertebral joint hypertrophy. Mild   spinal canal stenosis. Mild to moderate right neural foraminal stenosis. Moderate left neural foraminal stenosis.     C4-C5: Advanced loss of disc height. Broad-based circumferential disc osteophyte complex. Mild to moderate arthropathy of the ankylosed left facet joint. Moderate bilateral uncovertebral joint hypertrophy. Mild to moderate spinal canal stenosis. Severe   right neural foraminal stenosis. Moderate left neural foraminal stenosis.       C5-C6: Advanced loss of disc height. Broad-based circumferential disc osteophyte complex, eccentric to the left. Mild facet arthropathy, left greater than right. Severe left uncovertebral joint hypertrophy. Moderate spinal canal stenosis, left greater   than right. No right neural foraminal stenosis. Severe left neural foraminal stenosis.     C6-C7: Advanced loss of disc height. Broad-based circumferential disc osteophyte complex. Mild facet arthropathy. Mild bilateral uncovertebral joint hypertrophy. Moderate spinal canal stenosis. Mild to moderate right neural foraminal stenosis. Mild to   moderate left neural foraminal stenosis.     C7-T1: Mild to moderate loss of disc height. Shallow posterior disc bulge. Posttraumatic deformity centered at the left facet joint, better evaluated on recent CT. Mild left uncovertebral joint hypertrophy. Mild spinal canal stenosis. No right neural   foraminal stenosis. Moderate left neural foraminal stenosis.                                                                         CONCLUSION:  1.  Redemonstration  of posttraumatic deformity surrounding the left C7-T1 facet joint, which produces moderate left neuroforaminal stenosis. There is no identifiable associated marrow edema to suggest acute to subacute component.  2.  Multilevel degenerative changes of the cervical spine as described in detail above, greatest at C5-C6, where there is moderate spinal canal stenosis, left greater than right, and severe left neuroforaminal stenosis. At C4-C5, there is mild to   moderate spinal canal stenosis and severe right and moderate left neuroforaminal stenosis.  3.    4.  At C6-C7, there is moderate spinal canal stenosis and mild to moderate bilateral neuroforaminal stenosis.       EXAM: CT CERVICAL SPINE WITHOUT CONTRAST  LOCATION: [facility]  DATE/TIME: 08/01/2021, 1:55 PM     INDICATION: Cervical radiculopathy, no red flags. Neck pain, chronic, no prior imaging.  COMPARISON: MR cervical spine 01/20/2020.  TECHNIQUE: Routine CT Cervical Spine without IV contrast. Multiplanar reformats. Dose reduction techniques were used.     FINDINGS:  There is moderate dextroconvex cervicothoracic scoliosis. Stable appearing 3 mm anterolisthesis C3 on C4. There is stable 1 mm anterolisthesis T1 on T2. Otherwise, there is anatomic sagittal alignment. Vertebral body height is preserved. There is   extensive right-sided temporomandibular joint degenerative change noted. There is extensive irregularity about the left C7-T1 articular facet, specifically involving the left superior articular facet of T1 where there is lucency involving the superior   articular facet T1 level, see sagittal image 36 of series 7, although this demonstrates sclerotic margins. Lucency is consistent with fracture, technically age-indeterminate. This sclerosis could suggests chronicity. There is a chronic appearing lucency   with subjacent sclerosis involving the left pedicle at the T1 level likely related to old fracture in this location. See image  51 of series 4.1. No other convincing evidence of acute fracture. There is some fluid noted within several right inferior   mastoid air cells. Similar findings involving the left mastoid air cells. No extraspinal abnormality.      Craniovertebral junction and C1-C2: Normal.     C2-C3: Patent spinal canal and right neural foramen. Normal disc height. Prominent left-sided uncinate spurring results in mild-to-moderate left foraminal narrowing. Extensive bilateral facet arthropathy, more prominent on the left.      C3-C4: Severe loss of disc height. Anterolisthesis creates disc uncovering. There is mild to moderate canal stenosis with moderately severe bilateral foraminal narrowing. Bilateral facet arthropathy.      C4-C5: Severe loss of disc height. Central canal with mild to moderate bilateral foraminal narrowing from uncinate spurring. Facet arthropathy.      C5-C6: Severe loss of disc height. Vacuum disc phenomenon. Anterior and posterior osteophytic ridging. Patent spinal canal and right neural foramen. The left neural foramen is severely narrowed by left-sided uncinate spurring. Facet arthropathy.      C6-C7: Severe loss of disc height. No spinal canal with moderately severe bilateral foraminal narrowing. Mild facet arthropathy.      C7-T1: Moderate loss of disc height. Patent spinal canal and right neural foramen. The left neural foramen is moderately to severely narrowed from left-sided uncinate spurring. Bilateral facet arthropathy.                                                                      IMPRESSION:  1.  Extensive irregularity noted about the left C7-T1 posterior articular facet. There is lucency through the superior articular facet T1 level consistent with fracture, technically age-indeterminate but could be chronic given sclerotic margins. There is   additional, chronic appearing lucency with subjacent sclerosis involving the left pedicle at the T1 level consistent with chronic fracture. However,  if there has been acute trauma, would suggest neck MR to evaluate for marrow edema.     2.  Fluid noted within the inferior aspects of the mastoid air cells bilaterally.     3.  Multilevel cervical degenerative changes detailed above.     Rosana LEDEZMA-ANNA  [facility] Neurosurgery  O. [phone]         Again, thank you for allowing me to participate in the care of your patient.        Sincerely,        TAMI Yao CNP

## 2021-08-04 NOTE — PATIENT INSTRUCTIONS
Lara has left arm pain and numbness likely related to left sided nerve impingement in the cervical spine    Plan:  1. Cervical epidural steroid injection at the spine center (with pulmonologist clearance)  2. Increase gabapentin to 200mg Qam, 100mg Qpm, 300mg bedtime.  3. Continue physical therapy exercises.  4. Evaluate response to injection, follow up as needed    Rosana LEDEZMA-C  [facility] Neurosurgery  O. [phone]

## 2021-08-04 NOTE — TELEPHONE ENCOUNTER
Reason for Call:  Medication or medication refill: HYDROcodone-acetaminophen (NORCO) 5-325 MG tablet    Do you use a [facility]? NO   Name of the pharmacy and phone number for the current request:  [facility] pharmacy [address] [place] [address] [phone]    Name of the medication requested:   HYDROcodone-acetaminophen (NORCO) 5-325 MG tablet    Pt was released from the hospital yesterday and only received 10 pills. Patient takes 1 tablet every 4 hours and will be out of medication tomorrow.     Can we leave a detailed message on this number? YES    Phone number patient can be reached at: Cell number on file:    Telephone Information:   Home  [phone]       Best Time: ANY    Call taken on 8/4/2021 at 10:00 AM by Yvonne Fall

## 2021-08-04 NOTE — TELEPHONE ENCOUNTER
Routing refill request to provider for review/approval because:  Drug not on the [facility] refill protocol   ___________________________________________________________    Copy of Last Rx:        Last office visit with provider:  6/1/21   ___________________________________________________________    Requested Prescriptions   Pending Prescriptions Disp Refills     ciprofloxacin (CIPRO) 250 MG tablet [Pharmacy Med Name: Ciprofloxacin HCl Oral Tablet 250 MG] 45 tablet 0     Sig: Take 1/2 tablet by mouth once daily       There is no refill protocol information for this order          China Liz RN 08/03/21 8:16 PM

## 2021-08-04 NOTE — NURSING NOTE
Patient was seen in the ER. She is having pain in the left shoulder blade that radiates down the left arm.  Mayra Dean MA,CMA,8:18 AM

## 2021-08-05 NOTE — PROGRESS NOTES
Neb treatment given with 2 ml of 20% Mucomyst and Albuterol 2.5 mg neb.  Neb given with filtered neb.  BS before were decreased and coarse, after neb increase aeration and coarse BS.  Patient cough is non-productive at this time.  RA SaO2% 90%.  HR 79-84, RR 20-20    Patient left PF lab without any respiratory difficulties.       Daughter in-law was given instructions how to give nebulizer to patient & cleaning it.  Daughter in-law received a picture handout on how to give neb.  Patient will need a frequency and if a bronchodilator should be given with Mucomyst.  Daughter in-law stated that her  ( patient's son) contact Dr. Bruno for this information.

## 2021-08-06 NOTE — PATIENT INSTRUCTIONS
Please follow up two weeks post procedure with Rosana Urena NP with Neurosurgery to evaluate your plan of care.       DISCHARGE INSTRUCTIONS    During office hours (8:00 a.m.- 4:00 p.m.) questions or concerns may be answered  by calling Spine Center Navigation Nurses at  [phone].  Messages received after hours will be returned the following business day.      In the case of an emergency, please dial 911 or seek assistance at the nearest Emergency Room/Urgent Care facility.     All Patients:    ? You may experience an increase in your symptoms for the first 2 days (It may take anywhere between 2 days- 2 weeks for the steroid to have maximum effect).    ? You may use ice on the injection site, as frequently as 20 minutes each hour if needed.    ? You may take your pain medicine.    ? You may continue taking your regular medication after your injection. If you have had a Medial Branch Block you may resume pain medication once your pain diary is completed.    ? You may shower. No swimming, tub bath or hot tub for 48 hours.  You may remove your bandaid/bandage as soon as you are home.    ? You may resume light activities, as tolerated.    ? Resume your usual diet as tolerated.    ? It is strongly advised that you do not drive for 1-3 hours post injection.    ? If you have had oral sedation:  Do not drive for 8 hours post injection.      ? If you have had IV sedation:  Do not drive for 24 hours post injection.  Do not operate hazardous machinery or make important personal/business decisions for 24 hours.      POSSIBLE STEROID SIDE EFFECTS (If steroid/cortisone was used for your procedure)    -If you experience these symptoms, it should only last for a short period      Swelling of the legs                Skin redness (flushing)       Mouth (oral) irritation     Blood sugar (glucose) levels              Sweats                      Mood changes    Headache    Sleeplessness    Weakened immune system for up to 14 days,  which could increase the risk of carol the COVID-19 virus and/or experiencing more severe symptoms of the disease, if exposed.    Decreased effectiveness of the flu vaccine if given within 2 weeks of the steroid.         POSSIBLE PROCEDURE SIDE EFFECTS  -Call the Spine Center if you are concerned    Increased Pain             Increased numbness/tingling        Nausea/Vomiting            Bruising/bleeding at site        Redness or swelling                                                Difficulty walking        Weakness             Fever greater than 100.5    *In the event of a severe headache after an epidural steroid injection that is relieved by lying down, please call the [facility] to speak with a clinical staff member*

## 2021-08-06 NOTE — TELEPHONE ENCOUNTER
Phone call from patient's daughter, Estefani matute, with questions for dr. Bruno.  1) patient did her test dose of mucomyst neb at [facility] and tolerated it fine.  She does not have prescription for home use.  Needs prescription and h  ow often to use??   2) should she be using a bronchodilator with this mucomyst neb??  She only has duonebs at home now.       Patient was in ED on 8/2 and looks like Dr. Carlisle sent note to have her seen urgently by Dr. Bruno.  Is this still necessary?? (patient was never scheduled)

## 2021-08-12 PROBLEM — J18.9 PNEUMONIA: Status: ACTIVE | Noted: 2021-01-01

## 2021-08-12 PROBLEM — I48.21 PERMANENT ATRIAL FIBRILLATION (H): Status: ACTIVE | Noted: 2021-01-01

## 2021-08-12 PROBLEM — J18.9 PNEUMONIA: Status: RESOLVED | Noted: 2021-01-01 | Resolved: 2021-01-01

## 2021-08-12 PROBLEM — R00.0 TACHYCARDIA: Status: RESOLVED | Noted: 2021-01-01 | Resolved: 2021-01-01

## 2021-08-12 NOTE — PROGRESS NOTES
Hospital Follow-up Visit:    Hospital/Nursing Home/IP Rehab Facility: [facility]  Date of Admission: 8/1/2021  Date of Discharge: 8/3/2021  Reason(s) for Admission: Neck pain/arm pain      Was your hospitalization related to COVID-19? No   Problems taking medications regularly:  None  Medication changes since discharge: None  Problems adhering to non-medication therapy:  None    Summary of hospitalization:  CareEverywhere information obtained and reviewed  HOSPITAL COURSE:  89 y.o. female with past medical history of asthma, hypertension, CAD s/p stent 2015, breast cancer s/p lumpectomy 2017, hyperlipidemia who presented to [place] ED for evaluation of left neck/arm pain. MRI as performed and shows a chronic appearing C7 / T1 fracture with mod left foraminal stenosis. The case was discussed with neurosurgery, no acute intervention recommended.      PLAN     C7/T1 fracture with radiculopathy  ~MRI with chronic appearing C7/T1 fracture with moderate left foraminal stenosis  ~No focal neurologic deficits  ~Consulted neurosurgery conservative management no indication for surgery  ~Optimize pain control  ~Lidocaine patch as needed, PTA adam  Physical therapy recommending home with outpatient therapy       Shortness of breath: aspiration pneumonitis vs A. fib  Possible paroxysmal A. fib with RVR  Noted A. fib in the previous admission at urgent care, A. fib on EKG strip at the ED per cardiology Dr. Bennett and I agree is normal sinus with frequent PACs rather than new onset A. fib. EKG repeated here and I see no distinct P waves and irregular rhythm suggesting A. Fib.  TSH WNL  ~Resume PTA metoprolol 100 twice daily, Lopressor as needed rate control  ~Placed on telemetry, consider cardiology follow based on telemetry or consider outpatient Zio patch  ~[id number] score: 2-3, holding AC- at risk of bleed with her age and fall  ~Chest CT large amount of distal airway material and some  atelectasis  ~Pulmonary consulted prior to transfer consulted-no antibiotic indicated for now(afebrile, wbc wnl)/possibly due to poor pulmonary toilet  ~Patient just finished 14 days of Levaquin for Pseudomonas pneumonia 4 days ago and prednisone a few days ago  ~Incentive spirometry, inhalers     Asthma with no exacerbation  Chronic respiratory failure on home oxygen 2 L via NC-stable   Resume PTA inhalers     Hx of breast cancer s/p lumpectomy: PTA anastrozole     Hypertension  CAD s/p stent, 2015  HFpEF-with no exacerbation  Chest pain free.  Stable   Resume PTA metoprolol, losartan, Crestor, aspirin  Holding PTA Lasix     Hx trigeminal neuralgia  chronic back pain  PTA gababapeentin     GERD: PTA PPI     Diagnostic Tests/Treatments reviewed.  Follow up needed: New Dg A-fib - will see Cardiologist, Spine clinic - already had epidural steroid injection in cervical spine yesterday  Other Healthcare Providers Involved in Patient s Care:         None  Update since discharge: improved.       Post Discharge Medication Reconciliation: discharge medications reconciled, continue medications without change.  Plan of care communicated with patient       P   ICD-10-CM   PL                 1.   Radiculitis of left cervical region M54.12  Change Dx      2.   Abnormal CT of the abdomen R93.5  Change Dx      3.   Nausea R11.0  Change Dx      4.   Moderate persistent asthma, unspecified whether complicated J45.40  Change Dx      5.   Cardiac arrhythmia, unspecified cardiac arrhythmia type I49.9  Change Dx      6.   Permanent atrial fibrillation (H) I48.21  Change Dx      7.   Fatigue, unspecified type R53.83  Change Dx       1. Cervical radiculitis - improved since got epidural injection. Seeing spine center in [place]. On gabapentin 300mg + 100 mg + 200 mg. Taking Vicodin 1/2pill twice daily.  2. A- fib diagnosed on EKG in the hospital, but it looked like it was paroxysmal in the past. She is on metoprolol. Blood thinner -  high risk of falls. Rate controlled. I repeated EKG and per my review it did show A-fib. She will f/u with her Cardiologist.  3. Asthma with recent pneumonia treated with levaquin and steroids, seeing Dr Bruno. She will start adding antibiotic to nebulizer. Breathing is more comfortable. No wheezing.  4. Abnormal CT abdomen - she saw Colorectal and [facility]. Colonoscopy recommended in the hospital setting if she want to proceed to further diagnostic testing. Questionable inflammation vs mass. Abdominal pain resolved and does not have blood in the stool. She is not sure if wants to schedule colonoscopy. She would not want to have any aggressive treatment like surgery or chemo if diagnosed with cancer.  5.she feels tired, appetite is poor. Nausea occassionaly. I sent Rx for Zofran. GI did not recommend EGD.

## 2021-08-25 NOTE — TELEPHONE ENCOUNTER
...Reason for Call:  Medication or medication refill:    Do you use a [facility]?  Name of the pharmacy and phone number for the current request: [id number] - [address]  [phone]    Name of the medication requested: HYDROcodone-acetaminophen (NORCO) 5-325 MG tablet    Other request: NONE     Can we leave a detailed message on this number? YES    Phone number patient can be reached at: Home number on file [phone] (home)    Best Time: ANY    Call taken on 8/25/2021 at 9:12 AM by DEANDRE Reinoso

## 2021-08-25 NOTE — LETTER
8/25/2021         RE: Yanna Handy  [address]  [place] [address]        Dear Colleague,    Thank you for referring your patient, Yanna Handy, to the [facility]ERY [facility]. Please see a copy of my visit note below.    NEUROSURGERY FOLLOWUP  NOTE    Yanna Handy comes today in f/u .  Patient is a 89 yo female who presented to ED on 8/1/21 with left shoulder and arm pain ad numbness. MRI demonstrated a moderate spinal canal stenosis at C5-6 and C6-7 and mild to moderate at C4-5 with multi-level foraminal narrowing most significant at left C5-6 and b/l C4-5. She underwent a  C7-T1 interlaminar epidural injection and returns in f/u.     Patient has had great improvement of her left arm pain following her epidural injection.  Currently most of her pain remains resolved except patient has very focal left elbow pain.  Numbness and tingling has improved immensely as well she has some minor numbness and tingling over her left forearm.  She denies any other new neurological symptoms.      PHYSICAL EXAM:   Constitutional: BP (!) 161/83   Pulse 89   Ht 5' (1.524 m)   Wt 153 lb (69.4 kg)   SpO2 94%   BMI 29.88 kg/m       Mental Status: A & O in no acute distress.  Affect is appropriate.  Speech is fluent.  Recent and remote memory are intact.  Attention span and concentration are normal.     Motor:  Normal bulk and tone all muscle groups of upper and lower extremities.     Sensory: Sensation intact bilaterally to light touch.     - tinel at cubital tunnel    IMAGING:   I personally reviewed all radiographic images        CONSULTATION ASSESSMENT AND PLAN:    1 yo female who presented to ED on 8/1/21 with left shoulder and arm pain ad numbness. MRI demonstrated a moderate spinal canal stenosis at C5-6 and C6-7 and mild to moderate at C4-5 with multi-level foraminal narrowing most significant at left C5-6 and b/l C4-5. Great improvement after epidural injection. Now  having mostly focal left elbow pain. Had past injury to that arm. Recommend xray to the arm to r/o any new fractures.  She will also start in home physical therapy and continue gabapentin and norco as needed. We will contact her after xray obtained.       I spent more than 10 minutes in this apt, examining the pt, reviewing the scans, reviewing notes from chart, discussing treatment options with risks and benefits and coordinating care.     Eugenia Zaragoza MD      CC:     Wilbur Hannah  [address] [phone]        Again, thank you for allowing me to participate in the care of your patient.        Sincerely,        Eugenia Zaragoza MD

## 2021-08-25 NOTE — PROGRESS NOTES
NEUROSURGERY FOLLOWUP  NOTE    Yanna Handy comes today in f/u .  Patient is a 89 yo female who presented to ED on 8/1/21 with left shoulder and arm pain ad numbness. MRI demonstrated a moderate spinal canal stenosis at C5-6 and C6-7 and mild to moderate at C4-5 with multi-level foraminal narrowing most significant at left C5-6 and b/l C4-5. She underwent a  C7-T1 interlaminar epidural injection and returns in f/u.     Patient has had great improvement of her left arm pain following her epidural injection.  Currently most of her pain remains resolved except patient has very focal left elbow pain.  Numbness and tingling has improved immensely as well she has some minor numbness and tingling over her left forearm.  She denies any other new neurological symptoms.      PHYSICAL EXAM:   Constitutional: BP (!) 161/83   Pulse 89   Ht 5' (1.524 m)   Wt 153 lb (69.4 kg)   SpO2 94%   BMI 29.88 kg/m       Mental Status: A & O in no acute distress.  Affect is appropriate.  Speech is fluent.  Recent and remote memory are intact.  Attention span and concentration are normal.     Motor:  Normal bulk and tone all muscle groups of upper and lower extremities.     Sensory: Sensation intact bilaterally to light touch.     - tinel at cubital tunnel    IMAGING:   I personally reviewed all radiographic images        CONSULTATION ASSESSMENT AND PLAN:    1 yo female who presented to ED on 8/1/21 with left shoulder and arm pain ad numbness. MRI demonstrated a moderate spinal canal stenosis at C5-6 and C6-7 and mild to moderate at C4-5 with multi-level foraminal narrowing most significant at left C5-6 and b/l C4-5. Great improvement after epidural injection. Now having mostly focal left elbow pain. Had past injury to that arm. Recommend xray to the arm to r/o any new fractures.  She will also start in home physical therapy and continue gabapentin and norco as needed. We will contact her after xray obtained.       I spent more than  10 minutes in this apt, examining the pt, reviewing the scans, reviewing notes from chart, discussing treatment options with risks and benefits and coordinating care.     Eugenia Zaragoza MD      CC:     Wilbur Hannah  [address] [facility] [address]

## 2021-08-26 NOTE — TELEPHONE ENCOUNTER
Reason for Call:  Other appointment follow up from 08/12/2021 office visit    Detailed comments:  Patient called and stated she had an office visit on 08/12/2021 with PCP. Patient states they found a growth on her intestines. Patient was told to call back if she wasn't feeling any better and PCP would squeeze her into her schedule. Patient states she feels miserable today and very sick. Patient states she feels washed out, sleeps all the time, feels sick to her stomach. Patient states she does not want to go to the ER. Patient wants to come in and see PCP today. Please call patient to discuss her questions/concerns.    Phone Number Patient can be reached at: Home number on file [phone] (home)    Best Time: ANY    Can we leave a detailed message on this number? YES    Call taken on 8/26/2021 at 8:43 AM by Yvonne Fall

## 2021-08-26 NOTE — TELEPHONE ENCOUNTER
I can see her today 12;40 double book. But explain to her that I will not be able to do much here, especially if she is very nauseated or having pain, vomiting. In that case, I will send her to the ER. So, if she is really feeling miserable and very sick, she should go to the ER.

## 2021-08-27 NOTE — TELEPHONE ENCOUNTER
Patient called and stated she never received a call back from PCP/care team yesterday. Patient states she was home all day waiting for the call. I read PCP's message and advised patient per PCP that she should go to the ER.     Yvonne Fall

## 2021-08-31 NOTE — TELEPHONE ENCOUNTER
8-31-21  Reason for Call: Request for verbal orders     Order or referral being requested: Physical Therapy, Nursing Eval & Occupational     Date needed: as soon as possible    Has the patient been seen by the PCP for this problem? Not Applicable    Additional comments: Park called from [facility] and is looking for verbal orders for :  Physical Therapy twice a week for 8 weeks  Nursing evaluation   Occupational therapy evalutation    Phone number Patient can be reached at:  [phone]    Best Time:  anytime    Can we leave a detailed message on this number?  YES    Call taken on 8/31/2021 at 12:00 PM by Toma Singh

## 2021-09-01 NOTE — TELEPHONE ENCOUNTER
Voicemail left for Park with verbal orders.  Cheryl Ryan CMA ............... 9:19 AM, 09/01/21

## 2021-09-09 NOTE — PROGRESS NOTES
(R11.0) Nausea  (primary encounter diagnosis)  Comment: will continue symptomatic treatment with Zofran or compazine prn. If not better, further GI evaluation recommended. Possible side effect of gabapentin, which is now very helpful for the cervical radiculopathy. Possible stop gabapentin in the near future.   Plan: prochlorperazine (COMPAZINE) 5 MG tablet            (F41.9) Anxiety  Comment: few times a week anxiety attacks that makes nausea worse. We will try lorazepam only prn.  Side effects discussed.  Plan: LORazepam (ATIVAN) 0.5 MG tablet          Cerumen impaction - CA washed both ears today.        This note has been dictated using voice recognition software. Any grammatical or context distortions are unintentional and inherent to the software.      Return in about 3 months (around 12/9/2021) for Follow up, multiple issues.    There are no Patient Instructions on file for this visit.        Subjective:    Yanna Handy is a 89 year old female  here for follow up. 89 y.o. female with past medical history of asthma, hypertension, CAD s/p stent 2015, breast cancer s/p lumpectomy 2017, hyperlipidemia and chronic appearing C7 / T1 fracture with mod left foraminal stenosis.   Today complains about daily nausea, anxiety and left arm shoulder and elbow pain.  Nausea, daily, better since stopped omeprazole. Responds to Zofran. No vomiting, diarrhea, abdominal pain, blood in the stool. Appetite is good. Worse when she feels anxious.  C7 / T1 fracture with mod left foraminal stenosis - pain in the left arm is controlled with gabapentin and Tylenol. Not taking Norco any more.she had epidural injection, Now mostly localized left elbow pain. Xray was done yesterday -IMPRESSION: Diffuse bone demineralization. No evidence of fracture. There is osteoarthrosis throughout the wrist and hand and there is also chondrocalcinosis in the region of the TFCC. Arterial calcifications.    Social History     Socioeconomic History  "    Marital status:      Spouse name: Not on file     Number of children: 2     Years of education: Not on file     Highest education level: Not on file   Occupational History     Not on file   Tobacco Use     Smoking status: Never Smoker     Smokeless tobacco: Never Used   Substance and Sexual Activity     Alcohol use: Yes     Comment: Alcoholic Drinks/day: Wine occassionally     Drug use: No     Sexual activity: Not on file   Other Topics Concern     Not on file   Social History Narrative    She lives alone in her own home.     Social Determinants of Health     Financial Resource Strain:      Difficulty of Paying Living Expenses:    Food Insecurity:      Worried About Running Out of Food in the Last Year:      Ran Out of Food in the Last Year:    Transportation Needs:      Lack of Transportation (Medical):      Lack of Transportation (Non-Medical):    Physical Activity:      Days of Exercise per Week:      Minutes of Exercise per Session:    Stress:      Feeling of Stress :    Social Connections:      Frequency of Communication with Friends and Family:      Frequency of Social Gatherings with Friends and Family:      Attends Worship Services:      Active Member of Clubs or Organizations:      Attends Club or Organization Meetings:      Marital Status:    Intimate Partner Violence:      Fear of Current or Ex-Partner:      Emotionally Abused:      Physically Abused:      Sexually Abused:        Family History   Problem Relation Age of Onset     Nephritis Mother 37.00         in 1947     Sudden Death Father 62.00        autopsy said \"massive heart attack\", 3 ppd smoker     Breast Cancer Maternal Aunt 55.00     Lung Cancer Brother 65.00     Colon Cancer Maternal Grandmother 90.00     Lung Cancer Maternal Grandfather 65.00     Brain Cancer Son 25.00     Rectal Cancer Brother 64.00     No Known Problems Son      Review of Systems:  A 12 point comprehensive review of systems was negative except as noted in " HPI.        Objective:    Physical Exam   /59   Pulse 55   SpO2 94%   There is no height or weight on file to calculate BMI.    Constitutional: oriented to person, place, and time, appears well-nourished. No distress.   HENT:   Head: Normocephalic.   Ears: bilateral cerumen impaction.  Eyes: Conjunctivae are normal. Pupils are equal, round, and reactive to light.   Neck:  Neck supple.   Cardiovascular: Normal rate, regular rhythm and normal heart sounds.    Pulmonary/Chest: Effort normal and breath sounds normal.  Abdominal: Soft.   Musculoskeletal: Normal range of motion.   Neurological: alert and oriented to person, place, and time.  Psychiatric:  normal mood and affect.      Patient Active Problem List   Diagnosis     Chronic Sinusitis     Dyslipidemia, goal LDL below 70     Moderate persistent asthma     Essential hypertension     Osteoporosis Senile     Osteoarthritis     Coronary artery disease due to lipid rich plaque     Anemia     Insomnia     Malignant neoplasm of upper-outer quadrant of left female breast (H)     Aromatase inhibitor use     Recurrent UTI     Mild to moderate aortic stenosis     Hiatal hernia     Hepatic steatosis     (HFpEF) heart failure with preserved ejection fraction (H)     History of esophageal stricture     Permanent atrial fibrillation (H)       Current Outpatient Medications   Medication     acetaminophen (TYLENOL) 650 MG CR tablet     acetylcysteine (MUCOMYST) 20 % neb solution     albuterol (PROAIR HFA;PROVENTIL HFA;VENTOLIN HFA) 90 mcg/actuation inhaler     anastrozole (ARIMIDEX) 1 mg tablet     ascorbic acid (VITAMIN C) 1000 MG tablet     fluticasone furoate-vilanteroL (BREO ELLIPTA) 200-25 mcg/dose DsDv inhaler     furosemide (LASIX) 20 MG tablet     gabapentin (NEURONTIN) 100 MG capsule     ipratropium-albuteroL (COMBIVENT RESPIMAT)  mcg/actuation Mist inhaler     ipratropium-albuteroL (DUO-NEB) 0.5-2.5 mg/3 mL nebulizer     LORazepam (ATIVAN) 0.5 MG tablet      metoprolol tartrate (LOPRESSOR) 100 MG tablet     nitroglycerin (NITROSTAT) 0.4 MG SL tablet     ondansetron (ZOFRAN) 4 MG tablet     OXYGEN-AIR DELIVERY SYSTEMS MISC     prochlorperazine (COMPAZINE) 5 MG tablet     rosuvastatin (CRESTOR) 10 MG tablet     umeclidinium (INCRUSE ELLIPTA) 62.5 mcg/actuation DsDv inhaler     aspirin 81 MG EC tablet     calcium citrate-vitamin D (CITRACAL+D) 315-200 mg-unit per tablet     ciprofloxacin (CIPRO) 250 MG tablet     cyanocobalamin (VITAMIN B-12) 50 mcg tablet     LACTOBACILLUS ACIDOPHILUS (ACIDOPHILUS ORAL)     magnesium 250 mg Tab     omeprazole (PRILOSEC OTC) 20 MG tablet     No current facility-administered medications for this visit.               Wilbur Hannah MD  9/9/2021

## 2021-09-14 NOTE — TELEPHONE ENCOUNTER
"  Disp Refills Start End KATHLEEN    ondansetron (ZOFRAN) 8 MG tablet 30 tablet 0 6/1/2021  --   Sig - Route: Take 1 tablet (8 mg total) by mouth every 8 (eight) hours as needed for nausea. - Oral   Sent to pharmacy as: ondansetron HCL 8 mg tablet   E-Prescribing Status: Receipt confirmed by pharmacy (6/1/2021 10:58 AM CDT)   ondansetron (ZOFRAN) 8 MG tablet [[id number]]    Electronically signed by: Wilbur Hannah MD on 06/01/21 1058 Status: Active   Ordering user: Wilbur Hannah MD 06/01/21 1058 Authorized by: Wilbur Hannah MD   PRN reasons: nausea   Frequency: Q8H PRN 06/01/21 - Until Discontinued   Diagnoses  Nausea [R11.0]       Routing refill request to provider for review/approval because:  Provider refill for acute use.    Last Written Prescription Date:  6/1/21  Last Fill Quantity: 30,  # refills: 0   Last office visit provider:  9/9/21     Requested Prescriptions   Pending Prescriptions Disp Refills     ondansetron (ZOFRAN) 8 MG tablet [Pharmacy Med Name: Ondansetron HCl Oral Tablet 8 MG] 30 tablet 0     Sig: Take 1 tablet (8 mg total) by mouth every 8 (eight) hours as needed for nausea.        Antivertigo/Antiemetic Agents Passed - 9/13/2021  4:57 PM        Passed - Recent (12 mo) or future (30 days) visit within the authorizing provider's specialty     Patient has had an office visit with the authorizing provider or a provider within the authorizing providers department within the previous 12 mos or has a future within next 30 days. See \"Patient Info\" tab in inbasket, or \"Choose Columns\" in Meds & Orders section of the refill encounter.              Passed - Medication is active on med list        Passed - Patient is 18 years of age or older             Mateo Gilliland RN 09/14/21 12:14 PM  "

## 2021-09-15 NOTE — TELEPHONE ENCOUNTER
Reason for Call:  Other     Detailed comments: CRUZ WITH [facility]  Ph: [phone]  Patient is declining the Skilled Nursing visit she only want the Physical Therapist.    Call taken on 9/15/2021 at 10:15 AM by Laura L Goldberg, ARRT

## 2021-10-04 NOTE — TELEPHONE ENCOUNTER
Routing refill request to provider for review/approval because:  Controlled substance request    Last Written Prescription Date:  9/9/21  Last Fill Quantity: 30,  # refills: 0   Last office visit provider:  6/21/21     Requested Prescriptions   Pending Prescriptions Disp Refills     LORazepam (ATIVAN) 0.5 MG tablet [Pharmacy Med Name: LORazepam Oral Tablet 0.5 MG] 30 tablet 0     Sig: Take 1 tablet (0.5 mg) by mouth every 6 hours as needed for anxiety       There is no refill protocol information for this order          lakhwinder rush RN 10/03/21 10:30 PM

## 2021-10-13 NOTE — TELEPHONE ENCOUNTER
"Routing refill request to provider for review/approval because:  Provider fill for acute dosing    Last Written Prescription Date:  9/14/21  Last Fill Quantity: 30,  # refills: 0   Last office visit provider:  9/9/21     Requested Prescriptions   Pending Prescriptions Disp Refills     ondansetron (ZOFRAN) 8 MG tablet [Pharmacy Med Name: Ondansetron HCl Oral Tablet 8 MG] 30 tablet 0     Sig: Take 1 tablet (8 mg total) by mouth every 8 (eight) hours as needed for nausea.        Antivertigo/Antiemetic Agents Passed - 10/12/2021  9:53 AM        Passed - Recent (12 mo) or future (30 days) visit within the authorizing provider's specialty     Patient has had an office visit with the authorizing provider or a provider within the authorizing providers department within the previous 12 mos or has a future within next 30 days. See \"Patient Info\" tab in inbasket, or \"Choose Columns\" in Meds & Orders section of the refill encounter.              Passed - Medication is active on med list        Passed - Patient is 18 years of age or older             Mateo Gilliland RN 10/13/21 9:48 AM  "

## 2021-10-26 NOTE — TELEPHONE ENCOUNTER
Reason for Call:  Other patients health has been declining    Detailed comments: Patient's daughter in law, Nessa called and stated patients health has been declining and her and patient son, Mateo wanted to talk to Dr Hannah. Please call sonMateo to discuss his questions/concerns. They were by PCP to call if they ever have any questions/concerns.    Phone Number Patient can be reached at: Cell number on file:    Telephone Information:   Mobile [phone]     Best Time: ANY    Can we leave a detailed message on this number? YES    Call taken on 10/26/2021 at 3:25 PM by Yvonne Fall

## 2021-10-27 NOTE — TELEPHONE ENCOUNTER
Spoke with Mateo, patient's son. The next step in her mom's care and hospice activation discussed. They are moving her to her son's house today.She expressed wishes for comfort care only. They noticed significant decline in her health over the last few weeks.

## 2021-10-28 NOTE — TELEPHONE ENCOUNTER
Routing refill request to provider for review/approval because:  Controlled substance request.    Last Written Prescription Date:  10/6/21  Last Fill Quantity: 30,  # refills: 0   Last office visit provider:  9/9/21     Requested Prescriptions   Pending Prescriptions Disp Refills     LORazepam (ATIVAN) 0.5 MG tablet [Pharmacy Med Name: LORazepam Oral Tablet 0.5 MG] 30 tablet 0     Sig: TAKE ONE TABLET BY MOUTH EVERY SIX HOURS AS NEEDED FOR ANXIETY       There is no refill protocol information for this order          Gris Pete RN 10/28/21 1:22 PM

## 2021-10-29 NOTE — TELEPHONE ENCOUNTER
..Reason for Call:  Medication or medication refill:    Do you use a [facility]?  Name of the pharmacy and phone number for the current request: [facility][id number] - [address]  [phone]    Name of the medication requested: LORazepam (ATIVAN) 0.5 MG tablet    Other request: NONE    Can we leave a detailed message on this number? YES    Phone number patient can be reached at: Home number on file [phone] (home)    Best Time: ANY    Call taken on 10/29/2021 at 11:20 AM by DEANDRE Reinoso

## 2021-10-30 NOTE — TELEPHONE ENCOUNTER
Routing refill request to provider for review/approval because:  Drug not on the [facility] refill protocol - controlled substance    Last Written Prescription Date:  10/6/2021  Last Fill Quantity: 30,  # refills: 0   Last office visit provider:  9/9/2021 Dr. Hannah     Requested Prescriptions   Pending Prescriptions Disp Refills     LORazepam (ATIVAN) 0.5 MG tablet 30 tablet 0     Sig: Take 1 tablet (0.5 mg) by mouth every 6 hours as needed for anxiety       There is no refill protocol information for this order          Rachel Weber RN 10/30/21 4:04 PM

## 2021-10-30 NOTE — TELEPHONE ENCOUNTER
Refill Request  Did you contact pharmacy:  yes  Medication name: Lorazepam  Who prescribed the medication: Wilbur Hannah  Pharmacy Name and Location: [facility][id number] [address]  [phone]  Is patient out of medication: Patient only has two pills remaining.     Kadie Miller RN   10/30/21 10:51 AM  [facility] Nurse Advisor

## 2021-11-01 NOTE — PATIENT INSTRUCTIONS
1) I'm happy the new inhaled medications are working  2) You can do the duoneb and the acetylcysteine back to back if you want, up to three times a day if you are feeling more congested.  3) Continue to use oxygen as you are  4) Continue to use the Breo and Incruse also as you are.

## 2021-11-01 NOTE — TELEPHONE ENCOUNTER
[facility] pharmacy sends in a refill request for anastrozole #30, no refills.  We have reached out to the patient previously as she is overdue for her follow-up in our clinic.  She was due to have a mammogram in July and follow-up with Dr Rodas after that.  We have already sent in per our protocol #30 and notify patient.  Daughter stated that she would call and discuss this with her mother and get back to us.  Pharmacy has been called back and notified that we are unable to fill this at this time and patient and family are aware.  He verbalized understanding and stated he will take this out of their queue for now.    Mary Denise RN

## 2021-11-01 NOTE — PROGRESS NOTES
Assessment and Plan:Yanna Handy is a 89 year old female with a past medical history significant for chronic bronchitis who presents to clinic today for follow up.  Since the addition of mucomyst she seems to be doing much better with no clear pneumonias since.  Today we discussed different strategies for the use of her nebulizers based on maintenance guided by symptoms.  She continues on triple therapy with a LAMA and a LABA/ICS.  She also uses 2L oxygen with sleep and ambulation.    1) Chronic bronchitis - continue Breo and Incruse.  Use duonebs and mucomyst 20% neb up to three times a day as needed.  We discussed this being scaled back when doing fine, or how to increase if she is getting more congested.  2) Chronic hypoxic respiratory failure - continue oxygen 2L at sleep and with limited ambulation  3) High risk for COVID - has received all her vaccinations and boosters for COVID this year and influenza  4) RTC in 6 months.           CCx: bronchitis/cough    HPI: Ms. Handy is a 89 year old female who returns for follow up of her bronchitis.  She was placed on a mucomyst neb after her last visit.  Currently she uses either duoneb or mucomyst per session, and does this 2-3 times a day.  She feels this is helping as her cough is much less than usual and she hasn't been hospitalized nor needed an antibiotic.  She has no side effects to this medication.  She is now living with her son and starting to sleep and eat better.  She uses her oxygen with sleep and sometimes when she moves.   She can really only ambulate down the cornelius before she has to stop.  She is using her Breo and Incruse, she previously tried Trelegy but didn't like it.    ROS:  Review of Systems - History obtained from the patient  General ROS: negative  Psychological ROS: negative  ENT ROS: negative  Allergy and Immunology ROS: negative  Endocrine ROS: negative  Respiratory ROS: positive for - cough, shortness of breath and sputum  changes  negative for - hemoptysis, orthopnea, pleuritic pain, stridor, tachypnea or wheezing  Cardiovascular ROS: no chest pain or palpitations  Gastrointestinal ROS: no abdominal pain, change in bowel habits, or black or bloody stools  Genito-Urinary ROS: no dysuria, trouble voiding, or hematuria  Musculoskeletal ROS: negative  Neurological ROS: no TIA or stroke symptoms  Dermatological ROS: negative      Current Meds:  Current Outpatient Medications   Medication Sig Dispense Refill     acetaminophen (TYLENOL) 650 MG CR tablet [ACETAMINOPHEN (TYLENOL) 650 MG CR TABLET] Take 650 mg by mouth every 8 (eight) hours as needed for pain.       acetylcysteine (MUCOMYST) 20 % neb solution Take 2 mLs by nebulization 2 times daily as needed for mucolysis/respiratory distress 240 mL 4     albuterol (PROAIR HFA;PROVENTIL HFA;VENTOLIN HFA) 90 mcg/actuation inhaler [ALBUTEROL (PROAIR HFA;PROVENTIL HFA;VENTOLIN HFA) 90 MCG/ACTUATION INHALER] Inhale 2 puffs every 6 (six) hours as needed for wheezing. 1 Inhaler 11     anastrozole (ARIMIDEX) 1 MG tablet TAKE ONE TABLET BY MOUTH ONCE DAILY  30 tablet 0     ascorbic acid (VITAMIN C) 1000 MG tablet [ASCORBIC ACID (VITAMIN C) 1000 MG TABLET] Take 1,000 mg by mouth every morning.        aspirin 81 MG EC tablet Take 81 mg by mouth At Bedtime        calcium citrate-vitamin D (CITRACAL+D) 315-200 mg-unit per tablet Take 1 tablet by mouth At Bedtime        ciprofloxacin (CIPRO) 250 MG tablet Take 1/2 tablet by mouth once daily 45 tablet 0     cyanocobalamin (VITAMIN B-12) 50 mcg tablet Take 50 mcg by mouth every morning        fluticasone furoate-vilanteroL (BREO ELLIPTA) 200-25 mcg/dose DsDv inhaler [FLUTICASONE FUROATE-VILANTEROL (BREO ELLIPTA) 200-25 MCG/DOSE DSDV INHALER] Inhale 1 puff daily. Rinse mouth with water, gargle,spit after each use 180 each 3     furosemide (LASIX) 20 MG tablet [FUROSEMIDE (LASIX) 20 MG TABLET] Take 1 tablet (20 mg total) by mouth daily. 90 tablet 2      gabapentin (NEURONTIN) 100 MG capsule Taking 3 capsule by mouth in the morning, 1 capsule by mouth at noon and 2 capsules by mouth at bedtime daily. 180 capsule 3     ipratropium-albuteroL (COMBIVENT RESPIMAT)  mcg/actuation Mist inhaler [IPRATROPIUM-ALBUTEROL (COMBIVENT RESPIMAT)  MCG/ACTUATION MIST INHALER] Inhale 1 puff 4 (four) times a day as needed. 3 Inhaler 3     ipratropium-albuteroL (DUO-NEB) 0.5-2.5 mg/3 mL nebulizer [IPRATROPIUM-ALBUTEROL (DUO-NEB) 0.5-2.5 MG/3 ML NEBULIZER] Take 3 mL by nebulization 2 (two) times a day as needed. 1,080 mL 3     LACTOBACILLUS ACIDOPHILUS (ACIDOPHILUS ORAL) Take 1 tablet by mouth every morning        LORazepam (ATIVAN) 0.5 MG tablet Take 1 tablet (0.5 mg) by mouth every 6 hours as needed for anxiety 30 tablet 0     magnesium 250 mg Tab Take 250 mg by mouth every morning        metoprolol tartrate (LOPRESSOR) 100 MG tablet [METOPROLOL TARTRATE (LOPRESSOR) 100 MG TABLET] Take 1 tablet (100 mg total) by mouth 2 (two) times a day. 180 tablet 3     nitroglycerin (NITROSTAT) 0.4 MG SL tablet [NITROGLYCERIN (NITROSTAT) 0.4 MG SL TABLET] Place 1 tablet (0.4 mg total) under the tongue every 5 (five) minutes as needed for chest pain. 2 Bottle 1     omeprazole (PRILOSEC OTC) 20 MG tablet [OMEPRAZOLE (PRILOSEC OTC) 20 MG TABLET] Take 1 tablet (20 mg total) by mouth 2 (two) times a day before meals. 60 tablet 3     ondansetron (ZOFRAN) 4 MG tablet Take 1 tablet (4 mg) by mouth every 8 hours as needed for nausea 30 tablet 3     ondansetron (ZOFRAN) 8 MG tablet Take 1 tablet (8 mg total) by mouth every 8 (eight) hours as needed for nausea. 30 tablet 0     OXYGEN-AIR DELIVERY SYSTEMS MISC [OXYGEN-AIR DELIVERY SYSTEMS MISC] Use 2 L As Directed. 2L at bedtime  [employer]       prochlorperazine (COMPAZINE) 5 MG tablet Take 1 tablet (5 mg) by mouth every 12 hours as needed for nausea or vomiting 60 tablet 3     rosuvastatin (CRESTOR) 10 MG tablet [ROSUVASTATIN (CRESTOR) 10 MG TABLET]  Take 1 tablet (10 mg total) by mouth at bedtime. Take with a 5 mg pill for a total of 15 mg nightly 90 tablet 3     umeclidinium (INCRUSE ELLIPTA) 62.5 mcg/actuation DsDv inhaler [UMECLIDINIUM (INCRUSE ELLIPTA) 62.5 MCG/ACTUATION DSDV INHALER] Inhale 1 puff daily. 3 each 3       Labs:  @clab@  Lab Results   Component Value Date    WBC 11.9 (H) 08/12/2021    HGB 12.4 08/12/2021    HCT 37.8 08/12/2021     08/12/2021     08/12/2021    POTASSIUM 3.7 08/12/2021    CHLORIDE 102 08/12/2021    CO2 23 08/12/2021    GLC 98 08/12/2021    BUN 9 08/12/2021    CR 0.78 08/12/2021    MAG 2.4 08/01/2021    BILITOTAL 0.6 08/12/2021    AST 13 08/12/2021    ALT <9 08/12/2021    ALKPHOS 69 08/12/2021    PROTTOTAL 6.2 08/12/2021    ALBUMIN 3.1 (L) 08/12/2021       I have personally reviewed all pertinent imaging studies and PFT results unless otherwise noted.    Imaging studies:  XR Forearm Left 2 Views    Result Date: 9/7/2021  EXAM: XR FOREARM LEFT 2 VIEWS LOCATION: [facility] DATE/TIME: 9/7/2021 8:28 AM INDICATION:  Left arm pain COMPARISON: None.     IMPRESSION: Diffuse bone demineralization. No evidence of fracture. There is osteoarthrosis throughout the wrist and hand and there is also chondrocalcinosis in the region of the TFCC. Arterial calcifications. Otherwise negative.        Physical Exam:  /68   Pulse 80   Wt 64.4 kg (142 lb)   SpO2 94%   BMI 27.73 kg/m    General - Well nourished  Ears/Mouth -  Deferred given mask use during pandemic  Neck - no cervical lymphadenopathy  Lungs - Clear to ausculation bilaterally  CVS - regular rhythm with no murmurs, rubs or gallups  Abdomen - soft, NT, ND, NABS  Ext - no cyanosis, clubbing or edema  Skin - no rash  Psychology - alert and oriented, answers appropriate        Electronically signed by:    Piter Bruno MD PhD  [facility] Pulmonary and Critical Care Medicine

## 2021-11-02 NOTE — TELEPHONE ENCOUNTER
Reason for Call:  Other prescription    Detailed comments: Patient daughter in law called and stated they called on 10/27/2021 for a refill request and patient is completely out of lorazepam. Daughter in law states she needs the Rx sent no later than today. Please call patient's son with any questions/concerns.TC advised the turn around time is 1-3 business days. Today is the 3rd business day.    Phone Number Patient can be reached at: Other phone number: Please estefania Rozina (son)  [phone]    Best Time: ANY    Can we leave a detailed message on this number? YES    Call taken on 11/2/2021 at 8:53 AM by Yvonne Fall

## 2021-11-02 NOTE — TELEPHONE ENCOUNTER
Reason for Call:  Other prescription     Detailed comments: Patient daughter in law called and stated they called on 10/27/2021 for a refill request and patient is completely out of lorazepam. Daughter in law states she needs the Rx sent no later than today. Please call patient's son with any questions/concerns.TC advised the turn around time is 1-3 business days. Today is the 3rd business day.     Phone Number Patient can be reached at: Other phone number: Please estefania Rozina (son)  [phone]     Best Time: ANY     Can we leave a detailed message on this number? YES     Call taken on 11/2/2021 at 8:53 AM by Yvonne Fall

## 2021-11-02 NOTE — TELEPHONE ENCOUNTER
Duplicate request. Refill sent to Dr. Hannah.  Cheryl Ryan [facility] ............... 3:51 PM, 11/02/21

## 2021-11-11 NOTE — PROGRESS NOTES
(R11.0) Nausea  Comment: significantly improved, taking Zofran less frequently.       (C50.412,  Z17.0) Malignant neoplasm of upper-outer quadrant of left breast in female, estrogen receptor positive (H)  Comment: Needs refill for Arimidex and follow-up visit with Oncology.  Plan: anastrozole (ARIMIDEX) 1 MG tablet            (F51.01) Primary insomnia  Comment: Lorazepam helped a lot at bedtime.      (M81.0) Osteoporosis Senile  Comment: due for Prolia in December. We discussed importance of taking calcium daily.      (I50.32) Chronic heart failure with preserved ejection fraction (H)  Comment: stable, no edema, shortness of breath only with exertion.      (J45.40) Moderate persistent asthma, unspecified whether complicated  Comment: stable,saw Lung specialist few weeks ago.      (F41.9) Anxiety    Plan: LORazepam (ATIVAN) 0.5 MG tablet          Constipation - home treatment and OTC stool softeners and fibers discussed.        This note has been dictated using voice recognition software. Any grammatical or context distortions are unintentional and inherent to the software.      Return in about 4 weeks (around 12/9/2021).    Patient Instructions   Flu vaccine today.  See you on Dec 1st for Prolia and follow-up constipation.          Subjective:    Yanna Handy is a 89 year old female  here for follow up.      Social History     Socioeconomic History     Marital status:      Spouse name: Not on file     Number of children: 2     Years of education: Not on file     Highest education level: Not on file   Occupational History     Not on file   Tobacco Use     Smoking status: Never Smoker     Smokeless tobacco: Never Used   Substance and Sexual Activity     Alcohol use: Yes     Comment: Alcoholic Drinks/day: Wine occassionally     Drug use: No     Sexual activity: Not on file   Other Topics Concern     Not on file   Social History Narrative    She lives alone in her own home.     Social Determinants of Health  "    Financial Resource Strain: Not on file   Food Insecurity: Not on file   Transportation Needs: Not on file   Physical Activity: Not on file   Stress: Not on file   Social Connections: Not on file   Intimate Partner Violence: Not on file   Housing Stability: Not on file       Family History   Problem Relation Age of Onset     Nephritis Mother 37.00         in 1947     Sudden Death Father 62.00        autopsy said \"massive heart attack\", 3 ppd smoker     Breast Cancer Maternal Aunt 55.00     Lung Cancer Brother 65.00     Colon Cancer Maternal Grandmother 90.00     Lung Cancer Maternal Grandfather 65.00     Brain Cancer Son 25.00     Rectal Cancer Brother 64.00     No Known Problems Son      Review of Systems:  A 12 point comprehensive review of systems was negative except as noted in HPI.        Objective:    Physical Exam   /68   Pulse 80   Wt 62.6 kg (138 lb)   SpO2 94%   BMI 26.95 kg/m    Body mass index is 26.95 kg/m .    Constitutional: oriented to person, place, and time, appears well-nourished. No distress.   HENT:   Head: Normocephalic.   Eyes: Conjunctivae are normal.   Neck: Normal range of motion. Neck supple.   Cardiovascular: Normal rate, regular rhythm and normal heart sounds.    Pulmonary/Chest: Effort normal and breath sounds normal.  Abdominal: Soft.   Musculoskeletal: Normal range of motion.   Neurological: alert and oriented to person, place, and time.  Psychiatric:  normal mood and affect.      Patient Active Problem List   Diagnosis     Chronic Sinusitis     Dyslipidemia, goal LDL below 70     Moderate persistent asthma     Essential hypertension     Osteoporosis Senile     Osteoarthritis     Coronary artery disease due to lipid rich plaque     Anemia     Insomnia     Malignant neoplasm of upper-outer quadrant of left female breast (H)     Aromatase inhibitor use     Recurrent UTI     Mild to moderate aortic stenosis     Hiatal hernia     Hepatic steatosis     (HFpEF) heart " failure with preserved ejection fraction (H)     History of esophageal stricture     Permanent atrial fibrillation (H)       Current Outpatient Medications   Medication     acetaminophen (TYLENOL) 650 MG CR tablet     acetylcysteine (MUCOMYST) 20 % neb solution     albuterol (PROAIR HFA;PROVENTIL HFA;VENTOLIN HFA) 90 mcg/actuation inhaler     anastrozole (ARIMIDEX) 1 MG tablet     calcium citrate-vitamin D (CITRACAL+D) 315-200 mg-unit per tablet     fluticasone furoate-vilanteroL (BREO ELLIPTA) 200-25 mcg/dose DsDv inhaler     furosemide (LASIX) 20 MG tablet     gabapentin (NEURONTIN) 100 MG capsule     ipratropium-albuteroL (COMBIVENT RESPIMAT)  mcg/actuation Mist inhaler     ipratropium-albuteroL (DUO-NEB) 0.5-2.5 mg/3 mL nebulizer     LACTOBACILLUS ACIDOPHILUS (ACIDOPHILUS ORAL)     LORazepam (ATIVAN) 0.5 MG tablet     magnesium 250 mg Tab     metoprolol tartrate (LOPRESSOR) 100 MG tablet     nitroglycerin (NITROSTAT) 0.4 MG SL tablet     ondansetron (ZOFRAN) 4 MG tablet     prochlorperazine (COMPAZINE) 5 MG tablet     rosuvastatin (CRESTOR) 10 MG tablet     umeclidinium (INCRUSE ELLIPTA) 62.5 mcg/actuation DsDv inhaler     ondansetron (ZOFRAN) 8 MG tablet     OXYGEN-AIR DELIVERY SYSTEMS [facility]     No current facility-administered medications for this visit.               Wilbur Hannah MD  11/11/2021  Answers for HPI/ROS submitted by the patient on 11/11/2021  How many days per week do you miss taking your medication?: 0

## 2021-11-11 NOTE — PATIENT INSTRUCTIONS
Flu vaccine today.  See you on Dec 1st for Prolia and follow-up constipation.   Patient Education     External Ear Infection (Adult)    External otitis (also called “swimmer’s ear”) is an infection in the ear canal. It is often caused by bacteria or fungus. It can occur a few days after water gets trapped in the ear canal (from swimming or bathing). It can also occur after cleaning too deeply in the ear canal with a cotton swab or other object. Sometimes, hair care products get into the ear canal and cause this problem.  Symptoms can include pain, fever, itching, redness, drainage, or swelling of the ear canal. Temporary hearing loss may also occur.  Home care  · Do not try to clean the ear canal. This can push pus and bacteria deeper into the canal.  · Use prescribed ear drops as directed. These help reduce swelling and fight the infection. If an ear wick was placed in the ear canal, apply drops right onto the end of the wick. The wick will draw the medication into the ear canal even if it is swollen closed.  · A cotton ball may be loosely placed in the outer ear to absorb any drainage.  · You may use acetaminophen or ibuprofen to control pain, unless another medication was prescribed. Note: If you have chronic liver or kidney disease or ever had a stomach ulcer or GI bleeding, talk to your health care provider before taking any of these medications.  · Do not allow water to get into your ear when bathing. Also, avoid swimming until the infection has cleared.  Prevention  · Keep your ears dry. This helps lower the risk of infection. Dry your ears with a towel or hair dryer after getting wet. Also, use ear plugs when swimming.  · Do not stick any objects in the ear to remove wax.  · If you feel water trapped in your ear, use ear drops right away. You can get these drops over the counter at most drugstores.  They work by removing water from the ear canal.  Follow-up care  Follow up with your health care provider in one week, or as advised.   When to seek medical advice  Call your health care  provider right away if any of these occur:  · Ear pain becomes worse or doesn’t improve after 3 days of treatment  · Redness or swelling of the outer ear occurs or gets worse  · Headache  · Painful or stiff neck  · Drowsiness or confusion  · Fever of 100.4ºF (38ºC) or higher, or as directed by your health care provider  · Seizure  © 2312-5005 [employer]. [address] [place] [address], [place] [address]. All rights reserved. This information is not intended as a substitute for professional medical care. Always follow your healthcare professional's instructions.

## 2021-11-24 NOTE — PATIENT INSTRUCTIONS
Prolia 14th today.  Prolia 15th in 6 months with me.    DXA in 6 months .   Phone number to schedule [phone].    Daily calcium need is 8971-3105 mg a day from the diet and supplements.  Calcium citrate is easier to digest.  Vitamin D 2000 IU daily recommended.      Covdi Pfizer booster in 2 weeks.

## 2021-11-24 NOTE — PROGRESS NOTES
(M81.0) Osteoporosis Senile  (primary encounter diagnosis)    Plan: DX Hip/Pelvis/Spine           (R11.0) Nausea  On some days in the morning before she eats. Better after food intake. Taking Zofran 4 mg prn. No vomiting.    (K59.01) Slow transit constipation  Comment: On Miralax daily and laxatives prn. We discussed OTC laxatives.     CT scan in June showed abnormal proximal sigmoid colon. Differential considerations include acute uncomplicated diverticulitis versus colonic neoplasm and Multiple indeterminate splenic lesions with the largest measuring 2.6 cm. Splenic hemangiomas is a differential consideration for the larger lesions. However, splenic metastases could have a similar appearance.  She saw Colorectal and [facility] in July. Colonoscopy was   recommended in the hospital setting if she want to proceed to   further diagnostic testing. Questionable inflammation vs mass.   Abdominal pain resolved and does not have blood in the stool.   She did not want to schedule colonoscopy and to have any  aggressive treatment like surgery or chemo if diagnosed with   cancer.    Patient was educated on safety of Prolia utilizing Patient Counseling Chart for Healthcare Providers, as outlined by the Prolia REMS progam.     Return in about 6 months (around 5/24/2022) for Prolia, multiple issues.    Patient Instructions   Prolia 14th today.  Prolia 15th in 6 months with me.    DXA in 6 months .   Phone number to schedule [phone].    Daily calcium need is 4403-5718 mg a day from the diet and supplements.  Calcium citrate is easier to digest.  Vitamin D 2000 IU daily recommended.      Covdi Pfizer booster in 2 weeks.          /62   Pulse 78   SpO2 93%       Did you experience any problems with previous Prolia injection? no  Any medication change in the last 6 months? no  Did you take prednisone or other immunosupressant drugs in the last 6 months   (chemo, transplant, rheum, dermatology conditions)? no  Did you have  any serious infection in the last 6 months?no  Any recent hospitalizations?no  Do you plan any dental work in the next 2-3 months?no  How much calcium do you take daily from the diet and supplements?1200 mg  How much vit D do you take daily? 2000 IU  Last DXA? 2019, Reviewed and discussed      Patient is here today for the 14th Prolia injection. Patient tolerated previous injections well.   We discussed calcium and vit D daily needs today.   .    We discussed high risk of rebound vertebral fractures when Prolia suddenly stopped.    Next Prolia injection will be in 6 months.           This note has been dictated using voice recognition software. Any grammatical or context distortions are unintentional and inherent to the software      Patient Active Problem List   Diagnosis     Chronic Sinusitis     Dyslipidemia, goal LDL below 70     Moderate persistent asthma     Essential hypertension     Osteoporosis Senile     Osteoarthritis     Coronary artery disease due to lipid rich plaque     Anemia     Insomnia     Malignant neoplasm of upper-outer quadrant of left female breast (H)     Aromatase inhibitor use     Recurrent UTI     Mild to moderate aortic stenosis     Hiatal hernia     Hepatic steatosis     (HFpEF) heart failure with preserved ejection fraction (H)     History of esophageal stricture     Permanent atrial fibrillation (H)       Current Outpatient Medications   Medication     acetaminophen (TYLENOL) 650 MG CR tablet     acetylcysteine (MUCOMYST) 20 % neb solution     albuterol (PROAIR HFA;PROVENTIL HFA;VENTOLIN HFA) 90 mcg/actuation inhaler     anastrozole (ARIMIDEX) 1 MG tablet     calcium citrate-vitamin D (CITRACAL+D) 315-200 mg-unit per tablet     fluticasone furoate-vilanteroL (BREO ELLIPTA) 200-25 mcg/dose DsDv inhaler     furosemide (LASIX) 20 MG tablet     gabapentin (NEURONTIN) 100 MG capsule     ipratropium-albuteroL (COMBIVENT RESPIMAT)  mcg/actuation Mist inhaler     ipratropium-albuteroL  (DUO-NEB) 0.5-2.5 mg/3 mL nebulizer     LACTOBACILLUS ACIDOPHILUS (ACIDOPHILUS ORAL)     LORazepam (ATIVAN) 0.5 MG tablet     metoprolol tartrate (LOPRESSOR) 100 MG tablet     nitroglycerin (NITROSTAT) 0.4 MG SL tablet     ondansetron (ZOFRAN) 4 MG tablet     OXYGEN-AIR DELIVERY SYSTEMS MISC     rosuvastatin (CRESTOR) 10 MG tablet     umeclidinium (INCRUSE ELLIPTA) 62.5 mcg/actuation DsDv inhaler     Current Facility-Administered Medications   Medication     [START ON 11/26/2021] denosumab (PROLIA) injection 60 mg

## 2021-12-27 NOTE — TELEPHONE ENCOUNTER
Reason for Call:  Other appointment    Detailed comments: Patient Daughter in law calling and states that patient is scheduled for EDG on 12/29/21.  Estefani is wondering if patients appointment on 11/24/2021 can be used as pre-op or if she can get patient in tomorrow.      Phone Number Patient can be reached at: Cell number on file:    Telephone Information:   Mobile [phone]       Best Time: anytime    Can we leave a detailed message on this number? YES    Call taken on 12/27/2021 at 3:21 PM by Ignacia Melo

## 2021-12-27 NOTE — TELEPHONE ENCOUNTER
11/24 visit cannot be used as a preop. Please help to schedule the preop with the provider who has opening in the next few days.

## 2021-12-28 PROBLEM — N18.31 CHRONIC KIDNEY DISEASE, STAGE 3A (H): Status: ACTIVE | Noted: 2021-01-01

## 2021-12-28 PROBLEM — E87.6 HYPOKALEMIA: Status: ACTIVE | Noted: 2021-01-01

## 2021-12-28 PROBLEM — R11.0 NAUSEA: Status: ACTIVE | Noted: 2021-01-01

## 2021-12-28 NOTE — ED PROVIDER NOTES
EMERGENCY DEPARTMENT ENCOUNTER            IMPRESSION:  Hypokalemia  Hypomagnesemia  Hypotension, transient  Prolonged QT      MEDICAL DECISION MAKING:  Patient referred from clinic for treatment of hypokalemia.  She was seen in clinic today for GI preop.  She was scheduled to have endoscopy for chronic nausea. Labs showed a potassium of 2.6.  She has had overall decreased intake.  She feels weak.    On exam her initial blood pressure was low.  She appears elderly and frail.  She appears to hydrated.  Heart rate is regular.    She was placed on cardiac monitor    IV fluid was given with a upward trend of her blood pressure    EKG shows atrial fibrillation with new prolonged QT    Chemistry shows potassium of 2.6.  She was given IV and oral potassium replacement    Magnesium is 1.6.  She was ordered oral replacement    CBC is unremarkable    Patient will be admitted to the hospital for correction of electrolytes.       =================================================================  CHIEF COMPLAINT:  Chief Complaint   Patient presents with     hypokalemia         HPI  Yanna Handy is a 89 year old female with a history of hypertension, CAD, anemia, heart failure, and atrial fibrillation who presents to the ED by wheelchair for evaluation of hypokalemia.    Patient is here in the ED with her son. Patient reports that she was in for her physical and was found she had low potassium. She was getting ready for pre op for surgery to look down her throat. She has had nausea for over a month now and has had two CT scans that have not showed them anything. Has had a little trouble swallowing. Has ate and drank well but overall does not eat much. Has been thirsty and feels weak. Patient denies any other complaints at this time.     I, Damaso Jara am serving as a scribe to document services personally performed by Dr. Jovany Thomason MD, based on my observation and the provider's statements to me. I, Dr. Jovany Thomason MD  attest that Damaso Jara is acting in a scribe capacity, has observed my performance of the services and has documented them in accordance with my direction.      REVIEW OF SYSTEMS   Constitutional: Denies fever, chills, unintentional weight loss or fatigue   Eyes: Denies visual changes or discharge    HENT: Positive for trouble swallowing. Denies sore throat, ear pain or neck pain  Respiratory: Denies cough or shortness of breath    Cardiovascular: Denies chest pain, palpitations or leg swelling  GI: Positive for nausea. Denies abdominal pain, vomiting, or dark, bloody stools.    : Denies hematuria, dysuria, or flank pain  Musculoskeletal: Denies any new back pain or new muscle/joint pains  Skin: Denies rash or wound  Neurologic: Positive for general weakness. Denies current headache, focal weakness, or sensory changes    Lymphatic: Denies swollen glands    Psychiatric: Denies depression, suicidal ideation or homicidal ideation.    Remainder of systems reviewed, unless noted in HPI all others negative.      PAST MEDICAL HISTORY:  Past Medical History:   Diagnosis Date     (HFpEF) heart failure with preserved ejection fraction (H) 08/13/2020     Anemia 08/21/2015     Ascending aorta dilation (H) 05/12/2021    41 mm     Asthma      Breast cancer (H) 07/25/2017    left     Chronic bronchitis (H)      Coronary artery disease due to lipid rich plaque 09/15/2014    stents patent and no obstruction by CT cor angio on 5/12/2021     Essential hypertension      Fx ankle 08/2000     Fx wrist 12/2006    right     GI bleed 01/12/2015     Hx of radiation therapy 2017    left breast     Insomnia 01/13/2016     Malignant neoplasm of skin      Moderate aortic stenosis 07/02/2018    stable on November 2020 echo     Osteoarthritis      Other emphysema (H)      Senile osteoporosis      Sinusitis, chronic      Wrist fracture 06/16/2017       PAST SURGICAL HISTORY:  Past Surgical History:   Procedure Laterality Date     APPENDECTOMY   "1971     ARTHROSCOPY KNEE Left 10/2004     CATARACT EXTRACTION       COLONOSCOPY N/A 01/15/2015    COLONOSCOPY with biopsy of polyp;  Surgeon: Joel Coppola MD;  Location: [facility];  Service:     CORONARY STENT PLACEMENT  09/15/2014    2     HC REMOVAL OF TONSILS,<13 Y/O  1941     HYSTERECTOMY  1971     IR VISCERAL ANGIOGRAM  1/12/2015     LUMPECTOMY BREAST Left 08/02/2017    Dr. Pope     OOPHORECTOMY  1971    unilateral     REVISE SECONDARY VARICOSITY  1988    Varicose Vein Ligation     SINUS SURGERY      \"I had 3 sinus surgeries.\"     TOTAL KNEE ARTHROPLASTY Right 01/2003         CURRENT MEDICATIONS:    acetaminophen (TYLENOL) 650 MG CR tablet  albuterol (PROAIR HFA;PROVENTIL HFA;VENTOLIN HFA) 90 mcg/actuation inhaler  anastrozole (ARIMIDEX) 1 MG tablet  calcium citrate-vitamin D (CITRACAL+D) 315-200 mg-unit per tablet  famotidine (PEPCID) 20 MG tablet  fluticasone furoate-vilanteroL (BREO ELLIPTA) 200-25 mcg/dose DsDv inhaler  furosemide (LASIX) 20 MG tablet  gabapentin (NEURONTIN) 100 MG capsule  ipratropium-albuteroL (COMBIVENT RESPIMAT)  mcg/actuation Mist inhaler  ipratropium-albuteroL (DUO-NEB) 0.5-2.5 mg/3 mL nebulizer  LACTOBACILLUS ACIDOPHILUS (ACIDOPHILUS ORAL)  LORazepam (ATIVAN) 0.5 MG tablet  metoprolol tartrate (LOPRESSOR) 100 MG tablet  nitroglycerin (NITROSTAT) 0.4 MG SL tablet  ondansetron (ZOFRAN) 4 MG tablet  OXYGEN-AIR DELIVERY SYSTEMS MISC  prochlorperazine (COMPAZINE) 5 MG tablet  rosuvastatin (CRESTOR) 10 MG tablet  umeclidinium (INCRUSE ELLIPTA) 62.5 mcg/actuation DsDv inhaler        ALLERGIES:  Allergies   Allergen Reactions     Alendronate [Alendronic Acid] Nausea and Vomiting     Isosorbide Unknown     Metoclopramide Hcl [Metoclopramide] Anxiety     Rofecoxib Diarrhea and Nausea     Ciprofloxacin      Doxycycline Nausea and Vomiting     Lisinopril Cough     Risedronate Unknown     Sulfa (Sulfonamide Antibiotics) [Sulfa Drugs] Anaphylaxis       FAMILY HISTORY:  Family " "History   Problem Relation Age of Onset     Nephritis Mother 37.00         in 1947     Sudden Death Father 62.00        autopsy said \"massive heart attack\", 3 ppd smoker     Breast Cancer Maternal Aunt 55.00     Lung Cancer Brother 65.00     Colon Cancer Maternal Grandmother 90.00     Lung Cancer Maternal Grandfather 65.00     Brain Cancer Son 25.00     Rectal Cancer Brother 64.00     No Known Problems Son        SOCIAL HISTORY:   Social History     Socioeconomic History     Marital status:      Spouse name: Not on file     Number of children: 2     Years of education: Not on file     Highest education level: Not on file   Occupational History     Not on file   Tobacco Use     Smoking status: Never Smoker     Smokeless tobacco: Never Used   Substance and Sexual Activity     Alcohol use: Yes     Comment: Alcoholic Drinks/day: Wine occassionally     Drug use: No     Sexual activity: Not on file   Other Topics Concern     Not on file   Social History Narrative    She lives alone in her own home.     Social Determinants of Health     Financial Resource Strain: Not on file   Food Insecurity: Not on file   Transportation Needs: Not on file   Physical Activity: Not on file   Stress: Not on file   Social Connections: Not on file   Intimate Partner Violence: Not on file   Housing Stability: Not on file       PHYSICAL EXAM:    /55   Pulse 75   Temp 97.7  F (36.5  C) (Oral)   Resp 18   Wt 58.5 kg (129 lb)   SpO2 91%   BMI 25.19 kg/m      Constitutional: She is awake alert and responsive.  Elderly and frail  Head: Normocephalic, atraumatic.  ENT: Mucous membranes moist. Posterior oropharynx appears normal.  Eyes: Pupils midrange and reactive ,no conjunctival discharge  Neck: No lymphadenopathy, no stridor, supple, soft tissue swelling  Chest: No tenderness   Respiratory: Respirations even, unlabored. Lungs clear to ascultation bilaterally, in no acute respiratory distress.  Cardiovascular: Irregular  GI: " Abdomen soft, non-tender to palpation in all 4 quadrants. No guarding or rebound. Bowel sounds intact on all 4 quadrants.   Back: No CVA tenderness.    Musculoskeletal: Moves all 4 extremities equally, strength symmetrical on bilateral uppers and lowers.  No peripheral edema  Integument: Warm, dry. No rash. No bruising or petechiae.  Lymphatic: No cervical lymphadenopathy  Neurologic: Alert & oriented x 3. Normal speech. Grossly normal motor and sensory function. No focal deficits noted.    Psychiatric: Normal mood and affect. Normal judgement.    ED COURSE:  6:18 PM I met the patient and performed my initial interview and exam.  6:41 PM I spoke with Dr. Graves, hospitalist.     LAB:  All pertinent labs reviewed and interpreted.  Results for orders placed or performed during the hospital encounter of 12/28/21   Comprehensive metabolic panel   Result Value Ref Range    Sodium 137 136 - 145 mmol/L    Potassium 2.6 (LL) 3.5 - 5.0 mmol/L    Chloride 96 (L) 98 - 107 mmol/L    Carbon Dioxide (CO2) 25 22 - 31 mmol/L    Anion Gap 16 5 - 18 mmol/L    Urea Nitrogen 8 8 - 28 mg/dL    Creatinine 1.09 0.60 - 1.10 mg/dL    Calcium 10.4 8.5 - 10.5 mg/dL    Glucose 108 70 - 125 mg/dL    Alkaline Phosphatase 74 45 - 120 U/L    AST 15 0 - 40 U/L    ALT <9 0 - 45 U/L    Protein Total 7.3 6.0 - 8.0 g/dL    Albumin 3.0 (L) 3.5 - 5.0 g/dL    Bilirubin Total 1.0 0.0 - 1.0 mg/dL    GFR Estimate 48 (L) >60 mL/min/1.73m2   Result Value Ref Range    Magnesium 1.6 (L) 1.8 - 2.6 mg/dL   CBC with platelets and differential   Result Value Ref Range    WBC Count 8.4 4.0 - 11.0 10e3/uL    RBC Count 5.02 3.80 - 5.20 10e6/uL    Hemoglobin 14.2 11.7 - 15.7 g/dL    Hematocrit 43.0 35.0 - 47.0 %    MCV 86 78 - 100 fL    MCH 28.3 26.5 - 33.0 pg    MCHC 33.0 31.5 - 36.5 g/dL    RDW 16.1 (H) 10.0 - 15.0 %    Platelet Count 397 150 - 450 10e3/uL    % Neutrophils 67 %    % Lymphocytes 23 %    % Monocytes 8 %    % Eosinophils 0 %    % Basophils 1 %    %  Immature Granulocytes 1 %    NRBCs per 100 WBC 0 <1 /100    Absolute Neutrophils 5.7 1.6 - 8.3 10e3/uL    Absolute Lymphocytes 2.0 0.8 - 5.3 10e3/uL    Absolute Monocytes 0.7 0.0 - 1.3 10e3/uL    Absolute Eosinophils 0.0 0.0 - 0.7 10e3/uL    Absolute Basophils 0.1 0.0 - 0.2 10e3/uL    Absolute Immature Granulocytes 0.0 <=0.4 10e3/uL    Absolute NRBCs 0.0 10e3/uL       RADIOLOGY:  Reviewed all pertinent imaging. Please see official radiology report.  No orders to display        EKG:    Chronic atrial fibrillation with prolonged QT which is new    I have independently reviewed and interpreted the EKG(s) documented above.    PROCEDURES:   Critical Care Time 30 minutes excluding procedures      MEDICATIONS GIVEN IN THE EMERGENCY:  Medications   potassium chloride 10 mEq in 100 mL sterile water intermittent infusion (premix) (10 mEq Intravenous New Bag 12/28/21 1846)   sodium chloride 0.9% infusion ( Intravenous New Bag 12/28/21 1846)   potassium chloride (KLOR-CON) Packet 40 mEq (40 mEq Oral Given 12/28/21 1837)   magnesium oxide (MAG-OX) tablet 400 mg (400 mg Oral Given 12/28/21 1839)           NEW PRESCRIPTIONS STARTED AT TODAY'S ER VISIT:  New Prescriptions    No medications on file          FINAL DIAGNOSIS:    ICD-10-CM    1. Hypokalemia  E87.6             At the conclusion of the encounter I discussed the results of all of the tests and the disposition. The questions were answered. The patient or family acknowledged understanding and was agreeable with the care plan.     NAME: Yanna Handy  AGE: 89 year old female  YOB: 1931  MRN: [id number]  EVALUATION DATE & TIME: 12/28/2021  5:55 PM    PCP: Wilbur Hannah    ED PROVIDER: HALI Serra Caleb Rollag, am serving as a scribe to document services personally performed by Dr. Jovany Thomason based on my observation and the provider's statements to me. I, Jovany Thomason MD attest that Damaso Jara is acting in a scribe capacity, has observed my  performance of the services and has documented them in accordance with my direction.    Jovany Thomason M.D.  Emergency Medicine  [facility] EMERGENCY DEPARTMENT  [facility] [facility] [id number] [phone]  Dept: [phone]  12/28/2021       Jovany Thomason MD  12/28/21 [id number]

## 2021-12-28 NOTE — PROGRESS NOTES
[facility]  [address] [address]  Phone: [phone]  Fax: [phone]  Primary Provider: Wilbur Hannah        PREOPERATIVE EVALUATION:  Today's date: 12/28/2021    Yanna Handy is a 89 year old female who presents for a preoperative evaluation.    Surgical Information:  Surgery/Procedure: Esophagogastroduodenoscopy  Surgery Location: [facility]  Surgeon: Unknown - probably Amos Lloyd  Surgery Date: 12/29/2021  Time of Surgery: 8:15 am  Where patient plans to recover: At home with family  Fax number for surgical facility: [phone]    Type of Anesthesia Anticipated: to be determined    Assessment & Plan     The proposed surgical procedure is considered LOW risk.    Preoperative examination  Procedure, EGD, is scheduled for tomorrow morning.  Patient had EKG done in 8/2021.  Labs and CXR will be done today.  She has persistent nausea with no vomiting, or change in bowel habits for > 4 months. She has good appetite, but she did loose 8lbs since November. She reports belching, burping and gassiness.  She has small esophageal hiatal hernia, seen on the CT scan 12/19/21.  She has h/o esophageal strictures and dilatation done years ago.  She has GERD, but stopped omeprazole few months ago, thinking that is actually making her more nauseous. I sent Rx for famotidine, but she took it only for few days and did not find it helpful.  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  Lesions on the spleen seen for the first time in June 2021, looks stable on the CT scan in December. She has h/o breast cancer.    Last CT 12/19/21:  1.  Stable infarct involving the anterior aspect of the interpolar region of the left kidney.  2.  Mild calcified stenosis of the origin of the left renal artery. No areas of significant stenoses or occlusions.  3.  Severe stenosis of the origin of the right  renal artery.  4.  Stable low-attenuation lesions within the spleen. These are indeterminate. In a patient without history of primary malignancy this may represent a hemangioma.    - CBC with platelets; Future  - Comprehensive metabolic panel; Future    Nausea  Did not resolve after changes in her diet, using Zofran, Compazine, lorazepam. stopped omeprazole few months ago, thinking that is actually making her more nauseous. I sent Rx for famotidine, but she took it only for few days and did not find it helpful.      Hiatal hernia  See above.    History of esophageal stricture  See above.    Weight loss  See above.    Chronic heart failure with preserved ejection fraction (H), HTN, CAD, Permanent atrial fibrillation (H)  CAD s/p stent, 2015  HFpEF-with no exacerbation  She will not take metoprolol and furosemide tomorrow morning.  Chest pain free.  Stable. NO edema.  She refused blood thinner.  Taking metoprolol for rate control.    Anemia, unspecified type  Stable. Hgb today 13.8, MCV 85.  Component      Latest Ref Rng & Units 12/28/2021   WBC      4.0 - 11.0 10e3/uL 10.6   RBC Count      3.80 - 5.20 10e6/uL 4.91   Hemoglobin      11.7 - 15.7 g/dL 13.8   Hematocrit      35.0 - 47.0 % 41.8   MCV      78 - 100 fL 85   MCH      26.5 - 33.0 pg 28.1   MCHC      31.5 - 36.5 g/dL 33.0   RDW      10.0 - 15.0 % 15.9 (H)   Platelet Count      150 - 450 10e3/uL 415     Malignant neoplasm of upper-outer quadrant of left female breast, unspecified estrogen receptor status (H)  On Arimidex. We will stop this medication for a month to see if nausea will resolve.        Moderate persistent asthma, unspecified whether complicated  past medical history significant for recurrent pneumonia from chronic bronchitis, last hospitalization was in August for aspirational pneumonia.  Chronic bronchitis - continue Breo and Incruse.  Use duonebs and mucomyst 20% neb up to three times a day as needed. She denies cough, wheezing and  dyspnea.  Chronic hypoxic respiratory failure - using oxygen 2L at sleep and sometimes with limited ambulation.    - XR Chest 2 Views; Future    Primary insomnia  Taking lorazepam at bedtime, and sometimes during the day for anxiety. She will not take lorazepam on the day of the procedure.      C7/T1 fracture with radiculopathy  ~MRI with chronic appearing C7/T1 fracture with moderate left foraminal stenosis diagnosed in August.  ~No focal neurologic deficits  ~Consulted neurosurgery conservative management no indication for surgery  - taking gabapentin at bedtime       Patient instructions:  Do not take any medications tomorrow morning before procedure.    Stop Arimidex for a month, maybe it is causing nausea.    You can take lorazepam prn every 6-8h for anxiety, and at bedtime can increase the dose to 1 mg.    Small frequent meals are better than 2-3 large meals.    Try famotidine twice daily or we can restart omeprazole.    RECOMMENDATION:  APPROVAL GIVEN to proceed with proposed procedure, without further diagnostic evaluation.  [id number]    Subjective     HPI related to upcoming procedure: see above    Preop Questions 12/28/2021   1. Have you ever had a heart attack or stroke? No   2. Have you ever had surgery on your heart or blood vessels, such as a stent placement, a coronary artery bypass, or surgery on an artery in your head, neck, heart, or legs? YES -    3. Do you have chest pain with activity? No   4. Do you have a history of  heart failure? No   5. Do you currently have a cold, bronchitis or symptoms of other infection? No   6. Do you have a cough, shortness of breath, or wheezing? No   7. Do you or anyone in your family have previous history of blood clots? No   8. Do you or does anyone in your family have a serious bleeding problem such as prolonged bleeding following surgeries or cuts? No   9. Have you ever had problems with anemia or been told to take iron pills? No   10. Have you had any abnormal  blood loss such as black, tarry or bloody stools, or abnormal vaginal bleeding? No   11. Have you ever had a blood transfusion? No   12. Are you willing to have a blood transfusion if it is medically needed before, during, or after your surgery? Yes   13. Have you or any of your relatives ever had problems with anesthesia? No   14. Do you have sleep apnea, excessive snoring or daytime drowsiness? No   15. Do you have any artifical heart valves or other implanted medical devices like a pacemaker, defibrillator, or continuous glucose monitor? No   16. Do you have artificial joints? YES - Bilateral artificial knees   17. Are you allergic to latex? No           Review of Systems  Constitutional, neuro, ENT, endocrine, pulmonary, cardiac, gastrointestinal, genitourinary, musculoskeletal, integument and psychiatric systems are negative, except as otherwise noted.    Patient Active Problem List    Diagnosis Date Noted     Permanent atrial fibrillation (H) 08/12/2021     Priority: Medium     History of esophageal stricture 06/01/2021     Priority: Medium     Hiatal hernia 05/12/2021     Priority: Medium     see CT report         Hepatic steatosis 05/12/2021     Priority: Medium     Chronic Sinusitis      Priority: Medium     Moderate persistent asthma      Priority: Medium     Osteoporosis Senile      Priority: Medium     Osteoarthritis      Priority: Medium     Dyslipidemia, goal LDL below 70      Priority: Medium     Essential hypertension      Priority: Medium     Recurrent UTI 01/05/2021     Priority: Medium     (HFpEF) heart failure with preserved ejection fraction (H) 08/13/2020     Priority: Medium     Mild to moderate aortic stenosis 07/02/2018     Priority: Medium     Aromatase inhibitor use 12/20/2017     Priority: Medium     Malignant neoplasm of upper-outer quadrant of left female breast (H) 08/18/2017     Priority: Medium     Insomnia 01/13/2016     Priority: Medium     Anemia 08/21/2015     Priority: Medium  "    Coronary artery disease due to lipid rich plaque 09/15/2014     Priority: Medium     stents patent and no obstruction by CT cor angio on 5/12/2021          Past Medical History:   Diagnosis Date     (HFpEF) heart failure with preserved ejection fraction (H) 08/13/2020     Anemia 08/21/2015     Ascending aorta dilation (H) 05/12/2021    41 mm     Asthma      Breast cancer (H) 07/25/2017    left     Chronic bronchitis (H)      Coronary artery disease due to lipid rich plaque 09/15/2014    stents patent and no obstruction by CT cor angio on 5/12/2021     Essential hypertension      Fx ankle 08/2000     Fx wrist 12/2006    right     GI bleed 01/12/2015     Hx of radiation therapy 2017    left breast     Insomnia 01/13/2016     Malignant neoplasm of skin      Moderate aortic stenosis 07/02/2018    stable on November 2020 echo     Osteoarthritis      Other emphysema (H)      Senile osteoporosis      Sinusitis, chronic      Wrist fracture 06/16/2017     Past Surgical History:   Procedure Laterality Date     APPENDECTOMY  1971     ARTHROSCOPY KNEE Left 10/2004     CATARACT EXTRACTION       COLONOSCOPY N/A 01/15/2015    COLONOSCOPY with biopsy of polyp;  Surgeon: Joel Coppola MD;  Location: [facility];  Service:     CORONARY STENT PLACEMENT  09/15/2014    2     HC REMOVAL OF TONSILS,<11 Y/O  1941     HYSTERECTOMY  1971     IR VISCERAL ANGIOGRAM  1/12/2015     LUMPECTOMY BREAST Left 08/02/2017    Dr. Pope     OOPHORECTOMY  1971    unilateral     REVISE SECONDARY VARICOSITY  1988    Varicose Vein Ligation     SINUS SURGERY      \"I had 3 sinus surgeries.\"     TOTAL KNEE ARTHROPLASTY Right 01/2003     Current Outpatient Medications   Medication Sig Dispense Refill     acetaminophen (TYLENOL) 650 MG CR tablet [ACETAMINOPHEN (TYLENOL) 650 MG CR TABLET] Take 650 mg by mouth every 8 (eight) hours as needed for pain.       albuterol (PROAIR HFA;PROVENTIL HFA;VENTOLIN HFA) 90 mcg/actuation inhaler [ALBUTEROL (PROAIR " HFA;PROVENTIL HFA;VENTOLIN HFA) 90 MCG/ACTUATION INHALER] Inhale 2 puffs every 6 (six) hours as needed for wheezing. 1 Inhaler 11     anastrozole (ARIMIDEX) 1 MG tablet Take 1 tablet (1 mg) by mouth daily 90 tablet 3     calcium citrate-vitamin D (CITRACAL+D) 315-200 mg-unit per tablet Take 1 tablet by mouth At Bedtime        famotidine (PEPCID) 20 MG tablet Take 1 tablet (20 mg) by mouth 2 times daily 60 tablet 3     fluticasone furoate-vilanteroL (BREO ELLIPTA) 200-25 mcg/dose DsDv inhaler [FLUTICASONE FUROATE-VILANTEROL (BREO ELLIPTA) 200-25 MCG/DOSE DSDV INHALER] Inhale 1 puff daily. Rinse mouth with water, gargle,spit after each use 180 each 3     furosemide (LASIX) 20 MG tablet [FUROSEMIDE (LASIX) 20 MG TABLET] Take 1 tablet (20 mg total) by mouth daily. 90 tablet 2     gabapentin (NEURONTIN) 100 MG capsule Taking 3 capsule by mouth in the morning, 1 capsule by mouth at noon and 2 capsules by mouth at bedtime daily. 180 capsule 3     ipratropium-albuteroL (COMBIVENT RESPIMAT)  mcg/actuation Mist inhaler [IPRATROPIUM-ALBUTEROL (COMBIVENT RESPIMAT)  MCG/ACTUATION MIST INHALER] Inhale 1 puff 4 (four) times a day as needed. 3 Inhaler 3     ipratropium-albuteroL (DUO-NEB) 0.5-2.5 mg/3 mL nebulizer [IPRATROPIUM-ALBUTEROL (DUO-NEB) 0.5-2.5 MG/3 ML NEBULIZER] Take 3 mL by nebulization 2 (two) times a day as needed. 1,080 mL 3     LACTOBACILLUS ACIDOPHILUS (ACIDOPHILUS ORAL) Take 1 tablet by mouth every morning        LORazepam (ATIVAN) 0.5 MG tablet TAKE ONE TABLET BY MOUTH at bedtime AS NEEDED FOR ANXIETY and sleep 60 tablet 0     metoprolol tartrate (LOPRESSOR) 100 MG tablet [METOPROLOL TARTRATE (LOPRESSOR) 100 MG TABLET] Take 1 tablet (100 mg total) by mouth 2 (two) times a day. 180 tablet 3     nitroglycerin (NITROSTAT) 0.4 MG SL tablet [NITROGLYCERIN (NITROSTAT) 0.4 MG SL TABLET] Place 1 tablet (0.4 mg total) under the tongue every 5 (five) minutes as needed for chest pain. 2 Bottle 1     ondansetron  (ZOFRAN) 4 MG tablet Take 1 tablet (4 mg) by mouth every 8 hours as needed for nausea 30 tablet 0     OXYGEN-AIR DELIVERY SYSTEMS MISC [OXYGEN-AIR DELIVERY SYSTEMS MISC] Use 2 L As Directed. 2L at bedtime  Rey       prochlorperazine (COMPAZINE) 5 MG tablet Take 1 tablet (5 mg) by mouth every 6 hours as needed for nausea or vomiting 90 tablet 3     rosuvastatin (CRESTOR) 10 MG tablet [ROSUVASTATIN (CRESTOR) 10 MG TABLET] Take 1 tablet (10 mg total) by mouth at bedtime. Take with a 5 mg pill for a total of 15 mg nightly 90 tablet 3     umeclidinium (INCRUSE ELLIPTA) 62.5 mcg/actuation DsDv inhaler [UMECLIDINIUM (INCRUSE ELLIPTA) 62.5 MCG/ACTUATION DSDV INHALER] Inhale 1 puff daily. 3 each 3       Allergies   Allergen Reactions     Alendronate [Alendronic Acid] Nausea and Vomiting     Isosorbide Unknown     Metoclopramide Hcl [Metoclopramide] Anxiety     Rofecoxib Diarrhea and Nausea     Ciprofloxacin      Doxycycline Nausea and Vomiting     Lisinopril Cough     Risedronate Unknown     Sulfa (Sulfonamide Antibiotics) [Sulfa Drugs] Anaphylaxis        Social History     Tobacco Use     Smoking status: Never Smoker     Smokeless tobacco: Never Used   Substance Use Topics     Alcohol use: Yes     Comment: Alcoholic Drinks/day: Wine occassionally       History   Drug Use No         Objective     /70 (BP Location: Right arm, Patient Position: Sitting, Cuff Size: Adult Regular)   Pulse 80   Ht 1.524 m (5')   Wt 58.6 kg (129 lb 3.2 oz)   SpO2 96%   BMI 25.23 kg/m      Physical Exam  Constitutional:  oriented to person, place, and time, appears well-nourished. No distress.   HENT:   Head: Normocephalic.   Mouth/Throat: Oropharynx is clear and moist.   Eyes: Conjunctivae are normal. Pupils are equal, round, and reactive to light.   Neck: Normal range of motion. Neck supple.   Cardiovascular: Normal rate, regular rhythm and normal heart sounds.    Pulmonary/Chest: Effort normal and breath sounds normal.    Abdominal: Soft. Bowel sounds are normal.   Musculoskeletal: Normal range of motion.   Neurological: alert and oriented to person, place, and time.   Skin: Skin is warm.   Psychiatric: normal mood and affect.    Recent Labs   Lab Test 12/04/21  1550 08/12/21  0944 08/01/21  1222   HGB  --  12.4 12.6   PLT  --  397 280   NA  --  138 139   POTASSIUM  --  3.7 3.8   CR 0.9 0.78 0.78        Diagnostics:  Labs pending at this time.  Results will be reviewed when available.       Revised Cardiac Risk Index (RCRI):  The patient has the following serious cardiovascular risks for perioperative complications:   - Coronary Artery Disease (MI, positive stress test, angina, Qs on EKG) = 1 point     RCRI Interpretation: 1 point: Class II (low risk - 0.9% complication rate)          Total time spent on the date of this encounter doing: chart review, review of test results, patient visit, physical exam, education, counseling, developing plan of care, and documenting =  44   minutes.    Signed Electronically by: Wilbur Hannah MD  Copy of this evaluation report is provided to requesting physician.

## 2021-12-28 NOTE — PATIENT INSTRUCTIONS
Do not take any medications tomorrow morning before procedure.    Stop Arimidex for a month, maybe it is causing nausea.    You can take lorazepam prn every 6-8h for anxiety, and at bedtime can increase the dose to 1 mg.    Small frequent meals are better than 2-3 large meals.    Try famotidine twice daily or we can restart omeprazole.

## 2021-12-28 NOTE — ED PROVIDER NOTES
ED Triage Provider Note  [facility]  Encounter Date: Dec 28, 2021    History:  Needs Potassium    Yanna ABDELRAHMAN Handy is a 89 year old female who presents to the ED with hypokalemia.  Found at PMD today.  Patient is a bit weaker than normal.  No other symptoms.    Review of Systems:      Exam:  PHYSICAL EXAM    VITAL SIGNS: There were no vitals taken for this visit.  Constitutional:  Well developed, well nourished,    EYES: Conjunctivae clear, no discharge  HENT: Atraumatic, normocephalic, bilateral external ears normal.  Oropharynx moist. Nose normal.   Neck: Normal ROM , Supple   Respiratory:  No respiratory distress, normal nonlabored respirations.   Cardiovascular:  Distal perfusion appears intact  Musculoskeletal:  No edema appreciated, No cyanosis, No clubbing. Good range of motion in all major joints.   Integument:  Warm, Dry, No erythema, No rash.   Neurologic:  Alert and oriented. No focal deficits noted.  Ambulatory  Psychiatric:  Affect normal    Medical Decision Making:  Patient arriving to the ED with problem as above. A medical screening exam was performed.  orders initiated from Triage. The patient is appropriate to       Dr. Dietz on 12/28/2021 at 5:19 PM      Lab/Imaging Results:       Interventions:  Medications - No data to display    Diagnosis:  No diagnosis found.       Salvador Dietz MD  12/28/21 [id number]

## 2021-12-28 NOTE — ED TRIAGE NOTES
The pt presents for evaluation of hypokalemia. She had a preop appointment with her PCP this morning for an EGD that was scheduled for tomorrow. The pt's son received a phone call reporting that her potasium was critically low. The pt denies any recent medication changes, or any changes to her appetite or eating/drinking.

## 2021-12-29 NOTE — CONSULTS
GI CONSULT NOTE      Name: Yanna Handy  Medical Record #: [id number]  YOB: 1931  Date of Admission: 12/28/2021  Date/Time: 12/29/2021/10:17 AM     CHIEF COMPLAINT: Hypokalemia.     HISTORY OF PRESENT ILLNESS: We were asked to see Yanna Handy by Dr. Longoria for nausea. She is confused and son provides history.     Yanna Hnady is a 89 year old year old female with history of CAD, asthma, HLD, confusion who presented to the ED with a low potassium of 2.6. She has been seen by our service for dysphagia, most recently in July. She has a long history of esophageal dysphagia, last EGD in 2006, which was normal, but she did improve with empiric dilation. When she was seen in July, she had been diagnosed recently with pneumonia. An esophagram was ordered, but this was never performed. She was more recently seen by her primary for 2 months of nausea. An EGD was recommended and was scheduled for this morning at [facility]. She went in for her preop appt yesterday, and her potassium was found to be 2.6. She presented to the ED. On potassium protocol. Magnesium 0030 today 2.8, recheck pending.     Her son provides majority of history. Her dysphagia has not been too problematic, she does well if she eats slowly and he says she has a good appetite. However, the nausea over the past few months has been the biggest complaint. She lies in bed a lot more (living at home with son/DIL) and often complaints of nausea but no vomiting. Symptoms are definitely worse with stress, symptoms get worse when she is stressed during large family gatherings or on her way to procedures (CT scans). Work-up with her primary has been unremarkable. Trials of omeprazole seemed to worsen symptoms. Famotidine not helpful. Antiemetics also no helpful. She has been taking Ativan with some improvement. She is now taking MiraLAX each morning for constipation, which has improved her bowel movements but not helped with nausea.  "CT scans negative for cause.     In addition, she has a history of what appears to be chronic diverticular disease on CT scan and chronic constipation. There was concern she may have an underlying colonic malignancy. She saw colorectal surgery this summer, who recommended hospital colonoscopy, however, patient declined further work-up with colonoscopy and indicated she would not be interested in surgery or treatment for a colon cancer if that were the case. Her last colonoscopy 1/15/2015 by our service for hematochezia showed diverticulosis. Bx showed lymphoid aggregates only.     REVIEW OF SYSTEMS (ROS): Complete review of systems negative other than listed in HPI.    PAST MEDICAL HISTORY:  Past Medical History:   Diagnosis Date     (HFpEF) heart failure with preserved ejection fraction (H) 2020     Anemia 2015     Ascending aorta dilation (H) 2021    41 mm     Asthma      Breast cancer (H) 2017    left     Chronic bronchitis (H)      Coronary artery disease due to lipid rich plaque 09/15/2014    stents patent and no obstruction by CT cor angio on 2021     Essential hypertension      Fx ankle 2000     Fx wrist 2006    right     GI bleed 2015     Hx of radiation therapy 2017    left breast     Insomnia 2016     Malignant neoplasm of skin      Moderate aortic stenosis 2018    stable on 2020 echo     Osteoarthritis      Other emphysema (H)      Senile osteoporosis      Sinusitis, chronic      Wrist fracture 2017      FAMILY HISTORY:  Family History   Problem Relation Age of Onset     Nephritis Mother 37.00         in 1947     Sudden Death Father 62.00        autopsy said \"massive heart attack\", 3 ppd smoker     Breast Cancer Maternal Aunt 55.00     Lung Cancer Brother 65.00     Colon Cancer Maternal Grandmother 90.00     Lung Cancer Maternal Grandfather 65.00     Brain Cancer Son 25.00     Rectal Cancer Brother 64.00     No Known Problems Son  "       SOCIAL HISTORY:  Social History     Socioeconomic History     Marital status:      Spouse name: Not on file     Number of children: 2     Years of education: Not on file     Highest education level: Not on file   Occupational History     Not on file   Tobacco Use     Smoking status: Never Smoker     Smokeless tobacco: Never Used   Substance and Sexual Activity     Alcohol use: Yes     Comment: Alcoholic Drinks/day: Wine occassionally     Drug use: No     Sexual activity: Not on file   Other Topics Concern     Not on file   Social History Narrative    She lives alone in her own home.     Social Determinants of Health     Financial Resource Strain: Not on file   Food Insecurity: Not on file   Transportation Needs: Not on file   Physical Activity: Not on file   Stress: Not on file   Social Connections: Not on file   Intimate Partner Violence: Not on file   Housing Stability: Not on file       MEDICATIONS PRIOR TO ADMISSION: (Not in a hospital admission)         ALLERGIES: Alendronate [alendronic acid], Isosorbide, Metoclopramide hcl [metoclopramide], Rofecoxib, Ciprofloxacin, Doxycycline, Lisinopril, Risedronate, and Sulfa (sulfonamide antibiotics) [sulfa drugs]    PHYSICAL EXAM:    /60   Pulse 72   Temp 97.7  F (36.5  C) (Oral)   Resp 18   Wt 58.5 kg (129 lb)   SpO2 97%   BMI 25.19 kg/m      GENERAL: Pleasant, no obvious distress  NECK: Supple without adenopathy  EYES: No scleral icterus  LUNGS: Clear to auscultation bilaterally  HEART: Regular rate and rhythm, S1 and S2 present, no lower extremity edema  ABDOMEN: Non-distended. Positive bowel sounds. Soft, non-tender, no guarding/rebound/mass, no obvious organomegaly  MUSKULOSKELETAL:  Warm and well perfused, moves all extremities well  SKIN: No jaundice  NEUROLOGIC: Alert and oriented  PSYCHIATRIC: Normal affect    LAB DATA:  CMP Results:   Recent Labs   Lab Test 12/29/21  0030 12/28/21  1746 12/28/21  0917 12/04/21  1550 08/12/21  0944   NA  140 137 136  --  138   POTASSIUM 2.8* 2.6* 2.6*  --  3.7   CHLORIDE 101 96* 95*  --  102   CO2 23 25 24  --  23   ANIONGAP 16 16 17  --  13   GLC 94 108 102  --  98   BUN 7* 8 9  --  9   CR 0.91 1.09 1.07   < > 0.78   BILITOTAL  --  1.0 1.0  --  0.6   ALKPHOS  --  74 78  --  69   ALT  --  <9 <9  --  <9   AST  --  15 15  --  13    < > = values in this interval not displayed.      CBC  Recent Labs   Lab 12/28/21  1746 12/28/21  0917   WBC 8.4 10.6   RBC 5.02 4.91   HGB 14.2 13.8   HCT 43.0 41.8   MCV 86 85   MCH 28.3 28.1   MCHC 33.0 33.0   RDW 16.1* 15.9*    415     INRNo lab results found in last 7 days.   Lipase   Date Value Ref Range Status   08/01/2021 41 0 - 52 U/L Final   06/01/2021 38 0 - 52 U/L Final     IMAGING:  EXAM: CT ABDOMEN PELVIS W CONTRAST  LOCATION: [facility]  DATE/TIME: 12/4/2021 2:42 PM     INDICATION: Nausea/vomiting  COMPARISON: 6/21/2021  TECHNIQUE: CT scan of the abdomen and pelvis was performed following injection of IV contrast. Multiplanar reformats were obtained. Dose reduction techniques were used.  CONTRAST: 75ml Isovue 370     FINDINGS:   LOWER CHEST: Shaggy linear discoid atelectasis or fibrosis very similar to previous exam.     HEPATOBILIARY: Stable slight low-attenuation within hepatic parenchyma adjacent to gallbladder fossa likely relates to focal fatty infiltration. Liver, gallbladder and bile ducts otherwise negative.     PANCREAS: Normal.     SPLEEN: Low-attenuation foci within spleen unchanged, largest measures 2.5 cm.     ADRENAL GLANDS: Normal.     KIDNEYS/BLADDER: Stable tiny right renal cyst with right kidney otherwise negative.     Significant change in the appearance of left kidney now with a zone of more enhancement involving peripheral cortex of lateral left mid/lower pole. The appearance is most suggestive of segmental renal infarct, possibly subacute. Remainder of the left   kidney enhances normally. There is no mass effect and no  surrounding inflammation. No hydronephrosis.     BOWEL: Chronic diverticular disease involving sigmoid colon appears less pronounced than on prior study, no definite acute inflammatory component. Remainder of colon as well as small bowel unremarkable.     LYMPH NODES: No significant lymphadenopathy.     VASCULATURE: Heavy diffuse atherosclerotic calcifications including origins of splanchnic and renal arteries. No vascular occlusion evident.     PELVIC ORGANS: Stable simple appearing 2.3 x 1.7 cm right adnexal cyst.     MUSCULOSKELETAL: Degenerative changes throughout visualized spine.                                               IMPRESSION:   1.  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 There is no mass or inflammatory response. No hydronephrosis. Consider a follow-up exam in 2-3 weeks to assess   for expected evolution. CTA at that time may be beneficial to assess renal arterial vasculature.  2.  Chronic sigmoid diverticular disease, no convincing evidence for acute inflammation.  3.  Linear infiltrates at both lung bases minimally changed favored to represent fibrosis/atelectasis.    ASSESSMENT:  1. Nausea  89 year old female with chronic nausea. This has not improved with constipation treatment. CT scans unremarkable for cause. Failed PPI, short trial of H2 blocker. Had plans for outpatient EGD today to assess for upper GI pathology (PUD, malignancy, esophagitis/gastritis etc). We will attempt to get this done today, pending improvement in her potassium. Son agrees. He does sign her consents.     Constipation does not appear to be contributing, but we will make sure to continue treatment.     2. Dysphagia  Likely due to a motility disorder and not a major complaint. Will assess with EGD.     3. Hypokalemia  On potassium protocol. Recheck pending.     PLAN:  1. EGD once potassium appropriate per anesthesia  2. Keep  NPO  3. Supportive cares  4. Continue MiraLAX 17g daily    Discussed with Dr. Sesaywho will also visit with the patient.                                                Ami Heredia PA-C  Thank you for the opportunity to participate in the care of this patient.   Please feel free to call me with any questions or concerns.  Phone number [phone].            [facility] GI attending addendum  Pt seen, examined and discussed with MARQUES.  Agree with assessment and plan above.     PE: VS reviewed; Lungs CTA; CV RRR; ABD +BS soft nontender ; Neuro MS intact     Assessment  89 year old with nausea. CT unremarkable.PP and  H2 blocker not helpful. Scheduled for  outpatient EGD today at [facility] to assess for upper GI pathology. Also long history dyspagia. Son would like to pursue further work up. He does signs consents.     Andrew Sesay MD  12/29/2021/1:56 PM

## 2021-12-29 NOTE — PROCEDURES
"Procedure(s) completed.  Click on link(s) above or go to \"Chart Review\" and click on \"Procedures\"  "

## 2021-12-29 NOTE — H&P
[facility]    History and Physical - Hospitalist Service       Date of Admission:  12/28/2021    Assessment & Plan   Chief Complaint   Weakness    History is obtained from the patient    History of Present Illness   89 year old female with hx of asthma, dyslipidemia, CAD, hx esophageal stricture presents from [facility] after pre-procedure blood work revealed potassium = 2.6.  Patient reports frequent nausea, weight loss for the last 5 months. Denies fever, cough, sore throat, diarrhea, headache, vomiting, dysuria, hematuria.  Workup for nausea and weight loss ongoing, CT abd/pelvis without cause for nausea found. GI planning EGD today, canceled due to low potassium.  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    Principal Problem:    Hypokalemia: Oral and IV correction underway, repeat potassium in the night and in the a.m.  Check a urinalysis.  Likely poor oral intake.  Consult nutrition.  Active Problems:     Dyslipidemia, goal LDL below 70    Moderate persistent asthma: Resume home inhalers    Essential hypertension: Continue home medications    Osteoarthritis    Coronary artery disease due to lipid rich plaque: Patient symptom-free at this time    Chronic nausea, weight loss, history of esophageal stricture: Consult GI to assist in ongoing assessment and planning for nausea and weight loss work-up.  N.p.o. in case GI decides to do procedure tomorrow    Review of Systems    The 10 point Review of Systems is negative other than noted in the HPI or here.    Code Status:  No CPR- Do NOT Intubate      Diet: Orders Placed This Encounter      NPO for Medical/Clinical Reasons Except for: Meds, Ice Chips    DVT prophylaxis:    Medical:  subcutaneous heparin    Mechanical:  PCD's    Bang Catheter: Not present  Central Lines/Port-a-cath: Not present  Drains: Not present    Disposition Plan   Expected discharge: 12/31/2021    recommended to Home once potassium corrected.    The patient's care was discussed with the Patient.    Zhou Graves MD  [facility]  Securely message with the Vocera Web Console (learn more here)  Text page via [employer] Paging/Directory         Clinically Significant Risk Factors Present on Admission        _____________________________________________________________________    Medical History  I have reviewed this patient's medical history and updated it with pertinent information if needed.  Past Medical History:   Diagnosis Date     (HFpEF) heart failure with preserved ejection fraction (H) 08/13/2020     Anemia 08/21/2015     Ascending aorta dilation (H) 05/12/2021    41 mm     Asthma      Breast cancer (H) 07/25/2017    left     Chronic bronchitis (H)      Coronary artery disease due to lipid rich plaque 09/15/2014    stents patent and no obstruction by CT cor angio on 5/12/2021     Essential hypertension      Fx ankle 08/2000     Fx wrist 12/2006    right     GI bleed 01/12/2015     Hx of radiation therapy 2017    left breast     Insomnia 01/13/2016     Malignant neoplasm of skin      Moderate aortic stenosis 07/02/2018    stable on November 2020 echo     Osteoarthritis      Other emphysema (H)      Senile osteoporosis      Sinusitis, chronic      Wrist fracture 06/16/2017       Surgical History   I have reviewed this patient's surgical history and updated it with pertinent information if needed.  Past Surgical History:   Procedure Laterality Date     APPENDECTOMY  1971     ARTHROSCOPY KNEE Left 10/2004     CATARACT EXTRACTION       COLONOSCOPY N/A 01/15/2015    COLONOSCOPY with biopsy of polyp;  Surgeon: Joel Coppola MD;  Location: [facility];  Service:     CORONARY STENT PLACEMENT  09/15/2014    2     HC REMOVAL OF TONSILS,<13 Y/O  1941     HYSTERECTOMY  1971     IR VISCERAL ANGIOGRAM  1/12/2015     LUMPECTOMY BREAST Left 08/02/2017    Dr. Pope     OOPHORECTOMY  1971     "unilateral     REVISE SECONDARY VARICOSITY      Varicose Vein Ligation     SINUS SURGERY      \"I had 3 sinus surgeries.\"     TOTAL KNEE ARTHROPLASTY Right 2003       Social History   I have reviewed this patient's social history and updated it with pertinent information if needed.  Social History     Tobacco Use     Smoking status: Never Smoker     Smokeless tobacco: Never Used   Substance Use Topics     Alcohol use: Yes     Comment: Alcoholic Drinks/day: Wine occassionally     Drug use: No       Family History   I have reviewed this patient's family history and updated it with pertinent information if needed.  Family History   Problem Relation Age of Onset     Nephritis Mother 37.00         in 1947     Sudden Death Father 62.00        autopsy said \"massive heart attack\", 3 ppd smoker     Breast Cancer Maternal Aunt 55.00     Lung Cancer Brother 65.00     Colon Cancer Maternal Grandmother 90.00     Lung Cancer Maternal Grandfather 65.00     Brain Cancer Son 25.00     Rectal Cancer Brother 64.00     No Known Problems Son        Prior to Admission Medications   denosumab (PROLIA) injection 60 mg    acetaminophen (TYLENOL) 650 MG CR tablet, [ACETAMINOPHEN (TYLENOL) 650 MG CR TABLET] Take 650 mg by mouth every 8 (eight) hours as needed for pain.  acetylcysteine (MUCOMYST) 20 % neb solution, Take 2 mLs by nebulization At Bedtime  albuterol (PROAIR HFA;PROVENTIL HFA;VENTOLIN HFA) 90 mcg/actuation inhaler, [ALBUTEROL (PROAIR HFA;PROVENTIL HFA;VENTOLIN HFA) 90 MCG/ACTUATION INHALER] Inhale 2 puffs every 6 (six) hours as needed for wheezing.  anastrozole (ARIMIDEX) 1 MG tablet, Take 1 tablet (1 mg) by mouth daily  calcium citrate-vitamin D (CITRACAL+D) 315-200 mg-unit per tablet, Take 1 tablet by mouth At Bedtime   famotidine (PEPCID) 20 MG tablet, Take 1 tablet (20 mg) by mouth 2 times daily (Patient taking differently: Take 20 mg by mouth 2 times daily as needed )  fluticasone furoate-vilanteroL (BREO ELLIPTA) " 200-25 mcg/dose DsDv inhaler, [FLUTICASONE FUROATE-VILANTEROL (BREO ELLIPTA) 200-25 MCG/DOSE DSDV INHALER] Inhale 1 puff daily. Rinse mouth with water, gargle,spit after each use  furosemide (LASIX) 20 MG tablet, [FUROSEMIDE (LASIX) 20 MG TABLET] Take 1 tablet (20 mg total) by mouth daily.  gabapentin (NEURONTIN) 100 MG capsule, Taking 3 capsule by mouth in the morning, 1 capsule by mouth at noon and 2 capsules by mouth at bedtime daily. (Patient taking differently: Take 100 mg by mouth 2 times daily as needed Taking 3 capsule by mouth in the morning, 1 capsule by mouth at noon and 2 capsules by mouth at bedtime daily.)  ipratropium-albuteroL (COMBIVENT RESPIMAT)  mcg/actuation Mist inhaler, [IPRATROPIUM-ALBUTEROL (COMBIVENT RESPIMAT)  MCG/ACTUATION MIST INHALER] Inhale 1 puff 4 (four) times a day as needed.  ipratropium-albuteroL (DUO-NEB) 0.5-2.5 mg/3 mL nebulizer, [IPRATROPIUM-ALBUTEROL (DUO-NEB) 0.5-2.5 MG/3 ML NEBULIZER] Take 3 mL by nebulization 2 (two) times a day as needed. (Patient taking differently: Take 3 mLs by nebulization daily )  LACTOBACILLUS ACIDOPHILUS (ACIDOPHILUS ORAL), Take 1 tablet by mouth every morning   LORazepam (ATIVAN) 0.5 MG tablet, TAKE ONE TABLET BY MOUTH at bedtime AS NEEDED FOR ANXIETY and sleep  metoprolol tartrate (LOPRESSOR) 100 MG tablet, [METOPROLOL TARTRATE (LOPRESSOR) 100 MG TABLET] Take 1 tablet (100 mg total) by mouth 2 (two) times a day.  nitroglycerin (NITROSTAT) 0.4 MG SL tablet, [NITROGLYCERIN (NITROSTAT) 0.4 MG SL TABLET] Place 1 tablet (0.4 mg total) under the tongue every 5 (five) minutes as needed for chest pain.  ondansetron (ZOFRAN) 4 MG tablet, Take 1 tablet (4 mg) by mouth every 8 hours as needed for nausea  rosuvastatin (CRESTOR) 5 MG tablet, Take 10 mg by mouth At Bedtime  umeclidinium (INCRUSE ELLIPTA) 62.5 mcg/actuation DsDv inhaler, [UMECLIDINIUM (INCRUSE ELLIPTA) 62.5 MCG/ACTUATION DSDV INHALER] Inhale 1 puff daily.  OXYGEN-AIR DELIVERY SYSTEMS  MISC, [OXYGEN-AIR DELIVERY SYSTEMS MISC] Use 2 L As Directed. 2L at bedtime  [employer]  prochlorperazine (COMPAZINE) 5 MG tablet, Take 1 tablet (5 mg) by mouth every 6 hours as needed for nausea or vomiting (Patient not taking: Reported on 12/28/2021)  rosuvastatin (CRESTOR) 10 MG tablet, [ROSUVASTATIN (CRESTOR) 10 MG TABLET] Take 1 tablet (10 mg total) by mouth at bedtime. Take with a 5 mg pill for a total of 15 mg nightly (Patient not taking: Reported on 12/28/2021)  [DISCONTINUED] isosorbide mononitrate (IMDUR) 30 MG 24 hr tablet, [ISOSORBIDE MONONITRATE (IMDUR) 30 MG 24 HR TABLET] Take 0.5 tablets (15 mg total) by mouth daily.        Allergies      Allergies   Allergen Reactions     Alendronate [Alendronic Acid] Nausea and Vomiting     Isosorbide Unknown     Metoclopramide Hcl [Metoclopramide] Anxiety     Rofecoxib Diarrhea and Nausea     Ciprofloxacin      Doxycycline Nausea and Vomiting     Lisinopril Cough     Risedronate Unknown     Sulfa (Sulfonamide Antibiotics) [Sulfa Drugs] Anaphylaxis       Physical Exam   Vital signs:  Temp: 97.7  F (36.5  C) Temp src: Oral BP: 104/55 Pulse: 75   Resp: 18 SpO2: 91 % O2 Device: None (Room air)     Weight: 58.5 kg (129 lb) (historical)  Estimated body mass index is 25.19 kg/m  as calculated from the following:    Height as of an earlier encounter on 12/28/21: 1.524 m (5').    Weight as of this encounter: 58.5 kg (129 lb).    Constitutional: awake, alert, cooperative, no apparent distress, and appears stated age  ENT: normocepalic, without obvious abnormality, atramatic  Respiratory: No increased work of breathing, good air exchange, clear to auscultation bilaterally, no crackles or wheezing  Cardiovascular: normal apical pulses  and normal S1 and S2  GI: normal bowel sounds, soft and non-distended  Neurologic: Mental Status Exam:  Level of Alertness:   awake  Orientation:   person, place, time  Memory:   normal  Motor Exam:  moves all extremities well and  symmetrically    Data   Data reviewed today: I reviewed all medications, new labs and imaging results over the last 24 hours.  Recent Labs   Lab 12/28/21  1746 12/28/21  0917   WBC 8.4 10.6   HGB 14.2 13.8   MCV 86 85    415    136   POTASSIUM 2.6* 2.6*   CHLORIDE 96* 95*   CO2 25 24   BUN 8 9   CR 1.09 1.07   ANIONGAP 16 17   ALISON 10.4 10.2    102   ALBUMIN 3.0* 3.0*   PROTTOTAL 7.3 6.8   BILITOTAL 1.0 1.0   ALKPHOS 74 78   ALT <9 <9   AST 15 15     Recent Results (from the past 24 hour(s))   XR Chest 2 Views    Narrative    EXAM: XR CHEST 2 VW  LOCATION: [facility]  DATE/TIME: 12/28/2021 9:16 AM    INDICATION:  Moderate persistent asthma, unspecified whether complicated  COMPARISON: None.      Impression    IMPRESSION: Heart size mildly enlarged with left ventricular prominence similar to previous. Some fibrosis or chronic atelectasis in the lung bases greater on the right side also unchanged. No significant new findings.

## 2021-12-29 NOTE — ED NOTES
Hospitalist aware pt's oxygen sats drop to mid 80%'s but will go up to mid 90%'s with encouraging to take deep breaths

## 2021-12-29 NOTE — ANESTHESIA PREPROCEDURE EVALUATION
"Anesthesia Pre-Procedure Evaluation    Patient: Yanna Handy   MRN: [id number] : 1931        Preoperative Diagnosis: Nausea [R11.0]    Procedure : Procedure(s):  ESOPHAGOGASTRODUODENOSCOPY (EGD)          Past Medical History:   Diagnosis Date     (HFpEF) heart failure with preserved ejection fraction (H) 2020     Anemia 2015     Ascending aorta dilation (H) 2021    41 mm     Asthma      Breast cancer (H) 2017    left     Chronic bronchitis (H)      Coronary artery disease due to lipid rich plaque 09/15/2014    stents patent and no obstruction by CT cor angio on 2021     Essential hypertension      Fx ankle 2000     Fx wrist 2006    right     GI bleed 2015     Hx of radiation therapy     left breast     Insomnia 2016     Malignant neoplasm of skin      Moderate aortic stenosis 2018    stable on 2020 echo     Osteoarthritis      Other emphysema (H)      Senile osteoporosis      Sinusitis, chronic      Wrist fracture 2017      Past Surgical History:   Procedure Laterality Date     APPENDECTOMY  1971     ARTHROSCOPY KNEE Left 10/2004     CATARACT EXTRACTION       COLONOSCOPY N/A 01/15/2015    COLONOSCOPY with biopsy of polyp;  Surgeon: Joel Coppola MD;  Location: [facility];  Service:     CORONARY STENT PLACEMENT  09/15/2014    2     HC REMOVAL OF TONSILS,<13 Y/O  89     HYSTERECTOMY  1971     IR VISCERAL ANGIOGRAM  2015     LUMPECTOMY BREAST Left 2017    Dr. Pope     OOPHORECTOMY  1971    unilateral     REVISE SECONDARY VARICOSITY      Varicose Vein Ligation     SINUS SURGERY      \"I had 3 sinus surgeries.\"     TOTAL KNEE ARTHROPLASTY Right 2003      Allergies   Allergen Reactions     Alendronate [Alendronic Acid] Nausea and Vomiting     Isosorbide Unknown     Metoclopramide Hcl [Metoclopramide] Anxiety     Rofecoxib Diarrhea and Nausea     Ciprofloxacin      Doxycycline Nausea and Vomiting     Lisinopril " Cough     Risedronate Unknown     Sulfa (Sulfonamide Antibiotics) [Sulfa Drugs] Anaphylaxis      Social History     Tobacco Use     Smoking status: Never Smoker     Smokeless tobacco: Never Used   Substance Use Topics     Alcohol use: Yes     Comment: Alcoholic Drinks/day: Wine occassionally      Wt Readings from Last 1 Encounters:   12/28/21 58.5 kg (129 lb)        Anesthesia Evaluation   Pt has had prior anesthetic.     No history of anesthetic complications       ROS/MED HX  ENT/Pulmonary:     (+) asthma     Neurologic:     (+) dementia,     Cardiovascular: Comment: Ascending aortic dilation - no intervention needed at this time    (+) Dyslipidemia hypertension--CAD ---dysrhythmias (not anticoagulated), a-fib,     METS/Exercise Tolerance:     Hematologic:  - neg hematologic  ROS     Musculoskeletal:   (+) arthritis,     GI/Hepatic: Comment: Nausea no vomiting   Liver disease: steatosis.   Renal/Genitourinary:     (+) renal disease, type: CRI,     Endo:  - neg endo ROS     Psychiatric/Substance Use:  - neg psychiatric ROS     Infectious Disease:  - neg infectious disease ROS     Malignancy:       Other:            Physical Exam    Airway   unable to assess          Respiratory Devices and Support     Nasal Canula 2  l/min.     Dental       (+) chipped      Cardiovascular          Rhythm and rate: irregular     Pulmonary           breath sounds clear to auscultation           OUTSIDE LABS:  CBC:   Lab Results   Component Value Date    WBC 8.4 12/28/2021    WBC 10.6 12/28/2021    HGB 14.2 12/28/2021    HGB 13.8 12/28/2021    HCT 43.0 12/28/2021    HCT 41.8 12/28/2021     12/28/2021     12/28/2021     BMP:   Lab Results   Component Value Date     12/29/2021     12/29/2021    POTASSIUM 3.1 (L) 12/29/2021    POTASSIUM 3.1 (L) 12/29/2021    CHLORIDE 104 12/29/2021    CHLORIDE 101 12/29/2021    CO2 24 12/29/2021    CO2 23 12/29/2021    BUN 7 (L) 12/29/2021    BUN 7 (L) 12/29/2021    CR 0.83  12/29/2021    CR 0.91 12/29/2021    GLC 82 12/29/2021    GLC 94 12/29/2021     COAGS: No results found for: PTT, INR, FIBR  POC: No results found for: BGM, HCG, HCGS  HEPATIC:   Lab Results   Component Value Date    ALBUMIN 3.0 (L) 12/28/2021    PROTTOTAL 7.3 12/28/2021    ALT <9 12/28/2021    AST 15 12/28/2021    ALKPHOS 74 12/28/2021    BILITOTAL 1.0 12/28/2021     OTHER:   Lab Results   Component Value Date    LACT 1.9 08/01/2021    ALISON 8.6 12/29/2021    PHOS 3.5 05/12/2021    MAG 1.7 (L) 12/29/2021    LIPASE 41 08/01/2021    AMYLASE 53 06/01/2021    TSH 0.76 08/01/2021    SED 39 (H) 08/01/2021       Anesthesia Plan    ASA Status:  4   NPO Status:  NPO Appropriate    Anesthesia Type: MAC.              Consents    Anesthesia Plan(s) and associated risks, benefits, and realistic alternatives discussed. Questions answered and patient/representative(s) expressed understanding.    - Discussed:     - Discussed with:  Patient      - Patient is DNR/DNI Status: No    Use of blood products discussed: Yes.     - Discussed with: Patient, Healthcare Power of .     - Consented: consented to blood products            Reason for refusal: other.     Postoperative Care    Pain management: Multi-modal analgesia.   PONV prophylaxis: Ondansetron (or other 5HT-3), Dexamethasone or Solumedrol     Comments:                Brenda Liriano MD

## 2021-12-29 NOTE — ED NOTES
Pt having some confusion--Hospitalist aware. Pt seeing things that are not there. Assist of 2 to get pt up to bedside commode

## 2021-12-29 NOTE — PROGRESS NOTES
12/28/2021  5:55 PM    Pre-procedure Note    Reason for procedure: nausea    History and Physical Reviewed: Reviewed, no changes.    Pre-sedation assessment:    General: alert, appears stated age and cooperative  Airway: normal  Heart: S1, S2 normal, no murmur, click, rub or gallop, regular rate and rhythm, chest is clear without rales or wheezing, no pedal edema, no JVD, no hepatosplenomegaly  Lungs: clear to auscultation bilaterally    Sedation Plan based on assessment: Moderate    Mallampati score: Class II (visualization of the soft palate, fauces, and uvula)          ASA Classification: ASA 3 - Patient with moderate systemic disease with functional limitations    Impression: Patient deemed adequate candidate for conscious sedation    Risks, benefits and alternatives were discussed with the patient and informed consent was obtained.    Plan: esophagogastroduodenoscopy                                                    Andrew Sesay MD  Thank you for the opportunity to participate in the care of this patient.   Please feel free to call me with any questions or concerns.  Phone number [phone].

## 2021-12-29 NOTE — CONSULTS
CLINICAL NUTRITION SERVICES - ASSESSMENT NOTE     Nutrition Prescription    RECOMMENDATIONS FOR MDs/PROVIDERS TO ORDER:  Continue to replace K and Mg as needed    Malnutrition Status:    Severe    Future/Additional Recommendations:  When diet advances add ensure supplement daily     REASON FOR ASSESSMENT  Yanna Handy is a/an 89 year old female assessed by the dietitian for Provider Order - poor appetite, weight loss and low K    Pt with a past medical history of asthma, dyslipidemia, CAD, hx of esophageal stricture.  Pt reports frequent nausea, weight loss past 5 months. 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    NUTRITION HISTORY  Pt fast asleep.  Spoke with pt's son: pt does not follow any special diet at home.  They recently purchased nutritional supplements but she has not had any yet. Son states she has lost ~ 20 lb in the past 6 months. She lives with her son and daughter in law. Noted per GI note she has been having nausea over the past few months.    CURRENT NUTRITION ORDERS  Diet: NPO    LABS  Labs reviewed: K 2.8, Glu 94, Mg 1.6 (12/28) alb 3    MEDICATIONS  Medications reviewed: pepcid, Mag-Ox,     ANTHROPOMETRICS  Height: 5'  Most Recent Weight: 58.5 kg (129 lb) (historical)    IBW: 45.45 kg  BMI: Overweight BMI 25-29.9  Weight History:   Wt Readings from Last 10 Encounters:   12/28/21 58.5 kg (129 lb)   12/28/21 58.6 kg (129 lb 3.2 oz)   11/11/21 62.6 kg (138 lb)   11/01/21 64.4 kg (142 lb)   08/25/21 69.4 kg (153 lb)   08/12/21 69.4 kg (153 lb)   08/04/21 72.1 kg (159 lb)   08/01/21 72.1 kg (159 lb)   07/13/21 73 kg (161 lb)   06/08/21 73.7 kg (162 lb 8 oz)   Weight loss of 33 lb in the past 6 months (20%) which is clinically significant and severe    Dosing Weight: 49 kg/ adjusted weight    ASSESSED NUTRITION NEEDS  Estimated Energy Needs: 1470+ kcals/day (30+ Kcal/Kg)  Justification: Repletion  Estimated Protein  Needs: 49-59 grams protein/day 1+ g/Kg  Justification: Maintenance  Estimated Fluid Needs: 8496-0157 mL/day (25 - 30 mL/kg)   Justification: Maintenance    PHYSICAL FINDINGS  See malnutrition section below.  Stuart = 16  Skin WDL per nurse  GI: WDL per nurse  Last BM 12/29/2021 per nurse    MALNUTRITION:  % Weight Loss:  > 10% in 6 months (severe malnutrition)  % Intake:  </= 75% for >/= 1 month (severe malnutrition)  Subcutaneous Fat Loss:  Unable-pt sleeping  Muscle Loss:  Unable-pt sleeping  Fluid Retention:  None noted    Malnutrition Diagnosis: Severe malnutrition  In Context of:  Chronic illness or disease    NUTRITION DIAGNOSIS  Malnutrition related to chronic illness as evidenced by 20% weight loss in 6 months and inadequate oral intake (< 75% for >=1 month)      INTERVENTIONS  Implementation  Provided pt's son with potassium list-encouraged high K foods  When diet advances will add ensure daily to help pt meet her nutritional needs.    Goals  Patient to consume % of nutritionally adequate meals three times per day, or the equivalent with supplements/snacks.  Prevent further weight loss     Monitoring/Evaluation  Progress toward goals will be monitored and evaluated per protocol: po intake, labs, weight

## 2021-12-29 NOTE — ANESTHESIA CARE TRANSFER NOTE
Patient: Yanna Handy    Procedure: Procedure(s):  ESOPHAGOGASTRODUODENOSCOPY (EGD) WITH BIOPSY       Diagnosis: Nausea [R11.0]  Diagnosis Additional Information: No value filed.    Anesthesia Type:   MAC     Note:    Oropharynx: oropharynx clear of all foreign objects  Level of Consciousness: awake  Oxygen Supplementation: room air    Independent Airway: airway patency satisfactory and stable  Dentition: dentition unchanged  Vital Signs Stable: post-procedure vital signs reviewed and stable  Report to RN Given: handoff report given  Patient transferred to: Phase II    Handoff Report: Identifed the Patient, Identified the Reponsible Provider, Reviewed the pertinent medical history, Discussed the surgical course, Reviewed Intra-OP anesthesia mangement and issues during anesthesia, Set expectations for post-procedure period and Allowed opportunity for questions and acknowledgement of understanding      Vitals:  Vitals Value Taken Time   /79 12/29/21 1500   Temp 37  C (98.6  F) 12/29/21 1500   Pulse 71 12/29/21 1502   Resp 43 12/29/21 1502   SpO2 95 % 12/29/21 1501   Vitals shown include unvalidated device data.    Electronically Signed By: Roque Sandhu MD  December 29, 2021  3:39 PM

## 2021-12-29 NOTE — PROGRESS NOTES
[facility]    PROGRESS NOTE - Hospitalist Service    ASSESSMENT AND PLAN     Principal Problem:    Hypokalemia  Active Problems:    Dyslipidemia, goal LDL below 70    Moderate persistent asthma    Essential hypertension    Osteoarthritis    Coronary artery disease due to lipid rich plaque    History of esophageal stricture    Chronic kidney disease, stage 3a (H)    Yanna Handy is a 89 year old female with hx of asthma, dyslipidemia, CAD, hx esophageal stricture presents from [facility] after pre-procedure blood work revealed potassium = 2.6. Presented to the ED.      Hypokalemia:    Today potassium is 3.1 after a total of 80 mEq IV/p.o. have been administered since arrival.    Likely poor oral intake.  Nutrition to see    Another 40 mEq IV ordered   EKG reviewed     Nausea    Outpatient work-up included a CT abdomen pelvis which was unrevealing for cause of nausea.  Plan is for EGD but patient was sent to the emergency department due to low potassium   Gastroenterology evaluating here with plans to scope inpatient.   Keep NPO per GI     Weight loss   Son notes weight loss over the past several months.  Possibly related to underlying nausea as well as dementia    Cognitive decline with likely underlying dementia   No formal diagnosis as of yet   Patient recently moved in with her son and has been doing well.  Skylar she is pretty high functioning and able to take care of her self but he was concerned about her anxiety about living alone    Dyslipidemia, goal LDL below 70    Moderate persistent asthma: Resume home inhalers    Essential hypertension: Continue home medications    Osteoarthritis    Coronary artery disease due to lipid rich plaque: Patient symptom-free at this time     Code Status:  No CPR- Do NOT Intubate      Barriers to discharge: Improvement in potassium, GI work-up    Anticipated length of stay: 1 to 2 days    Subjective:  Patient is resting comfortably in  bed.  She is mildly delirious.  Most of history obtained from son at bedside.  Patient denies any chest pain, shortness of breath, abdominal pain or nausea.    PHYSICAL EXAM  Temp:  [97.7  F (36.5  C)] 97.7  F (36.5  C)  Pulse:  [] 65  Resp:  [14-36] 36  BP: ()/(51-99) 127/60  SpO2:  [91 %-98 %] 98 %  Wt Readings from Last 1 Encounters:   12/28/21 58.5 kg (129 lb)       Intake/Output Summary (Last 24 hours) at 12/29/2021 1237  Last data filed at 12/29/2021 0402  Gross per 24 hour   Intake 950 ml   Output --   Net 950 ml      Body mass index is 25.19 kg/m .    GENRL: Alert and answering some questions. Poor historian. Not in acute distress. Lying in bed, but tries to get up when I speak with her  HEENT: no lymphadenopathy or thyromegaly  CHEST: Clear to auscultation bilaterally. No wheezes, rhonchi or crackles. Breathing easily   HEART: Regular rate and rhythm, S1S2 auscultated. No murmurs, rubs or gallops.   ABDMN: Soft. Non-tender, non-distended. No organomegaly. No guarding or rigidity. Bowel sounds present   EXTRM: trace  pedal edema, DP pulses 2+. No discoloration   NEURO: Moving all extremities. No involuntary movements.   PSYCH: odd affect and mood.   INTGM: No skin rash, no cyanosis or clubbing    PERTINENT LABS/IMAGING:  No results found for this visit on 12/28/21.  Most Recent 3 CBC's:Recent Labs   Lab Test 12/28/21  1746 12/28/21  0917 08/12/21  0944   WBC 8.4 10.6 11.9*   HGB 14.2 13.8 12.4   MCV 86 85 88    415 397     Most Recent 3 BMP's:Recent Labs   Lab Test 12/29/21  1007 12/29/21  0030 12/28/21  1746    140 137   POTASSIUM 3.1*  3.1* 2.8* 2.6*   CHLORIDE 104 101 96*   CO2 24 23 25   BUN 7* 7* 8   CR 0.83 0.91 1.09   ANIONGAP 12 16 16   ALISON 8.6 9.4 10.4   GLC 82 94 108       Recent Labs   Lab Test 06/18/20  1034   CHOL 171   HDL 62   LDL 83   TRIG 131     Recent Labs   Lab Test 06/18/20  1034 06/18/18  1111 12/20/17  1359   LDL 83 94 102     Recent Labs   Lab Test  12/29/21  1007      POTASSIUM 3.1*  3.1*   CHLORIDE 104   CO2 24   GLC 82   BUN 7*   CR 0.83   GFRESTIMATED 67   ALISON 8.6     No results for input(s): A1C in the last 12454 hours.  Recent Labs   Lab Test 12/28/21  1746 12/28/21  0917 08/12/21  0944   HGB 14.2 13.8 12.4     Recent Labs   Lab Test 08/01/21  1222 08/13/20  0804   TROPONINI 0.03 0.01     Recent Labs   Lab Test 08/01/21  1738 04/05/21  1124 08/13/20  0804   * 264* 310*     Recent Labs   Lab Test 08/01/21  1222   TSH 0.76     No results for input(s): INR in the last 48548 hours.    Enma Campbell DO  [facility] Medicine Service  [phone]

## 2021-12-29 NOTE — CONSULTS
"Care Management Initial Consult    General Information  Assessment completed with: Mateo Thomas son via phone  Type of CM/SW Visit: Initial Assessment    Primary Care Provider verified and updated as needed: Yes   Readmission within the last 30 days: no previous admission in last 30 days      Reason for Consult: discharge planning  Advance Care Planning: Advance Care Planning Reviewed: other (comment) (\"doesn't have one\")          Communication Assessment  Patient's communication style: spoken language (English or Bilingual)    Hearing Difficulty or Deaf: no   Wear Glasses or Blind: no    Cognitive  Cognitive/Neuro/Behavioral: WDL                      Living Environment:   People in home: child(la), adult  son Mateo and daughter in law  Current living Arrangements: house (\"a few steps to get inside then all 1 level\")      Able to return to prior arrangements: yes       Family/Social Support:  Care provided by: self  Provides care for: no one  Marital Status:   Children          Description of Support System: Supportive,Involved    Support Assessment: Adequate family and caregiver support,Adequate social supports,Patient communicates needs well met    Current Resources:   Patient receiving home care services: No     Community Resources: None  Equipment currently used at home: none  Supplies currently used at home: Oxygen Tubing/Supplies,Other (\"Oxygen concentrator and portable; glasses\")    Employment/Financial:  Employment Status: retired        Financial Concerns:     Referral to Financial Counselor: No       Lifestyle & Psychosocial Needs:  Social Determinants of Health     Tobacco Use: Low Risk      Smoking Tobacco Use: Never Smoker     Smokeless Tobacco Use: Never Used   Alcohol Use: Not on file   Financial Resource Strain: Not on file   Food Insecurity: Not on file   Transportation Needs: Not on file   Physical Activity: Not on file   Stress: Not on file   Social Connections: Not on file   Intimate " "Partner Violence: Not on file   Depression: Not at risk     PHQ-2 Score: 0   Housing Stability: Not on file       Functional Status:  Prior to admission patient needed assistance:   Dependent ADLs:: Independent,Ambulation-no assistive device  Dependent IADLs:: Cleaning,Cooking,Laundry,Shopping,Meal Preparation,Transportation (\"family helps\")       Mental Health Status:          Chemical Dependency Status:                Values/Beliefs:  Spiritual, Cultural Beliefs, Mandaeism Practices, Values that affect care:                 Additional Information:  Lara lives in her son Mateo's house. She has a few steps to get inside then \"it is all 1 level\".     She is independent with most ADLs and family also helps PRN.    She \"uses Home O2 concentrator 75% of the time and she does have a portable tank that she never use\".    Son Mateo [phone].    Son to transport at discharge.    Josefa Veloz RN      "

## 2021-12-29 NOTE — UTILIZATION REVIEW
Inpatient appropriate  Admission Status; Secondary Review Determination     Under the authority of the Utilization Management Committee, the utilization review process indicated a secondary review on the above patient. The review outcome is based on review of the medical records, discussions with staff, and applying clinical experience noted on the date of the review.     (x) Inpatient Status Appropriate - This patient's medical care is consistent with medical management for inpatient care and reasonable inpatient medical practice.     RATIONALE FOR DETERMINATION   89 years old female evaluated in the ED on December 28, 2021 for hypokalemia.  Past medical history of asthma, dyslipidemia, CAD, esophageal stricture.  Patient was scheduled to have an EGD due to frequent nausea, weight loss over the last 5 months, however preop laboratory work-up was abnormal and procedure was canceled.  Upon ED arrival, blood pressure was 86/54, but since then has been stable and actually slightly hypertensive.  Laboratory work-up remarkable for potassium 2.6, chloride 95, albumin 3, GFR 49.  Chest x-ray showed unchanged fibrosis or chronic atelectasis at the bases greater on the right side.  As of today, potassium 2.8 despite ongoing replacement.  Gastroenterology service consulted, plan for EGD once potassium is replaced.  At the time of admission with the information available to the attending physician more than 2 nights Hospital complex care was anticipated, based on patient risk of adverse outcome if treated as outpatient and complex care required. Inpatient admission is appropriate based on the Medicare guidelines.     This document was produced using voice recognition software     The information on this document is developed by the utilization review team in order for the business office to ensure compliance. This only denotes the appropriateness of proper admission status and does not reflect the quality of care rendered.    The definitions of Inpatient Status and Observation Status used in making the determination above are those provided in the CMS Coverage Manual, Chapter 1 and Chapter 6, section 70.4.     Sincerely,     Jordi Umanzor MD  [facility]  Utilization Review Physician Advisor  Pager: [phone]

## 2021-12-29 NOTE — ED NOTES
"Pt will take her gown off and say \"its time to get up\" couple times during night. Pt reoriented and repositioned in bed.  "

## 2021-12-29 NOTE — ANESTHESIA POSTPROCEDURE EVALUATION
Patient: Yanna Handy    Procedure: Procedure(s):  ESOPHAGOGASTRODUODENOSCOPY (EGD) WITH BIOPSY       Diagnosis:Nausea [R11.0]  Diagnosis Additional Information: No value filed.    Anesthesia Type:  MAC    Note:  Disposition: Outpatient   Postop Pain Control: Uneventful            Sign Out: Well controlled pain   PONV: No   Neuro/Psych: Uneventful            Sign Out: Acceptable/Baseline neuro status   Airway/Respiratory: Uneventful            Sign Out: Acceptable/Baseline resp. status   CV/Hemodynamics: Uneventful            Sign Out: Acceptable CV status; No obvious hypovolemia; No obvious fluid overload   Other NRE: NONE   DID A NON-ROUTINE EVENT OCCUR? No           Last vitals:  Vitals Value Taken Time   /79 12/29/21 1500   Temp 37  C (98.6  F) 12/29/21 1500   Pulse 71 12/29/21 1502   Resp 43 12/29/21 1502   SpO2 95 % 12/29/21 1501   Vitals shown include unvalidated device data.    Electronically Signed By: Roque Sandhu MD  December 29, 2021  3:38 PM

## 2021-12-29 NOTE — PHARMACY-ADMISSION MEDICATION HISTORY
Pharmacy Note - Admission Medication History    Pertinent Provider Information: None     ______________________________________________________________________    Prior To Admission (PTA) med list completed and updated in EMR.       Current Facility-Administered Medications for the 12/28/21 encounter (Hospital Encounter)   Medication     denosumab (PROLIA) injection 60 mg     PTA Med List   Medication Sig Note Last Dose     acetaminophen (TYLENOL) 650 MG CR tablet [ACETAMINOPHEN (TYLENOL) 650 MG CR TABLET] Take 650 mg by mouth every 8 (eight) hours as needed for pain.       acetylcysteine (MUCOMYST) 20 % neb solution Take 2 mLs by nebulization At Bedtime  12/27/2021 at Unknown time     albuterol (PROAIR HFA;PROVENTIL HFA;VENTOLIN HFA) 90 mcg/actuation inhaler [ALBUTEROL (PROAIR HFA;PROVENTIL HFA;VENTOLIN HFA) 90 MCG/ACTUATION INHALER] Inhale 2 puffs every 6 (six) hours as needed for wheezing.       anastrozole (ARIMIDEX) 1 MG tablet Take 1 tablet (1 mg) by mouth daily 12/28/2021: Provider instructed patient to hold medication for the next month (starting 12/28/21). 12/27/2021 at Unknown time     calcium citrate-vitamin D (CITRACAL+D) 315-200 mg-unit per tablet Take 1 tablet by mouth At Bedtime   12/27/2021 at Unknown time     famotidine (PEPCID) 20 MG tablet Take 1 tablet (20 mg) by mouth 2 times daily (Patient taking differently: Take 20 mg by mouth 2 times daily as needed )       fluticasone furoate-vilanteroL (BREO ELLIPTA) 200-25 mcg/dose DsDv inhaler [FLUTICASONE FUROATE-VILANTEROL (BREO ELLIPTA) 200-25 MCG/DOSE DSDV INHALER] Inhale 1 puff daily. Rinse mouth with water, gargle,spit after each use  12/27/2021 at Unknown time     furosemide (LASIX) 20 MG tablet [FUROSEMIDE (LASIX) 20 MG TABLET] Take 1 tablet (20 mg total) by mouth daily.  12/27/2021 at Unknown time     gabapentin (NEURONTIN) 100 MG capsule Taking 3 capsule by mouth in the morning, 1 capsule by mouth at noon and 2 capsules by mouth at bedtime  daily. (Patient taking differently: Take 100 mg by mouth 2 times daily as needed Taking 3 capsule by mouth in the morning, 1 capsule by mouth at noon and 2 capsules by mouth at bedtime daily.)       ipratropium-albuteroL (COMBIVENT RESPIMAT)  mcg/actuation Mist inhaler [IPRATROPIUM-ALBUTEROL (COMBIVENT RESPIMAT)  MCG/ACTUATION MIST INHALER] Inhale 1 puff 4 (four) times a day as needed.       ipratropium-albuteroL (DUO-NEB) 0.5-2.5 mg/3 mL nebulizer [IPRATROPIUM-ALBUTEROL (DUO-NEB) 0.5-2.5 MG/3 ML NEBULIZER] Take 3 mL by nebulization 2 (two) times a day as needed. (Patient taking differently: Take 3 mLs by nebulization daily )  12/27/2021 at Unknown time     LACTOBACILLUS ACIDOPHILUS (ACIDOPHILUS ORAL) Take 1 tablet by mouth every morning   12/27/2021 at Unknown time     LORazepam (ATIVAN) 0.5 MG tablet TAKE ONE TABLET BY MOUTH at bedtime AS NEEDED FOR ANXIETY and sleep  12/28/2021 at Unknown time     metoprolol tartrate (LOPRESSOR) 100 MG tablet [METOPROLOL TARTRATE (LOPRESSOR) 100 MG TABLET] Take 1 tablet (100 mg total) by mouth 2 (two) times a day.  12/28/2021 at x1     nitroglycerin (NITROSTAT) 0.4 MG SL tablet [NITROGLYCERIN (NITROSTAT) 0.4 MG SL TABLET] Place 1 tablet (0.4 mg total) under the tongue every 5 (five) minutes as needed for chest pain.       ondansetron (ZOFRAN) 4 MG tablet Take 1 tablet (4 mg) by mouth every 8 hours as needed for nausea       rosuvastatin (CRESTOR) 5 MG tablet Take 10 mg by mouth At Bedtime  12/27/2021 at Unknown time     umeclidinium (INCRUSE ELLIPTA) 62.5 mcg/actuation DsDv inhaler [UMECLIDINIUM (INCRUSE ELLIPTA) 62.5 MCG/ACTUATION DSDV INHALER] Inhale 1 puff daily.  12/27/2021 at Unknown time       Information source(s): Family member and CareEverywhere/SureScripts  Method of interview communication: in-person    Summary of Changes to PTA Med List  New: Acetylcysteine   Discontinued: None  Changed: Crestor 15mg to 10mg rashel, duoneb bid PRN to once daily, famotidine  bid to PRN    Patient was asked about OTC/herbal products specifically.  PTA med list reflects this.    In the past week, patient estimated taking medication this percent of the time:  greater than 90%.    Allergies were reviewed, assessed, and updated with the patient.      Medications currently not available for use during hospital stay. Family/Patient representative states they will bring inhalers to [facility].    The information provided in this note is only as accurate as the sources available at the time of the update(s).    Thank you for the opportunity to participate in the care of this patient.    Daya Arriola, [facility]  12/28/2021 7:39 PM

## 2021-12-29 NOTE — OR NURSING
Pt. Transported to  Her Room via cart post-procedure.  Son Mateo updated on plan of care and notified of room number.

## 2021-12-30 NOTE — PLAN OF CARE
Problem: Risk for Delirium  Goal: Optimal Coping  Outcome: No Change  Goal: Improved Behavioral Control  Outcome: No Change  Goal: Improved Attention and Thought Clarity  Outcome: No Change  Goal: Improved Sleep  Outcome: No Change    Pt was santos very confused, ripped PIV off, wanting to leave, refusing to take meds until the son arrives. Disoriented, took a while to re-orient and to make her accept take prn meds for delirium/confusion. Will continue to monitor

## 2021-12-30 NOTE — PLAN OF CARE
Patient irritable upon arriving to room. Dismissive of myself during set up of IV's insisting that I stop bothering them. Reported nausea and zofran dissolving tablet given. Patient's son reported symptoms abated. patient was very tired and slept most of shift, woken for meds and assessments and was calm cooperative and pleasant, though confused. Patient was unaware of situation, person, referred to me as her son, and did not know where she was. Got up to go to the bathroom and had a BM assist of 1 and urinated 2 times this shift. Bed alarm is on. Patient positioned self on side and changed positions independently. IV potassium hung. At 1700 Potassium was 3.3 and protocol called for 2 administrations of K+

## 2021-12-30 NOTE — PROGRESS NOTES
Care Management Discharge Note    Discharge Date: 12/30/2021     Discharge Disposition: Home with family    Discharge Services:  OP f/u with PCP    Discharge DME: None    Discharge Transportation: family or friend will provide    Private pay costs discussed: Not applicable    PAS Confirmation Code:  Not applicable  Patient/family educated on Medicare website which has current facility and service quality ratings:      Education Provided on the Discharge Plan:    Persons Notified of Discharge Plans:   Patient/Family in Agreement with the Plan: yes    Handoff Referral Completed: Yes    Additional Information:  Chart reviewed and plan of care discussed with hospitalist. Plan to discharge home with family today with OP f/u with PCP.    PT vijay pending.    Francesca Mays RN

## 2021-12-30 NOTE — DISCHARGE SUMMARY
[facility]  Hospitalist Discharge Summary      Date of Admission:  12/28/2021  Date of Discharge:  12/30/2021 11:06 AM  Discharging Provider: Hugo Wilcox MD      Discharge Diagnoses   Hypokalemia, Nausea    Follow-ups Needed After Discharge   Follow-up Appointments     Follow-up and recommended labs and tests       Follow up with primary care provider, Wilbur Hannah, within 7 days for   hospital follow- up.  The following labs/tests are recommended: Potassium   and Magnesium Levels.             Unresulted Labs Ordered in the Past 30 Days of this Admission     No orders found from 11/28/2021 to 12/29/2021.      These results will be followed up by NA    Discharge Disposition   Discharged to home  Condition at discharge: Stable      Hospital Course    Yanna Handy is a 89 year old female with hx of asthma, dyslipidemia, CAD, hx esophageal stricture presents from [facility] after pre-procedure blood work revealed potassium = 2.6. Presented to the ED.      Hypokalemia:               Likely due to poor oral intake from nausea and ongoing loop diuretic use.   Ordered 20 MEq Daily replacement.   Should have one week recheck.    Hypomagnesemia   Likely from poor PO intake   Daily [place]   Recheck in 1 week.     Nausea               Outpatient work-up included a CT abdomen pelvis which was unrevealing for cause of nausea.  Plan is for EGD but patient was sent to the emergency department due to low potassium              Gastroenterology evaluating performed upper endoscopy which was grossly normal.  Biopsies pending.     Weight loss/Severe Malnutrition              Son notes weight loss over the past several months.  Possibly related to underlying nausea as well as dementia.   We have advanced diet and would add nutritional supplement at home if PO intake isn't improving in the next day or so.     Acute Metabolic Encephalopathy on top of Cognitive decline with likely  underlying dementia              No formal diagnosis as of yet              Patient recently moved in with her son and has been doing well.  She is pretty high functioning and able to take care of her self but he was concerned about her anxiety about living alone   She became more encephalopathic while here, required a low dose of seroquel x1.   Family would like to get her back home ASAP.  I agree with this plan.     They use lorazepam prn which will need to be followed closely outpatient due to it's narrow therapeutic window in the elderly.     Dyslipidemia, goal LDL below 70     Moderate persistent asthma: Resume home inhalers     Essential hypertension: Continue home medications     Osteoarthritis     Coronary artery disease due to lipid rich plaque: Patient symptom-free at this time       Consultations This Hospital Stay   SOCIAL WORK IP CONSULT  GASTROENTEROLOGY IP CONSULT  NUTRITION SERVICES ADULT IP CONSULT  PHARMACY IP CONSULT  PHYSICAL THERAPY ADULT IP CONSULT  OCCUPATIONAL THERAPY ADULT IP CONSULT    Code Status   Prior    Time Spent on this Encounter   IHugo MD, personally saw the patient today and spent greater than 30 minutes discharging this patient.       Hugo Wilcox MD  [place] [id number]  Phone: [phone]  Fax: [phone]  ______________________________________________________________________    Physical Exam   Vital Signs: Temp: 97.1  F (36.2  C) Temp src: Oral BP: (!) 167/86 Pulse: 85   Resp: 18 SpO2: 93 % O2 Device: Nasal cannula Oxygen Delivery: 2 LPM  Weight: 129 lbs 0 oz  Constitutional: awake, alert, cooperative, no apparent distress, and appears stated age and flushed  Eyes: Lids and lashes normal, pupils equal, round and reactive to light, extra ocular muscles intact, sclera clear, conjunctiva normal  Respiratory: No increased work of breathing, good air exchange, clear to auscultation  bilaterally, no crackles or wheezing  Cardiovascular: Normal apical impulse, regular rate and rhythm, normal S1 and S2, no S3 or S4, and no murmur noted  Skin: no bruising or bleeding       Primary Care Physician   Wiblur Hannah    Discharge Orders      Reason for your hospital stay    hypokalemia     Follow-up and recommended labs and tests     Follow up with primary care provider, Wilbur Hannah, within 7 days for hospital follow- up.  The following labs/tests are recommended: Potassium and Magnesium Levels.     Activity    Your activity upon discharge: activity as tolerated     Diet    Follow this diet upon discharge: Orders Placed This Encounter      Regular Diet Adult    Consider adding supplement like Boost or Ensure daily.       Significant Results and Procedures   Results for orders placed or performed in visit on 12/28/21   XR Chest 2 Views    Narrative    EXAM: XR CHEST 2 VW  LOCATION: [facility]  DATE/TIME: 12/28/2021 9:16 AM    INDICATION:  Moderate persistent asthma, unspecified whether complicated  COMPARISON: None.      Impression    IMPRESSION: Heart size mildly enlarged with left ventricular prominence similar to previous. Some fibrosis or chronic atelectasis in the lung bases greater on the right side also unchanged. No significant new findings.       Discharge Medications   Discharge Medication List as of 12/30/2021 10:51 AM      START taking these medications    Details   magnesium oxide (MAG-OX) 400 MG tablet Take 1 tablet (400 mg) by mouth daily, Disp-30 tablet, R-0, E-Prescribe      potassium chloride (KLOR-CON) 20 MEQ packet Take 20 mEq by mouth daily, Disp-30 packet, R-0, E-Prescribe         CONTINUE these medications which have NOT CHANGED    Details   acetaminophen (TYLENOL) 650 MG CR tablet [ACETAMINOPHEN (TYLENOL) 650 MG CR TABLET] Take 650 mg by mouth every 8 (eight) hours as needed for pain., Historical      acetylcysteine (MUCOMYST) 20 % neb solution Take 2  mLs by nebulization At Bedtime, Historical      albuterol (PROAIR HFA;PROVENTIL HFA;VENTOLIN HFA) 90 mcg/actuation inhaler [ALBUTEROL (PROAIR HFA;PROVENTIL HFA;VENTOLIN HFA) 90 MCG/ACTUATION INHALER] Inhale 2 puffs every 6 (six) hours as needed for wheezing., Disp-1 Inhaler, R-11, NormalMay substitute the equivalent medication per insurance preference.      anastrozole (ARIMIDEX) 1 MG tablet Take 1 tablet (1 mg) by mouth daily, Disp-90 tablet, R-3, E-Prescribe      calcium citrate-vitamin D (CITRACAL+D) 315-200 mg-unit per tablet Take 1 tablet by mouth At Bedtime , Historical      famotidine (PEPCID) 20 MG tablet Take 1 tablet (20 mg) by mouth 2 times daily, Disp-60 tablet, R-3, E-Prescribe      fluticasone furoate-vilanteroL (BREO ELLIPTA) 200-25 mcg/dose DsDv inhaler [FLUTICASONE FUROATE-VILANTEROL (BREO ELLIPTA) 200-25 MCG/DOSE DSDV INHALER] Inhale 1 puff daily. Rinse mouth with water, gargle,spit after each use, Disp-180 each, R-3, [id number] day supply      furosemide (LASIX) 20 MG tablet [FUROSEMIDE (LASIX) 20 MG TABLET] Take 1 tablet (20 mg total) by mouth daily., Disp-90 tablet, R-2, Normal      gabapentin (NEURONTIN) 100 MG capsule Taking 3 capsule by mouth in the morning, 1 capsule by mouth at noon and 2 capsules by mouth at bedtime daily., Disp-180 capsule, R-3, E-Prescribe      ipratropium-albuteroL (COMBIVENT RESPIMAT)  mcg/actuation Mist inhaler [IPRATROPIUM-ALBUTEROL (COMBIVENT RESPIMAT)  MCG/ACTUATION MIST INHALER] Inhale 1 puff 4 (four) times a day as needed., Disp-3 Inhaler, R-3, [id number] day supply      ipratropium-albuteroL (DUO-NEB) 0.5-2.5 mg/3 mL nebulizer [IPRATROPIUM-ALBUTEROL (DUO-NEB) 0.5-2.5 MG/3 ML NEBULIZER] Take 3 mL by nebulization 2 (two) times a day as needed., Disp-1,080 mL, R-3, Normal**Patient requests 90 days supply**      LACTOBACILLUS ACIDOPHILUS (ACIDOPHILUS ORAL) Take 1 tablet by mouth every morning , Historical      LORazepam (ATIVAN) 0.5 MG tablet TAKE ONE TABLET  BY MOUTH at bedtime AS NEEDED FOR ANXIETY and sleep, Disp-60 tablet, R-0, E-Prescribe      metoprolol tartrate (LOPRESSOR) 100 MG tablet [METOPROLOL TARTRATE (LOPRESSOR) 100 MG TABLET] Take 1 tablet (100 mg total) by mouth 2 (two) times a day., Disp-180 tablet, R-3, Normal      nitroglycerin (NITROSTAT) 0.4 MG SL tablet [NITROGLYCERIN (NITROSTAT) 0.4 MG SL TABLET] Place 1 tablet (0.4 mg total) under the tongue every 5 (five) minutes as needed for chest pain., Disp-2 Bottle, R-1, Normal      ondansetron (ZOFRAN) 4 MG tablet Take 1 tablet (4 mg) by mouth every 8 hours as needed for nausea, Disp-30 tablet, R-0, E-Prescribe      !! rosuvastatin (CRESTOR) 5 MG tablet Take 10 mg by mouth At Bedtime, Historical      umeclidinium (INCRUSE ELLIPTA) 62.5 mcg/actuation DsDv inhaler [UMECLIDINIUM (INCRUSE ELLIPTA) 62.5 MCG/ACTUATION DSDV INHALER] Inhale 1 puff daily., Disp-3 each, R-3, [id number] day supply      OXYGEN-AIR DELIVERY SYSTEMS MISC [OXYGEN-AIR DELIVERY SYSTEMS MISC] Use 2 L As Directed. 2L at bedtime  L[employer]areHistorical      prochlorperazine (COMPAZINE) 5 MG tablet Take 1 tablet (5 mg) by mouth every 6 hours as needed for nausea or vomiting, Disp-90 tablet, R-3, E-Prescribe      !! rosuvastatin (CRESTOR) 10 MG tablet [ROSUVASTATIN (CRESTOR) 10 MG TABLET] Take 1 tablet (10 mg total) by mouth at bedtime. Take with a 5 mg pill for a total of 15 mg nightly, Disp-90 tablet, R-3, Normal       !! - Potential duplicate medications found. Please discuss with provider.        Allergies   Allergies   Allergen Reactions     Alendronate [Alendronic Acid] Nausea and Vomiting     Isosorbide Unknown     Metoclopramide Hcl [Metoclopramide] Anxiety     Rofecoxib Diarrhea and Nausea     Ciprofloxacin      Doxycycline Nausea and Vomiting     Lisinopril Cough     Risedronate Unknown     Sulfa (Sulfonamide Antibiotics) [Sulfa Drugs] Anaphylaxis

## 2021-12-30 NOTE — PROGRESS NOTES
Some escalating behaviors overnight.  Seroquel given.  K is back to normal range.  Placement in home environment with son is the discharge plan.  Will have PT/OT eval to assess if home care would be helpful.  Full note to follow.

## 2021-12-31 NOTE — PROGRESS NOTES
"Clinic Care Coordination Contact  [facility]: Post-Discharge Note  SITUATION                                                      Admission:    Admission Date: 12/28/21   Reason for Admission: Hypokalemia, Nausea  Discharge:   Discharge Date: 12/30/21  Discharge Diagnosis: Hypokalemia, Nausea    BACKGROUND                                                      89 year old female with hx of asthma, dyslipidemia, CAD, hx esophageal stricture presents from [place] after pre-procedure blood work revealed potassium = 2.6.  Patient reports frequent nausea, weight loss for the last 5 months. Denies fever, cough, sore throat, diarrhea, headache, vomiting, dysuria, hematuria.  Workup for nausea and weight loss ongoing, CT abd/pelvis without cause for nausea found. GI planning EGD today, canceled due to low potassium.  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    ASSESSMENT      Enrollment  Primary Care Care Coordination Status: Declined    Discharge Assessment  How are you doing now that you are home?: \"Doing okay\"  How are your symptoms? (Red Flag symptoms escalate to triage hotline per guidelines): Improved  Do you feel your condition is stable enough to be safe at home until your provider visit?: Yes  Does the patient have their discharge instructions? : Yes  Does the patient have questions regarding their discharge instructions? : No  Were you started on any new medications or were there changes to any of your previous medications? : Yes  Does the patient have all of their medications?: Yes  Do you have questions regarding any of your medications? : No  Do you have all of your needed medical supplies or equipment (DME)?  (i.e. oxygen tank, CPAP, cane, etc.): Yes  Discharge follow-up appointment scheduled within 14 calendar days? : No  Is patient agreeable to assistance with scheduling? : No    Post-op ([facility] Only)  If the patient had a " surgery or procedure, do they have any questions for a nurse?: No      PLAN                                                      Outpatient Plan:    Follow-up and recommended labs and tests      Follow up with primary care provider, Wilbur Hannah, within 7 days for   hospital follow- up.  The following labs/tests are recommended: Potassium   and Magnesium Levels.     Future Appointments   Date Time Provider Department Center   1/27/2022  9:20 AM Wilbur Hannah MD [facility]   5/26/2022  9:40 AM Wilbur Hannah MD [facility]         For any urgent concerns, please contact our 24 hour nurse triage line: [phone] ([address] [place] [address])         CHELSY Johnston  [phone]  Connected Care Resource [facility]

## 2022-01-01 ENCOUNTER — MEDICAL CORRESPONDENCE (OUTPATIENT)
Dept: HEALTH INFORMATION MANAGEMENT | Facility: CLINIC | Age: 87
End: 2022-01-01
Payer: COMMERCIAL

## 2022-01-01 ENCOUNTER — NURSE TRIAGE (OUTPATIENT)
Dept: NURSING | Facility: CLINIC | Age: 87
End: 2022-01-01
Payer: COMMERCIAL

## 2022-01-01 ENCOUNTER — DOCUMENTATION ONLY (OUTPATIENT)
Dept: OTHER | Facility: CLINIC | Age: 87
End: 2022-01-01
Payer: COMMERCIAL

## 2022-01-01 DIAGNOSIS — G93.41 METABOLIC ENCEPHALOPATHY: Primary | ICD-10-CM

## 2022-01-01 DIAGNOSIS — R52 PAIN: ICD-10-CM

## 2022-01-01 DIAGNOSIS — E43 SEVERE PROTEIN-CALORIE MALNUTRITION (H): ICD-10-CM

## 2022-01-03 NOTE — TELEPHONE ENCOUNTER
Outbound call to sonMateo. Given [facility] Intake number [phone] to call to set up appointment.   Relayed message from Jeane Woodward below.     Encouraged to call back with additional concerns. He verbalizes understanding and agreement with this plan.     Kadie Miller RN   01/03/22 12:31 PM  [facility] Nurse Advisor

## 2022-01-03 NOTE — TELEPHONE ENCOUNTER
Triage call:     Outbound call to patient's son, Mateo. He is home and currently with patient.   She is having pain in back and arms   Taking Gabapentin, Lorazepam 5mg, Hydrocodone/acetaminophen 5-325 (1/2 pill)  Crushing pills and encouraging fluids.  Requiring support to walk and go to the bathroom.   He has discussed hospice and end of life care with patient and she is agreeable. They are focused on comfort cares.     Advised 911 to assess patient and hospital evaluation for Hospice. Son states that this is not an option as it was traumatizing for patient last week and he doesn't think she could handle a transport to the hospital.   She is heard calling for him in the background of the call.   Will await message from clinic on hospice referral and follow up with Mateo to advise next steps.     Kadie Miller RN   01/03/22 10:12 AM  [facility] Nurse Advisor

## 2022-01-03 NOTE — TELEPHONE ENCOUNTER
Reason for Disposition    SEVERE weakness (i.e., unable to walk or barely able to walk, requires support) and new onset or worsening    Additional Information    Negative: Severe difficulty breathing (e.g., struggling for each breath, speaks in single words)    Negative: Shock suspected (e.g., cold/pale/clammy skin, too weak to stand, low BP, rapid pulse)    Negative: Difficult to awaken or acting confused (e.g., disoriented, slurred speech)    Negative: Fainted > 15 minutes ago and still feels too weak or dizzy to stand    Protocols used: WEAKNESS (GENERALIZED) AND FATIGUE-A-OH

## 2022-01-03 NOTE — TELEPHONE ENCOUNTER
Telephone call:     Daughter in law is calling to inquire about hospice and end of life care for Yanna.   She is living with them and has been declining since her discharge from the hospital 12/30  Difficulty swallowing with applesauce   Hard to suck through straw to get water  She is requesting Hospice referral   Patient is complaining of pain and discomfort. They have not been giving her any medications except for a hydrocodone/acetaminophen that is not prescribed to her.   Requesting liquid medication for comfort.     She states Dr. Faulkner is out of office today. Could someone please advise?   SonMateo can be reached at [phone].    Kadie Miller RN   01/03/22 9:46 AM  [facility] Nurse Advisor

## 2022-01-03 NOTE — TELEPHONE ENCOUNTER
Kadie @ [facility] Triage nurse called and is requesting to have a return call from Dr Hannah or covering provider regarding patient. Ok to call patient/son to discuss next steps with patient.     Yvonne Fall

## 2022-01-03 NOTE — TELEPHONE ENCOUNTER
Hospice referral has been placed in an Urgent/STAT manner. She can try to increase the Hydrocodone to one tablet for pain control from a half tablet.

## 2022-01-03 NOTE — TELEPHONE ENCOUNTER
Kadie,  Triage nurse called back and stated she would like like to have Shelly Woodward respond to and route the message back to her as soon as possible. She called over ago and is still waiting for a response to her message she left at 10:00am.    Yvonne Fall

## 2022-02-18 ENCOUNTER — MEDICAL CORRESPONDENCE (OUTPATIENT)
Dept: HEALTH INFORMATION MANAGEMENT | Facility: CLINIC | Age: 87
End: 2022-02-18
Payer: COMMERCIAL

## 2022-04-25 NOTE — PROGRESS NOTES
Progress Notes by Ananya Schaefer MD at 8/29/2017  9:10 AM     Author: Ananya Schaefer MD Service: -- Author Type: Physician    Filed: 8/29/2017 10:00 AM Encounter Date: 8/29/2017 Status: Signed    : Ananya Schaefer MD (Physician)                  [email or web address]           Thank you Dr. Hannah for asking the [facility] team to participate in the care of your patient, Yanna Handy.     Impression and Plan     1. Coronary artery disease.  Yanna has known coronary artery disease.  Specifically, Yanna underwent coronary angiography on 15 September 2014 and confirmed the presence of significant LAD disease. She had successful drug-eluting stent placement to the proximal, as well as mid LAD.     Regadenoson nuclear perfusion study 10 October 2016 was favorable and revealed no evidence of infarct or ischemia (see Cardiac Diagnostics section below).    This is stable.  Patient denies any recurrent angina type chest pain.       2. Hypertension. Blood pressure is reasonable in the office today at 136/62 mmHg and plan to continue the current therapy.     3. Dyslipidemia. Yanna states that she recently had her rosuvastatin increased to 15 mg daily.  I will defer continued monitoring and treatment to her discretion.    Plan on follow-up in one year.          History of Present Illness    Once again I would like to thank you again for asking me to participate in the care of your patient, Yanna Handy.  As you know, but to reiterate for my own records, Yanna Handy is a 86 y.o. female with known coronary artery disease. I initially saw Yanna for evaluation for shortness of breath. As part of her workup, she did undergo nuclear perfusion imaging, which did suggest evidence of ischemia. Patient was somewhat reticent to pursue direct angiography and ultimately CT angiography was performed for further evaluation. This did reveal a proximal LAD stenosis  "of 70-80% as well as moderate disease in the obtuse marginal.     The patient subsequently underwent coronary angiography on 15 September 2014 and confirmed the presence of significant LAD disease. She had successful drug-eluting stent placement to the proximal, as well as mid LAD.      In follow-up today, Yanna minimizes any recurrent chest pain symptoms.  She states her breathing is comfortable.  She reports no palpitations or lightheadedness.    Further review of systems is otherwise negative/noncontributory (medical record and 13 point review of systems reviewed as well and pertinent positives noted).         Cardiac Diagnostics      Echocardiogram 28 July 2017:  1. Normal left ventricular size and systolic performance with ejection fraction of 50-55%.  2. Mild concentric increase in left ventricular wall thickness.  3. Mild aortic insufficiency.  4. Normal right ventricular size and systolic performance.  5. Normal atrial dimensions.  6. Mild aortic root enlargement.     Echocardiogram (personally reviewed) 20 August 2014:  1. Normal LV size and function with ejection fraction of 55%.   2. Mild aortic insufficiency.  3. Mild, to possibly mild-moderate, mitral insufficiency.   4. Mild left atrial enlargement.    Regadenoson nuclear perfusion study 10 October 2016:  1. No evidence of infarct or ischemia.  2. Normal left ventricular systolic performance with ejection fraction of 70%.    Nuclear perfusion study 20 August 2014 (pre-coronary angiogram which was performed 15 September 2014):  1. Medium-sized area of ischemia involving the mid-distal inferolateral and apical segments consistent with ischemia.  2. Ejection fraction 70%.    Coronary angiogram performed 15 September 2014:  1. 75% stenosis involving the proximal LAD and a 70% stenosis involving the mid LAD.   2. Both were successfully revascularized with drug-eluting stents.   3. The circumflex had \"minimal\" disease.   4. Right coronary artery was " "\"normal.\"  12-lead ECG (personally reviewed) 29 August 2017: Sinus rhythm.  Mild nonspecific T-wave change.  Borderline LVH.         Physical Examination       /62 (Patient Site: Right Arm, Patient Position: Sitting, Cuff Size: Adult Regular)  Pulse 60  Resp 16  Ht 5' 0.5\" (1.537 m)  Wt 169 lb (76.7 kg)  LMP 07/11/1971  BMI 32.46 kg/m2        Wt Readings from Last 3 Encounters:   08/29/17 169 lb (76.7 kg)   08/24/17 169 lb 12.8 oz (77 kg)   08/18/17 169 lb 11.2 oz (77 kg)     The patient is alert and oriented times three. Sclerae are anicteric. Mucosal membranes are moist. Jugular venous pressure is normal. No significant adenopathy/thyromegally appreciated. Lungs are clear with good expansion. On cardiovascular exam, the patient has a regular S1 and S2. Abdomen is soft and non-tender. Extremities reveal no clubbing, cyanosis, or edema.         Family History/Social History/Risk Factors   Patient does not smoke.  Family history of hypertension.         Medications  Allergies   Current Outpatient Prescriptions   Medication Sig Dispense Refill   ? amoxicillin (AMOXIL) 500 MG tablet Two before Dental procedure     ? ascorbic acid (VITAMIN C) 1000 MG tablet Take 1,000 mg by mouth every morning.      ? aspirin 81 MG EC tablet Take 81 mg by mouth bedtime.     ? calcium citrate-vitamin D (CITRACAL+D) 315-200 mg-unit per tablet Take 1 tablet by mouth bedtime.     ? cholecalciferol, vitamin D3, 400 unit Tab Take 800 Units by mouth every morning.      ? ciprofloxacin HCl (CIPRO) 250 MG tablet TAKE ONE-HALF TABLET BY MOUTH EVERY  tablet 0   ? cyanocobalamin (VITAMIN B-12) 50 mcg tablet Take 50 mcg by mouth every morning.      ? DOCOSAHEXANOIC ACID/EPA (FISH OIL ORAL) Take 2 g by mouth daily.     ? docusate sodium (COLACE) 100 MG capsule Take 100 mg by mouth bedtime.     ? fluticasone-vilanterol (BREO ELLIPTA) 200-25 mcg/dose DsDv Inhale 1 puff daily. 1 each 11   ? furosemide (LASIX) 20 MG tablet TAKE ONE " TABLET EVERY DAY 90 tablet 3   ? hydrocortisone 1 % ointment      ? ipratropium-albuterol (COMBIVENT RESPIMAT)  mcg/actuation Mist inhaler Inhale 1 puff 4 (four) times a day. 1 Inhaler 5   ? ipratropium-albuterol (DUO-NEB) 0.5-2.5 mg/mL nebulizer INHALE 1 VIAL VIA NEBULIZER EVERY 6 HOURS 1080 mL 3   ? LACTOBACILLUS ACIDOPHILUS (ACIDOPHILUS ORAL) Take 1 tablet by mouth every morning.      ? magnesium 250 mg Tab Take 250 mg by mouth every morning.     ? metoprolol tartrate (LOPRESSOR) 100 MG tablet TAKE ONE TABLET BY MOUTH TWICE A DAY (Patient taking differently: 50mg BID) 360 tablet 1   ? multivitamin (MULTIVITAMIN) per tablet Take 1 tablet by mouth every morning.      ? nitroglycerin (NITROSTAT) 0.4 MG SL tablet Place 1 tablet (0.4 mg total) under the tongue every 5 (five) minutes as needed for chest pain. 25 tablet 3   ? nortriptyline (PAMELOR) 50 MG capsule TAKE ONE CAPSULE BY MOUTH EVERY DAY AT BEDTIME 90 capsule 1   ? omeprazole (PRILOSEC) 20 MG capsule Take 20 mg by mouth every morning.      ? rosuvastatin (CRESTOR) 5 MG tablet Take 3 tablets (15 mg total) by mouth at bedtime. 270 tablet 3   ? tiotropium bromide (SPIRIVA RESPIMAT) 2.5 mcg/actuation Mist Inhale 2 puffs daily. 4 g 0   ? nitroglycerin (NITROSTAT) 0.4 MG SL tablet Place 1 tablet (0.4 mg total) under the tongue every 5 (five) minutes as needed for chest pain. 30 tablet prn     No current facility-administered medications for this visit.       Allergies   Allergen Reactions   ? Alendronate Nausea And Vomiting   ? Metoclopramide Hcl Anxiety   ? Rofecoxib Diarrhea and Nausea Only   ? Risedronate    ? Sulfa (Sulfonamide Antibiotics) Anaphylaxis          Lab Results   Lab Results   Component Value Date     07/27/2017    K 4.3 07/27/2017     07/27/2017    CO2 25 07/27/2017    BUN 11 07/27/2017    CREATININE 0.82 07/27/2017    CALCIUM 9.3 07/27/2017     Lab Results   Component Value Date    WBC 10.1 06/16/2017    WBC 6.7 08/21/2015    HGB  12.9 07/27/2017    HCT 41.4 06/16/2017    MCV 89 06/16/2017     06/16/2017     Lab Results   Component Value Date    CHOL 205 (H) 06/16/2017    TRIG 114 06/16/2017    HDL 73 06/16/2017    LDLCALC 109 06/16/2017     Lab Results   Component Value Date    INR 1.09 01/12/2015     Lab Results   Component Value Date    BNP 73 05/05/2016     Lab Results   Component Value Date    CKTOTAL 66 08/30/2016    TROPONINI <0.01 01/14/2015    TROPONINI 0.01 03/28/2012     Lab Results   Component Value Date    TSH 3.4 05/14/2014                                  n/a

## 2022-05-05 ENCOUNTER — TELEPHONE (OUTPATIENT)
Dept: INTERNAL MEDICINE | Facility: CLINIC | Age: 87
End: 2022-05-05

## 2022-05-05 ENCOUNTER — DOCUMENTATION ONLY (OUTPATIENT)
Dept: OTHER | Facility: CLINIC | Age: 87
End: 2022-05-05
Payer: COMMERCIAL

## 2022-05-05 NOTE — TELEPHONE ENCOUNTER
Patient's son Mateo called and states that he missed a call from Mariola (no message in chart). Mateo states that patient  on 22 and would like care team to know. Please call if there are any questions thanks.

## 2023-02-06 NOTE — TELEPHONE ENCOUNTER
Patient informed. She expressed understanding. She is going to call Dr. Schaefer of cardiology to follow-up with them.  
Stress test was normal. No change comparing to test done in 7/2018.  ECHO is stable, but has to be reviewed by her Cardiologist since they are some changes on the aortic valve. Please ask to schedule f/u with her Cardiologist.  
Test Results  Who is calling?:  Lara Patient  Who ordered the test:  Wilbur Hannah MD  Type of test: Other: Stress test and echo cardiogram  Date of test: 11/23/2020  Where [facility] the [facility]  What are your questions/concerns?:  Please call with results.   Okay to leave a detailed message?:  Yes    
3 = A little assistance

## (undated) DEVICE — TUBING SUCTION MEDI-VAC 1/4"X20' N620A - HE

## (undated) DEVICE — SUCTION MANIFOLD NEPTUNE 2 SYS 1 PORT 702-025-000

## (undated) DEVICE — SOL WATER IRRIG 1000ML BOTTLE 2F7114